# Patient Record
Sex: MALE | Race: WHITE | NOT HISPANIC OR LATINO | Employment: OTHER | URBAN - METROPOLITAN AREA
[De-identification: names, ages, dates, MRNs, and addresses within clinical notes are randomized per-mention and may not be internally consistent; named-entity substitution may affect disease eponyms.]

---

## 2017-01-09 RX ORDER — FLUTICASONE PROPIONATE 50 MCG
2 SPRAY, SUSPENSION (ML) NASAL DAILY
Qty: 16 G | Refills: 12 | Status: SHIPPED | OUTPATIENT
Start: 2017-01-09 | End: 2017-02-16 | Stop reason: SDUPTHER

## 2017-02-16 RX ORDER — FLUTICASONE PROPIONATE 50 MCG
2 SPRAY, SUSPENSION (ML) NASAL DAILY
Qty: 48 G | Refills: 0 | Status: SHIPPED | OUTPATIENT
Start: 2017-02-16 | End: 2017-06-11 | Stop reason: SDUPTHER

## 2017-04-13 ENCOUNTER — TELEPHONE (OUTPATIENT)
Dept: INTERNAL MEDICINE | Facility: CLINIC | Age: 72
End: 2017-04-13

## 2017-04-13 DIAGNOSIS — J30.9 CHRONIC ALLERGIC RHINITIS: Primary | ICD-10-CM

## 2017-04-13 NOTE — TELEPHONE ENCOUNTER
----- Message from Magdaleno Mcnamara sent at 4/13/2017  8:30 AM CDT -----  Contact: self/ [phone] home  Type: Referral to a Specialist    Where would you like a referral to? Allergist    Have you previously requested? yes    Is the physician within the [facility]? Yes    Name and phone number of specialist: Advisement of PCP    Reason for appointment: Seasonal allergies    Is an appointment scheduled with specialist? When? no    Comments:  Pt would like to request a referral for the specialist above.  He would like a call back to discuss.  Please call and advise.      Thank you

## 2017-04-13 NOTE — TELEPHONE ENCOUNTER
Spoke to patient and states that he talked to Dr. Friedman is pass regarding seeing an allergist----patient would like to get a referral for seasonal allergies and recently had an allergic reaction to something that he went to Derm for and would like to discuss that with the allergist also---pls advise

## 2017-06-10 ENCOUNTER — DOCUMENTATION ONLY (OUTPATIENT)
Dept: INTERNAL MEDICINE | Facility: CLINIC | Age: 72
End: 2017-06-10

## 2017-06-10 NOTE — PROGRESS NOTES
Pre-Visit Review & Summary:    73 y/o male with history of HTN, Pre-DM, DJD L/S Spine, Allergic Rhinitis, and BPH has a scheduled appt 6/22/17 for 6 month follow up.    He had previously been in the SPRINT Hypertension Research Trial, which has since ended. He had been randomized to the Intensive [facility] with Systolic BP Goal of <120. He has done well and BP is controlled on Atenolol 25 mg/day, Amlodipine 5 mg/day, and Losartan 100 mg/day.       PMH:    1. HTN          Atenolol 25 mg/day, Amlodipine 5 mg/day, Losartan 100 mg/day          SPRINT HTN Trial          (Intolerant of 10 mg Amlodipine)    2. Pre-DM          Hgb A1c (12/2016): 5.8    3. DJD L/S           Sx improved with low back exercises    4. Allergic Rhinitis           Zyrtec prn, Flonase Nasal [place] prn           Sx include seasonal nasal congestion    5. Hx of Kidney Stones           Cystoscopy with retrieval of Stone 2011    6. Basal Cell CA Right Ear             Excised 2011; no recurrence           Followed yearly [facility] - appt 12/8/16    7. BPH         Tamsulosin 0.4 mg/day         Proscar 5 mg/day         Followed by Urologist, Dr. Beatty    8. Hx of Colon Polyps         Last Colonoscopy 1/2013 - benign polyp; told to repeat 1/2018      MEDS:        ASA 81 mg/day        Amlodipine 5 mg/day        Atenolol 25 mg/day        Flonase Nasal [place] prn        Losartan 100 mg/day        Proscar 5 mg/day        Tamsulosin 0.4 mg/day        Zyrtec prn    ALLERGIES:        ACE Inhibitors        Chlorthalidone - hyponatremia        Intolerant of 10 mg Amlodipine    Surgical Hx:       TURP       Hemorrhoidectomy       Pilonidal Cystectomy       Right Inguinal Hernia Repair       Cystoscopy       Orchiectomy for Undescended Testicle as a child       Basal Cell CA Excision Right Ear 2011    Social Hx:      Smoking Hx: Former Smoker (10 pk yrs) - Quit 1976      ETOH: Rare      Exercise: Walks 30-45 min/day      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       Home Environment: Single      (+) Sexually Active - Female      (-) Hx of STDs    Family Hx:      (+) HTN - Father      (+) DM - Father      (+) HD - Father      (+) Dementia - Mother    Preventive Health Screening & Recent Lab Results:              Annual PE: 11/11/15              CBC (12/2016): 13.4/40.1              CMP (12/2016): normal              TSH (10/2014): 1.886              Lipids (10/2016): Chol-170, TG-68, HDL-48, LDL-108              Hgb A1c (10/2014): 6.2                             (11/2015): 6.0                             (12/2016): 5.8              PSA - done at Urologist office              Eye Exam - 3/2015 - Dr. Meyers on [address]              Colonoscopy: 1/2013 - benign polyp; repeat 2018              Immunizations                            Influenza - 9/2016                          Tdp - Nov, 2009                          Prevnar 13 - 11/11/15                          Pneumovax - 10/2014                          Zostovax - 12/2014              Other:                    EKG (3/2015): NSR                    Anemia W/U (11/2014): normal S.Fe/TIBC, and normal Ferritin    IMP:  1. HTN  2. Pre-DM  3. AR  4. BPH  5. Hx of colon Polyps  6. Hx of Kidney Stones  7. Hx of Basal Cell CA

## 2017-06-12 RX ORDER — FLUTICASONE PROPIONATE 50 MCG
SPRAY, SUSPENSION (ML) NASAL
Qty: 16 G | Refills: 0 | Status: SHIPPED | OUTPATIENT
Start: 2017-06-12 | End: 2017-07-17 | Stop reason: SDUPTHER

## 2017-06-22 ENCOUNTER — OFFICE VISIT (OUTPATIENT)
Dept: INTERNAL MEDICINE | Facility: CLINIC | Age: 72
End: 2017-06-22
Payer: MEDICARE

## 2017-06-22 VITALS
BODY MASS INDEX: 24.79 KG/M2 | WEIGHT: 163.56 LBS | DIASTOLIC BLOOD PRESSURE: 76 MMHG | HEIGHT: 68 IN | HEART RATE: 68 BPM | SYSTOLIC BLOOD PRESSURE: 126 MMHG

## 2017-06-22 DIAGNOSIS — N40.1 BPH (BENIGN PROSTATIC HYPERTROPHY) WITH URINARY OBSTRUCTION: ICD-10-CM

## 2017-06-22 DIAGNOSIS — N13.8 BPH (BENIGN PROSTATIC HYPERTROPHY) WITH URINARY OBSTRUCTION: ICD-10-CM

## 2017-06-22 DIAGNOSIS — R73.03 PRE-DIABETES: ICD-10-CM

## 2017-06-22 DIAGNOSIS — Z87.442 HISTORY OF KIDNEY STONES: ICD-10-CM

## 2017-06-22 DIAGNOSIS — Z86.010 HISTORY OF COLON POLYPS: ICD-10-CM

## 2017-06-22 DIAGNOSIS — I10 ESSENTIAL HYPERTENSION: Primary | ICD-10-CM

## 2017-06-22 PROCEDURE — 99213 OFFICE O/P EST LOW 20 MIN: CPT | Mod: PBBFAC | Performed by: INTERNAL MEDICINE

## 2017-06-22 PROCEDURE — 99999 PR PBB SHADOW E&M-EST. PATIENT-LVL III: CPT | Mod: PBBFAC,,, | Performed by: INTERNAL MEDICINE

## 2017-06-22 PROCEDURE — 99214 OFFICE O/P EST MOD 30 MIN: CPT | Mod: S$PBB,,, | Performed by: INTERNAL MEDICINE

## 2017-06-22 PROCEDURE — 1159F MED LIST DOCD IN RCRD: CPT | Mod: ,,, | Performed by: INTERNAL MEDICINE

## 2017-06-22 NOTE — PROGRESS NOTES
Subjective:       Patient ID: Jd Dill III is a 72 y.o. male.    Chief Complaint: Follow-up    71 y/o male with history of HTN, Pre-DM, DJD L/S Spine, Allergic Rhinitis, and BPH comes in for 6 month follow up.    He had previously been in the SPRINT Hypertension Research Trial, which has since ended. He had been randomized to the Intensive Care Group with Systolic BP Goal of <120. He has done well and BP is controlled on Atenolol 25 mg/day, Amlodipine 5 mg/day, and Losartan 100 mg/day.     He notes he had TURP for BPH with Dr. Quispe April, 2017. Post-op, he developed diffuse stinging rash. He saw a Dermatologist, Dr. Martines, who treated with TAC Cream and Atarax. Rash resolved. Subsequent patch testing May, 2017 was negative. No recurrence of rash since then. He is urinating well on Proscar and Flomax. He has follow up with Dr. Quispe in September, 2017.      PMH:    1. HTN          Atenolol 25 mg/day, Amlodipine 5 mg/day, Losartan 100 mg/day          SPRINT HTN Trial          (Intolerant of 10 mg Amlodipine)    2. Pre-DM          Hgb A1c (12/2016): 5.8    3. DJD L/S           Sx improved with low back exercises    4. Allergic Rhinitis           Zyrtec prn, Flonase Nasal [place] prn           Sx include seasonal nasal congestion    5. Hx of Kidney Stones           Cystoscopy with retrieval of Stone 2011    6. Basal Cell CA Right Ear             Excised 2011; no recurrence           Followed yearly [facility] - appt 12/8/16    7. BPH         Tamsulosin 0.4 mg/day         Proscar 5 mg/day         Followed by Urologist, Dr. Beatty    8. Hx of Colon Polyps         Last Colonoscopy 1/2013 - benign polyp; told to repeat 1/2018      MEDS:        ASA 81 mg/day        Amlodipine 5 mg/day        Atenolol 25 mg/day        Flonase Nasal [place] prn        Losartan 100 mg/day        Proscar 5 mg/day        Tamsulosin 0.4 mg/day        Zyrtec prn    ALLERGIES:        ACE Inhibitors        Chlorthalidone - hyponatremia         Intolerant of 10 mg Amlodipine    Surgical Hx:       TURP       Hemorrhoidectomy       Pilonidal Cystectomy       Right Inguinal Hernia Repair       Cystoscopy       Orchiectomy for Undescended Testicle as a child       Basal Cell CA Excision Right Ear 2011    Social Hx:      Smoking Hx: Former Smoker (10 pk yrs) - Quit 1976      ETOH: Rare      Exercise: Walks 30-45 min/day      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      Home Environment: Single      (+) Sexually Active - Female      (-) Hx of STDs    Family Hx:      (+) HTN - Father      (+) DM - Father      (+) HD - Father      (+) Dementia - Mother    Preventive Health Screening & Recent Lab Results:              Annual PE: 11/11/15              CBC (12/2016): 13.4/40.1              CMP (12/2016): normal              TSH (10/2014): 1.886              Lipids (10/2016): Chol-170, TG-68, HDL-48, LDL-108              Hgb A1c (10/2014): 6.2                             (11/2015): 6.0                             (12/2016): 5.8              PSA - done at Urologist office              Eye Exam - 3/2015 - Dr. Meyers on [address]              Colonoscopy: 1/2013 - benign polyp; repeat 2018              Immunizations                            Influenza - 9/2016                          Tdp - Nov, 2009                          Prevnar 13 - 11/11/15                          Pneumovax - 10/2014                          Zostovax - 12/2014              Other:                    EKG (3/2015): NSR                    Anemia W/U (11/2014): normal S.Fe/TIBC, and normal Ferritin            Review of Systems   Constitutional: Negative.  Negative for fever.   HENT: Negative for trouble swallowing.    Respiratory: Negative for shortness of breath and wheezing.    Cardiovascular: Negative for chest pain, palpitations and leg swelling.   Gastrointestinal: Negative.  Negative for abdominal pain.   Genitourinary: Negative for difficulty urinating and dysuria.   Musculoskeletal: Negative.     Neurological: Negative for dizziness, syncope, weakness, light-headedness and headaches.       Objective:      Physical Exam   Constitutional: He is oriented to person, place, and time. He appears well-developed and well-nourished.   HENT:   Head: Normocephalic.   Cardiovascular: Normal rate, regular rhythm and normal heart sounds.  Exam reveals no gallop.    No murmur heard.  Pulmonary/Chest: Effort normal. No respiratory distress. He has no wheezes. He has no rales.   Abdominal: Soft.   Musculoskeletal: He exhibits no edema.   Neurological: He is alert and oriented to person, place, and time.   Skin: Skin is warm and dry.       Assessment:   1. HTN - well controlled  2. Pre-DM  3. AR  4. BPH  5. Hx of colon Polyps  6. Hx of Kidney Stones  7. Hx of Basal Cell CA    Plan:   1. RTC in 6 months for Annual PE

## 2017-07-17 RX ORDER — FLUTICASONE PROPIONATE 50 MCG
SPRAY, SUSPENSION (ML) NASAL
Qty: 16 G | Refills: 0 | Status: SHIPPED | OUTPATIENT
Start: 2017-07-17 | End: 2017-07-18 | Stop reason: SDUPTHER

## 2017-07-18 RX ORDER — FLUTICASONE PROPIONATE 50 MCG
SPRAY, SUSPENSION (ML) NASAL
Qty: 16 G | Refills: 0 | Status: SHIPPED | OUTPATIENT
Start: 2017-07-18 | End: 2017-07-20 | Stop reason: SDUPTHER

## 2017-07-18 NOTE — TELEPHONE ENCOUNTER
----- Message from Brandee Griffith sent at 7/18/2017  4:01 PM CDT -----  Contact: [facility][phone]      RX request - refill or new RX.  Is this a refill or new RX:  Refill  RX name and strength: fluticasone (FLONASE) 50 mcg/actuation nasal spray  Directions:   Is this a 30 day or 90 day RX:  90 days  Pharmacy name and phone #: [facility] [id number] [facility] - [address].  # [phone]  Comments:  Please call and advise.       Thank you!!!

## 2017-07-20 RX ORDER — FLUTICASONE PROPIONATE 50 MCG
SPRAY, SUSPENSION (ML) NASAL
Qty: 48 G | Refills: 2 | Status: SHIPPED | OUTPATIENT
Start: 2017-07-20 | End: 2017-10-20

## 2017-08-05 DIAGNOSIS — I10 ESSENTIAL HYPERTENSION: ICD-10-CM

## 2017-08-05 RX ORDER — LOSARTAN POTASSIUM 100 MG/1
TABLET ORAL
Qty: 90 TABLET | Refills: 3 | Status: SHIPPED | OUTPATIENT
Start: 2017-08-05 | End: 2017-12-12 | Stop reason: SDUPTHER

## 2017-08-21 ENCOUNTER — TELEPHONE (OUTPATIENT)
Dept: INTERNAL MEDICINE | Facility: CLINIC | Age: 72
End: 2017-08-21

## 2017-08-21 DIAGNOSIS — I10 ESSENTIAL HYPERTENSION: Primary | ICD-10-CM

## 2017-08-21 RX ORDER — METOPROLOL SUCCINATE 25 MG/1
25 TABLET, EXTENDED RELEASE ORAL DAILY
Qty: 90 TABLET | Refills: 3 | Status: SHIPPED | OUTPATIENT
Start: 2017-08-21 | End: 2017-12-12 | Stop reason: SDUPTHER

## 2017-08-21 NOTE — TELEPHONE ENCOUNTER
Please call patient and let him know we are substituting a similar once a day medication called Metoprolol succinate 25 mg for the Atenolol 25 mg. It is also a once a day prescription.

## 2017-08-21 NOTE — TELEPHONE ENCOUNTER
----- Message from Dianna Hemphill sent at 8/21/2017 11:22 AM CDT -----  Contact: [facility] [phone]  Prescription Clarification:     The pharmacy needs clarity on the following RX:    atenolol (TENORMIN) 25 MG tablet    The 25 mg and the 50 mg is on back order. Please send a substitute RX for this medication.    Thank You

## 2017-09-04 RX ORDER — FLUTICASONE PROPIONATE 50 MCG
SPRAY, SUSPENSION (ML) NASAL
Qty: 1 BOTTLE | Refills: 2 | Status: SHIPPED | OUTPATIENT
Start: 2017-09-04 | End: 2017-10-20 | Stop reason: SDUPTHER

## 2017-09-11 ENCOUNTER — TELEPHONE (OUTPATIENT)
Dept: INTERNAL MEDICINE | Facility: CLINIC | Age: 72
End: 2017-09-11

## 2017-09-11 NOTE — TELEPHONE ENCOUNTER
----- Message from Magdaleno Mcnamara sent at 9/11/2017  2:46 PM CDT -----  Contact: self/ [phone] cell  Pt is calling because he received a letter regarding Dr. [facility].  He would like to discuss a recommendation for a new physician.  He would like a call back today, If possible.  Please call and advise.    Thank you

## 2017-10-19 RX ORDER — FLUTICASONE PROPIONATE 50 MCG
2 SPRAY, SUSPENSION (ML) NASAL DAILY
Qty: 48 G | Refills: 0 | Status: CANCELLED | OUTPATIENT
Start: 2017-10-19

## 2017-10-20 RX ORDER — FLUTICASONE PROPIONATE 50 MCG
2 SPRAY, SUSPENSION (ML) NASAL DAILY
Qty: 3 BOTTLE | Refills: 0 | Status: SHIPPED | OUTPATIENT
Start: 2017-10-20 | End: 2017-12-12 | Stop reason: SDUPTHER

## 2017-10-20 NOTE — TELEPHONE ENCOUNTER
----- Message from Annabelle Rouse sent at 10/20/2017 11:29 AM CDT -----  Contact: [facility] [phone]  RX request - refill or new RX.  Is this a refill or new RX:  refill  RX name and strength: fluticasone (FLONASE) 50 mcg/actuation nasal spray  Directions:   Is this a 30 day or 90 day RX:  90  Pharmacy name and phone # : [facility] [phone]  Comments:

## 2017-12-12 ENCOUNTER — OFFICE VISIT (OUTPATIENT)
Dept: INTERNAL MEDICINE | Facility: CLINIC | Age: 72
End: 2017-12-12
Payer: MEDICARE

## 2017-12-12 ENCOUNTER — TELEPHONE (OUTPATIENT)
Dept: INTERNAL MEDICINE | Facility: CLINIC | Age: 72
End: 2017-12-12

## 2017-12-12 ENCOUNTER — LAB VISIT (OUTPATIENT)
Dept: LAB | Facility: HOSPITAL | Age: 72
End: 2017-12-12
Attending: INTERNAL MEDICINE
Payer: MEDICARE

## 2017-12-12 VITALS
HEART RATE: 83 BPM | WEIGHT: 170.44 LBS | OXYGEN SATURATION: 98 % | HEIGHT: 68 IN | DIASTOLIC BLOOD PRESSURE: 64 MMHG | BODY MASS INDEX: 25.83 KG/M2 | SYSTOLIC BLOOD PRESSURE: 118 MMHG

## 2017-12-12 DIAGNOSIS — R09.81 NASAL CONGESTION: Primary | ICD-10-CM

## 2017-12-12 DIAGNOSIS — I10 ESSENTIAL HYPERTENSION: ICD-10-CM

## 2017-12-12 DIAGNOSIS — N13.8 BENIGN PROSTATIC HYPERPLASIA WITH URINARY OBSTRUCTION: ICD-10-CM

## 2017-12-12 DIAGNOSIS — R73.03 PRE-DIABETES: ICD-10-CM

## 2017-12-12 DIAGNOSIS — N40.1 BENIGN PROSTATIC HYPERPLASIA WITH URINARY OBSTRUCTION: ICD-10-CM

## 2017-12-12 LAB
ALBUMIN SERPL BCP-MCNC: 4.1 G/DL
ALP SERPL-CCNC: 67 U/L
ALT SERPL W/O P-5'-P-CCNC: 20 U/L
ANION GAP SERPL CALC-SCNC: 6 MMOL/L
AST SERPL-CCNC: 19 U/L
BILIRUB SERPL-MCNC: 0.5 MG/DL
BUN SERPL-MCNC: 12 MG/DL
CALCIUM SERPL-MCNC: 9.9 MG/DL
CHLORIDE SERPL-SCNC: 101 MMOL/L
CHOLEST SERPL-MCNC: 158 MG/DL
CHOLEST/HDLC SERPL: 3.4 {RATIO}
CO2 SERPL-SCNC: 29 MMOL/L
CREAT SERPL-MCNC: 0.9 MG/DL
EST. GFR  (AFRICAN AMERICAN): >60 ML/MIN/1.73 M^2
EST. GFR  (NON AFRICAN AMERICAN): >60 ML/MIN/1.73 M^2
ESTIMATED AVG GLUCOSE: 123 MG/DL
GLUCOSE SERPL-MCNC: 96 MG/DL
HBA1C MFR BLD HPLC: 5.9 %
HDLC SERPL-MCNC: 47 MG/DL
HDLC SERPL: 29.7 %
LDLC SERPL CALC-MCNC: 88 MG/DL
NONHDLC SERPL-MCNC: 111 MG/DL
POTASSIUM SERPL-SCNC: 4.7 MMOL/L
PROT SERPL-MCNC: 6.8 G/DL
SODIUM SERPL-SCNC: 136 MMOL/L
TRIGL SERPL-MCNC: 115 MG/DL

## 2017-12-12 PROCEDURE — 99213 OFFICE O/P EST LOW 20 MIN: CPT | Mod: PBBFAC | Performed by: INTERNAL MEDICINE

## 2017-12-12 PROCEDURE — 80053 COMPREHEN METABOLIC PANEL: CPT

## 2017-12-12 PROCEDURE — 80061 LIPID PANEL: CPT

## 2017-12-12 PROCEDURE — 99999 PR PBB SHADOW E&M-EST. PATIENT-LVL III: CPT | Mod: PBBFAC,,, | Performed by: INTERNAL MEDICINE

## 2017-12-12 PROCEDURE — 99214 OFFICE O/P EST MOD 30 MIN: CPT | Mod: S$PBB,,, | Performed by: INTERNAL MEDICINE

## 2017-12-12 PROCEDURE — 36415 COLL VENOUS BLD VENIPUNCTURE: CPT

## 2017-12-12 PROCEDURE — 83036 HEMOGLOBIN GLYCOSYLATED A1C: CPT

## 2017-12-12 RX ORDER — AZELASTINE 1 MG/ML
1 SPRAY, METERED NASAL 2 TIMES DAILY
Qty: 30 ML | Refills: 1 | Status: SHIPPED | OUTPATIENT
Start: 2017-12-12 | End: 2018-02-08 | Stop reason: SDUPTHER

## 2017-12-12 RX ORDER — METOPROLOL SUCCINATE 25 MG/1
25 TABLET, EXTENDED RELEASE ORAL DAILY
Qty: 90 TABLET | Refills: 3 | Status: SHIPPED | OUTPATIENT
Start: 2017-12-12 | End: 2019-01-31 | Stop reason: SDUPTHER

## 2017-12-12 RX ORDER — AMLODIPINE BESYLATE 5 MG/1
5 TABLET ORAL DAILY
Qty: 90 TABLET | Refills: 3 | Status: SHIPPED | OUTPATIENT
Start: 2017-12-12 | End: 2018-11-27 | Stop reason: SDUPTHER

## 2017-12-12 RX ORDER — LOSARTAN POTASSIUM 100 MG/1
100 TABLET ORAL DAILY
Qty: 90 TABLET | Refills: 3 | Status: SHIPPED | OUTPATIENT
Start: 2017-12-12 | End: 2019-01-20 | Stop reason: SDUPTHER

## 2017-12-12 RX ORDER — FLUTICASONE PROPIONATE 50 MCG
2 SPRAY, SUSPENSION (ML) NASAL DAILY
Qty: 3 BOTTLE | Refills: 11 | Status: SHIPPED | OUTPATIENT
Start: 2017-12-12 | End: 2019-01-14

## 2017-12-12 NOTE — TELEPHONE ENCOUNTER
----- Message from Tomasz Pina sent at 12/12/2017 11:45 AM CST -----  Contact: self [phone]  Patient need clarification on medication losartan (COZAAR) 100 MG tablet . Please advise, Thanks

## 2017-12-12 NOTE — TELEPHONE ENCOUNTER
Spoke to patient and wanted to clarify if the losartan 100 mg changed---looked over med list and it has not changed

## 2017-12-12 NOTE — PROGRESS NOTES
Subjective:       Patient ID: Jd Dill III is a 72 y.o. male.    Chief Complaint: Follow-up    Here to re-est care.    frequ nasal divina, gatito at noc. Symptoms for yrs. Takes flonase and zyrtec but still has the prob. Worse in spring and fall, but yr round. Knows he's allergic to feathers which he avoids.      Review of Systems   Constitutional: Negative for appetite change and unexpected weight change.   HENT: Positive for congestion, postnasal drip and rhinorrhea.    Respiratory: Negative for chest tightness and shortness of breath.    Cardiovascular: Negative for chest pain.   Gastrointestinal: Negative for abdominal pain.   Genitourinary: Negative for difficulty urinating, scrotal swelling and testicular pain.   Skin:        No lesions       Objective:      Physical Exam   Constitutional: He is oriented to person, place, and time. He appears well-developed and well-nourished. No distress.   HENT:   Head: Normocephalic and atraumatic.   Nasal mucosa mildly boggy, no purulence seen   Eyes: No scleral icterus.   Neck: Normal range of motion. No thyromegaly present.   Cardiovascular: Normal rate, regular rhythm and normal heart sounds.  Exam reveals no gallop and no friction rub.    No murmur heard.  Pulmonary/Chest: Effort normal and breath sounds normal. No respiratory distress. He has no wheezes. He has no rales.   Abdominal: Soft. Bowel sounds are normal. He exhibits no distension and no mass. There is no tenderness. There is no rebound and no guarding.   Musculoskeletal: Normal range of motion. He exhibits no edema.   Lymphadenopathy:     He has no cervical adenopathy.   Neurological: He is alert and oriented to person, place, and time.   Skin: No lesion noted. He is not diaphoretic.   Psychiatric: He has a normal mood and affect. Thought content normal.       Assessment:       1. Nasal congestion    2. Essential hypertension    3. Benign prostatic hyperplasia with urinary obstruction    4. Pre-diabetes         Plan:       Jd was seen today for follow-up.    Diagnoses and all orders for this visit:    Nasal congestion  -     azelastine (ASTELIN) 137 mcg (0.1 %) nasal spray; 1 spray (137 mcg total) by Nasal route 2 (two) times daily.  -     fluticasone (FLONASE) 50 mcg/actuation nasal spray; 2 sprays by Each Nare route once daily.  Ok to d/c zytrect while trying astelin.  Explained that if not better in 1-2 mos, pt should rtc/call PCP then consider allergy referral        Essential hypertension  -     amLODIPine (NORVASC) 5 MG tablet; Take 1 tablet (5 mg total) by mouth once daily.  -     losartan (COZAAR) 100 MG tablet; Take 1 tablet (100 mg total) by mouth once daily.  -     metoprolol succinate (TOPROL-XL) 25 MG 24 hr tablet; Take 1 tablet (25 mg total) by mouth once daily.  -     Lipid panel; Future  -     Comprehensive metabolic panel; Future  -     Hemoglobin A1c; Future  controlled    Benign prostatic hyperplasia with urinary obstruction  Sees urologist    Pre-diabetes  -     Hemoglobin A1c; Future        Health Maintenance       Date Due Completion Date    TETANUS VACCINE 11/23/2019 11/23/2009    Lipid Panel 11/30/2021 11/30/2016    Colonoscopy 01/02/2023 1/2/2013 (Done)    Override on 1/2/2013: Done      [place] due 1-2 mos, Dr Bess EJ    Return in about 1 year (around 12/12/2018).

## 2017-12-18 ENCOUNTER — RESEARCH ENCOUNTER (OUTPATIENT)
Dept: RESEARCH | Facility: HOSPITAL | Age: 72
End: 2017-12-18
Payer: MEDICARE

## 2018-01-29 ENCOUNTER — TELEPHONE (OUTPATIENT)
Dept: INTERNAL MEDICINE | Facility: CLINIC | Age: 73
End: 2018-01-29

## 2018-02-08 DIAGNOSIS — R09.81 NASAL CONGESTION: ICD-10-CM

## 2018-02-08 RX ORDER — AZELASTINE 1 MG/ML
SPRAY, METERED NASAL
Qty: 30 ML | Refills: 11 | Status: SHIPPED | OUTPATIENT
Start: 2018-02-08 | End: 2018-10-16

## 2018-08-27 ENCOUNTER — OFFICE VISIT (OUTPATIENT)
Dept: INTERNAL MEDICINE | Facility: CLINIC | Age: 73
End: 2018-08-27
Payer: MEDICARE

## 2018-08-27 VITALS
WEIGHT: 166.88 LBS | DIASTOLIC BLOOD PRESSURE: 68 MMHG | HEIGHT: 68 IN | OXYGEN SATURATION: 99 % | SYSTOLIC BLOOD PRESSURE: 120 MMHG | HEART RATE: 63 BPM | BODY MASS INDEX: 25.29 KG/M2

## 2018-08-27 DIAGNOSIS — J30.9 ALLERGIC RHINITIS, UNSPECIFIED SEASONALITY, UNSPECIFIED TRIGGER: Primary | ICD-10-CM

## 2018-08-27 PROCEDURE — 99213 OFFICE O/P EST LOW 20 MIN: CPT | Mod: S$PBB,,, | Performed by: INTERNAL MEDICINE

## 2018-08-27 PROCEDURE — 99214 OFFICE O/P EST MOD 30 MIN: CPT | Mod: PBBFAC | Performed by: INTERNAL MEDICINE

## 2018-08-27 PROCEDURE — 99999 PR PBB SHADOW E&M-EST. PATIENT-LVL IV: CPT | Mod: PBBFAC,,, | Performed by: INTERNAL MEDICINE

## 2018-08-27 NOTE — PROGRESS NOTES
Subjective:       Patient ID: Jd Dill III is a 73 y.o. male.    Chief Complaint: Follow-up (chest congestion for about 3 weeks, has taking mucinex, has gotten better )    Here for semiurgent appt --  (chest congestion for about 3 weeks, has taking mucinex, has gotten better )  Still chest cough. He suspects from allergies, needs to prop himself up at noc to sleep.  No f/c/ns, no purulent sputum. He knows he is allergic to feathers, no animals in the home and no clear allergen exposure recently.      Review of Systems   Constitutional: Negative for appetite change and unexpected weight change.   HENT: Positive for congestion, postnasal drip and rhinorrhea.    Respiratory: Positive for cough. Negative for chest tightness and shortness of breath.    Cardiovascular: Negative for chest pain.   Gastrointestinal: Negative for abdominal pain.   Genitourinary: Negative for difficulty urinating, scrotal swelling and testicular pain.   Skin:        No lesions       Objective:      Physical Exam   Constitutional: He is oriented to person, place, and time. He appears well-developed and well-nourished. No distress.   HENT:   Head: Normocephalic and atraumatic.   Nasal mucosa mildly boggy, no purulence seen   Eyes: No scleral icterus.   Neck: Normal range of motion. No thyromegaly present.   Cardiovascular: Normal rate, regular rhythm and normal heart sounds. Exam reveals no gallop and no friction rub.   No murmur heard.  Pulmonary/Chest: Effort normal and breath sounds normal. No respiratory distress. He has no wheezes. He has no rales.   Abdominal: Soft. Bowel sounds are normal. He exhibits no distension and no mass. There is no tenderness. There is no rebound and no guarding.   Musculoskeletal: Normal range of motion. He exhibits no edema.   Lymphadenopathy:     He has no cervical adenopathy.   Neurological: He is alert and oriented to person, place, and time.   Skin: No lesion noted. He is not diaphoretic.   Psychiatric:  He has a normal mood and affect. Thought content normal.       Assessment:       1. Allergic rhinitis, unspecified seasonality, unspecified trigger        Plan:       Jd was seen today for follow-up.    Diagnoses and all orders for this visit:    Allergic rhinitis, unspecified seasonality, unspecified trigger  -     Ambulatory Referral to Allergy  Patient Instructions   consider adding Singulair or montelukast allergy medicine          Health Maintenance       Date Due Completion Date    Influenza Vaccine 08/01/2018 12/12/2017 (Done)    Override on 12/12/2017: Done    Override on 9/28/2016: Done    TETANUS VACCINE 11/23/2019 11/23/2009    Lipid Panel 12/12/2022 12/12/2017    Colonoscopy 04/19/2023 4/19/2018 (Done)    Override on 4/19/2018: Done (dr [facility])    Override on 1/2/2013: Done          No Follow-up on file.    Future Appointments   Date Time Provider Department Center   8/28/2018 10:00 AM TUSHAR Aldana III, MD [facility]

## 2018-08-28 ENCOUNTER — OFFICE VISIT (OUTPATIENT)
Dept: ALLERGY | Facility: CLINIC | Age: 73
End: 2018-08-28
Payer: MEDICARE

## 2018-08-28 VITALS
OXYGEN SATURATION: 97 % | HEIGHT: 68 IN | HEART RATE: 82 BPM | SYSTOLIC BLOOD PRESSURE: 140 MMHG | DIASTOLIC BLOOD PRESSURE: 78 MMHG | BODY MASS INDEX: 25.33 KG/M2 | WEIGHT: 167.13 LBS

## 2018-08-28 DIAGNOSIS — I10 ESSENTIAL HYPERTENSION: ICD-10-CM

## 2018-08-28 DIAGNOSIS — N13.8 BENIGN PROSTATIC HYPERPLASIA WITH URINARY OBSTRUCTION: ICD-10-CM

## 2018-08-28 DIAGNOSIS — N40.1 BENIGN PROSTATIC HYPERPLASIA WITH URINARY OBSTRUCTION: ICD-10-CM

## 2018-08-28 DIAGNOSIS — Z87.442 HISTORY OF KIDNEY STONES: ICD-10-CM

## 2018-08-28 DIAGNOSIS — J31.0 RHINITIS, UNSPECIFIED TYPE: Primary | ICD-10-CM

## 2018-08-28 PROCEDURE — 99213 OFFICE O/P EST LOW 20 MIN: CPT | Mod: PBBFAC | Performed by: ALLERGY & IMMUNOLOGY

## 2018-08-28 PROCEDURE — 99204 OFFICE O/P NEW MOD 45 MIN: CPT | Mod: S$PBB,,, | Performed by: ALLERGY & IMMUNOLOGY

## 2018-08-28 PROCEDURE — 99999 PR PBB SHADOW E&M-EST. PATIENT-LVL III: CPT | Mod: PBBFAC,,, | Performed by: ALLERGY & IMMUNOLOGY

## 2018-08-28 NOTE — LETTER
August 28, 2018      Jabier Hinkle MD  [address] [place]  [facility] [address]           [facility] All[facility]/ [facility]ology  [address]  [facility] [address]  Phone: [phone]  Fax: [phone]          Patient: Jd Dill III   MR Number: [id number]   YOB: 1945   Date of Visit: 8/28/2018       Dear Dr. Jabier Hinkle:    Thank you for referring Jd Dill to me for evaluation. Attached you will find relevant portions of my assessment and plan of care.    If you have questions, please do not hesitate to call me. I look forward to following Jd Dill along with you.    Sincerely,    TUSHAR Aldana III, MD    Enclosure  CC:  No Recipients    If you would like to receive this communication electronically, please contact [email or web address] or [phone] to request more information on [employer] access.    For providers and/or their staff who would like to refer a patient to [facility], please contact us through our one-stop-shop provider referral line, [facility], at [phone].    If you feel you have received this communication in error or would no longer like to receive these types of communications, please e-mail [email or web address]

## 2018-08-28 NOTE — PROGRESS NOTES
Jd Dill is referred by Dr. Jabier Hinkle for a consult regarding chronic rhinitis.  He is here alone.    He grew up in [place], [place] and then went [employer].  He lived in several locations while he was in the [employer] and settled in to [place] many years ago.    Twenty years ago he developed mild increased rhinitis which has been increasing since then.  It has gotten much worse in the last year.    He has bilateral nasal congestion on particularly when he lies down.  He has to sleep propped up on pillows.  He also has mild clear rhinorrhea and postnasal drip.  He denies any hoarseness, sore throats, throat clearing, difficulty swallowing, or cough.  He does have some congestion in his chest.  He was taking Mucinex and this did help.    Yesterday he noticed day bump in the right nostril.    He has been taking Zyrtec without relief.  He has also been taking azelastine without relief.  He just started taking them together yesterday.  He also takes Flonase daily.    He does occasionally have indigestion and heartburn.  He sleeps with Tums next to his bed.  He using eats this about once a month.    Twice in the past he has developed a rash on the back of his head after sleeping on feather pillows.    For ROS, FH, SH please see Allergy and Asthma Questionnaire dated today.    Some relevant pertinent positives:    Review of Systems:  He had a kidney stone about five years ago.  He has BPH and is taking Flomax and Proscar.  He has hypertension and is taking amlodipine, losartan, and metoprolol.    Family History:  His mother had rhinitis with nasal congestion.    Home environment:  He has lived in the same house for the past 16 years.  There was no water damage.  There is no evidence of mold.  There is no carpeting in the bedroom.  There are no pets.    Social History:  He has smoked in the past minimally.  There are no pets.    Physical Examination:  General: Well-developed, well-nourished, no acute distress.  Head: No  sinus tenderness.  Eyes: Conjunctivae:  No bulbar or palpebral conjunctival injection.  Ears: EAC's clear.  TM's clear.  No pre-auricular nodes.  Nose: Nasal Mucosa:  Pink.  Septum: No apparent deviation.  Turbinates:  No significant edema.  Polyps/Mass:  None visible.  Teeth/Gums:  No bleeding noted.  Oropharynx: No exudates.  Neck: Supple without thyromegaly. No cervical lymphadenopathy.    Respiratory/Chest: Effort: Good.  Auscultation:  Clear bilaterally.  Cardiovascular:  No murmur, rubs, or gallop heard.   GI:  Non-tender.  No masses.  No organomegaly.  Extremities:  No swelling.  No cyanosis, clubbing, or edema.  Skin: Good turgor.  No urticaria or angioedema.  Neuro/Psych: Oriented x 3.    Assessment:  1.  Chronic rhinitis, consider allergic.  2.  Mild GERD.  3.  Hypertension on amlodipine, losartan, and metoprolol.  4.  History of nephrolithiasis.  5.  BPH.    Recommendations:  1.  Laboratory as ordered.  2.  Continue present medications for now.  3.  Return to clinic in one week.  4.  Consider ENT evaluation for LPR.

## 2018-09-04 ENCOUNTER — OFFICE VISIT (OUTPATIENT)
Dept: ALLERGY | Facility: CLINIC | Age: 73
End: 2018-09-04
Payer: MEDICARE

## 2018-09-04 VITALS
HEIGHT: 68 IN | BODY MASS INDEX: 25.79 KG/M2 | SYSTOLIC BLOOD PRESSURE: 138 MMHG | WEIGHT: 170.19 LBS | DIASTOLIC BLOOD PRESSURE: 72 MMHG

## 2018-09-04 DIAGNOSIS — I10 ESSENTIAL HYPERTENSION: ICD-10-CM

## 2018-09-04 DIAGNOSIS — K21.9 GASTROESOPHAGEAL REFLUX DISEASE WITHOUT ESOPHAGITIS: ICD-10-CM

## 2018-09-04 DIAGNOSIS — J30.9 ALLERGIC RHINITIS, UNSPECIFIED SEASONALITY, UNSPECIFIED TRIGGER: Primary | ICD-10-CM

## 2018-09-04 DIAGNOSIS — K21.9 LARYNGOPHARYNGEAL REFLUX: ICD-10-CM

## 2018-09-04 PROCEDURE — 99213 OFFICE O/P EST LOW 20 MIN: CPT | Mod: PBBFAC | Performed by: ALLERGY & IMMUNOLOGY

## 2018-09-04 PROCEDURE — 99999 PR PBB SHADOW E&M-EST. PATIENT-LVL III: CPT | Mod: PBBFAC,,, | Performed by: ALLERGY & IMMUNOLOGY

## 2018-09-04 PROCEDURE — 99214 OFFICE O/P EST MOD 30 MIN: CPT | Mod: S$PBB,,, | Performed by: ALLERGY & IMMUNOLOGY

## 2018-09-04 NOTE — PROGRESS NOTES
Jd Dill returns to clinic today for continued evaluation of chronic rhinitis.  He is here alone.  He was last seen August 28, 2018.    Since his last visit, he has been about the same.  Today he describes chronic rhinitis with nasal congestion bilaterally, postnasal drip, hoarseness, throat clearing, difficulty swallowing, and a cough that is usually present in the morning.  He denies any wheezing or shortness of breath.    He also this says that he does have indigestion particularly at night that will wake him up.    He has been taking Zyrtec and Flonase daily.  He has been taking azelastine one spray twice a day since December.  With these medications he still does not have control of his symptoms.    He takes Tums for as needed heartburn.  He may use this about once a month.    He takes metoprolol for his hypertension.    [facility] QUESTIONNAIRE SHORT 9/4/2018   Are you taking any new medications since your last visit? No   Constitution: No symptoms, No changes since my last visit with this provider   Head or facial pain: No changes since my last visit with this provider   Eyes: No symptoms   Ears: No symptoms   Nose: Post nasal drip, Sniffling, Sneezing, Runny nose, Blocked nose   Throat: Hoarseness, Reflux/ heartburn   Sinuses: No symptoms   Lungs: Cough   Skin: No symptoms   Cardiovascular: High blood pressue   Gastrointestinal: Heartburn/ indigestion/ reflux   Genital/ urinary Urination at night more than twice   Musculoskeletal: No symptoms   Neurologic: No symptoms   Endocrine: No symptoms   Hematologic: Bruises/ bleeds easily     Physical Examination:  General: Well-developed, well-nourished, no acute distress.  Head: No sinus tenderness.  Eyes: Conjunctivae:  No bulbar or palpebral conjunctival injection.  Ears: EAC's clear.  TM's clear.  No pre-auricular nodes.  Nose: Nasal Mucosa:  Pink.  Septum: No apparent deviation.  Turbinates:  No significant edema.  Polyps/Mass:  None  visible.  Teeth/Gums:  No bleeding noted.  Oropharynx: No exudates.  Neck: Supple without thyromegaly. No cervical lymphadenopathy.    Respiratory/Chest: Effort: Good.  Auscultation:  Clear bilaterally.  Skin: Good turgor.  No urticaria or angioedema.  Neuro/Psych: Oriented x 3.    Laboratory 08/28/2018:  IgE level:  167.  ImmunoCAP:  Class II:  DM f.  Class I:  DM pt.  A    Assessment:  1.  Allergic rhinitis.  2.  Mild GERD.  3.  Consider LPR.  4.  Hypertension on amlodipine, losartan, and metoprolol.  5.  History of nephrolithiasis.  6.  BPH.    Recommendations:  1.  House dust mite avoidance procedures.  2.  Continue Zyrtec, Flonase, and azelastine.  3.  ENT evaluation for LPR.  4.  Return to clinic in three four weeks or sooner if needed.    Allergic mechanisms and treatment options were reviewed in detail.  House dust mite avoidance was reviewed.    LPR and web site were reviewed.

## 2018-09-11 ENCOUNTER — OFFICE VISIT (OUTPATIENT)
Dept: OTOLARYNGOLOGY | Facility: CLINIC | Age: 73
End: 2018-09-11
Payer: MEDICARE

## 2018-09-11 VITALS — HEIGHT: 68 IN | WEIGHT: 167.31 LBS | BODY MASS INDEX: 25.36 KG/M2

## 2018-09-11 DIAGNOSIS — H61.23 BILATERAL IMPACTED CERUMEN: ICD-10-CM

## 2018-09-11 DIAGNOSIS — J34.89 NASAL SEPTAL SPUR: ICD-10-CM

## 2018-09-11 DIAGNOSIS — K21.9 LPRD (LARYNGOPHARYNGEAL REFLUX DISEASE): Primary | ICD-10-CM

## 2018-09-11 DIAGNOSIS — R09.81 CHRONIC NASAL CONGESTION: ICD-10-CM

## 2018-09-11 DIAGNOSIS — J30.89 ALLERGIC RHINITIS DUE TO DUST MITE: ICD-10-CM

## 2018-09-11 PROCEDURE — 69210 REMOVE IMPACTED EAR WAX UNI: CPT | Mod: 50,PBBFAC,PO | Performed by: NURSE PRACTITIONER

## 2018-09-11 PROCEDURE — 99203 OFFICE O/P NEW LOW 30 MIN: CPT | Mod: 25,S$PBB,, | Performed by: NURSE PRACTITIONER

## 2018-09-11 PROCEDURE — 31575 DIAGNOSTIC LARYNGOSCOPY: CPT | Mod: PBBFAC,PO | Performed by: NURSE PRACTITIONER

## 2018-09-11 PROCEDURE — 31575 DIAGNOSTIC LARYNGOSCOPY: CPT | Mod: S$PBB,,, | Performed by: NURSE PRACTITIONER

## 2018-09-11 PROCEDURE — 99999 PR PBB SHADOW E&M-EST. PATIENT-LVL IV: CPT | Mod: PBBFAC,,, | Performed by: NURSE PRACTITIONER

## 2018-09-11 PROCEDURE — 69210 REMOVE IMPACTED EAR WAX UNI: CPT | Mod: 51,S$PBB,, | Performed by: NURSE PRACTITIONER

## 2018-09-11 PROCEDURE — 99214 OFFICE O/P EST MOD 30 MIN: CPT | Mod: PBBFAC,PO,25 | Performed by: NURSE PRACTITIONER

## 2018-09-11 RX ORDER — OMEPRAZOLE 40 MG/1
40 CAPSULE, DELAYED RELEASE ORAL
Qty: 30 CAPSULE | Refills: 11 | Status: SHIPPED | OUTPATIENT
Start: 2018-09-11 | End: 2019-07-01

## 2018-09-11 NOTE — PATIENT INSTRUCTIONS
"  How Acid Reflux Affects Your Throat    Do you have to clear your throat or cough often? Are you hoarse? Do you have trouble swallowing? If you have these or other throat symptoms, you may have acid reflux. This occurs when stomach acid flows back up and irritates your throat.  Why you have throat symptoms  There are muscles (esophageal sphincters) at both ends of the tube that carries food to your stomach (the esophagus). These muscles relax to let food pass. Then they tighten to keep stomach acid down. When the lower esophageal sphincter (LES) doesnt tighten enough, acid can flow back (reflux) from your stomach into your esophagus. This may cause heartburn. In some cases the upper esophageal sphincter (UES) also doesnt work well. Then acid can travel higher and enter your throat (pharynx). In many cases, this causes throat symptoms.  Common throat symptoms  · Need to clear your throat often  · Feeling like youre choking  · Long-term (chronic) cough  · Hoarseness  · Trouble swallowing  · Feel like you have a lump in your throat  · Sour or acid taste  · Sore throat that keeps coming back     LARYNGOPHARYNGEAL REFLUX  (SILENT OR ATYPICAL REFLUX)    If you have any of the following symptoms you may have laryngopharyngeal reflux (LPR):  hoarseness, thick or too much mucus, chronic throat pain/irritation, chronic throat clearing, chronic cough, especially cough that wake you up from sleep, chronic "postnasal drip" without the need to blow your nose.     Many people with LPR do not have symptoms of heartburn. Compared to the esophagus, the voice box and the back of the throat are significantly more sensitive to the effects of acid on surrounding tissue. Acid passing quickly through the esophagus does not have a chance to irritate the area for too long.  However acid that pools in the throat or voice box can cause prolonged irritation resulting in the symptoms of LPR.    The symptoms of LPR can consist of a dry cough " "and the sensation of something being stuck in the throat.  Some people will complain of heartburn while others may have intermittent hoarseness or loss of voice.  Another major symptom of LPR is "postnasal drip."  Patients are often told symptoms are due to abnormal nasal drainage or sinus infection; however this is rarely the cause of chronic throat irritation. For post nasal drip to cause the complaints described, signs and symptoms of an active nasal infection should be present.     Treatments for LPR include:  postural changes, weight reduction, diet modification, medication to reduce stomach acid and promote normal motility, and surgery to prevent reflux. Most patients will begin to notice some relief in her symptoms about 2 weeks after starting the medication; however it is generally recommended the medication should be continued for 2 months. If the symptoms completely resolve, the medication can then be tapered.  Some people will remain symptom free while others may have relapses which required treatment again.    Things you can do to prevent reflux include:  Do not smoke.  Smoking will cause reflux.  Avoid tight fitting clothes or belts around the waist.  Avoid eating at least 2 hours prior to bedtime.  In fact avoid eating your largest meal at night.  Weight loss.  For patient's with recent weight gain, shedding a few pounds is all that is required to improve reflux.  Avoid caffeine, cola beverages, citrus beverages, mints, alcoholic beverages, particularly at night, cheese, fried foods, spicy foods, eggs, and chocolate.  Sleep with the head of bed elevated at least 6 inches.    Recommendations:    Take Nexium or Prilosec (PPI) every morning on an empty stomach (30-60 minutes before eating) 40 MG.   At bedtime take Zantac (H2-blocker) 150-300 mg.    After 4-8 weeks, with significant symptomatic improvement, you may begin weaning your reflux medications down:  Nexium or Prilosec 40 mg --> to 20 mg " (over-the-counter strength)  Zantac 300 mg --> to 150 mg (over-the-counter stength)  Wean as tolerated.   See a Gastro doctor (GI) for refractory symptoms and continued management.        Nasal Surgery: Septoplasty    Youre scheduled to have nasal surgery. The type of nasal surgery youre having is called septoplasty. Read on to learn more about what to expect during this surgery.During surgery, the surgeon may remove cartilage and bone to reshape the deviated septum. After surgery, there is more breathing space. Enough cartilage and bone remain to give the nose support.  What to expect during septoplasty  This surgery repairs a blockage inside the nose caused by a deviated septum. With a deviated septum, there is a problem with the wall that divides the nose into two chambers. A deviated septum may block air coming through one or both nostrils. This makes it harder for you to breathe through your nose. During septoplasty, the surgeon makes incisions inside the nose. Then the surgeon trims, reshapes, moves, or removes cartilage and sometimes bone from the septum.  Risks and possible complications  As with any surgery, nasal surgery has some risks. These include a slight risk of bleeding and infection. Your doctor will discuss any other risks and complications with you.   After septoplasty  After septoplasty, youll be taken to a recovery area or to your hospital room. Your experience may be as follows:  · You may have packing material inside your nose. This reduces bleeding and promotes healing. You may also have bandages (dressings) on the outside of your nose.  · Its normal to have some mucus and blood drain from your nose. Until packing is removed, you may have to breathe through your mouth.  · You may have some swelling or bruising around the eyelids if a rhinoplasty was also done.  · Expect some throat dryness and irritation.  · Pain medicine will be prescribed as needed. Dont take medicine that contains  aspirin or ibuprofen. These can cause increased bleeding.  Follow-up care  Youll need to follow up with your doctor within a week after your surgery. Here is what to expect:  · Any packing, splint, or dressings will probably be removed. You may feel slight discomfort and bleed a little when this is done.  · After the splint or packing is removed, youll most likely breathe better than you did before surgery.  · You may have minor numbness, pain, swelling, and a little stiffness under the tip of the nose.  · In a few days, the inside of your nose may swell and briefly block your breathing. Or a scab or crust may block breathing for a short time. Leave the scab alone. Your doctor can remove it. Using saline (irrigation or aerosol) regularly after surgery helps to reduce the amount of crusting at each visit.  · Contact your healthcare provider if you have any questions or concerns.  Date Last Reviewed: 11/1/2016  © 5180-0876 [employer]. [address] [place] [address], [place] [address]. All rights reserved. This information is not intended as a substitute for professional medical care. Always follow your healthcare professional's instructions.

## 2018-09-11 NOTE — LETTER
September 11, 2018      TUSHAR Aldana III, MD  [address] [facility]  [facility] [address]           [facility]  [address] [facility] [address] [place] [address]  Phone: [phone]  Fax: [phone]          Patient: Jd Dill III   MR Number: [id number]   YOB: 1945   Date of Visit: 9/11/2018       Dear Dr. TSUHAR Aldana III:    Thank you for referring Jd Dill to me for evaluation. Attached you will find relevant portions of my assessment and plan of care.    If you have questions, please do not hesitate to call me. I look forward to following Jd Dill along with you.    Sincerely,    Claudia Brian NP    Enclosure  CC:  No Recipients    If you would like to receive this communication electronically, please contact [email or web address] or [phone] to request more information on [facility] [employer] access.    For providers and/or their staff who would like to refer a patient to [facility], please contact us through our one-stop-shop provider referral line, [facility] [place], at [phone].    If you feel you have received this communication in error or would no longer like to receive these types of communications, please e-mail [email or web address]

## 2018-09-11 NOTE — PROGRESS NOTES
"Subjective:       Patient ID: Jd Dill III is a 73 y.o. male.    Chief Complaint: Cough and Eval LPR    HPI   Patient saw his internist 12/12/17 for nasal congestion; treated with Astelin, Flonase, Zyrtec. Patient saw his internist again for nasal congestion on 8/27/18; referred to allergist. Patient saw allergist on 8/28/18 for evaluation. Elevated IgE @ 167 and Class I & II responses to both types of dust mites; all else negative. His reports his nasal congestion, rhinorrhea, sneezing are worse at night when he is in bed, then gradually improve throughout the morning and into the day he is fine, then all symptoms worsen again at night in bed (explained that is likely where he has the most contact with dust mites).   Patient reports "chest congestion" started 3-4 weeks ago and is now completely resolved.   Patient states reflux wakes him up in the middle of the night approximately once every 6-8 weeks.     Review of Systems   Constitutional: Negative.    HENT: Positive for congestion, rhinorrhea and sneezing.    Eyes: Negative.    Respiratory: Negative.  Negative for cough.    Cardiovascular: Negative.    Gastrointestinal: Negative.    Musculoskeletal: Negative.    Skin: Negative.    Neurological: Negative.    Hematological: Negative.    Psychiatric/Behavioral: Negative.        Objective:      Physical Exam   Constitutional: He is oriented to person, place, and time. Vital signs are normal. He appears well-developed and well-nourished. He is cooperative. He does not appear ill. No distress.   HENT:   Head: Normocephalic and atraumatic.   Right Ear: Hearing, tympanic membrane, external ear and ear canal normal. Tympanic membrane is not erythematous. No middle ear effusion.   Left Ear: Hearing, tympanic membrane, external ear and ear canal normal. Tympanic membrane is not erythematous.  No middle ear effusion.   Nose: Nose normal.   Mouth/Throat: Uvula is midline, oropharynx is clear and moist and mucous " membranes are normal.     SEPARATE PROCEDURE IN OFFICE:   Procedure: Removal of impacted cerumen, bilateral   Pre Procedure Diagnosis: Cerumen Impaction   Post Procedure Diagnosis: Cerumen Impaction   Verbal informed consent in regards to risk of trauma to ear canal, ear drum or hearing, discomfort during procedure and/or inability to remove cerumen impaction in one session or unforeseen events or complications.   No anesthesia.     Procedure in detail:   Ear canal visualized bilateral with appropriate size ear speculum utilizing Operating Head Binocular Otomicroscope   Utilizing the following: Ring curet, delicate alligator forceps, and/or suction cannula. The impacted cerumen of the ear canals was removed atraumatically. The TM and EAC were then inspected and found to be clear of wax. See description of TMs/EACs in PE above.   Complications: No   Condition: Improved/Good     Eyes: EOM and lids are normal. Right eye exhibits no discharge. Left eye exhibits no discharge. No scleral icterus.   Neck: Trachea normal and normal range of motion. Neck supple. No tracheal deviation present.   Cardiovascular: Normal rate.   Pulmonary/Chest: Effort normal. No stridor. No respiratory distress. He has no wheezes.   Musculoskeletal: Normal range of motion.   Neurological: He is alert and oriented to person, place, and time. He has normal strength. Coordination and gait normal.   Skin: Skin is warm, dry and intact. He is not diaphoretic. No cyanosis. No pallor.   Psychiatric: He has a normal mood and affect. His speech is normal and behavior is normal. Judgment and thought content normal. Cognition and memory are normal.   Nursing note and vitals reviewed.      Procedure: Flexible laryngoscopy    In order to fully examine the upper aerodigestive tract, including the larynx, in a patient with a hyperactive gag reflex, and suboptimal visualization with indirect mirror exam,  flexible endoscopy is required.   After explaining the  procedure and obtaining verbal consent, a timeout was performed with the patient's participation according to the universal protocol. Both nasal cavities were anesthetized with 4% Xylocaine spray mixed with Ronny-Synephrine. The flexible laryngoscope  was inserted into the nasal cavity and advanced to visualize the nasal cavity, nasopharynx, the posterior oropharynx, hypopharynx, and the endolarynx with the  findings noted. The scope was removed and the procedure terminated. The patient tolerated this procedure well without apparent complication.     OVERALL FINDINGS  Nasopharynx - the torus is clear. There are no lesions of the posterior wall.   Oropharynx - no lesions of the tongue base. There is no obvious fullness or asymmetry.  Hypopharynx - there are no lesions of the pyriform sinuses or postcricoid region   Larynx - there are no lesions of the supraglottic or glottic larynx.  Vocal fold mobility is normal.     SPECIFIC FINDINGS  Adenoid tissue - normal   Nasopharynx & eustachian tube orifices - normal   Posterior pharyngeal wall - normal   Base of tongue - normal   Epiglottis - normal   Valleculae - normal   Pyriform sinuses - normal   False vocal cords - normal   True vocal cords - normal  Arytenoids - normal   Interarytenoid space - erythema, edema  Larynx    Larynx     Assessment:       1. LPRD (laryngopharyngeal reflux disease)     2.  Bilateral cerumen impaction removal   3. Large septal bone spur right posteriorly  Plan:     Advised/Cautioned: The results of today's ENT exam and flexible endoscopy were detailed to the patient and her questions were answered. Patient education centered around GERD, known exacerbants and contemporary treatment options. Laryngoscope photos were given to the patient. Handouts given on LPRD and GERD were given to the patient. After review of these, patient elected to be placed on PPI 40 mg QAM on an empty stomach for the next 6-8 weeks, and H2-blocker QHS. I encouraged the  patient once he has completed the evening meal to not snack or consume any other food products or caffeinated beverages for at least  minutes before retiring. Finally, I encouraged the patient to sleep about 30 degrees above horizontal, and this can be facilitated by using 2-3 pillows or a wedge foam product. If the patient is not demonstrably improved in 6-8 weeks, consultation with gastroenterology may be indicated to rule out intrinsic disease in the lower esophagus, stomach, or proximal duodenum.     Discussed septoplasty. See Dr. Diez if interested. Continue Flonase, Astelin, Zyrtec, saline daily.

## 2018-10-16 ENCOUNTER — OFFICE VISIT (OUTPATIENT)
Dept: ALLERGY | Facility: CLINIC | Age: 73
End: 2018-10-16
Payer: MEDICARE

## 2018-10-16 VITALS — BODY MASS INDEX: 25.84 KG/M2 | WEIGHT: 170 LBS | DIASTOLIC BLOOD PRESSURE: 76 MMHG | SYSTOLIC BLOOD PRESSURE: 120 MMHG

## 2018-10-16 DIAGNOSIS — I10 ESSENTIAL HYPERTENSION: ICD-10-CM

## 2018-10-16 DIAGNOSIS — J30.9 ALLERGIC RHINITIS, UNSPECIFIED SEASONALITY, UNSPECIFIED TRIGGER: Primary | ICD-10-CM

## 2018-10-16 DIAGNOSIS — K21.9 LARYNGOPHARYNGEAL REFLUX: ICD-10-CM

## 2018-10-16 PROCEDURE — 99213 OFFICE O/P EST LOW 20 MIN: CPT | Mod: PBBFAC | Performed by: ALLERGY & IMMUNOLOGY

## 2018-10-16 PROCEDURE — 99214 OFFICE O/P EST MOD 30 MIN: CPT | Mod: S$PBB,,, | Performed by: ALLERGY & IMMUNOLOGY

## 2018-10-16 PROCEDURE — 99999 PR PBB SHADOW E&M-EST. PATIENT-LVL III: CPT | Mod: PBBFAC,,, | Performed by: ALLERGY & IMMUNOLOGY

## 2018-10-16 NOTE — PROGRESS NOTES
Jd Dill returns to clinic today for continued evaluation of chronic rhinitis.  He was last seen September 4, 2018.  He is here alone.    After his last visit, he went home and did dust mite avoidance procedures.  He cleaned his house and bedroom.  He vacuumed the drapes.  He did not by dust mite covers yet.    After doing the above, his nose became dry and he discontinued Zyrtec and Flonase.    He also saw NP Claudia Brian who diagnosed LPR.  She started him on omeprazole in the morning and ranitidine at night.    Since then his symptoms have been almost completely controlled.  He is having some mild nasal congestion.  His postnasal drip, hoarseness, throat clearing, difficulty swallowing, and cough have resolved.    He is not having any wheezing or shortness of breath.    He only has occasional heartburn.    [facility] ALLERGY QUESTIONNAIRE SHORT 10/14/2018   Are you taking any new medications since your last visit? No   Constitution: No changes since my last visit with this provider   Head or facial pain: No symptoms   Eyes: No symptoms   Ears: No symptoms   Nose: No symptoms   Throat: No symptoms   Sinuses: No symptoms   Lungs: No symptoms   Skin: No symptoms   Cardiovascular: No symptoms   Gastrointestinal: No symptoms   Genital/ urinary No symptoms   Musculoskeletal: No symptoms   Neurologic: No symptoms   Endocrine: No symptoms   Hematologic: No symptoms     Physical Examination:  General: Well-developed, well-nourished, no acute distress.  Head: No sinus tenderness.  Eyes: Conjunctivae:  No bulbar or palpebral conjunctival injection.  Ears: EAC's clear.  TM's clear.  No pre-auricular nodes.  Nose: Nasal Mucosa:  Pink.  Septum: No apparent deviation.  Turbinates:  No significant edema.  Polyps/Mass:  None visible.  Teeth/Gums:  No bleeding noted.  Oropharynx: No exudates.  Neck: Supple without thyromegaly. No cervical lymphadenopathy.    Respiratory/Chest: Effort: Good.  Auscultation:  Clear bilaterally.  Skin:  Good turgor.  No urticaria or angioedema.  Neuro/Psych: Oriented x 3.    Laboratory 08/28/2018:  IgE level:  167.  ImmunoCAP:  Class II:  DM f.  Class I:  DM pt.      Assessment:  1.  Allergic rhinitis, controlled.  2.  Mild GERD.  3.  LPR.  4.  Hypertension on amlodipine, losartan, and metoprolol.  5.  History of nephrolithiasis.  6.  BPH.    Recommendations:  1.  Continue house dust mite avoidance procedures.  2.  Hold Zyrtec, Flonase, and azelastine for now.  3.  Continue omeprazole in the morning.  4.  Continue ranitidine at night.  5.  Observe symptoms on above.  6.  Return to clinic in three months or sooner if needed.

## 2018-11-27 DIAGNOSIS — I10 ESSENTIAL HYPERTENSION: ICD-10-CM

## 2018-11-27 RX ORDER — AMLODIPINE BESYLATE 5 MG/1
5 TABLET ORAL DAILY
Qty: 90 TABLET | Refills: 3 | Status: SHIPPED | OUTPATIENT
Start: 2018-11-27 | End: 2019-11-25 | Stop reason: SDUPTHER

## 2018-12-04 ENCOUNTER — TELEPHONE (OUTPATIENT)
Dept: INTERNAL MEDICINE | Facility: CLINIC | Age: 73
End: 2018-12-04

## 2018-12-04 DIAGNOSIS — I10 ESSENTIAL HYPERTENSION: Primary | ICD-10-CM

## 2018-12-04 NOTE — TELEPHONE ENCOUNTER
----- Message from Elysia Isidro sent at 12/4/2018  9:24 AM CST -----  Contact: [phone]  Patient wants to know if should request lab order before his physical appt 1214-2018.    Please advise, thank you.

## 2018-12-05 NOTE — TELEPHONE ENCOUNTER
Hi, he is due for just BMP blood test.  No need lipids/cholesterol since his levels have been ok.  Order is in.  Let me know if patient has any questions.  Thank you, Jabier Hinkle

## 2018-12-07 ENCOUNTER — LAB VISIT (OUTPATIENT)
Dept: LAB | Facility: HOSPITAL | Age: 73
End: 2018-12-07
Attending: INTERNAL MEDICINE
Payer: MEDICARE

## 2018-12-07 DIAGNOSIS — I10 ESSENTIAL HYPERTENSION: ICD-10-CM

## 2018-12-07 LAB
ANION GAP SERPL CALC-SCNC: 7 MMOL/L
BUN SERPL-MCNC: 19 MG/DL
CALCIUM SERPL-MCNC: 9.4 MG/DL
CHLORIDE SERPL-SCNC: 101 MMOL/L
CO2 SERPL-SCNC: 27 MMOL/L
CREAT SERPL-MCNC: 1 MG/DL
EST. GFR  (AFRICAN AMERICAN): >60 ML/MIN/1.73 M^2
EST. GFR  (NON AFRICAN AMERICAN): >60 ML/MIN/1.73 M^2
GLUCOSE SERPL-MCNC: 92 MG/DL
POTASSIUM SERPL-SCNC: 4.6 MMOL/L
SODIUM SERPL-SCNC: 135 MMOL/L

## 2018-12-07 PROCEDURE — 36415 COLL VENOUS BLD VENIPUNCTURE: CPT

## 2018-12-07 PROCEDURE — 80048 BASIC METABOLIC PNL TOTAL CA: CPT

## 2018-12-14 ENCOUNTER — OFFICE VISIT (OUTPATIENT)
Dept: INTERNAL MEDICINE | Facility: CLINIC | Age: 73
End: 2018-12-14
Payer: MEDICARE

## 2018-12-14 VITALS
BODY MASS INDEX: 25.36 KG/M2 | HEART RATE: 65 BPM | WEIGHT: 167.31 LBS | SYSTOLIC BLOOD PRESSURE: 128 MMHG | OXYGEN SATURATION: 98 % | HEIGHT: 68 IN | DIASTOLIC BLOOD PRESSURE: 60 MMHG

## 2018-12-14 DIAGNOSIS — J30.9 ALLERGIC RHINITIS, UNSPECIFIED SEASONALITY, UNSPECIFIED TRIGGER: ICD-10-CM

## 2018-12-14 DIAGNOSIS — I10 ESSENTIAL HYPERTENSION: Primary | ICD-10-CM

## 2018-12-14 PROCEDURE — 99213 OFFICE O/P EST LOW 20 MIN: CPT | Mod: S$PBB,,, | Performed by: INTERNAL MEDICINE

## 2018-12-14 PROCEDURE — 99999 PR PBB SHADOW E&M-EST. PATIENT-LVL III: CPT | Mod: PBBFAC,,, | Performed by: INTERNAL MEDICINE

## 2018-12-14 PROCEDURE — 99213 OFFICE O/P EST LOW 20 MIN: CPT | Mod: PBBFAC | Performed by: INTERNAL MEDICINE

## 2018-12-14 NOTE — PROGRESS NOTES
Subjective:       Patient ID: Jd Dill III is a 73 y.o. male.    Chief Complaint: Annual Exam    Patient is here for followup for chronic conditions.    Int in new shingles vaccine.    Found that he has dust mite allergy. Not on sprays any more. On 2 GERD meds, he is not sure whether these help him.      Review of Systems   Constitutional: Negative for appetite change and unexpected weight change.   HENT: Positive for congestion, postnasal drip and rhinorrhea. Negative for sore throat.         Allergy symptoms btr   Respiratory: Negative for cough, chest tightness and shortness of breath.    Cardiovascular: Negative for chest pain.   Gastrointestinal: Negative for abdominal pain.        Rare GERD symptoms   Genitourinary: Negative for difficulty urinating, scrotal swelling and testicular pain.   Skin:        No lesions   Allergic/Immunologic: Positive for environmental allergies.   Neurological: Negative for tremors, syncope and weakness.   Psychiatric/Behavioral: Negative for dysphoric mood.       Objective:      Physical Exam   Constitutional: He is oriented to person, place, and time. He appears well-developed and well-nourished. No distress.   HENT:   Head: Normocephalic and atraumatic.   Eyes: No scleral icterus.   Neck: Normal range of motion. No thyromegaly present.   Cardiovascular: Normal rate, regular rhythm and normal heart sounds. Exam reveals no gallop and no friction rub.   No murmur heard.  Pulmonary/Chest: Effort normal and breath sounds normal. No respiratory distress. He has no wheezes. He has no rales.   Abdominal: Soft. Bowel sounds are normal. He exhibits no distension and no mass. There is no tenderness. There is no rebound and no guarding.   Musculoskeletal: Normal range of motion. He exhibits no edema.   Lymphadenopathy:     He has no cervical adenopathy.   Neurological: He is alert and oriented to person, place, and time.   Skin: No lesion noted. He is not diaphoretic.   Psychiatric: He  has a normal mood and affect. Thought content normal.       Assessment:       No diagnosis found.    Plan:       Here for scheduled visit.    HTN controlled well.    He will consider whether it makes sense to take 2 GERD meds daily, he is not sure whether it has helped his allergy symptoms.    There are no diagnoses linked to this encounter.    Health Maintenance       Date Due Completion Date    TETANUS VACCINE 11/23/2019 11/23/2009    Lipid Panel 12/12/2022 12/12/2017    Colonoscopy 04/19/2023 4/19/2018    Override on 4/19/2018: Done (dr [facility])    Override on 1/2/2013: Done          Follow-up in about 1 year (around 12/14/2019).    Future Appointments   Date Time Provider Department Center   1/14/2019 10:00 AM TUSHAR Aldana III, MD [facility]

## 2019-01-14 ENCOUNTER — OFFICE VISIT (OUTPATIENT)
Dept: ALLERGY | Facility: CLINIC | Age: 74
End: 2019-01-14
Payer: MEDICARE

## 2019-01-14 VITALS
SYSTOLIC BLOOD PRESSURE: 116 MMHG | DIASTOLIC BLOOD PRESSURE: 68 MMHG | BODY MASS INDEX: 25.99 KG/M2 | WEIGHT: 171.5 LBS | HEIGHT: 68 IN

## 2019-01-14 DIAGNOSIS — J30.9 ALLERGIC RHINITIS, UNSPECIFIED SEASONALITY, UNSPECIFIED TRIGGER: Primary | ICD-10-CM

## 2019-01-14 DIAGNOSIS — K21.9 LARYNGOPHARYNGEAL REFLUX: ICD-10-CM

## 2019-01-14 DIAGNOSIS — I10 ESSENTIAL HYPERTENSION: ICD-10-CM

## 2019-01-14 PROCEDURE — 99999 PR PBB SHADOW E&M-EST. PATIENT-LVL III: CPT | Mod: PBBFAC,,, | Performed by: ALLERGY & IMMUNOLOGY

## 2019-01-14 PROCEDURE — 99213 OFFICE O/P EST LOW 20 MIN: CPT | Mod: PBBFAC | Performed by: ALLERGY & IMMUNOLOGY

## 2019-01-14 PROCEDURE — 99999 PR PBB SHADOW E&M-EST. PATIENT-LVL III: ICD-10-PCS | Mod: PBBFAC,,, | Performed by: ALLERGY & IMMUNOLOGY

## 2019-01-14 PROCEDURE — 99214 PR OFFICE/OUTPT VISIT, EST, LEVL IV, 30-39 MIN: ICD-10-PCS | Mod: S$PBB,,, | Performed by: ALLERGY & IMMUNOLOGY

## 2019-01-14 PROCEDURE — 99214 OFFICE O/P EST MOD 30 MIN: CPT | Mod: S$PBB,,, | Performed by: ALLERGY & IMMUNOLOGY

## 2019-01-14 NOTE — PROGRESS NOTES
Jd Dill returns to clinic today for continued evaluation of allergic rhinitis and LPR.  He was last seen October 16, 2018.  He is here alone.    Since his last visit, he has done much better.  His cough has resolved.  He is no longer clearing his throat or having postnasal drip.  He continues to take Ranitidine at [facility].    He did quit taking Zyrtec, Flonase, and Astelin.    He is having some mild bilateral nasal congestion that alternates.  He has a sensation that one nostril is more open than the other one throughout the day.    He does have some mild postnasal drip.    He did do house dust mite avoidance.    He is not having any heartburn.    [facility] ALLERGY QUESTIONNAIRE SHORT 1/14/2019   Are you taking any new medications since your last visit? No   Constitution: No changes since my last visit with this provider   Head or facial pain: No changes since my last visit with this provider   Eyes: No symptoms   Ears: No symptoms   Nose: Sneezing, Runny nose, Blocked nose   Throat: Reflux/ heartburn   Sinuses: No symptoms   Lungs: No symptoms   Skin: No symptoms   Cardiovascular: High blood pressue   Gastrointestinal: Heartburn/ indigestion/ reflux   Genital/ urinary Frequency of urination, Urination at night more than twice, Prostate trouble   Musculoskeletal: No symptoms   Neurologic: No symptoms   Endocrine: No symptoms   Hematologic: No symptoms     Physical Examination:  General: Well-developed, well-nourished, no acute distress.  Head: No sinus tenderness.  Eyes: Conjunctivae:  No bulbar or palpebral conjunctival injection.  Ears: EAC's clear.  TM's clear.  No pre-auricular nodes.  Nose: Nasal Mucosa:  Pink.  Septum: No apparent deviation.  Turbinates:  No significant edema.  Polyps/Mass:  None visible.  Teeth/Gums:  No bleeding noted.  Oropharynx: No exudates.  Neck: Supple without thyromegaly. No cervical lymphadenopathy.    Respiratory/Chest: Effort: Good.  Auscultation:  Clear bilaterally.  Skin:  Good turgor.  No urticaria or angioedema.  Neuro/Psych: Oriented x 3.    Laboratory 08/28/2018:  IgE level:  167.  ImmunoCAP:  Class II:  DM f.  Class I:  DM pt.      Assessment:  1.  Allergic rhinitis.  2.  Mild GERD.  3.  LPR.  4.  Hypertension on amlodipine, losartan, and metoprolol.  5.  History of nephrolithiasis.  6.  BPH.    Recommendations:  1.  Continue house dust mite avoidance procedures.  2.  Start taking azelastine 1 to 2 sprays each nostril b.i.d. p.r.n. rhinitis.  3.  Continue omeprazole in the morning.  4.  Continue ranitidine at night.  5.  Observe symptoms on above.  6.  Return to clinic in two weeks.

## 2019-01-20 DIAGNOSIS — I10 ESSENTIAL HYPERTENSION: ICD-10-CM

## 2019-01-21 RX ORDER — LOSARTAN POTASSIUM 100 MG/1
100 TABLET ORAL DAILY
Qty: 90 TABLET | Refills: 1 | Status: SHIPPED | OUTPATIENT
Start: 2019-01-21 | End: 2019-07-18 | Stop reason: SDUPTHER

## 2019-01-28 ENCOUNTER — OFFICE VISIT (OUTPATIENT)
Dept: ALLERGY | Facility: CLINIC | Age: 74
End: 2019-01-28
Payer: MEDICARE

## 2019-01-28 VITALS
BODY MASS INDEX: 26.03 KG/M2 | HEIGHT: 68 IN | DIASTOLIC BLOOD PRESSURE: 54 MMHG | WEIGHT: 171.75 LBS | SYSTOLIC BLOOD PRESSURE: 122 MMHG

## 2019-01-28 DIAGNOSIS — K21.9 LARYNGOPHARYNGEAL REFLUX: ICD-10-CM

## 2019-01-28 DIAGNOSIS — I10 ESSENTIAL HYPERTENSION: ICD-10-CM

## 2019-01-28 DIAGNOSIS — R05.9 COUGH: ICD-10-CM

## 2019-01-28 DIAGNOSIS — J30.9 ALLERGIC RHINITIS, UNSPECIFIED SEASONALITY, UNSPECIFIED TRIGGER: Primary | ICD-10-CM

## 2019-01-28 PROCEDURE — 99999 PR PBB SHADOW E&M-EST. PATIENT-LVL III: ICD-10-PCS | Mod: PBBFAC,,, | Performed by: ALLERGY & IMMUNOLOGY

## 2019-01-28 PROCEDURE — 99213 OFFICE O/P EST LOW 20 MIN: CPT | Mod: PBBFAC | Performed by: ALLERGY & IMMUNOLOGY

## 2019-01-28 PROCEDURE — 99214 OFFICE O/P EST MOD 30 MIN: CPT | Mod: S$PBB,,, | Performed by: ALLERGY & IMMUNOLOGY

## 2019-01-28 PROCEDURE — 99214 PR OFFICE/OUTPT VISIT, EST, LEVL IV, 30-39 MIN: ICD-10-PCS | Mod: S$PBB,,, | Performed by: ALLERGY & IMMUNOLOGY

## 2019-01-28 PROCEDURE — 99999 PR PBB SHADOW E&M-EST. PATIENT-LVL III: CPT | Mod: PBBFAC,,, | Performed by: ALLERGY & IMMUNOLOGY

## 2019-01-28 RX ORDER — AZELASTINE 1 MG/ML
2 SPRAY, METERED NASAL DAILY PRN
COMMUNITY
End: 2019-02-12

## 2019-01-28 NOTE — PROGRESS NOTES
Jd Dill returns to clinic today for continued evaluation of allergic rhinitis and LPR.  He is here alone.  He was last seen January 14, 2019.    Since his last visit, he has been doing well.  He continues to take his omeprazole 40 mg in the morning and ranitidine 300 mg at night.    He added azelastine two sprays each nostril as needed for nasal congestion.  He did needed twice this morning.    With these three medications he says that his symptoms are completely controlled.  He no longer has any throat clearing.  He no longer has any cough.    His rhinitis is controlled now.  He is not taking any Flonase are antihistamines.    He denies any wheezing or shortness of breath.    He denies any indigestion or heartburn.    [facility] ALLERGY QUESTIONNAIRE SHORT 1/28/2019   Are you taking any new medications since your last visit? No   Constitution: No symptoms   Head or facial pain: No symptoms   Eyes: No symptoms   Ears: No symptoms   Nose: Sniffling, Runny nose   Throat: No symptoms   Sinuses: No symptoms   Lungs: No symptoms   Skin: No symptoms   Cardiovascular: No symptoms   Gastrointestinal: No symptoms   Genital/ urinary No symptoms   Musculoskeletal: No symptoms   Neurologic: No symptoms   Endocrine: No symptoms   Hematologic: No symptoms     Physical Examination:  General: Well-developed, well-nourished, no acute distress.  Head: No sinus tenderness.  Eyes: Conjunctivae:  No bulbar or palpebral conjunctival injection.  Ears: EAC's clear.  TM's clear.  No pre-auricular nodes.  Nose: Nasal Mucosa:  Pink.  Septum: No apparent deviation.  Turbinates:  No significant edema.  Polyps/Mass:  None visible.  Teeth/Gums:  No bleeding noted.  Oropharynx: No exudates.  Neck: Supple without thyromegaly. No cervical lymphadenopathy.    Respiratory/Chest: Effort: Good.  Auscultation:  Clear bilaterally.  Skin: Good turgor.  No urticaria or angioedema.  Neuro/Psych: Oriented x 3.    Laboratory 08/28/2018:  IgE level:   167.  ImmunoCAP:  Class II:  DM f.  Class I:  DM pt.      Assessment:  1.  Allergic rhinitis.  2.  Mild GERD.  3.  LPR.  4.  Hypertension on amlodipine, losartan, and metoprolol.  5.  History of nephrolithiasis.  6.  BPH.    Recommendations:  1.  Continue house dust mite avoidance procedures.  2.  Continue azelastine 1 to 2 sprays each nostril b.i.d. p.r.n. rhinitis.  3.  Discontinue omeprazole in the morning.  4.  Continue ranitidine at night.  5.  Observe symptoms on above.  6.  Return to clinic in 6 to 8 weeks or sooner if needed.

## 2019-01-31 DIAGNOSIS — I10 ESSENTIAL HYPERTENSION: ICD-10-CM

## 2019-01-31 RX ORDER — METOPROLOL SUCCINATE 25 MG/1
25 TABLET, EXTENDED RELEASE ORAL DAILY
Qty: 90 TABLET | Refills: 11 | Status: SHIPPED | OUTPATIENT
Start: 2019-01-31 | End: 2020-04-20

## 2019-02-12 DIAGNOSIS — R09.81 NASAL CONGESTION: ICD-10-CM

## 2019-02-12 RX ORDER — AZELASTINE 1 MG/ML
SPRAY, METERED NASAL
Qty: 30 ML | Refills: 5 | Status: SHIPPED | OUTPATIENT
Start: 2019-02-12 | End: 2019-06-28 | Stop reason: SDUPTHER

## 2019-03-11 ENCOUNTER — OFFICE VISIT (OUTPATIENT)
Dept: ALLERGY | Facility: CLINIC | Age: 74
End: 2019-03-11
Payer: MEDICARE

## 2019-03-11 VITALS
SYSTOLIC BLOOD PRESSURE: 124 MMHG | WEIGHT: 169.31 LBS | BODY MASS INDEX: 25.66 KG/M2 | HEIGHT: 68 IN | DIASTOLIC BLOOD PRESSURE: 78 MMHG

## 2019-03-11 DIAGNOSIS — I10 ESSENTIAL HYPERTENSION: ICD-10-CM

## 2019-03-11 DIAGNOSIS — J30.9 ALLERGIC RHINITIS, UNSPECIFIED SEASONALITY, UNSPECIFIED TRIGGER: Primary | ICD-10-CM

## 2019-03-11 DIAGNOSIS — K21.9 GASTROESOPHAGEAL REFLUX DISEASE, ESOPHAGITIS PRESENCE NOT SPECIFIED: ICD-10-CM

## 2019-03-11 DIAGNOSIS — K21.9 LARYNGOPHARYNGEAL REFLUX: ICD-10-CM

## 2019-03-11 PROCEDURE — 99999 PR PBB SHADOW E&M-EST. PATIENT-LVL III: CPT | Mod: PBBFAC,,, | Performed by: ALLERGY & IMMUNOLOGY

## 2019-03-11 PROCEDURE — 99213 OFFICE O/P EST LOW 20 MIN: CPT | Mod: PBBFAC | Performed by: ALLERGY & IMMUNOLOGY

## 2019-03-11 PROCEDURE — 99214 OFFICE O/P EST MOD 30 MIN: CPT | Mod: S$PBB,,, | Performed by: ALLERGY & IMMUNOLOGY

## 2019-03-11 PROCEDURE — 99214 PR OFFICE/OUTPT VISIT, EST, LEVL IV, 30-39 MIN: ICD-10-PCS | Mod: S$PBB,,, | Performed by: ALLERGY & IMMUNOLOGY

## 2019-03-11 PROCEDURE — 99999 PR PBB SHADOW E&M-EST. PATIENT-LVL III: ICD-10-PCS | Mod: PBBFAC,,, | Performed by: ALLERGY & IMMUNOLOGY

## 2019-03-11 NOTE — PROGRESS NOTES
Jd Dill returns to clinic today for continued evaluation of allergic rhinitis and LPR.  He is here alone.  He was last seen January 28, 2019.    Since his last visit, he has done well.  He was able to stop his omeprazole without any increase in his symptoms.    He continues to take azelastine at least once a day usually in the morning.  He may occasionally use this in the afternoon.    He continues to take Ranitidine at night.    He has no longer taking any Flonase for antihistamines.    He says that his symptoms are almost completely controlled.  He does have some nasal congestion which is relieved with azelastine.    He denies any hoarseness, throat clearing, postnasal drip, or cough.s     [facility] ALLERGY QUESTIONNAIRE SHORT 3/11/2019   Are you taking any new medications since your last visit? No   Constitution: No changes since my last visit with this provider   Head or facial pain: No changes since my last visit with this provider   Eyes: No symptoms   Ears: No symptoms   Nose: Blocked nose   Throat: Reflux/ heartburn   Sinuses: No symptoms   Lungs: No symptoms   Skin: No symptoms   Cardiovascular: No symptoms   Gastrointestinal: No symptoms   Genital/ urinary No symptoms   Musculoskeletal: No symptoms   Neurologic: No symptoms   Endocrine: No symptoms   Hematologic: No symptoms     Physical Examination:  General: Well-developed, well-nourished, no acute distress.  Head: No sinus tenderness.  Eyes: Conjunctivae:  No bulbar or palpebral conjunctival injection.  Ears: EAC's clear.  TM's clear.  No pre-auricular nodes.  Nose: Nasal Mucosa:  Pink.  Septum: No apparent deviation.  Turbinates:  No significant edema.  Polyps/Mass:  None visible.  Teeth/Gums:  No bleeding noted.  Oropharynx: No exudates.  Neck: Supple without thyromegaly. No cervical lymphadenopathy.    Respiratory/Chest: Effort: Good.  Auscultation:  Clear bilaterally.  Skin: Good turgor.  No urticaria or angioedema.  Neuro/Psych: Oriented x  3.    Laboratory 08/28/2018:  IgE level:  167.  ImmunoCAP:  Class II:  DM f.  Class I:  DM pt.      Assessment:  1.  Allergic rhinitis, controlled.  2.  Mild GERD.  3.  LPR, controlled.  4.  Hypertension on amlodipine, losartan, and metoprolol.  5.  History of nephrolithiasis.  6.  BPH.    Recommendations:  1.  Continue house dust mite avoidance procedures.  2.  Continue azelastine 1 to 2 sprays each nostril b.i.d. p.r.n. rhinitis.  3.  Continue ranitidine at night.  4.  Observe symptoms on above.  5.  Return to clinic in 3 to 4 months or sooner if needed.

## 2019-06-28 DIAGNOSIS — R09.81 NASAL CONGESTION: ICD-10-CM

## 2019-06-28 RX ORDER — AZELASTINE 1 MG/ML
SPRAY, METERED NASAL
Qty: 90 ML | Refills: 1 | Status: SHIPPED | OUTPATIENT
Start: 2019-06-28 | End: 2020-04-13

## 2019-07-01 ENCOUNTER — OFFICE VISIT (OUTPATIENT)
Dept: ALLERGY | Facility: CLINIC | Age: 74
End: 2019-07-01
Payer: MEDICARE

## 2019-07-01 VITALS
HEIGHT: 68 IN | WEIGHT: 171.31 LBS | SYSTOLIC BLOOD PRESSURE: 122 MMHG | BODY MASS INDEX: 25.96 KG/M2 | DIASTOLIC BLOOD PRESSURE: 70 MMHG

## 2019-07-01 DIAGNOSIS — K21.9 LARYNGOPHARYNGEAL REFLUX: ICD-10-CM

## 2019-07-01 DIAGNOSIS — K21.9 LPRD (LARYNGOPHARYNGEAL REFLUX DISEASE): ICD-10-CM

## 2019-07-01 DIAGNOSIS — J30.9 ALLERGIC RHINITIS, UNSPECIFIED SEASONALITY, UNSPECIFIED TRIGGER: Primary | ICD-10-CM

## 2019-07-01 DIAGNOSIS — K21.9 GASTROESOPHAGEAL REFLUX DISEASE, ESOPHAGITIS PRESENCE NOT SPECIFIED: ICD-10-CM

## 2019-07-01 DIAGNOSIS — I10 ESSENTIAL HYPERTENSION: ICD-10-CM

## 2019-07-01 DIAGNOSIS — R73.03 PRE-DIABETES: ICD-10-CM

## 2019-07-01 PROCEDURE — 99214 PR OFFICE/OUTPT VISIT, EST, LEVL IV, 30-39 MIN: ICD-10-PCS | Mod: S$PBB,,, | Performed by: ALLERGY & IMMUNOLOGY

## 2019-07-01 PROCEDURE — 99213 OFFICE O/P EST LOW 20 MIN: CPT | Mod: PBBFAC | Performed by: ALLERGY & IMMUNOLOGY

## 2019-07-01 PROCEDURE — 99999 PR PBB SHADOW E&M-EST. PATIENT-LVL III: CPT | Mod: PBBFAC,,, | Performed by: ALLERGY & IMMUNOLOGY

## 2019-07-01 PROCEDURE — 99999 PR PBB SHADOW E&M-EST. PATIENT-LVL III: ICD-10-PCS | Mod: PBBFAC,,, | Performed by: ALLERGY & IMMUNOLOGY

## 2019-07-01 PROCEDURE — 99214 OFFICE O/P EST MOD 30 MIN: CPT | Mod: S$PBB,,, | Performed by: ALLERGY & IMMUNOLOGY

## 2019-07-01 RX ORDER — OMEPRAZOLE 40 MG/1
CAPSULE, DELAYED RELEASE ORAL
Qty: 90 CAPSULE | Refills: 3 | OUTPATIENT
Start: 2019-07-01

## 2019-07-01 NOTE — PROGRESS NOTES
Jd Dill returns to clinic today for continued evaluation of allergic rhinitis and LPR.  He is here alone.  He was last seen March 11, 2019.    Since his last visit, he has continued to take azelastine in the morning and ranitidine 300 milligrams at night.    He says that his symptoms are completely controlled.  He does have some mild nasal congestion and clear rhinorrhea in the morning.  This resolves after he uses his Astelin.    He is not having any indigestion or heartburn.  He now is taking only Zantac 300 milligrams at night.  He no longer is taking omeprazole.    He denies any conjunctivitis.  He denies any cough, wheezing, or shortness of breath.    He has no longer taking any Flonase.    [facility] ALLERGY QUESTIONNAIRE SHORT 7/1/2019   Are you taking any new medications since your last visit? No   Constitution: No changes since my last visit with this provider   Head or facial pain: No changes since my last visit with this provider   Eyes: No symptoms   Ears: No symptoms   Nose: Sniffling, Sneezing, Blocked nose   Throat: Reflux/ heartburn   Sinuses: No symptoms   Lungs: No symptoms   Skin: No symptoms   Cardiovascular: High blood pressue   Gastrointestinal: No symptoms   Genital/ urinary No symptoms   Musculoskeletal: No symptoms   Neurologic: No symptoms   Endocrine: No symptoms   Hematologic: No symptoms     Physical Examination:  General: Well-developed, well-nourished, no acute distress.  Head: No sinus tenderness.  Eyes: Conjunctivae:  No bulbar or palpebral conjunctival injection.  Ears: EAC's clear.  TM's clear.  No pre-auricular nodes.  Nose: Nasal Mucosa:  Pink.  Septum: No apparent deviation.  Turbinates:  No significant edema.  Polyps/Mass:  None visible.  Teeth/Gums:  No bleeding noted.  Oropharynx: No exudates.  Neck: Supple without thyromegaly. No cervical lymphadenopathy.    Respiratory/Chest: Effort: Good.  Auscultation:  Clear bilaterally.  Skin: Good turgor.  No urticaria or  angioedema.  Neuro/Psych: Oriented x 3.    Laboratory 08/28/2018:  IgE level:  167.  ImmunoCAP:  Class II:  DM f.  Class I:  DM pt.      Assessment:  1.  Allergic rhinitis, controlled.  2.  Mild GERD, controlled.  3.  LPR, controlled.  4.  Hypertension on amlodipine, losartan, and metoprolol.  5.  History of nephrolithiasis.  6.  BPH.    Recommendations:  1.  Continue house dust mite avoidance procedures.  2.  Continue azelastine 1 to 2 sprays each nostril b.i.d. p.r.n. rhinitis.  3.  Continue ranitidine at night.  4.  We discussed reducing the dosage of some of his medication and as he is doing so well, he would like to continue on the same regimen.  5.  Return to clinic in six months or sooner if needed.  4 months or sooner if needed.

## 2019-07-02 ENCOUNTER — PATIENT MESSAGE (OUTPATIENT)
Dept: ALLERGY | Facility: CLINIC | Age: 74
End: 2019-07-02

## 2019-07-02 ENCOUNTER — TELEPHONE (OUTPATIENT)
Dept: ALLERGY | Facility: CLINIC | Age: 74
End: 2019-07-02

## 2019-07-02 DIAGNOSIS — K21.9 LPRD (LARYNGOPHARYNGEAL REFLUX DISEASE): ICD-10-CM

## 2019-07-02 NOTE — TELEPHONE ENCOUNTER
Returned patient's call, but there wasn't an answer.      ----- Message from Tomasz Pina sent at 7/2/2019 11:54 AM CDT -----  Contact: [phone]  Patient requesting from the office to discuss  medication ranitidine (ZANTAC) 300 MG tablet . Please call and advise, Thanks

## 2019-07-18 DIAGNOSIS — I10 ESSENTIAL HYPERTENSION: ICD-10-CM

## 2019-07-18 RX ORDER — LOSARTAN POTASSIUM 100 MG/1
TABLET ORAL
Qty: 90 TABLET | Refills: 1 | Status: SHIPPED | OUTPATIENT
Start: 2019-07-18 | End: 2020-01-11

## 2019-09-15 ENCOUNTER — PATIENT MESSAGE (OUTPATIENT)
Dept: ALLERGY | Facility: CLINIC | Age: 74
End: 2019-09-15

## 2019-09-18 RX ORDER — FAMOTIDINE 40 MG/1
40 TABLET, FILM COATED ORAL DAILY
Qty: 30 TABLET | Refills: 5 | Status: SHIPPED | OUTPATIENT
Start: 2019-09-18 | End: 2020-01-23

## 2019-10-23 ENCOUNTER — TELEPHONE (OUTPATIENT)
Dept: INTERNAL MEDICINE | Facility: CLINIC | Age: 74
End: 2019-10-23

## 2019-10-23 DIAGNOSIS — I10 ESSENTIAL HYPERTENSION: Primary | ICD-10-CM

## 2019-10-23 DIAGNOSIS — R73.03 PRE-DIABETES: ICD-10-CM

## 2019-10-23 NOTE — TELEPHONE ENCOUNTER
Hi, please contact the patient to assist in scheduling    Orders Placed This Encounter    Basic metabolic panel    Hemoglobin A1c       Thank you, Jabier Hinkle    Future Appointments   Date Time Provider Department Center   12/12/2019  7:00 AM [facility] [place] [facility]   12/17/2019  9:20 AM Jabier Hinkle MD [facility] IM [facility]   1/2/2020  9:40 AM TUSHAR Aldana III, MD [facility] ALLERGY [facility] [place] [facility]

## 2019-10-23 NOTE — TELEPHONE ENCOUNTER
Please advise. Looks as if the labs may not be ordered yet although the lab appt is made. We are in need of those labs to be ordered in addition to A1c to link the labs to this appt. Thank you so much!    Ron Bush

## 2019-10-23 NOTE — TELEPHONE ENCOUNTER
----- Message from Esther Woody sent at 10/23/2019 12:38 PM CDT -----  Contact: Patient [phone]  Doctor appointment and lab have been scheduled.  Please link lab orders to the lab appointment.  Date of doctor appointment:  11/17/2019  Date of lab appointment:  12/12/2019  Physical or EP: Annual  Comments: Please add A1C

## 2019-11-25 DIAGNOSIS — I10 ESSENTIAL HYPERTENSION: ICD-10-CM

## 2019-11-25 RX ORDER — AMLODIPINE BESYLATE 5 MG/1
TABLET ORAL
Qty: 90 TABLET | Refills: 3 | Status: SHIPPED | OUTPATIENT
Start: 2019-11-25 | End: 2020-11-23

## 2019-12-12 ENCOUNTER — LAB VISIT (OUTPATIENT)
Dept: LAB | Facility: HOSPITAL | Age: 74
End: 2019-12-12
Payer: MEDICARE

## 2019-12-12 DIAGNOSIS — R73.03 PRE-DIABETES: ICD-10-CM

## 2019-12-12 DIAGNOSIS — I10 ESSENTIAL HYPERTENSION: ICD-10-CM

## 2019-12-12 LAB
ANION GAP SERPL CALC-SCNC: 8 MMOL/L (ref 8–16)
BUN SERPL-MCNC: 16 MG/DL (ref 8–23)
CALCIUM SERPL-MCNC: 9.5 MG/DL (ref 8.7–10.5)
CHLORIDE SERPL-SCNC: 102 MMOL/L (ref 95–110)
CO2 SERPL-SCNC: 26 MMOL/L (ref 23–29)
CREAT SERPL-MCNC: 1 MG/DL (ref 0.5–1.4)
EST. GFR  (AFRICAN AMERICAN): >60 ML/MIN/1.73 M^2
EST. GFR  (NON AFRICAN AMERICAN): >60 ML/MIN/1.73 M^2
ESTIMATED AVG GLUCOSE: 131 MG/DL (ref 68–131)
GLUCOSE SERPL-MCNC: 111 MG/DL (ref 70–110)
HBA1C MFR BLD HPLC: 6.2 % (ref 4–5.6)
POTASSIUM SERPL-SCNC: 4.6 MMOL/L (ref 3.5–5.1)
SODIUM SERPL-SCNC: 136 MMOL/L (ref 136–145)

## 2019-12-12 PROCEDURE — 36415 COLL VENOUS BLD VENIPUNCTURE: CPT

## 2019-12-12 PROCEDURE — 80048 BASIC METABOLIC PNL TOTAL CA: CPT

## 2019-12-12 PROCEDURE — 83036 HEMOGLOBIN GLYCOSYLATED A1C: CPT

## 2019-12-17 ENCOUNTER — OFFICE VISIT (OUTPATIENT)
Dept: INTERNAL MEDICINE | Facility: CLINIC | Age: 74
End: 2019-12-17
Payer: MEDICARE

## 2019-12-17 ENCOUNTER — IMMUNIZATION (OUTPATIENT)
Dept: PHARMACY | Facility: CLINIC | Age: 74
End: 2019-12-17
Payer: MEDICARE

## 2019-12-17 VITALS
WEIGHT: 170.63 LBS | OXYGEN SATURATION: 99 % | SYSTOLIC BLOOD PRESSURE: 122 MMHG | HEIGHT: 68 IN | HEART RATE: 67 BPM | BODY MASS INDEX: 25.86 KG/M2 | DIASTOLIC BLOOD PRESSURE: 74 MMHG

## 2019-12-17 DIAGNOSIS — J30.9 ALLERGIC RHINITIS, UNSPECIFIED SEASONALITY, UNSPECIFIED TRIGGER: ICD-10-CM

## 2019-12-17 DIAGNOSIS — R73.03 PRE-DIABETES: Primary | ICD-10-CM

## 2019-12-17 DIAGNOSIS — I10 ESSENTIAL HYPERTENSION: ICD-10-CM

## 2019-12-17 PROCEDURE — 1159F MED LIST DOCD IN RCRD: CPT | Mod: ,,, | Performed by: INTERNAL MEDICINE

## 2019-12-17 PROCEDURE — 99999 PR PBB SHADOW E&M-EST. PATIENT-LVL III: ICD-10-PCS | Mod: PBBFAC,,, | Performed by: INTERNAL MEDICINE

## 2019-12-17 PROCEDURE — 99213 OFFICE O/P EST LOW 20 MIN: CPT | Mod: PBBFAC | Performed by: INTERNAL MEDICINE

## 2019-12-17 PROCEDURE — 1126F AMNT PAIN NOTED NONE PRSNT: CPT | Mod: ,,, | Performed by: INTERNAL MEDICINE

## 2019-12-17 PROCEDURE — 99999 PR PBB SHADOW E&M-EST. PATIENT-LVL III: CPT | Mod: PBBFAC,,, | Performed by: INTERNAL MEDICINE

## 2019-12-17 PROCEDURE — 99214 OFFICE O/P EST MOD 30 MIN: CPT | Mod: S$PBB,,, | Performed by: INTERNAL MEDICINE

## 2019-12-17 PROCEDURE — 1126F PR PAIN SEVERITY QUANTIFIED, NO PAIN PRESENT: ICD-10-PCS | Mod: ,,, | Performed by: INTERNAL MEDICINE

## 2019-12-17 PROCEDURE — 99214 PR OFFICE/OUTPT VISIT, EST, LEVL IV, 30-39 MIN: ICD-10-PCS | Mod: S$PBB,,, | Performed by: INTERNAL MEDICINE

## 2019-12-17 PROCEDURE — 1159F PR MEDICATION LIST DOCUMENTED IN MEDICAL RECORD: ICD-10-PCS | Mod: ,,, | Performed by: INTERNAL MEDICINE

## 2019-12-17 NOTE — PATIENT INSTRUCTIONS
Diabetes: Meal Planning  You can help keep your blood sugar level in your target range by eating healthy foods. Your health care team can help you create a low-fat, nutritious meal plan. Take an active role in your diabetes management by following your meal plan and working with your health care team.    Make Your Meal Plan  A meal plan gives guidelines for the types and amounts of food you should eat. The goal is to balance food and insulin (or other diabetes medications). Your dietitian will help you make a flexible meal plan that includes many foods that you like.  Watch Serving Sizes  Your meal plan will group foods by servings. To learn how much a serving is, start by measuring food portions at each meal. Soon youll know what a serving looks like on your plate. Ask your health care provider about how to balance servings of different foods.  Eat from All the Food Groups  The basis of a healthy meal plan is variety (eating lots of different foods). Choose lean meats, fresh fruits and vegetables, whole grains, and low-fat or nonfat dairy products. Eating a wide variety of foods provides the nutrients your body needs. It can also keep you from getting bored with your meal plan.  Learn About Carbohydrates, Fats, and Protein  · Carbohydrates are starches and sugars. They are found in many foods, including fruit, bread, pasta, milk, and sweets. Of all the foods you eat, carbohydrates have the most effect on your blood sugar. Your dietitian may teach you about carb counting, a way to figure out the amount of carbohydrates in a meal.  · Fats have the most calories. They also have the most effect on your weight and your risk of heart disease. When you have diabetes, its important to control your weight and protect your heart. Foods that are high in fat include whole milk, cheese, snack foods, and desserts.  · Protein is important for building and repairing muscles and bones. Choose low-fat protein sources, such as  fish, egg whites, and skinless chicken.  Reduce Liquid Sugars  Extra calories from sodas, sports drinks, and fruit drinks make it hard to keep blood sugar in range. Cut as many liquid sugars from your meal plan as you can.  This includes most fruit juices, which are often high in natural or added sugar. Instead, drink plenty of water and other sugar-free beverages.  Eat Less Fat  If you need to lose weight, try to reduce the amount of fat in your diet. This can also help lower your cholesterol level to keep blood vessels healthier. Cut fat by using only small amounts of liquid oil for cooking. Read food labels carefully to avoid foods with unhealthy trans fats.     Timing Your Meals  When it comes to blood sugar control, when you eat is as important as what you eat. You may need to eat several small meals spaced evenly throughout the day to stay in your target range. So dont skip breakfast or wait until late in the day to get most of your calories. Doing so can cause your blood sugar to rise too high or fall too low.       Eat Foods Rich in Fiber  Fiber is a carbohydrate that breaks down slowly. Fiber is also healthy for your heart. Fiber-rich foods include:     · Whole-grain breads and cereals  · Bulgur wheat  · Brown rice     · Whole-wheat pasta  · Fruits and vegetables  · Dry beans, and peas   Choose Healthy Protein Foods  Eating protein that is low in fat can help you control your weight. It also helps keep your heart healthy. Low-fat protein foods include:  · Fish  · Plant proteins, such as dry beans and peas, nuts, and soy products like tofu and soymilk  · Lean meat with all visible fat removed  · Poultry with the skin removed  · Low-fat or nonfat milk, cheese, and yogurt  Limit Unhealthy Fats and Sugar  Saturated and trans fats are unhealthy for your heart. They raise LDL (bad) cholesterol. Fat is also high in calories, so it can make you gain weight. To cut down on unhealthy fats and sugar, limit these  foods:     · Butter or margarine  · Palm and palm kernel oils and coconut oil  · Cream  · Cheese  · Carroll  · Lunch meats     · Ice cream  · Sweet bakery goods such as pies, muffins, and donuts  · Jams and jellies  · Candy bars  · Regular sodas   How Much to Eat  The amount of food you eat affects your blood sugar. It also affects your weight. Your health care team will tell you how much of each type of food you should eat.  · Use measuring cups and spoons and a food scale to measure serving sizes.  · Learn what a correct serving size looks like on your plate. This will help when you are away from home and cant measure your servings.  · Eat only the number of servings given on your meal plan for each food. Dont take seconds.  When to Eat  Your meal plan will likely include breakfast, lunch, dinner, and some snacks.  · Try to eat your meals and snacks at about the same times each day.  · Eat all your meals and snacks. Skipping a meal or snack can make your blood sugar drop too low. It can also cause you to eat too much at the next meal or snack. Then your blood sugar could get too high.  © 2829-6847 [employer]. [address] [place] [address]. All rights reserved. This information is not intended as a substitute for professional medical care. Always follow your healthcare professional's instructions.          © 9448-3014 [employer]. [address] [place] [address]. All rights reserved. This information is not intended as a substitute for professional medical care. Always follow your healthcare professional's instructions.        Diabetes: Understanding Carbohydrates  Just as a car needs the right type of fuel (gas) to run, you need the right kind of fuel (food) to function. To sustain energy, your body needs food that contains carbohydrates. But carbohydrates raise blood sugar levels higher and faster than other kinds of food. Your dietitian will work with you to  determine the appropriate amount of carbohydrates in your diet.   Starches  Starches are found in grains, some vegetables, and beans. Grain products include bread, pasta, cereal, and tortillas. Starchy vegetables include potatoes, peas, corn, lima beans, yams, and squash. Kidney beans, simmons beans, black beans, garbanzo beans, and lentils also contain starches.  Sugars  Sugars are found naturally in many foods. Or sugar can be added. Foods that contain natural sugar include fruits and fruit juices, dairy products, honey, and molasses. Added sugars are found in most desserts, processed foods, candy, regular soda, and fruit drinks. These are very helpful to treat low blood sugar (hypoglycemia) by promptly providing sugar.   Fiber  Fiber comes from plant foods. Most fiber isnt digested by the body. Instead of raising blood sugar levels like other carbohydrates, it actually keeps blood sugar from rising too fast. Fiber is found in fruits, vegetables, whole grains, beans, peas, and many nuts.  Carb Counting  You can learn to figure out how many carbohydrates you are eating every day. Ask your dietitian to teach you a technique called carb counting. This system helps you keep track of the carbohydrates you eat at each meal. There are different ways to do carb counting. Basic carb counting is described below. This method is a good way to get started with carb counting:  · When you count carbohydrate servings, 1 serving of a starch, fruit, or dairy product counts as 1 carb.  · Each carb is about 15 grams of carbohydrate. For example:  · 1 slice of bread = 1 starch serving = 15 grams of carbohydrate  · 1 apple = 1 fruit serving = 15 grams of carbohydrate  · 1 cup milk = 1 dairy serving = 12 grams of carbohydrate  · Your dietitian will help you determine how many carbohydrate servings to have at each meal and snack.  © 3478-8070 [employer]. [address] [place] [address], [place] [address]. All rights reserved.  This information is not intended as a substitute for professional medical care. Always follow your healthcare professional's instructions.

## 2019-12-17 NOTE — PROGRESS NOTES
Subjective:       Patient ID: Jd Dill III is a 74 y.o. male.    Chief Complaint: Annual Exam    Patient is here for followup for chronic conditions.    No new complaints.    Review of Systems   Constitutional: Negative for appetite change and unexpected weight change.   HENT: Positive for congestion, postnasal drip and rhinorrhea. Negative for sore throat.         Allergy symptoms btr   Respiratory: Negative for cough, chest tightness and shortness of breath.    Cardiovascular: Negative for chest pain.   Gastrointestinal: Negative for abdominal pain.        Rare GERD symptoms   Genitourinary: Negative for difficulty urinating, scrotal swelling and testicular pain.   Skin:        No lesions   Allergic/Immunologic: Positive for environmental allergies.   Neurological: Negative for tremors, syncope and weakness.   Psychiatric/Behavioral: Negative for dysphoric mood.       Objective:      Physical Exam   Constitutional: He is oriented to person, place, and time. He appears well-developed and well-nourished. No distress.   HENT:   Head: Normocephalic and atraumatic.   Eyes: No scleral icterus.   Neck: Normal range of motion. No thyromegaly present.   Cardiovascular: Normal rate, regular rhythm and normal heart sounds. Exam reveals no gallop and no friction rub.   No murmur heard.  Pulmonary/Chest: Effort normal and breath sounds normal. No respiratory distress. He has no wheezes. He has no rales.   Abdominal: Soft. Bowel sounds are normal. He exhibits no distension and no mass. There is no tenderness. There is no rebound and no guarding.   Musculoskeletal: Normal range of motion. He exhibits no edema.   Lymphadenopathy:     He has no cervical adenopathy.   Neurological: He is alert and oriented to person, place, and time.   Skin: No lesion noted. He is not diaphoretic.   Psychiatric: He has a normal mood and affect. Thought content normal.       Assessment:       1. Pre-diabetes    2. Essential hypertension     3. Allergic rhinitis, unspecified seasonality, unspecified trigger        Plan:       Jd was seen today for annual exam.    Diagnoses and all orders for this visit:    Pre-diabetes  He would like a recheck in 6 mos  Lengthy discussion re importance of good diet, insurance may not cover RD.  Pt given handouts.      Essential hypertension  controlled    Allergic rhinitis, unspecified seasonality, unspecified trigger  On appropriate meds, sees allergist      Health Maintenance       Date Due Completion Date    TETANUS VACCINE 11/23/2019 11/23/2009    Shingles Vaccine (3 of 3) 02/11/2020 12/17/2019    Lipid Panel 12/12/2022 12/12/2017    Colonoscopy 04/19/2023 4/19/2018    Override on 4/19/2018: Done (dr [facility])    Override on 1/2/2013: Done          Follow up in about 6 months (around 6/17/2020) for Shingles vaccine please.    Future Appointments   Date Time Provider Department Center   1/6/2020  9:40 AM TUSHAR Aldana III, MD [facility] ALLERGY [facility] [place]   6/18/2020 10:00 AM Jabier Hinkle MD [facility] IM [facility] [place]

## 2020-01-03 ENCOUNTER — PATIENT OUTREACH (OUTPATIENT)
Dept: ADMINISTRATIVE | Facility: OTHER | Age: 75
End: 2020-01-03

## 2020-01-06 ENCOUNTER — OFFICE VISIT (OUTPATIENT)
Dept: ALLERGY | Facility: CLINIC | Age: 75
End: 2020-01-06
Payer: MEDICARE

## 2020-01-06 ENCOUNTER — PATIENT MESSAGE (OUTPATIENT)
Dept: PHARMACY | Facility: CLINIC | Age: 75
End: 2020-01-06

## 2020-01-06 VITALS — HEIGHT: 68 IN | RESPIRATION RATE: 16 BRPM | BODY MASS INDEX: 25.62 KG/M2 | WEIGHT: 169.06 LBS

## 2020-01-06 DIAGNOSIS — J30.9 ALLERGIC RHINITIS, UNSPECIFIED SEASONALITY, UNSPECIFIED TRIGGER: Primary | ICD-10-CM

## 2020-01-06 DIAGNOSIS — K21.9 GASTROESOPHAGEAL REFLUX DISEASE, ESOPHAGITIS PRESENCE NOT SPECIFIED: ICD-10-CM

## 2020-01-06 DIAGNOSIS — K21.9 LARYNGOPHARYNGEAL REFLUX: ICD-10-CM

## 2020-01-06 PROCEDURE — 99214 PR OFFICE/OUTPT VISIT, EST, LEVL IV, 30-39 MIN: ICD-10-PCS | Mod: S$PBB,,, | Performed by: ALLERGY & IMMUNOLOGY

## 2020-01-06 PROCEDURE — 1159F MED LIST DOCD IN RCRD: CPT | Mod: ,,, | Performed by: ALLERGY & IMMUNOLOGY

## 2020-01-06 PROCEDURE — 99213 OFFICE O/P EST LOW 20 MIN: CPT | Mod: PBBFAC | Performed by: ALLERGY & IMMUNOLOGY

## 2020-01-06 PROCEDURE — 1126F PR PAIN SEVERITY QUANTIFIED, NO PAIN PRESENT: ICD-10-PCS | Mod: ,,, | Performed by: ALLERGY & IMMUNOLOGY

## 2020-01-06 PROCEDURE — 1126F AMNT PAIN NOTED NONE PRSNT: CPT | Mod: ,,, | Performed by: ALLERGY & IMMUNOLOGY

## 2020-01-06 PROCEDURE — 99214 OFFICE O/P EST MOD 30 MIN: CPT | Mod: S$PBB,,, | Performed by: ALLERGY & IMMUNOLOGY

## 2020-01-06 PROCEDURE — 99999 PR PBB SHADOW E&M-EST. PATIENT-LVL III: CPT | Mod: PBBFAC,,, | Performed by: ALLERGY & IMMUNOLOGY

## 2020-01-06 PROCEDURE — 99999 PR PBB SHADOW E&M-EST. PATIENT-LVL III: ICD-10-PCS | Mod: PBBFAC,,, | Performed by: ALLERGY & IMMUNOLOGY

## 2020-01-06 PROCEDURE — 1159F PR MEDICATION LIST DOCUMENTED IN MEDICAL RECORD: ICD-10-PCS | Mod: ,,, | Performed by: ALLERGY & IMMUNOLOGY

## 2020-01-06 NOTE — PROGRESS NOTES
Jd Dill returns to clinic today for continued evaluation of allergic rhinitis and LPR.  He is here alone.  He was last seen July 1, 2019.    After his last visit he changed from Ranitidine to famotidine.  He does not feel that this is working as well.    He does wake up each morning with bilateral nasal congestion.  He uses Astelin with some improvement.  He may need this once in the day as well.  He also uses at night before going to bed.    He is not having any heartburn or indigestion.    He has not had any conjunctivitis.  He does not have any postnasal drip or frequent throat clearing.    He denies any cough, wheezing, or shortness of breath.    He is not taking any Flonase.    [facility] PE[facility] ALLERGY QUESTIONNAIRE SHORT 1/6/2020   Sinus pressure? Yes   Ears - No symptoms Yes   Congestion? Yes   Throat - No symptoms Yes   Eye - No symptoms Yes   Lungs - No symptoms Yes   Skin - No symptoms Yes     Physical Examination:  General: Well-developed, well-nourished, no acute distress.  Head: No sinus tenderness.  Eyes: Conjunctivae:  No bulbar or palpebral conjunctival injection.  Ears: EAC's clear.  TM's clear.  No pre-auricular nodes.  Nose: Nasal Mucosa:  Pink.  Septum: No apparent deviation.  Turbinates:  No significant edema.  Polyps/Mass:  None visible.  Teeth/Gums:  No bleeding noted.  Oropharynx: No exudates.  Neck: Supple without thyromegaly. No cervical lymphadenopathy.    Respiratory/Chest: Effort: Good.  Auscultation:  Clear bilaterally.  Skin: Good turgor.  No urticaria or angioedema.  Neuro/Psych: Oriented x 3.    Laboratory 08/28/2018:  IgE level:  167.  ImmunoCAP:  Class II:  DM f.  Class I:  DM pt.      Assessment:  1.  Allergic rhinitis, controlled.  2.  Mild GERD, controlled.  3.  LPR, controlled.  4.  Hypertension on amlodipine, losartan, and metoprolol.  5.  History of nephrolithiasis.  6.  BPH.    Recommendations:  1.  Continue house dust mite avoidance procedures.  2.  Continue azelastine 1 to 2  sprays each nostril b.i.d. p.r.n. rhinitis.  3.  Continue Pepcid for now.  Resume Ranitidine when available.  4.  Return to clinic in six months or sooner if needed.

## 2020-01-11 DIAGNOSIS — I10 ESSENTIAL HYPERTENSION: ICD-10-CM

## 2020-01-11 RX ORDER — LOSARTAN POTASSIUM 100 MG/1
TABLET ORAL
Qty: 90 TABLET | Refills: 1 | Status: SHIPPED | OUTPATIENT
Start: 2020-01-11 | End: 2020-07-03

## 2020-01-13 ENCOUNTER — PATIENT MESSAGE (OUTPATIENT)
Dept: ALLERGY | Facility: CLINIC | Age: 75
End: 2020-01-13

## 2020-01-17 ENCOUNTER — TELEPHONE (OUTPATIENT)
Dept: ALLERGY | Facility: CLINIC | Age: 75
End: 2020-01-17

## 2020-01-17 ENCOUNTER — PATIENT MESSAGE (OUTPATIENT)
Dept: ALLERGY | Facility: CLINIC | Age: 75
End: 2020-01-17

## 2020-01-17 NOTE — TELEPHONE ENCOUNTER
Jovan Stinson,    I spoke to Janey with [facility], she states that the insurance company will not cover the ranitidine 300 mg capsules and she recommends resending the prescription for the ranitidine tablets

## 2020-01-23 ENCOUNTER — TELEPHONE (OUTPATIENT)
Dept: ALLERGY | Facility: CLINIC | Age: 75
End: 2020-01-23

## 2020-01-23 RX ORDER — FAMOTIDINE 40 MG/1
TABLET, FILM COATED ORAL
Qty: 90 TABLET | Refills: 1 | Status: SHIPPED | OUTPATIENT
Start: 2020-01-23 | End: 2020-04-20 | Stop reason: SDUPTHER

## 2020-01-23 NOTE — TELEPHONE ENCOUNTER
Pharmacy was unable to get Ranitidine that has not been recalled.  Pharmacy will send over a refill request for Famotidine.      ----- Message from Mabel Castillo sent at 1/23/2020 10:47 AM CST -----  Contact: [facility] at [phone]//Opal Aldana pt- Ref to a med change.  Ranitidine 300mg needs to be changed to a similar med/Fanitidine  Would be covered. Ranitinidine has been recalled.

## 2020-02-18 ENCOUNTER — IMMUNIZATION (OUTPATIENT)
Dept: PHARMACY | Facility: CLINIC | Age: 75
End: 2020-02-18
Payer: MEDICARE

## 2020-03-02 ENCOUNTER — OFFICE VISIT (OUTPATIENT)
Dept: INTERNAL MEDICINE | Facility: CLINIC | Age: 75
End: 2020-03-02
Payer: MEDICARE

## 2020-03-02 VITALS
WEIGHT: 168 LBS | OXYGEN SATURATION: 97 % | HEART RATE: 73 BPM | HEIGHT: 68 IN | DIASTOLIC BLOOD PRESSURE: 60 MMHG | BODY MASS INDEX: 25.46 KG/M2 | SYSTOLIC BLOOD PRESSURE: 100 MMHG

## 2020-03-02 DIAGNOSIS — R29.898 TMJ CLICK: Primary | ICD-10-CM

## 2020-03-02 PROCEDURE — 99213 OFFICE O/P EST LOW 20 MIN: CPT | Mod: PBBFAC | Performed by: INTERNAL MEDICINE

## 2020-03-02 PROCEDURE — 99999 PR PBB SHADOW E&M-EST. PATIENT-LVL III: CPT | Mod: PBBFAC,,, | Performed by: INTERNAL MEDICINE

## 2020-03-02 PROCEDURE — 99214 OFFICE O/P EST MOD 30 MIN: CPT | Mod: S$PBB,,, | Performed by: INTERNAL MEDICINE

## 2020-03-02 PROCEDURE — 99999 PR PBB SHADOW E&M-EST. PATIENT-LVL III: ICD-10-PCS | Mod: PBBFAC,,, | Performed by: INTERNAL MEDICINE

## 2020-03-02 PROCEDURE — 99214 PR OFFICE/OUTPT VISIT, EST, LEVL IV, 30-39 MIN: ICD-10-PCS | Mod: S$PBB,,, | Performed by: INTERNAL MEDICINE

## 2020-03-02 RX ORDER — NAPROXEN 375 MG/1
375 TABLET ORAL 2 TIMES DAILY WITH MEALS
Qty: 30 TABLET | Refills: 0 | Status: SHIPPED | OUTPATIENT
Start: 2020-03-02 | End: 2020-03-18

## 2020-03-02 NOTE — PATIENT INSTRUCTIONS
Understanding Temporomandibular Disorders (TMD)    Do you have pain in your face, jaw, or teeth? Do you have trouble chewing? Does your jaw make clicking or popping noises? These symptoms can be caused by temporomandibular disorders (TMD). This term describes a group of problems related to the temporomandibular joint (TMJ) and nearby muscles. Your symptoms may be painful and frustrating. But dont worry. Your health care team can help you treat TMD and prevent future problems.  Whats wrong?  TMD causes many kinds of symptoms. Thats part of the reason it can be hard to diagnose. You may have headaches, tooth pain, or muscle aches. Your pain may be constant. Or it may come and go without any apparent reason. TMD-related problems include:  · Tight muscles  · Joint inflammation  · Joint damage  · Teeth grinding or clenching  What can you do?  If you are having TMD symptoms, dont wait. Call your dentist or health care provider right away. You dont have to live with pain or discomfort. TMD can be treated. In fact, a key part of treatment is learning to manage your condition at home.  Which treatment is right for you?  Treatment helps rest the muscles and joint. It also helps relieve symptoms and restore function. Depending on the type of problem you have, your treatment plan may include:  · Temporary diet changes.  · New habits for managing stress and maintaining the health of your jaw.  · Medicine to reduce pain and inflammation.  · Therapy to reduce pressure on the joint and restore function.  · Dental treatment to reduce pressure on the joint.  How can you avoid future problems?  Treatment can help relieve your current condition. But TMD symptoms may return over time. You can avoid future problems by maintaining the health of your jaw:  · Avoid foods and habits that make your symptoms worse.  · Lower the stress level in your life.  · Follow your treatment plan.  · Pay attention to your body and get help if  symptoms return.  Date Last Reviewed: 7/13/2015  © 2785-7595 [employer] [address] [place] [address], [place] [address]. All rights reserved. This information is not intended as a substitute for professional medical care. Always follow your healthcare professional's instructions.

## 2020-03-02 NOTE — PROGRESS NOTES
INTERNAL MEDICINE CLINIC - SAME DAY APPOINTMENT  Progress Note    PRESENTING HISTORY     PCP: Jabier Hinkle MD  Chief Complaint/Reason for Visit:   No chief complaint on file.    History of Present Illness & ROS : Mr. Jd Dill III is a 75 y.o. male.      He feels a swelling in the left neck for one week.  Left upper teeth is hurting occasionally.  Last seen Dentist 4 months ago.  No fever.  No pain in ear. No hearing loss.    PAST HISTORY:     Past Medical History:   Diagnosis Date    Allergy     AR (allergic rhinitis)     Basal cell carcinoma of ear     Right Ear    BPH (benign prostatic hyperplasia)     DDD (degenerative disc disease), lumbar     Degenerative disc disease     Gastroesophageal reflux disease 3/11/2019    Hypertension     Kidney stone     Undescended testicle     Right Orchiectomy       Past Surgical History:   Procedure Laterality Date    BASAL CELL CARCINOMA EXCISION      HEMORRHOID SURGERY      HERNIA REPAIR      PILONIDAL CYST DRAINAGE      Right Orchiectomy      TRANSURETHRAL RESECTION OF PROSTATE         Family History   Problem Relation Age of Onset    Heart disease Father     Hypertension Father     Diabetes Father     Dementia Mother     Allergies Mother        Social History     Socioeconomic History    Marital status: Single     Spouse name: Not on file    Number of children: Not on file    Years of education: Not on file    Highest education level: Not on file   Occupational History    Not on file   Social Needs    Financial resource strain: Not on file    Food insecurity:     Worry: Not on file     Inability: Not on file    Transportation needs:     Medical: Not on file     Non-medical: Not on file   Tobacco Use    Smoking status: Former Smoker     Packs/day: 0.50     Years: 10.00     Pack years: 5.00     Last attempt to quit: 1997     Years since quittin.1    Smokeless tobacco: Never Used   Substance and Sexual Activity    Alcohol  use: Yes     Comment: Rare    Drug use: Not on file    Sexual activity: Not on file   Lifestyle    Physical activity:     Days per week: Not on file     Minutes per session: Not on file    Stress: Not on file   Relationships    Social connections:     Talks on phone: Not on file     Gets together: Not on file     Attends Yarsani service: Not on file     Active member of club or organization: Not on file     Attends meetings of clubs or organizations: Not on file     Relationship status: Not on file   Other Topics Concern    Not on file   Social History Narrative    Retired, 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 No kids.       MEDICATIONS & ALLERGIES:     Current Outpatient Medications on File Prior to Visit   Medication Sig Dispense Refill    amLODIPine (NORVASC) 5 MG tablet TAKE 1 TABLET BY MOUTH EVERY DAY 90 tablet 3    azelastine (ASTELIN) 137 mcg (0.1 %) nasal spray INSTILL 1 SPRAY (137 MCG TOTAL) INTO EACH NOSTRIL 2 (TWO) TIMES DAILY. 90 mL 1    famotidine (PEPCID) 40 MG tablet TAKE 1 TABLET BY MOUTH EVERY DAY 90 tablet 1    finasteride (PROSCAR) 5 mg tablet Take 5 mg by mouth once daily.  6    losartan (COZAAR) 100 MG tablet TAKE 1 TABLET BY MOUTH EVERY DAY 90 tablet 1    metoprolol succinate (TOPROL-XL) 25 MG 24 hr tablet TAKE 1 TABLET (25 MG TOTAL) BY MOUTH ONCE DAILY. 90 tablet 11    ranitidine (ZANTAC) 300 MG tablet Take 1 tablet (300 mg total) by mouth every evening. 30 tablet 11    tamsulosin (FLOMAX) 0.4 mg Cp24 Take 0.8 mg by mouth once daily.        No current facility-administered medications on file prior to visit.         Review of patient's allergies indicates:   Allergen Reactions    Ace inhibitors Other (See Comments)     Cough    Chlorthalidone      Hyponatremia    Mite extract Other (See Comments)     Nose gets congested       Medications Reconciliation:   I have reconciled the patient's home medications with the patient/family. I have updated all  changes.  Refer to After-Visit Medication List.    OBJECTIVE:     Vital Signs:  Vitals:    03/02/20 0818   BP: 100/60   Pulse: 73     Wt Readings from Last 1 Encounters:   03/02/20 0818 76.2 kg (167 lb 15.9 oz)     Body mass index is 25.54 kg/m².     Physical Exam:  General: Well developed, well nourished. No distress.  HEENT: Head is normocephalic, atraumatic.  Ears: both external ears are normal.  TMs are normal bilaterally.  Nasal turbinates - layla..  Mouth: no gum swelling.  Neck: slight click to Left TMJ. No facial or neck swelling noted.  No adenopathy in the neck.  Eyes: Clear conjunctiva bilaterally.  Neck: Supple, symmetrical neck; trachea midline.  Lungs: Clear to auscultation bilaterally and normal respiratory effort.  Cardiovascular: Heart with regular rate and rhythm.      Laboratory  Lab Results   Component Value Date    WBC 5.63 11/30/2016    HGB 13.4 (L) 11/30/2016    HCT 40.1 11/30/2016     11/30/2016    CHOL 158 12/12/2017    TRIG 115 12/12/2017    HDL 47 12/12/2017    ALT 20 12/12/2017    AST 19 12/12/2017     12/12/2019    K 4.6 12/12/2019     12/12/2019    CREATININE 1.0 12/12/2019    BUN 16 12/12/2019    CO2 26 12/12/2019    TSH 1.886 10/31/2014    HGBA1C 6.2 (H) 12/12/2019       ASSESSMENT & PLAN:     TMJ click  -     naproxen (EC-NAPROSYN) 375 MG TbEC EC tablet; Take 1 tablet (375 mg total) by mouth 2 (two) times daily with meals. for 15 days  Dispense: 30 tablet; Refill: 0    - Follow up with Dentistry in 2 months.    Instructions for the patient:  TMJ instructions    Scheduled Follow-up :  Future Appointments   Date Time Provider Department Center   6/18/2020 10:00 AM Jabier Hinkle MD [facility] IM [facility]   7/7/2020 10:00 AM TUSHAR Aldana III, MD [facility] ALLERGY [facility]       After Visit Medication List :     Medication List           Accurate as of March 2, 2020  8:49 AM. If you have any questions, ask your nurse or doctor.               START taking these  medications    naproxen 375 MG Tbec EC tablet  Commonly known as:  EC-NAPROSYN  Take 1 tablet (375 mg total) by mouth 2 (two) times daily with meals. for 15 days  Started by:  Tyron Nieves MD        CONTINUE taking these medications    amLODIPine 5 MG tablet  Commonly known as:  NORVASC  TAKE 1 TABLET BY MOUTH EVERY DAY     azelastine 137 mcg (0.1 %) nasal spray  Commonly known as:  ASTELIN  INSTILL 1 SPRAY (137 MCG TOTAL) INTO EACH NOSTRIL 2 (TWO) TIMES DAILY.     famotidine 40 MG tablet  Commonly known as:  PEPCID  TAKE 1 TABLET BY MOUTH EVERY DAY     finasteride 5 mg tablet  Commonly known as:  PROSCAR     losartan 100 MG tablet  Commonly known as:  COZAAR  TAKE 1 TABLET BY MOUTH EVERY DAY     metoprolol succinate 25 MG 24 hr tablet  Commonly known as:  TOPROL-XL  TAKE 1 TABLET (25 MG TOTAL) BY MOUTH ONCE DAILY.     ranitidine 300 MG tablet  Commonly known as:  ZANTAC  Take 1 tablet (300 mg total) by mouth every evening.     tamsulosin 0.4 mg Cap  Commonly known as:  FLOMAX           Where to Get Your Medications      [facility] [place] [id number] [facility] [place] [address]  [address] [facility] [address]    Phone:  [phone]   · naproxen 375 MG Tbec EC tablet         Signing Physician:  Tyron Nieves MD

## 2020-03-18 ENCOUNTER — TELEPHONE (OUTPATIENT)
Dept: INTERNAL MEDICINE | Facility: CLINIC | Age: 75
End: 2020-03-18

## 2020-03-18 DIAGNOSIS — R29.898 TMJ CLICK: ICD-10-CM

## 2020-03-18 RX ORDER — NAPROXEN 375 MG/1
375 TABLET ORAL 2 TIMES DAILY WITH MEALS
Qty: 30 TABLET | Refills: 0 | Status: SHIPPED | OUTPATIENT
Start: 2020-03-18 | End: 2020-07-16

## 2020-03-18 NOTE — TELEPHONE ENCOUNTER
----- Message from Shanita Fisher sent at 3/18/2020 10:11 AM CDT -----  Contact: Ms. Aviva Herrera @ [facility]  Requesting an RX refill or new RX.  Is this a refill or new RX:  Refill  RX name and strength: naproxen (EC-NAPROSYN) 375 MG TbEC EC tablet ()  Directions (copy/paste from chart):  N/A  Is this a 30 day or 90 day RX:  30  Local pharmacy or mail order pharmacy:  [facility] [place] - [address]  Pharmacy name and phone # [phone], fax# [phone]

## 2020-03-18 NOTE — TELEPHONE ENCOUNTER
Hi, please let him know that I sent in a refill but he should not take it daily since it could cause a stomach ulcer if taken too much and too frequently  Let me know if patient has any questions.  Thank you, Jabier Hinkle

## 2020-03-19 ENCOUNTER — IMMUNIZATION (OUTPATIENT)
Dept: PHARMACY | Facility: CLINIC | Age: 75
End: 2020-03-19
Payer: MEDICARE

## 2020-04-12 DIAGNOSIS — R09.81 NASAL CONGESTION: ICD-10-CM

## 2020-04-13 RX ORDER — AZELASTINE 1 MG/ML
SPRAY, METERED NASAL
Qty: 30 ML | Refills: 5 | Status: SHIPPED | OUTPATIENT
Start: 2020-04-13 | End: 2020-12-17 | Stop reason: SDUPTHER

## 2020-04-20 DIAGNOSIS — I10 ESSENTIAL HYPERTENSION: ICD-10-CM

## 2020-04-20 RX ORDER — METOPROLOL SUCCINATE 25 MG/1
TABLET, EXTENDED RELEASE ORAL
Qty: 90 TABLET | Refills: 11 | Status: SHIPPED | OUTPATIENT
Start: 2020-04-20 | End: 2021-07-13

## 2020-04-20 RX ORDER — FAMOTIDINE 40 MG/1
40 TABLET, FILM COATED ORAL DAILY
Qty: 90 TABLET | Refills: 1 | Status: SHIPPED | OUTPATIENT
Start: 2020-04-20 | End: 2020-07-16

## 2020-04-21 ENCOUNTER — TELEPHONE (OUTPATIENT)
Dept: ALLERGY | Facility: CLINIC | Age: 75
End: 2020-04-21

## 2020-04-21 NOTE — TELEPHONE ENCOUNTER
Scheduled with patient for in clinic appointment tomorrow.      ----- Message from Emory Delvalle sent at 4/21/2020 10:32 AM CDT -----  Contact: Patient  Patient called in and wanted to speak with the office regarding an appointment. He would like a call back from the office and can be reached at    [phone]

## 2020-04-22 ENCOUNTER — OFFICE VISIT (OUTPATIENT)
Dept: ALLERGY | Facility: CLINIC | Age: 75
End: 2020-04-22
Payer: MEDICARE

## 2020-04-22 DIAGNOSIS — N13.8 BENIGN PROSTATIC HYPERPLASIA WITH URINARY OBSTRUCTION: ICD-10-CM

## 2020-04-22 DIAGNOSIS — Z87.442 HISTORY OF KIDNEY STONES: ICD-10-CM

## 2020-04-22 DIAGNOSIS — I10 ESSENTIAL HYPERTENSION: ICD-10-CM

## 2020-04-22 DIAGNOSIS — J30.89 ALLERGIC RHINITIS DUE TO DUST MITE: Primary | ICD-10-CM

## 2020-04-22 DIAGNOSIS — K21.9 LARYNGOPHARYNGEAL REFLUX: ICD-10-CM

## 2020-04-22 DIAGNOSIS — N40.1 BENIGN PROSTATIC HYPERPLASIA WITH URINARY OBSTRUCTION: ICD-10-CM

## 2020-04-22 DIAGNOSIS — K21.9 GASTROESOPHAGEAL REFLUX DISEASE, ESOPHAGITIS PRESENCE NOT SPECIFIED: ICD-10-CM

## 2020-04-22 PROCEDURE — 99213 OFFICE O/P EST LOW 20 MIN: CPT | Mod: 95,,, | Performed by: ALLERGY & IMMUNOLOGY

## 2020-04-22 PROCEDURE — 99213 PR OFFICE/OUTPT VISIT, EST, LEVL III, 20-29 MIN: ICD-10-PCS | Mod: 95,,, | Performed by: ALLERGY & IMMUNOLOGY

## 2020-04-22 NOTE — PROGRESS NOTES
Established Patient - Audio Only Telehealth Visit     The patient location is: Home  The chief complaint leading to consultation is: LPR and medication change  Visit type: Virtual visit with audio only (telephone)       The reason for the audio only service rather than synchronous audio and video virtual visit was related to technical difficulties or patient preference/necessity.     Each patient to whom I provide medical services by telemedicine is:  (1) informed of the relationship between the physician and patient and the respective role of any other health care provider with respect to management of the patient; and (2) notified that they may decline to receive medical services by telemedicine and may withdraw from such care at any time. Patient verbally consented to receive this service via voice-only telephone call.    Jd Dill is evaluated today via an audio phone call.  He does not have a smart phone and prefers not to come in to the hospital.    He has allergic rhinitis and LPR.  He was last seen January 6, 2020.    He was initially diagnosed with LPR by JOBY Brian on September 11, 2018.  She started him on omeprazole 40 milligrams a day and ranitidine 300 milligrams at night.    His symptoms did improve and omeprazole was discontinued.  His symptoms have been well controlled on ranitidine 300 milligrams a day.    There was an initial recall of Ranitidine several months ago.  He was changed to famotidine.  His symptoms returned and he did not do well.  Ranitidine was restarted and within six weeks his symptoms were well controlled again.    Ranitidine has again been recalled.  He does have several bottles of omeprazole at home.    He denies any rhinitis or conjunctivitis.  He denies any cough, wheezing, or shortness of breath.    [facility] PEQ ALLERGY QUESTIONNAIRE SHORT 4/22/2020   Head or facial pain: No symptoms   Sinus pressure? -   Ears: No symptoms   Nosebleeds? No   Postnasal drip? Yes   Sneezing?  Yes   Runny nose? Yes   Congestion? Yes   Throat: No symptoms   Eyes: No symptoms   Lungs: No symptoms   Skin: No symptoms     Physical Examination:  Telephone visit.    Laboratory 08/28/2018:  IgE level:  167.  ImmunoCAP:  Class II:  DM f.  Class I:  DM pt.      Assessment:  1.  Allergic rhinitis, controlled.  2.  Mild GERD, controlled.  3.  LPR, controlled.  4.  Hypertension on amlodipine, losartan, and metoprolol.  5.  History of nephrolithiasis.  6.  BPH.    Recommendations:  1.  Continue house dust mite avoidance procedures.  2.  Continue azelastine 1 to 2 sprays each nostril b.i.d. p.r.n. rhinitis.  3.  Start taking omeprazole 40 milligrams a day.  4.  Follow symptoms on above.  5.  Return to clinic or call in 4 to 8 weeks.    Discussed with Pharmacy in person.  Ranitidine is not available from [facility] now and no date is given for it's return.    15 minutes was spent with this patient, over half in counseling and coordinating care.       This service was not originating from a related E/M service provided within the previous 7 days nor will  to an E/M service or procedure within the next 24 hours or my soonest available appointment.  Prevailing standard of care was able to be met in this audio-only visit.

## 2020-05-15 ENCOUNTER — TELEPHONE (OUTPATIENT)
Dept: ALLERGY | Facility: CLINIC | Age: 75
End: 2020-05-15

## 2020-05-15 NOTE — TELEPHONE ENCOUNTER
----- Message from Tyler Olson sent at 5/15/2020  9:09 AM CDT -----  Contact: pt  Pt wanted to leave a message for Dr. Aldana to give him a call back at [phone]

## 2020-05-15 NOTE — TELEPHONE ENCOUNTER
"Patient states " I'm just giving Dr. Aldana a call as recommended when I last did a virtual visit. I think he just wants to see how this new medication Omeprazole is working. It's working pretty well."   "

## 2020-07-16 ENCOUNTER — OFFICE VISIT (OUTPATIENT)
Dept: ALLERGY | Facility: CLINIC | Age: 75
End: 2020-07-16
Payer: MEDICARE

## 2020-07-16 VITALS — HEIGHT: 68 IN | WEIGHT: 165.81 LBS | BODY MASS INDEX: 25.13 KG/M2

## 2020-07-16 DIAGNOSIS — I10 ESSENTIAL HYPERTENSION: ICD-10-CM

## 2020-07-16 DIAGNOSIS — J30.9 ALLERGIC RHINITIS, UNSPECIFIED SEASONALITY, UNSPECIFIED TRIGGER: Primary | ICD-10-CM

## 2020-07-16 DIAGNOSIS — K21.9 LARYNGOPHARYNGEAL REFLUX: ICD-10-CM

## 2020-07-16 DIAGNOSIS — K21.9 GASTROESOPHAGEAL REFLUX DISEASE, ESOPHAGITIS PRESENCE NOT SPECIFIED: ICD-10-CM

## 2020-07-16 PROCEDURE — 99214 PR OFFICE/OUTPT VISIT, EST, LEVL IV, 30-39 MIN: ICD-10-PCS | Mod: S$PBB,,, | Performed by: ALLERGY & IMMUNOLOGY

## 2020-07-16 PROCEDURE — 99213 OFFICE O/P EST LOW 20 MIN: CPT | Mod: PBBFAC | Performed by: ALLERGY & IMMUNOLOGY

## 2020-07-16 PROCEDURE — 99999 PR PBB SHADOW E&M-EST. PATIENT-LVL III: ICD-10-PCS | Mod: PBBFAC,,, | Performed by: ALLERGY & IMMUNOLOGY

## 2020-07-16 PROCEDURE — 99999 PR PBB SHADOW E&M-EST. PATIENT-LVL III: CPT | Mod: PBBFAC,,, | Performed by: ALLERGY & IMMUNOLOGY

## 2020-07-16 PROCEDURE — 99214 OFFICE O/P EST MOD 30 MIN: CPT | Mod: S$PBB,,, | Performed by: ALLERGY & IMMUNOLOGY

## 2020-07-16 RX ORDER — OMEPRAZOLE 40 MG/1
40 CAPSULE, DELAYED RELEASE ORAL EVERY MORNING
Qty: 90 CAPSULE | Refills: 3 | Status: SHIPPED | OUTPATIENT
Start: 2020-07-16 | End: 2021-07-07

## 2020-07-16 RX ORDER — OMEPRAZOLE 40 MG/1
CAPSULE, DELAYED RELEASE ORAL
COMMUNITY
Start: 2020-04-25 | End: 2020-07-16 | Stop reason: SDUPTHER

## 2020-07-16 NOTE — PROGRESS NOTES
Jd guillermo returns to clinic today for continued evaluation of a cough and LPR.  He is here alone.  He was last seen April 22, 2020 with a phone call.    Since his last visit, he has been taking omeprazole 40 milligrams a day.  His symptoms are controlled for the most part.  He does have some mild postnasal drip, throat clearing, and cough.  He does think that he does better on Ranitidine.    He also uses azelastine two to 3 times a day.  He does feel that this is effective and works immediately.    He did not have control of his symptoms with Pepcid.  It is improved since starting omeprazole.    He denies any conjunctivitis.  He denies any wheezing or shortness of breath.    He has not had any heartburn.    [facility] PEQ ALLERGY QUESTIONNAIRE SHORT 7/15/2020   Head or facial pain: No symptoms   Sinus pressure? -   Ears: No symptoms   Nosebleeds? No   Postnasal drip? Yes   Sneezing? Yes   Runny nose? Yes   Congestion? Yes   Throat: No symptoms   Eyes: -   Eye itching? No   Eye redness? No   Eye discharge? No   Eye pain?  No   Light sensitivity / light hurts the eyes? No   Lungs: No symptoms   Skin: No symptoms     Physical Examination:  General: Well-developed, well-nourished, no acute distress.  Head: No sinus tenderness.  Eyes: Conjunctivae:  No bulbar or palpebral conjunctival injection.  Ears: EAC's clear.  TM's clear.  No pre-auricular nodes.  Nose: Nasal Mucosa:  Pink.  Septum: No apparent deviation.  Turbinates:  No significant edema.  Polyps/Mass:  None visible.  Teeth/Gums:  No bleeding noted.  Oropharynx: No exudates.  Neck: Supple without thyromegaly. No cervical lymphadenopathy.    Respiratory/Chest: Effort: Good.  Auscultation:  Clear bilaterally.  Skin: Good turgor.  No urticaria or angioedema.  Neuro/Psych: Oriented x 3.    Laboratory 08/28/2018:  IgE level:  167.  ImmunoCAP:  Class II:  DM f.  Class I:  LORRAINE pt.      Assessment:  1.  Allergic rhinitis, controlled.  2.  Mild GERD, controlled.  3.  LPR,  controlled.  4.  Hypertension on amlodipine, losartan, and metoprolol.  5.  History of nephrolithiasis.  6.  BPH.    Recommendations:  1.  Continue house dust mite avoidance procedures.  2.  Continue azelastine 1 to 2 sprays each nostril b.i.d. p.r.n. rhinitis.  3.  Continue omeprazole 40 milligrams a day.  4.  Discussed Ranitidine when again available.  5.  Return to clinic or call in six months or sooner if needed.    Discussed with Pharmacy in person on April 22, 2020.  Ranitidine is not available from [email or web address] now and no date is given for it's return.

## 2020-09-29 ENCOUNTER — PATIENT MESSAGE (OUTPATIENT)
Dept: OTHER | Facility: OTHER | Age: 75
End: 2020-09-29

## 2020-11-23 DIAGNOSIS — I10 ESSENTIAL HYPERTENSION: ICD-10-CM

## 2020-11-23 RX ORDER — AMLODIPINE BESYLATE 5 MG/1
TABLET ORAL
Qty: 90 TABLET | Refills: 0 | Status: SHIPPED | OUTPATIENT
Start: 2020-11-23 | End: 2020-12-17 | Stop reason: SDUPTHER

## 2020-11-23 NOTE — TELEPHONE ENCOUNTER
Hi, please call patient and let the patient know that at [facility] we have developed a hypertension registry of patients and I see that this patient is due to see me back and have the high blood pressure evaluated along with overall health.  I have sent in prescription refills.  Please offer an appointment.  Thank you, Jabier Hinkle

## 2020-11-25 ENCOUNTER — PATIENT OUTREACH (OUTPATIENT)
Dept: ADMINISTRATIVE | Facility: HOSPITAL | Age: 75
End: 2020-11-25

## 2020-11-25 ENCOUNTER — PATIENT MESSAGE (OUTPATIENT)
Dept: ADMINISTRATIVE | Facility: HOSPITAL | Age: 75
End: 2020-11-25

## 2020-11-25 NOTE — PROGRESS NOTES
Health Maintenance Due   Topic Date Due    Hepatitis C Screening  1945     I tried calling pt, but there was no answer and I was unable to LM.  Portal message has been sent to pt asking him to call back to schedule his annual visit with Dr. Hinkle.  Chart review completed.

## 2020-11-27 ENCOUNTER — TELEPHONE (OUTPATIENT)
Dept: INTERNAL MEDICINE | Facility: CLINIC | Age: 75
End: 2020-11-27

## 2020-11-27 DIAGNOSIS — Z11.59 ENCOUNTER FOR HEPATITIS C SCREENING TEST FOR LOW RISK PATIENT: ICD-10-CM

## 2020-11-27 DIAGNOSIS — R73.03 PRE-DIABETES: Primary | ICD-10-CM

## 2020-11-27 DIAGNOSIS — I10 ESSENTIAL HYPERTENSION: ICD-10-CM

## 2020-11-27 NOTE — TELEPHONE ENCOUNTER
----- Message from Dana Carrera sent at 11/27/2020  9:27 AM CST -----  Regarding: Orders  Contact: Patient  Patient is scheduled to have his annual physical completed on 12/17/2020   Patient is requesting orders to have blood work completed before appt   Patient would like a call to schedule once orders are submitted   Please assist     Patient can be reached at [phone]

## 2020-11-30 NOTE — TELEPHONE ENCOUNTER
Hi, please contact the patient to assist in scheduling    Orders Placed This Encounter    CBC Auto Differential    Comprehensive Metabolic Panel    Lipid Panel    Hemoglobin A1C    Hepatitis C Antibody       Thank you, Jabier Hinkle

## 2020-12-10 ENCOUNTER — LAB VISIT (OUTPATIENT)
Dept: LAB | Facility: HOSPITAL | Age: 75
End: 2020-12-10
Attending: INTERNAL MEDICINE
Payer: MEDICARE

## 2020-12-10 DIAGNOSIS — R73.03 PRE-DIABETES: ICD-10-CM

## 2020-12-10 DIAGNOSIS — I10 ESSENTIAL HYPERTENSION: ICD-10-CM

## 2020-12-10 DIAGNOSIS — Z11.59 ENCOUNTER FOR HEPATITIS C SCREENING TEST FOR LOW RISK PATIENT: ICD-10-CM

## 2020-12-10 LAB
ALBUMIN SERPL BCP-MCNC: 4 G/DL (ref 3.5–5.2)
ALP SERPL-CCNC: 76 U/L (ref 55–135)
ALT SERPL W/O P-5'-P-CCNC: 14 U/L (ref 10–44)
ANION GAP SERPL CALC-SCNC: 7 MMOL/L (ref 8–16)
AST SERPL-CCNC: 19 U/L (ref 10–40)
BASOPHILS # BLD AUTO: 0.01 K/UL (ref 0–0.2)
BASOPHILS NFR BLD: 0.1 % (ref 0–1.9)
BILIRUB SERPL-MCNC: 0.6 MG/DL (ref 0.1–1)
BUN SERPL-MCNC: 21 MG/DL (ref 8–23)
CALCIUM SERPL-MCNC: 9.4 MG/DL (ref 8.7–10.5)
CHLORIDE SERPL-SCNC: 101 MMOL/L (ref 95–110)
CHOLEST SERPL-MCNC: 148 MG/DL (ref 120–199)
CHOLEST/HDLC SERPL: 3.4 {RATIO} (ref 2–5)
CO2 SERPL-SCNC: 27 MMOL/L (ref 23–29)
CREAT SERPL-MCNC: 1 MG/DL (ref 0.5–1.4)
DIFFERENTIAL METHOD: ABNORMAL
EOSINOPHIL # BLD AUTO: 0.1 K/UL (ref 0–0.5)
EOSINOPHIL NFR BLD: 1.3 % (ref 0–8)
ERYTHROCYTE [DISTWIDTH] IN BLOOD BY AUTOMATED COUNT: 12.8 % (ref 11.5–14.5)
EST. GFR  (AFRICAN AMERICAN): >60 ML/MIN/1.73 M^2
EST. GFR  (NON AFRICAN AMERICAN): >60 ML/MIN/1.73 M^2
ESTIMATED AVG GLUCOSE: 126 MG/DL (ref 68–131)
GLUCOSE SERPL-MCNC: 118 MG/DL (ref 70–110)
HBA1C MFR BLD HPLC: 6 % (ref 4–5.6)
HCT VFR BLD AUTO: 40 % (ref 40–54)
HCV AB SERPL QL IA: NEGATIVE
HDLC SERPL-MCNC: 44 MG/DL (ref 40–75)
HDLC SERPL: 29.7 % (ref 20–50)
HGB BLD-MCNC: 12.8 G/DL (ref 14–18)
IMM GRANULOCYTES # BLD AUTO: 0.01 K/UL (ref 0–0.04)
IMM GRANULOCYTES NFR BLD AUTO: 0.1 % (ref 0–0.5)
LDLC SERPL CALC-MCNC: 91 MG/DL (ref 63–159)
LYMPHOCYTES # BLD AUTO: 1.2 K/UL (ref 1–4.8)
LYMPHOCYTES NFR BLD: 18.4 % (ref 18–48)
MCH RBC QN AUTO: 30.6 PG (ref 27–31)
MCHC RBC AUTO-ENTMCNC: 32 G/DL (ref 32–36)
MCV RBC AUTO: 96 FL (ref 82–98)
MONOCYTES # BLD AUTO: 0.9 K/UL (ref 0.3–1)
MONOCYTES NFR BLD: 13.9 % (ref 4–15)
NEUTROPHILS # BLD AUTO: 4.5 K/UL (ref 1.8–7.7)
NEUTROPHILS NFR BLD: 66.2 % (ref 38–73)
NONHDLC SERPL-MCNC: 104 MG/DL
NRBC BLD-RTO: 0 /100 WBC
PLATELET # BLD AUTO: 164 K/UL (ref 150–350)
PMV BLD AUTO: 11.2 FL (ref 9.2–12.9)
POTASSIUM SERPL-SCNC: 4.4 MMOL/L (ref 3.5–5.1)
PROT SERPL-MCNC: 7.1 G/DL (ref 6–8.4)
RBC # BLD AUTO: 4.18 M/UL (ref 4.6–6.2)
SODIUM SERPL-SCNC: 135 MMOL/L (ref 136–145)
TRIGL SERPL-MCNC: 65 MG/DL (ref 30–150)
WBC # BLD AUTO: 6.74 K/UL (ref 3.9–12.7)

## 2020-12-10 PROCEDURE — 36415 COLL VENOUS BLD VENIPUNCTURE: CPT

## 2020-12-10 PROCEDURE — 85025 COMPLETE CBC W/AUTO DIFF WBC: CPT

## 2020-12-10 PROCEDURE — 80053 COMPREHEN METABOLIC PANEL: CPT

## 2020-12-10 PROCEDURE — 86803 HEPATITIS C AB TEST: CPT

## 2020-12-10 PROCEDURE — 80061 LIPID PANEL: CPT

## 2020-12-10 PROCEDURE — 83036 HEMOGLOBIN GLYCOSYLATED A1C: CPT

## 2020-12-11 ENCOUNTER — PATIENT MESSAGE (OUTPATIENT)
Dept: OTHER | Facility: OTHER | Age: 75
End: 2020-12-11

## 2020-12-17 ENCOUNTER — OFFICE VISIT (OUTPATIENT)
Dept: INTERNAL MEDICINE | Facility: CLINIC | Age: 75
End: 2020-12-17
Payer: MEDICARE

## 2020-12-17 VITALS
OXYGEN SATURATION: 97 % | SYSTOLIC BLOOD PRESSURE: 120 MMHG | HEART RATE: 71 BPM | BODY MASS INDEX: 25.79 KG/M2 | DIASTOLIC BLOOD PRESSURE: 68 MMHG | WEIGHT: 170.19 LBS | HEIGHT: 68 IN

## 2020-12-17 DIAGNOSIS — R09.81 NASAL CONGESTION: ICD-10-CM

## 2020-12-17 DIAGNOSIS — I10 ESSENTIAL HYPERTENSION: ICD-10-CM

## 2020-12-17 PROCEDURE — 99214 OFFICE O/P EST MOD 30 MIN: CPT | Mod: S$PBB,,, | Performed by: INTERNAL MEDICINE

## 2020-12-17 PROCEDURE — 99999 PR PBB SHADOW E&M-EST. PATIENT-LVL IV: CPT | Mod: PBBFAC,,, | Performed by: INTERNAL MEDICINE

## 2020-12-17 PROCEDURE — 99214 OFFICE O/P EST MOD 30 MIN: CPT | Mod: PBBFAC | Performed by: INTERNAL MEDICINE

## 2020-12-17 PROCEDURE — 99999 PR PBB SHADOW E&M-EST. PATIENT-LVL IV: ICD-10-PCS | Mod: PBBFAC,,, | Performed by: INTERNAL MEDICINE

## 2020-12-17 PROCEDURE — 99214 PR OFFICE/OUTPT VISIT, EST, LEVL IV, 30-39 MIN: ICD-10-PCS | Mod: S$PBB,,, | Performed by: INTERNAL MEDICINE

## 2020-12-17 RX ORDER — AZELASTINE 1 MG/ML
SPRAY, METERED NASAL
Qty: 30 ML | Refills: 5 | Status: SHIPPED | OUTPATIENT
Start: 2020-12-17 | End: 2021-09-08

## 2020-12-17 RX ORDER — AMLODIPINE BESYLATE 5 MG/1
5 TABLET ORAL DAILY
Qty: 90 TABLET | Refills: 11 | Status: SHIPPED | OUTPATIENT
Start: 2020-12-17 | End: 2021-12-23

## 2020-12-17 NOTE — PATIENT INSTRUCTIONS
Here is some information regarding the vaccine, this information is updated frequently. I recommend that you check these websites for more vaccine information.  [email or web address]://[email or web address]/articles/[email or web address]-vaccine-ygj-ygmh-ti-first  [email or web address]/[email or web address]-faqs

## 2020-12-17 NOTE — PROGRESS NOTES
Subjective:       Patient ID: Jd Dill III is a 75 y.o. male.    Chief Complaint: Annual Exam    Patient is here for followup for chronic conditions.    No new complaints.    Active with walking in neighborhood.    Review of Systems   Constitutional: Negative for appetite change and unexpected weight change.   HENT: Positive for congestion, postnasal drip and rhinorrhea. Negative for sore throat.         Allergy symptoms btr   Respiratory: Negative for cough, chest tightness and shortness of breath.    Cardiovascular: Negative for chest pain.   Gastrointestinal: Negative for abdominal pain.        Rare GERD symptoms   Genitourinary: Negative for difficulty urinating, scrotal swelling and testicular pain.   Skin:        No lesions   Allergic/Immunologic: Positive for environmental allergies.   Neurological: Negative for tremors, syncope and weakness.   Psychiatric/Behavioral: Negative for dysphoric mood.           Objective:      Physical Exam  Constitutional:       General: He is not in acute distress.     Appearance: He is well-developed. He is not diaphoretic.   HENT:      Head: Normocephalic and atraumatic.   Eyes:      General: No scleral icterus.  Neck:      Musculoskeletal: Normal range of motion.      Thyroid: No thyromegaly.   Cardiovascular:      Rate and Rhythm: Normal rate and regular rhythm.      Heart sounds: Normal heart sounds. No murmur. No friction rub. No gallop.    Pulmonary:      Effort: Pulmonary effort is normal. No respiratory distress.      Breath sounds: Normal breath sounds. No wheezing or rales.   Abdominal:      General: Bowel sounds are normal. There is no distension.      Palpations: Abdomen is soft. There is no mass.      Tenderness: There is no abdominal tenderness. There is no guarding or rebound.   Musculoskeletal: Normal range of motion.   Lymphadenopathy:      Cervical: No cervical adenopathy.   Skin:     Findings: No lesion.   Neurological:      Mental Status: He is alert and  oriented to person, place, and time.   Psychiatric:         Thought Content: Thought content normal.         Assessment:       1. Nasal congestion    2. Essential hypertension        Plan:       Jd was seen today for annual exam.    Diagnoses and all orders for this visit:    Nasal congestion  -     azelastine (ASTELIN) 137 mcg (0.1 %) nasal spray; INSTILL 1 SPRAY INTO EACH NOSTRIL TWICE A DAY  He will discuss further with Dr Aldana in January    Essential hypertension  -     amLODIPine (NORVASC) 5 MG tablet; Take 1 tablet (5 mg total) by mouth once daily.  controlled    Reviewed blood work as well    Reviewed NOK -- his cousin listed under emergency contacts    Health Maintenance       Date Due Completion Date    Colonoscopy 04/19/2023 4/19/2018    Override on 4/19/2018: Done (dr [facility])    Override on 1/2/2013: Done    Lipid Panel 12/10/2025 12/10/2020    TETANUS VACCINE 03/19/2030 3/19/2020          Follow up in about 1 year (around 12/17/2021).    Future Appointments   Date Time Provider Department Center   1/13/2021  9:20 AM TUSHAR Aldana III, MD [facility] ALLER[facility]

## 2021-01-13 ENCOUNTER — OFFICE VISIT (OUTPATIENT)
Dept: ALLERGY | Facility: CLINIC | Age: 76
End: 2021-01-13
Payer: MEDICARE

## 2021-01-13 VITALS — BODY MASS INDEX: 25.96 KG/M2 | HEIGHT: 68 IN | WEIGHT: 171.31 LBS

## 2021-01-13 DIAGNOSIS — I10 ESSENTIAL HYPERTENSION: ICD-10-CM

## 2021-01-13 DIAGNOSIS — K21.9 LARYNGOPHARYNGEAL REFLUX: ICD-10-CM

## 2021-01-13 DIAGNOSIS — J30.9 ALLERGIC RHINITIS, UNSPECIFIED SEASONALITY, UNSPECIFIED TRIGGER: Primary | ICD-10-CM

## 2021-01-13 DIAGNOSIS — K21.9 GASTROESOPHAGEAL REFLUX DISEASE, UNSPECIFIED WHETHER ESOPHAGITIS PRESENT: ICD-10-CM

## 2021-01-13 PROCEDURE — 99999 PR PBB SHADOW E&M-EST. PATIENT-LVL III: ICD-10-PCS | Mod: PBBFAC,,, | Performed by: ALLERGY & IMMUNOLOGY

## 2021-01-13 PROCEDURE — 99999 PR PBB SHADOW E&M-EST. PATIENT-LVL III: CPT | Mod: PBBFAC,,, | Performed by: ALLERGY & IMMUNOLOGY

## 2021-01-13 PROCEDURE — 99213 OFFICE O/P EST LOW 20 MIN: CPT | Mod: PBBFAC | Performed by: ALLERGY & IMMUNOLOGY

## 2021-01-13 PROCEDURE — 99214 OFFICE O/P EST MOD 30 MIN: CPT | Mod: S$PBB,,, | Performed by: ALLERGY & IMMUNOLOGY

## 2021-01-13 PROCEDURE — 99214 PR OFFICE/OUTPT VISIT, EST, LEVL IV, 30-39 MIN: ICD-10-PCS | Mod: S$PBB,,, | Performed by: ALLERGY & IMMUNOLOGY

## 2021-04-06 ENCOUNTER — PATIENT MESSAGE (OUTPATIENT)
Dept: INTERNAL MEDICINE | Facility: CLINIC | Age: 76
End: 2021-04-06

## 2021-04-06 DIAGNOSIS — K21.9 LPRD (LARYNGOPHARYNGEAL REFLUX DISEASE): Primary | ICD-10-CM

## 2021-04-13 ENCOUNTER — OFFICE VISIT (OUTPATIENT)
Dept: GASTROENTEROLOGY | Facility: CLINIC | Age: 76
End: 2021-04-13
Payer: MEDICARE

## 2021-04-13 VITALS — BODY MASS INDEX: 25.39 KG/M2 | WEIGHT: 167.56 LBS | HEIGHT: 68 IN

## 2021-04-13 DIAGNOSIS — K21.9 LPRD (LARYNGOPHARYNGEAL REFLUX DISEASE): ICD-10-CM

## 2021-04-13 DIAGNOSIS — K21.9 GASTROESOPHAGEAL REFLUX DISEASE, UNSPECIFIED WHETHER ESOPHAGITIS PRESENT: Primary | ICD-10-CM

## 2021-04-13 PROCEDURE — 99214 PR OFFICE/OUTPT VISIT, EST, LEVL IV, 30-39 MIN: ICD-10-PCS | Mod: S$PBB,,, | Performed by: NURSE PRACTITIONER

## 2021-04-13 PROCEDURE — 99999 PR PBB SHADOW E&M-EST. PATIENT-LVL III: CPT | Mod: PBBFAC,,, | Performed by: NURSE PRACTITIONER

## 2021-04-13 PROCEDURE — 99213 OFFICE O/P EST LOW 20 MIN: CPT | Mod: PBBFAC,PO | Performed by: NURSE PRACTITIONER

## 2021-04-13 PROCEDURE — 99999 PR PBB SHADOW E&M-EST. PATIENT-LVL III: ICD-10-PCS | Mod: PBBFAC,,, | Performed by: NURSE PRACTITIONER

## 2021-04-13 PROCEDURE — 99214 OFFICE O/P EST MOD 30 MIN: CPT | Mod: S$PBB,,, | Performed by: NURSE PRACTITIONER

## 2021-06-03 ENCOUNTER — PATIENT MESSAGE (OUTPATIENT)
Dept: INTERNAL MEDICINE | Facility: CLINIC | Age: 76
End: 2021-06-03

## 2021-06-03 DIAGNOSIS — H61.20 IMPACTED CERUMEN, UNSPECIFIED LATERALITY: Primary | ICD-10-CM

## 2021-06-07 ENCOUNTER — TELEPHONE (OUTPATIENT)
Dept: OTOLARYNGOLOGY | Facility: CLINIC | Age: 76
End: 2021-06-07

## 2021-06-09 ENCOUNTER — TELEPHONE (OUTPATIENT)
Dept: OTOLARYNGOLOGY | Facility: CLINIC | Age: 76
End: 2021-06-09

## 2021-06-11 ENCOUNTER — TELEPHONE (OUTPATIENT)
Dept: OTOLARYNGOLOGY | Facility: CLINIC | Age: 76
End: 2021-06-11

## 2021-07-13 ENCOUNTER — OFFICE VISIT (OUTPATIENT)
Dept: ALLERGY | Facility: CLINIC | Age: 76
End: 2021-07-13
Payer: MEDICARE

## 2021-07-13 VITALS — WEIGHT: 168.19 LBS | HEIGHT: 68 IN | BODY MASS INDEX: 25.49 KG/M2

## 2021-07-13 DIAGNOSIS — K21.9 LARYNGOPHARYNGEAL REFLUX: ICD-10-CM

## 2021-07-13 DIAGNOSIS — I10 ESSENTIAL HYPERTENSION: ICD-10-CM

## 2021-07-13 DIAGNOSIS — J30.9 ALLERGIC RHINITIS, UNSPECIFIED SEASONALITY, UNSPECIFIED TRIGGER: Primary | ICD-10-CM

## 2021-07-13 DIAGNOSIS — K21.9 GASTROESOPHAGEAL REFLUX DISEASE, UNSPECIFIED WHETHER ESOPHAGITIS PRESENT: ICD-10-CM

## 2021-07-13 PROCEDURE — 99999 PR PBB SHADOW E&M-EST. PATIENT-LVL III: ICD-10-PCS | Mod: PBBFAC,,, | Performed by: ALLERGY & IMMUNOLOGY

## 2021-07-13 PROCEDURE — 99213 OFFICE O/P EST LOW 20 MIN: CPT | Mod: PBBFAC | Performed by: ALLERGY & IMMUNOLOGY

## 2021-07-13 PROCEDURE — 99214 OFFICE O/P EST MOD 30 MIN: CPT | Mod: S$PBB,,, | Performed by: ALLERGY & IMMUNOLOGY

## 2021-07-13 PROCEDURE — 99999 PR PBB SHADOW E&M-EST. PATIENT-LVL III: CPT | Mod: PBBFAC,,, | Performed by: ALLERGY & IMMUNOLOGY

## 2021-07-13 PROCEDURE — 99214 PR OFFICE/OUTPT VISIT, EST, LEVL IV, 30-39 MIN: ICD-10-PCS | Mod: S$PBB,,, | Performed by: ALLERGY & IMMUNOLOGY

## 2021-07-29 ENCOUNTER — PATIENT MESSAGE (OUTPATIENT)
Dept: ADMINISTRATIVE | Facility: HOSPITAL | Age: 76
End: 2021-07-29

## 2021-07-29 ENCOUNTER — PATIENT OUTREACH (OUTPATIENT)
Dept: ADMINISTRATIVE | Facility: HOSPITAL | Age: 76
End: 2021-07-29

## 2021-08-26 ENCOUNTER — TELEPHONE (OUTPATIENT)
Dept: RESEARCH | Facility: HOSPITAL | Age: 76
End: 2021-08-26

## 2021-09-13 ENCOUNTER — TELEPHONE (OUTPATIENT)
Dept: INTERNAL MEDICINE | Facility: CLINIC | Age: 76
End: 2021-09-13

## 2021-09-24 ENCOUNTER — TELEPHONE (OUTPATIENT)
Dept: RESEARCH | Facility: HOSPITAL | Age: 76
End: 2021-09-24

## 2021-09-30 ENCOUNTER — PES CALL (OUTPATIENT)
Dept: ADMINISTRATIVE | Facility: CLINIC | Age: 76
End: 2021-09-30

## 2021-10-07 ENCOUNTER — RESEARCH ENCOUNTER (OUTPATIENT)
Dept: RESEARCH | Facility: HOSPITAL | Age: 76
End: 2021-10-07

## 2021-10-25 ENCOUNTER — TELEPHONE (OUTPATIENT)
Dept: INTERNAL MEDICINE | Facility: CLINIC | Age: 76
End: 2021-10-25
Payer: MEDICARE

## 2021-10-25 DIAGNOSIS — I10 ESSENTIAL HYPERTENSION: ICD-10-CM

## 2021-10-25 DIAGNOSIS — R73.03 PRE-DIABETES: Primary | ICD-10-CM

## 2021-12-20 ENCOUNTER — LAB VISIT (OUTPATIENT)
Dept: LAB | Facility: HOSPITAL | Age: 76
End: 2021-12-20
Attending: INTERNAL MEDICINE
Payer: MEDICARE

## 2021-12-20 DIAGNOSIS — I10 ESSENTIAL HYPERTENSION: ICD-10-CM

## 2021-12-20 DIAGNOSIS — R73.03 PRE-DIABETES: ICD-10-CM

## 2021-12-20 LAB
ALBUMIN SERPL BCP-MCNC: 3.9 G/DL (ref 3.5–5.2)
ALP SERPL-CCNC: 86 U/L (ref 55–135)
ALT SERPL W/O P-5'-P-CCNC: 18 U/L (ref 10–44)
ANION GAP SERPL CALC-SCNC: 4 MMOL/L (ref 8–16)
AST SERPL-CCNC: 19 U/L (ref 10–40)
BASOPHILS # BLD AUTO: 0 K/UL (ref 0–0.2)
BASOPHILS NFR BLD: 0 % (ref 0–1.9)
BILIRUB SERPL-MCNC: 0.5 MG/DL (ref 0.1–1)
BUN SERPL-MCNC: 24 MG/DL (ref 8–23)
CALCIUM SERPL-MCNC: 9.9 MG/DL (ref 8.7–10.5)
CHLORIDE SERPL-SCNC: 104 MMOL/L (ref 95–110)
CO2 SERPL-SCNC: 29 MMOL/L (ref 23–29)
CREAT SERPL-MCNC: 1.1 MG/DL (ref 0.5–1.4)
DIFFERENTIAL METHOD: ABNORMAL
EOSINOPHIL # BLD AUTO: 0.1 K/UL (ref 0–0.5)
EOSINOPHIL NFR BLD: 1.9 % (ref 0–8)
ERYTHROCYTE [DISTWIDTH] IN BLOOD BY AUTOMATED COUNT: 13.2 % (ref 11.5–14.5)
EST. GFR  (AFRICAN AMERICAN): >60 ML/MIN/1.73 M^2
EST. GFR  (NON AFRICAN AMERICAN): >60 ML/MIN/1.73 M^2
ESTIMATED AVG GLUCOSE: 137 MG/DL (ref 68–131)
GLUCOSE SERPL-MCNC: 120 MG/DL (ref 70–110)
HBA1C MFR BLD: 6.4 % (ref 4–5.6)
HCT VFR BLD AUTO: 41.4 % (ref 40–54)
HGB BLD-MCNC: 13.6 G/DL (ref 14–18)
IMM GRANULOCYTES # BLD AUTO: 0.01 K/UL (ref 0–0.04)
IMM GRANULOCYTES NFR BLD AUTO: 0.1 % (ref 0–0.5)
LYMPHOCYTES # BLD AUTO: 1.4 K/UL (ref 1–4.8)
LYMPHOCYTES NFR BLD: 19.8 % (ref 18–48)
MCH RBC QN AUTO: 31.4 PG (ref 27–31)
MCHC RBC AUTO-ENTMCNC: 32.9 G/DL (ref 32–36)
MCV RBC AUTO: 96 FL (ref 82–98)
MONOCYTES # BLD AUTO: 0.9 K/UL (ref 0.3–1)
MONOCYTES NFR BLD: 12.9 % (ref 4–15)
NEUTROPHILS # BLD AUTO: 4.6 K/UL (ref 1.8–7.7)
NEUTROPHILS NFR BLD: 65.3 % (ref 38–73)
NRBC BLD-RTO: 0 /100 WBC
PLATELET # BLD AUTO: 169 K/UL (ref 150–450)
PMV BLD AUTO: 11 FL (ref 9.2–12.9)
POTASSIUM SERPL-SCNC: 4.7 MMOL/L (ref 3.5–5.1)
PROT SERPL-MCNC: 6.8 G/DL (ref 6–8.4)
RBC # BLD AUTO: 4.33 M/UL (ref 4.6–6.2)
SODIUM SERPL-SCNC: 137 MMOL/L (ref 136–145)
WBC # BLD AUTO: 6.98 K/UL (ref 3.9–12.7)

## 2021-12-20 PROCEDURE — 36415 COLL VENOUS BLD VENIPUNCTURE: CPT | Performed by: INTERNAL MEDICINE

## 2021-12-20 PROCEDURE — 80053 COMPREHEN METABOLIC PANEL: CPT | Performed by: INTERNAL MEDICINE

## 2021-12-20 PROCEDURE — 85025 COMPLETE CBC W/AUTO DIFF WBC: CPT | Performed by: INTERNAL MEDICINE

## 2021-12-20 PROCEDURE — 83036 HEMOGLOBIN GLYCOSYLATED A1C: CPT | Performed by: INTERNAL MEDICINE

## 2021-12-23 ENCOUNTER — OFFICE VISIT (OUTPATIENT)
Dept: INTERNAL MEDICINE | Facility: CLINIC | Age: 76
End: 2021-12-23
Payer: MEDICARE

## 2021-12-23 VITALS
BODY MASS INDEX: 26.06 KG/M2 | WEIGHT: 171.94 LBS | HEIGHT: 68 IN | DIASTOLIC BLOOD PRESSURE: 68 MMHG | RESPIRATION RATE: 18 BRPM | HEART RATE: 70 BPM | OXYGEN SATURATION: 99 % | SYSTOLIC BLOOD PRESSURE: 118 MMHG

## 2021-12-23 DIAGNOSIS — R09.81 NASAL CONGESTION: ICD-10-CM

## 2021-12-23 DIAGNOSIS — R73.03 PRE-DIABETES: Primary | ICD-10-CM

## 2021-12-23 DIAGNOSIS — I10 ESSENTIAL HYPERTENSION: ICD-10-CM

## 2021-12-23 PROCEDURE — 99214 OFFICE O/P EST MOD 30 MIN: CPT | Mod: S$PBB,,, | Performed by: INTERNAL MEDICINE

## 2021-12-23 PROCEDURE — 99214 OFFICE O/P EST MOD 30 MIN: CPT | Mod: PBBFAC | Performed by: INTERNAL MEDICINE

## 2021-12-23 PROCEDURE — 99214 PR OFFICE/OUTPT VISIT, EST, LEVL IV, 30-39 MIN: ICD-10-PCS | Mod: S$PBB,,, | Performed by: INTERNAL MEDICINE

## 2021-12-23 PROCEDURE — 99999 PR PBB SHADOW E&M-EST. PATIENT-LVL IV: CPT | Mod: PBBFAC,,, | Performed by: INTERNAL MEDICINE

## 2021-12-23 PROCEDURE — 99999 PR PBB SHADOW E&M-EST. PATIENT-LVL IV: ICD-10-PCS | Mod: PBBFAC,,, | Performed by: INTERNAL MEDICINE

## 2022-01-06 ENCOUNTER — PATIENT MESSAGE (OUTPATIENT)
Dept: INTERNAL MEDICINE | Facility: CLINIC | Age: 77
End: 2022-01-06
Payer: MEDICARE

## 2022-01-11 ENCOUNTER — OFFICE VISIT (OUTPATIENT)
Dept: ALLERGY | Facility: CLINIC | Age: 77
End: 2022-01-11
Payer: MEDICARE

## 2022-01-11 VITALS — BODY MASS INDEX: 26.3 KG/M2 | WEIGHT: 173.5 LBS | HEIGHT: 68 IN

## 2022-01-11 DIAGNOSIS — I10 ESSENTIAL HYPERTENSION: ICD-10-CM

## 2022-01-11 DIAGNOSIS — K21.9 GASTROESOPHAGEAL REFLUX DISEASE, UNSPECIFIED WHETHER ESOPHAGITIS PRESENT: ICD-10-CM

## 2022-01-11 DIAGNOSIS — R73.03 PRE-DIABETES: ICD-10-CM

## 2022-01-11 DIAGNOSIS — J30.9 ALLERGIC RHINITIS, UNSPECIFIED SEASONALITY, UNSPECIFIED TRIGGER: Primary | ICD-10-CM

## 2022-01-11 DIAGNOSIS — K21.9 LARYNGOPHARYNGEAL REFLUX: ICD-10-CM

## 2022-01-11 PROCEDURE — 99212 OFFICE O/P EST SF 10 MIN: CPT | Mod: PBBFAC | Performed by: ALLERGY & IMMUNOLOGY

## 2022-01-11 PROCEDURE — 99214 PR OFFICE/OUTPT VISIT, EST, LEVL IV, 30-39 MIN: ICD-10-PCS | Mod: S$PBB,,, | Performed by: ALLERGY & IMMUNOLOGY

## 2022-01-11 PROCEDURE — 99999 PR PBB SHADOW E&M-EST. PATIENT-LVL II: ICD-10-PCS | Mod: PBBFAC,,, | Performed by: ALLERGY & IMMUNOLOGY

## 2022-01-11 PROCEDURE — 99999 PR PBB SHADOW E&M-EST. PATIENT-LVL II: CPT | Mod: PBBFAC,,, | Performed by: ALLERGY & IMMUNOLOGY

## 2022-01-11 PROCEDURE — 99214 OFFICE O/P EST MOD 30 MIN: CPT | Mod: S$PBB,,, | Performed by: ALLERGY & IMMUNOLOGY

## 2022-01-11 RX ORDER — OMEPRAZOLE 40 MG/1
40 CAPSULE, DELAYED RELEASE ORAL
Qty: 180 CAPSULE | Refills: 3 | Status: SHIPPED | OUTPATIENT
Start: 2022-01-11 | End: 2022-08-31

## 2022-01-11 NOTE — PROGRESS NOTES
Jd Dill returns to clinic today for continued evaluation of allergic rhinitis, cough, and LPR. He was last seen July 13, 2021.    Since his last visit, he has been taking omeprazole. He used to take ranitidine until it was taken off the market.  He does think that this has been working as well as the ranitidine.     He does however continue to wake up with bilateral nasal congestion and postnasal drip.     He does take azelastine 1 to 2 sprays each nostril twice a day which does help.     After he has been up right for several hours and has used his Astelin his symptoms resolve.    He has tried risers for GERD that did not help.  He tried elevate the head of his bed but his mattress began to slip.     He has been considering getting a hospital bed to keep his head elevated.    He denies any cough, wheezing, or shortness of breath.    [facility] PE[facility] ALLERGY QUESTIONNAIRE SHORT 1/7/2022   Head or facial pain: No symptoms   Sinus pressure? -   Ears: No symptoms   Ear discharge? -   Ear pain? -   Hearing loss? -   Nose: No symptoms   Nosebleeds? -   Postnasal drip? -   Sneezing? -   Runny nose? -   Congestion? -   Throat: No symptoms   Eyes: No symptoms   Eye itching? -   Eye redness? -   Eye discharge? -   Eye pain?  -   Light sensitivity / light hurts the eyes? -   Lungs: No symptoms   Skin: -   Rash? No   Color change of skin? No     Physical Examination:  General: Well-developed, well-nourished, no acute distress.  Neuro/Psych: Oriented x 3.    Laboratory 08/28/2018:  IgE level:  167.  ImmunoCAP:  Class II:  DM f.  Class I:  DM pt.      Assessment:  1.  Allergic rhinitis, controlled.  2.  Mild GERD, controlled.  3.  LPR, not controlled.  4.  Hypertension on amlodipine, losartan, and metoprolol.  5.  History of nephrolithiasis.  6.  BPH.    Recommendations:  1.  Continue house dust mite avoidance procedures.  2.  Continue azelastine 1 to 2 sprays each nostril b.i.d. p.r.n. rhinitis.  3.  Increase omeprazole 40 milligrams  to twice a day.  A  4.  Follow symptoms on increased dose of omeprazole.  5.  Return to clinic in three months.    Patient education January 13, 2021:  Discussed long-term safety profile of PPIs.    Discussed with Pharmacy in person on April 22, 2020.  Ranitidine is not available from [place] now and no date is given for it's return.

## 2022-04-11 ENCOUNTER — OFFICE VISIT (OUTPATIENT)
Dept: ALLERGY | Facility: CLINIC | Age: 77
End: 2022-04-11
Payer: MEDICARE

## 2022-04-11 VITALS — HEIGHT: 68 IN | BODY MASS INDEX: 25.66 KG/M2 | WEIGHT: 169.31 LBS

## 2022-04-11 DIAGNOSIS — J30.9 ALLERGIC RHINITIS, UNSPECIFIED SEASONALITY, UNSPECIFIED TRIGGER: Primary | ICD-10-CM

## 2022-04-11 DIAGNOSIS — I10 ESSENTIAL HYPERTENSION: ICD-10-CM

## 2022-04-11 DIAGNOSIS — K21.9 GASTROESOPHAGEAL REFLUX DISEASE, UNSPECIFIED WHETHER ESOPHAGITIS PRESENT: ICD-10-CM

## 2022-04-11 DIAGNOSIS — K21.9 LARYNGOPHARYNGEAL REFLUX: ICD-10-CM

## 2022-04-11 PROCEDURE — 99214 OFFICE O/P EST MOD 30 MIN: CPT | Mod: S$PBB,,, | Performed by: ALLERGY & IMMUNOLOGY

## 2022-04-11 PROCEDURE — 99214 PR OFFICE/OUTPT VISIT, EST, LEVL IV, 30-39 MIN: ICD-10-PCS | Mod: S$PBB,,, | Performed by: ALLERGY & IMMUNOLOGY

## 2022-04-11 PROCEDURE — 99999 PR PBB SHADOW E&M-EST. PATIENT-LVL III: CPT | Mod: PBBFAC,,, | Performed by: ALLERGY & IMMUNOLOGY

## 2022-04-11 PROCEDURE — 99999 PR PBB SHADOW E&M-EST. PATIENT-LVL III: ICD-10-PCS | Mod: PBBFAC,,, | Performed by: ALLERGY & IMMUNOLOGY

## 2022-04-11 PROCEDURE — 99213 OFFICE O/P EST LOW 20 MIN: CPT | Mod: PBBFAC | Performed by: ALLERGY & IMMUNOLOGY

## 2022-04-11 NOTE — PROGRESS NOTES
Jd Dill returns to clinic today for continued evaluation of allergic rhinitis, cough, and LPR. He was last seen July 13, 2021.    Since his last visit, he has been taking omeprazole. He used to take ranitidine until it was taken off the market.  He does think that this has been working as well as the ranitidine.     He does however continue to wake up with bilateral nasal congestion and postnasal drip.     He does take azelastine 1 to 2 sprays each nostril twice a day which does help.     After he has been up right for several hours and has used his Astelin his symptoms resolve.    He has tried risers for GERD that did not help.  He tried elevate the head of his bed but his mattress began to slip.     He has been considering getting a hospital bed to keep his head elevated.    He denies any cough, wheezing, or shortness of breath.    [facility] PEQ ALLERGY QUESTIONNAIRE SHORT 1/7/2022   Head or facial pain: No symptoms   Sinus pressure? -   Ears: No symptoms   Ear discharge? -   Ear pain? -   Hearing loss? -   Nose: No symptoms   Nosebleeds? -   Postnasal drip? -   Sneezing? -   Runny nose? -   Congestion? -   Throat: No symptoms   Eyes: No symptoms   Eye itching? -   Eye redness? -   Eye discharge? -   Eye pain?  -   Light sensitivity / light hurts the eyes? -   Lungs: No symptoms   Skin: -   Rash? No   Color change of skin? No     Physical Examination:  General: Well-developed, well-nourished, no acute distress.  Neuro/Psych: Oriented x 3.    Laboratory 08/28/2018:  IgE level:  167.  ImmunoCAP:  Class II:  DM f.  Class I:  DM pt.      Assessment:  1.  Allergic rhinitis, controlled.  2.  Mild GERD, controlled.  3.  LPR, not controlled.  4.  Hypertension on amlodipine, losartan, and metoprolol.  5.  History of nephrolithiasis.  6.  BPH.    Recommendations:  1.  Continue house dust mite avoidance procedures.  2.  Continue azelastine 1 to 2 sprays each nostril b.i.d. p.r.n. rhinitis.  3.  Increase omeprazole 40 milligrams  to twice a day.    4.  Follow symptoms on increased dose of omeprazole.  5.  Return to clinic in three months.    Patient education January 13, 2021:  Discussed long-term safety profile of PPIs.    Discussed with Pharmacy in person on April 22, 2020.  Ranitidine is not available from [place] now and no date is given for it's return.

## 2022-04-11 NOTE — PROGRESS NOTES
Jd Dill returns to clinic today for continued evaluation of allergic rhinitis, cough, and LPR. He was last seen January 11, 2022. He is here alone.    At his last visit, he was continuing to have rhinitis that was not controlled.    His omeprazole was increased to 40 milligrams twice a day. In the past he was well controlled on ranitidine.    He did not see any improvement until about three weeks ago. He now continues to wake up with bilateral nasal congestion. This is usually relieved with azelastine in the morning.    He does not feel that he is clearing his throat. Is not coughing.    He has not had any heartburn.    He has been washing his clothes in warm to hot water.    [facility] PEQ ALLERGY QUESTIONNAIRE SHORT 4/11/2022   Head or facial pain: -   Facial swelling? No   Sinus pain? No   Sinus pressure? No   Ears: -   Ear discharge? No   Ear pain? No   Hearing loss? No   Nose: -   Nosebleeds? No   Postnasal drip? Yes   Sneezing? Yes   Runny nose? Yes   Congestion? Yes   Throat: -   Sore throat? No   Trouble swallowing? No   Voice change? No   Eyes: -   Eye itching? Yes   Eye redness? No   Eye discharge? No   Eye pain?  No   Light sensitivity / light hurts the eyes? No   Lungs: -   Cough? No   Wheezing? No   Shortness of breath? No   Apnea? No   Choking? No   Chest tightness? No   Skin: -   Rash? No   Color change of skin? No     Physical Examination:  General: Well-developed, well-nourished, no acute distress. Clearing throat during interview.  Head: No sinus tenderness.  Eyes: Conjunctivae:  No bulbar or palpebral conjunctival injection.  Ears: EAC's clear.  TM's clear.  No pre-auricular nodes.  Nose: Nasal Mucosa:  Pink.  Septum: No apparent deviation.  Turbinates:  No significant edema.  Polyps/Mass:  None visible.  Teeth/Gums:  No bleeding noted.  Oropharynx: No exudates.  Neck: Supple without thyromegaly. No cervical lymphadenopathy.    Respiratory/Chest: Effort: Good.  Auscultation:  Clear bilaterally.  Skin:  Good turgor.  No urticaria or angioedema.  Neuro/Psych: Oriented x 3.    Laboratory 08/28/2018:  IgE level:  167.  ImmunoCAP:  Class II:  DM f.  Class I:  DM pt.      Assessment:  1.  Allergic rhinitis, controlled.  2.  Mild GERD, controlled.  3.  LPR, improved.  4.  Hypertension on amlodipine, losartan, and metoprolol.  5.  History of nephrolithiasis.  6.  BPH.    Recommendations:  1.  Continue house dust mite avoidance procedures.  2.  Continue azelastine 1 to 2 sprays each nostril b.i.d. p.r.n. rhinitis.  3.  Continue omeprazole 40 milligrams to twice a day.    4.  ENT evaluation if symptoms persist.  5.  Return to clinic in three to four months or sooner if needed.    Patient education January 13, 2021:  Discussed long-term safety profile of PPIs.    Discussed with Pharmacy in person on April 22, 2020.  Ranitidine is not available from [place] now and no date is given for it's return.

## 2022-04-26 ENCOUNTER — NURSE TRIAGE (OUTPATIENT)
Dept: ADMINISTRATIVE | Facility: CLINIC | Age: 77
End: 2022-04-26
Payer: MEDICARE

## 2022-04-26 ENCOUNTER — TELEPHONE (OUTPATIENT)
Dept: INTERNAL MEDICINE | Facility: CLINIC | Age: 77
End: 2022-04-26
Payer: MEDICARE

## 2022-04-26 NOTE — TELEPHONE ENCOUNTER
Hi, please call him back -- I saw he called deanna having diarrhea. Please ask if any blood in stools. Has he been on any recent antibiotics. How is his urine?  If he would like an appt please offer him one with Leonor Pretus or tomorrow or Thursday  Let me know if patient has any questions.  Thank you, Jabier Hinkle

## 2022-04-26 NOTE — TELEPHONE ENCOUNTER
Called and spoke to patient. Episodes of diarrhea have slacked up quite a bit since this morning.Patient denies blood or changes in urine color. Patient spoke to triage nurse this morning. Patient states that the person he spoke to this morning is suppose to follow up with him by Thursday.Patient went and bought pedialyte to keep hydrated. Educated patient to call the clinic if condition changes and he notices blood in his stool or changes in urine color or abdominal cramps again. Pt verbalized understanding with read back.

## 2022-04-26 NOTE — TELEPHONE ENCOUNTER
Last Wednesday had abdominal pain. Thursday woke up vomiting and stomach felt better but started with diarrhea. Has been having diarrhea since that time.    Reason for Disposition   [1] SEVERE diarrhea (e.g., 7 or more times / day more than normal) AND [2] age > 60 years    Additional Information   Negative: Shock suspected (e.g., cold/pale/clammy skin, too weak to stand, low BP, rapid pulse)   Negative: Difficult to awaken or acting confused (e.g., disoriented, slurred speech)   Negative: Sounds like a life-threatening emergency to the triager   Negative: Vomiting also present and worse than the diarrhea   Negative: [1] Blood in stool AND [2] without diarrhea   Negative: Diarrhea in a cancer patient who is currently (or recently) receiving chemotherapy or radiation therapy, or cancer patient who has metastatic or end-stage cancer and is receiving palliative care   Negative: [1] SEVERE abdominal pain (e.g., excruciating) AND [2] present > 1 hour   Negative: [1] SEVERE abdominal pain AND [2] age > 60 years   Negative: [1] Blood in the stool AND [2] moderate or large amount of blood   Negative: Black or tarry bowel movements (Exception: chronic-unchanged black-grey bowel movements AND is taking iron pills or Pepto-bismol)   Negative: [1] Drinking very little AND [2] dehydration suspected (e.g., no urine > 12 hours, very dry mouth, very lightheaded)   Negative: Patient sounds very sick or weak to the triager    Protocols used: DIARRHEA-A-

## 2022-05-13 DIAGNOSIS — I10 ESSENTIAL HYPERTENSION: ICD-10-CM

## 2022-05-13 RX ORDER — LOSARTAN POTASSIUM 100 MG/1
TABLET ORAL
Qty: 90 TABLET | Refills: 11 | Status: SHIPPED | OUTPATIENT
Start: 2022-05-13 | End: 2023-06-27 | Stop reason: SDUPTHER

## 2022-05-13 NOTE — TELEPHONE ENCOUNTER
Refill Routing Note   Medication(s) are not appropriate for processing by [facility] for the following reason(s):      - Medication not previously prescribed by PCP    [facility] action(s):  Defer          Medication reconciliation completed: No     Appointments  past 12m or future 3m with PCP    Date Provider   Last Visit   12/23/2021 Jabier Hinkle MD   Next Visit   Visit date not found Jabier Hinkle MD   ED visits in past 90 days: 0        Note composed:10:09 AM 05/13/2022

## 2022-05-13 NOTE — TELEPHONE ENCOUNTER
No new care gaps identified.  [facility] Embedded Care Gaps. Reference number: [id number]. 5/13/2022   12:30:33 AM CDT

## 2022-05-26 ENCOUNTER — PATIENT MESSAGE (OUTPATIENT)
Dept: ALLERGY | Facility: CLINIC | Age: 77
End: 2022-05-26
Payer: MEDICARE

## 2022-05-27 ENCOUNTER — TELEPHONE (OUTPATIENT)
Dept: ALLERGY | Facility: CLINIC | Age: 77
End: 2022-05-27
Payer: MEDICARE

## 2022-05-27 NOTE — TELEPHONE ENCOUNTER
Pt. Wanted you to know he is still coughing , you talked about sending to a doctor to get scoped . please advise

## 2022-05-27 NOTE — TELEPHONE ENCOUNTER
----- Message from Elvira Delvalle RN sent at 5/26/2022 12:34 PM CDT -----  Regarding: FW: pt advice  Contact: Pt @ [phone].  Sent to rheum  ----- Message -----  From: Tremontana Chevalier  Sent: 5/26/2022   9:34 AM CDT  To: [facility] Staff  Subject: pt advice                                        Pt calling to spk with someone in Dr. Aldana office about a cough he says is related to allergies and may need to have a scope procedure. Pt @ [phone].

## 2022-05-30 DIAGNOSIS — R05.9 COUGH: Primary | ICD-10-CM

## 2022-06-09 ENCOUNTER — PES CALL (OUTPATIENT)
Dept: ADMINISTRATIVE | Facility: CLINIC | Age: 77
End: 2022-06-09
Payer: MEDICARE

## 2022-06-16 ENCOUNTER — LAB VISIT (OUTPATIENT)
Dept: LAB | Facility: HOSPITAL | Age: 77
End: 2022-06-16
Attending: INTERNAL MEDICINE
Payer: MEDICARE

## 2022-06-16 DIAGNOSIS — R73.03 PRE-DIABETES: ICD-10-CM

## 2022-06-16 LAB
ESTIMATED AVG GLUCOSE: 143 MG/DL (ref 68–131)
HBA1C MFR BLD: 6.6 % (ref 4–5.6)

## 2022-06-16 PROCEDURE — 83036 HEMOGLOBIN GLYCOSYLATED A1C: CPT | Performed by: INTERNAL MEDICINE

## 2022-06-16 PROCEDURE — 36415 COLL VENOUS BLD VENIPUNCTURE: CPT | Performed by: INTERNAL MEDICINE

## 2022-06-30 ENCOUNTER — TELEPHONE (OUTPATIENT)
Dept: INTERNAL MEDICINE | Facility: CLINIC | Age: 77
End: 2022-06-30
Payer: MEDICARE

## 2022-06-30 ENCOUNTER — PATIENT MESSAGE (OUTPATIENT)
Dept: INTERNAL MEDICINE | Facility: CLINIC | Age: 77
End: 2022-06-30
Payer: MEDICARE

## 2022-06-30 ENCOUNTER — OFFICE VISIT (OUTPATIENT)
Dept: OTOLARYNGOLOGY | Facility: CLINIC | Age: 77
End: 2022-06-30
Payer: MEDICARE

## 2022-06-30 VITALS — TEMPERATURE: 98 F | DIASTOLIC BLOOD PRESSURE: 62 MMHG | SYSTOLIC BLOOD PRESSURE: 122 MMHG | HEART RATE: 76 BPM

## 2022-06-30 DIAGNOSIS — R05.9 COUGH: Primary | ICD-10-CM

## 2022-06-30 DIAGNOSIS — J30.9 ALLERGIC RHINITIS, UNSPECIFIED SEASONALITY, UNSPECIFIED TRIGGER: ICD-10-CM

## 2022-06-30 DIAGNOSIS — J34.3 HYPERTROPHY OF BOTH INFERIOR NASAL TURBINATES: ICD-10-CM

## 2022-06-30 DIAGNOSIS — K21.9 GASTROESOPHAGEAL REFLUX DISEASE, UNSPECIFIED WHETHER ESOPHAGITIS PRESENT: ICD-10-CM

## 2022-06-30 DIAGNOSIS — J34.2 NASAL SEPTAL DEVIATION: ICD-10-CM

## 2022-06-30 PROCEDURE — 99999 PR PBB SHADOW E&M-EST. PATIENT-LVL V: CPT | Mod: PBBFAC,,, | Performed by: OTOLARYNGOLOGY

## 2022-06-30 PROCEDURE — 99204 OFFICE O/P NEW MOD 45 MIN: CPT | Mod: 25,S$PBB,, | Performed by: OTOLARYNGOLOGY

## 2022-06-30 PROCEDURE — 99215 OFFICE O/P EST HI 40 MIN: CPT | Mod: PBBFAC | Performed by: OTOLARYNGOLOGY

## 2022-06-30 PROCEDURE — 31575 DIAGNOSTIC LARYNGOSCOPY: CPT | Mod: S$PBB,,, | Performed by: OTOLARYNGOLOGY

## 2022-06-30 PROCEDURE — 99204 PR OFFICE/OUTPT VISIT, NEW, LEVL IV, 45-59 MIN: ICD-10-PCS | Mod: 25,S$PBB,, | Performed by: OTOLARYNGOLOGY

## 2022-06-30 PROCEDURE — 31575 PR LARYNGOSCOPY, FLEXIBLE; DIAGNOSTIC: ICD-10-PCS | Mod: S$PBB,,, | Performed by: OTOLARYNGOLOGY

## 2022-06-30 PROCEDURE — 99999 PR PBB SHADOW E&M-EST. PATIENT-LVL V: ICD-10-PCS | Mod: PBBFAC,,, | Performed by: OTOLARYNGOLOGY

## 2022-06-30 PROCEDURE — 31575 DIAGNOSTIC LARYNGOSCOPY: CPT | Mod: PBBFAC | Performed by: OTOLARYNGOLOGY

## 2022-06-30 NOTE — TELEPHONE ENCOUNTER
Hi, please call him -- his hemoglobin a1c is slightly higher than the last check, which shows he now has mild diabetes. This is not an urgent matter since his sugars levels are not very elevated.  I recommend he see a diabetes nutritionist and see me back in 3-4 weeks as well.  Let me know if patient has any questions and if he would like to see the diabetes nurtionist.  Thank you, Jabier Hinkle

## 2022-06-30 NOTE — PROGRESS NOTES
[facility]  Department of Otorhinolaryngology and Communication Sciences    Jd Dill III is a 77 y.o. male who presents to the [facility] for consultation at the kind request of Dr. TUSHAR Aldana III for further management of aerodigestive symptoms.    He complains of nasal congestion in the morning, intermittent nasal obstruction, clear rhinorrhea. Onset was gradual. Duration is several years. Symptoms are mild. Time course is constant. Symptoms are stable. Alleviating factors include Astelin spray.     She has nasal obstruction, worse first thing in the am. She does report frequent sneezing.  Wakes up in the am with nose stopped up. If his head remains elevated, his nose is less congested. Using Astelin presently. He recalls in the past some benefit from Flonase but he has not been using this in quite a while. He is not using an antihistamine.     He had been experiencing some coughing at the time of his last visit with Dr. Aldana but this has resolved.    Prior workup includes the following:  - pulmonary:  no  - GI:  yes; saw GI in 2021; no EGD as of yet  - allergy:  yes; + dust mite allergy;  omeprazole 40 BID Rx'd by Dr. Aldana  - ENT:  yes; saw RONNIE Brian 2018    Past Medical History  He has a past medical history of Allergy, AR (allergic rhinitis), Basal cell carcinoma of ear, BPH (benign prostatic hyperplasia), DDD (degenerative disc disease), lumbar, Degenerative disc disease, Gastroesophageal reflux disease, Hypertension, Kidney stone, and Undescended testicle.    Past Surgical History  He has a past surgical history that includes Hemorrhoid surgery; Hernia repair; Right Orchiectomy; Pilonidal cyst drainage; Transurethral resection of prostate (2010); and Excision basal cell carcinoma.    Family History  His family history includes Allergies in his mother; Dementia in his mother; Diabetes in his father; Heart disease in his father; Hypertension in his father.    Social History  He reports that  he quit smoking about 25 years ago. He has a 5.00 pack-year smoking history. He has never used smokeless tobacco. He reports current alcohol use.    Allergies  He is allergic to ace inhibitors, chlorthalidone, and mite extract.    Medications  He has a current medication list which includes the following prescription(s): amlodipine, azelastine, finasteride, losartan, metoprolol succinate, omeprazole, and tamsulosin.    Review of Systems   Constitutional: Negative for fever.   HENT: Negative for sore throat.    Eyes: Negative for visual disturbance.   Respiratory: Negative for wheezing.    Cardiovascular: Negative for chest pain.   Gastrointestinal: Negative for nausea.   Musculoskeletal: Negative for arthralgias.   Skin: Negative for rash.   Neurological: Negative for tremors.   Hematological: Does not bruise/bleed easily.   Psychiatric/Behavioral: The patient is not nervous/anxious.           Objective:     /62   Pulse 76   Temp 97.8 °F (36.6 °C)   Physical Exam  Constitutional: comfortable, well dressed  Psychiatric: appropriate affect  Respiratory: comfortably breathing, symmetric chest rise, no stridor  Voice: faint high pitched strain  Cardiovascular: upper extremities non-edematous  Lymphatic: no cervical lymphadenopathy  Neurologic: alert and oriented to time, place, person, and situation; cranial nerves 3-12 grossly intact  Head: normocephalic  Eyes: conjunctivae and sclerae clear  Ears: normal pinnae, normal external auditory canals, tympanic membranes intact  Nose: mucosa pink; + turbinate congestion bilaterally; + broad septal deflection to the right anteriorly with low deflection to left along maxillary crest; no mucopurulence, no polyps  Oral cavity / oropharynx: no mucosal lesions  Neck: soft, full range of motion, laryngotracheal complex palpable with appropriate landmarks, larynx elevates on swallowing  Indirect laryngoscopy: limited due to gag    Procedure  Flexible Laryngoscopy (66801):  Laryngoscopy is indicated for assessment of upper aerodigestive structure and function. This was carried out transnasally with a distal chip videoendoscope. After verbal consent was obtained, the patient was positioned and the nose was topically decongested with 1% phenylephrine and topically anesthetized with 4% lidocaine. The endoscope was passed through the most patent nasal cavity and positioned to image the nasopharynx, larynx, and hypopharynx in detail. The following features were examined: nasopharyngeal, laryngeal, hypopharyngeal masses; velopharyngeal strength, closure, and symmetry of motion; vocal fold range and symmetry of motion; laryngeal mucosal edema, erythema, inflammation, and hydration; salivary pooling; and gross laryngeal sensation. The equipment was removed. The patient tolerated the procedure well without complication. All findings were normal except:  - no masses, no mucosal abnormalities, no paralysis/paresis  - nasal airway obstruction as outlined above    Assessment:     Jd Dill III is a 77 y.o. male with chronic allergic rhinitis, nasal septal deviation, inferior nasal turbinate hypertrophy.    He carries a diagnosis of LPR for which he has been on BID PPI for several months. He has not had an EGD.       Plan:        I had a discussion with the patient regarding his condition and the further workup and management options.      I recommended he consider consistent use of a nonsedating PO antihistamine, a topical nasal steroid, and nasal saline.    I recommended he meet with the GI team to discuss ongoing reflux management and for screening EGD.    He will follow up with me in the future on an as-needed basis.    All questions were answered, and the patient is in agreement with the above.     Gualberto Meza M.D.  [facility]  Department of Otorhinolaryngology and Communication Sciences

## 2022-06-30 NOTE — PATIENT INSTRUCTIONS
Consider using Fexofenadine once a day  Consider using Fluticasone spray daily (in addition to the Astelin)  Nasal saline    Meet with the GI team to discuss reflux management

## 2022-07-06 ENCOUNTER — TELEPHONE (OUTPATIENT)
Dept: INTERNAL MEDICINE | Facility: CLINIC | Age: 77
End: 2022-07-06
Payer: MEDICARE

## 2022-07-06 DIAGNOSIS — E11.9 TYPE 2 DIABETES MELLITUS WITHOUT COMPLICATION, WITHOUT LONG-TERM CURRENT USE OF INSULIN: Primary | ICD-10-CM

## 2022-07-11 ENCOUNTER — OFFICE VISIT (OUTPATIENT)
Dept: ALLERGY | Facility: CLINIC | Age: 77
End: 2022-07-11
Payer: MEDICARE

## 2022-07-11 ENCOUNTER — DOCUMENTATION ONLY (OUTPATIENT)
Dept: ALLERGY | Facility: CLINIC | Age: 77
End: 2022-07-11
Payer: MEDICARE

## 2022-07-11 VITALS — BODY MASS INDEX: 26.23 KG/M2 | WEIGHT: 167.13 LBS | HEIGHT: 67 IN | HEART RATE: 76 BPM | OXYGEN SATURATION: 98 %

## 2022-07-11 DIAGNOSIS — K21.9 GASTROESOPHAGEAL REFLUX DISEASE, UNSPECIFIED WHETHER ESOPHAGITIS PRESENT: ICD-10-CM

## 2022-07-11 DIAGNOSIS — I10 ESSENTIAL HYPERTENSION: ICD-10-CM

## 2022-07-11 DIAGNOSIS — J30.9 ALLERGIC RHINITIS, UNSPECIFIED SEASONALITY, UNSPECIFIED TRIGGER: Primary | ICD-10-CM

## 2022-07-11 DIAGNOSIS — K21.9 LARYNGOPHARYNGEAL REFLUX: ICD-10-CM

## 2022-07-11 PROCEDURE — 99213 OFFICE O/P EST LOW 20 MIN: CPT | Mod: PBBFAC | Performed by: ALLERGY & IMMUNOLOGY

## 2022-07-11 PROCEDURE — 99999 PR PBB SHADOW E&M-EST. PATIENT-LVL III: CPT | Mod: PBBFAC,,, | Performed by: ALLERGY & IMMUNOLOGY

## 2022-07-11 PROCEDURE — 99214 OFFICE O/P EST MOD 30 MIN: CPT | Mod: S$PBB,,, | Performed by: ALLERGY & IMMUNOLOGY

## 2022-07-11 PROCEDURE — 99999 PR PBB SHADOW E&M-EST. PATIENT-LVL III: ICD-10-PCS | Mod: PBBFAC,,, | Performed by: ALLERGY & IMMUNOLOGY

## 2022-07-11 PROCEDURE — 99214 PR OFFICE/OUTPT VISIT, EST, LEVL IV, 30-39 MIN: ICD-10-PCS | Mod: S$PBB,,, | Performed by: ALLERGY & IMMUNOLOGY

## 2022-07-11 RX ORDER — AZELASTINE 1 MG/ML
1-2 SPRAY, METERED NASAL 2 TIMES DAILY PRN
Qty: 30 ML | Refills: 5 | Status: SHIPPED | OUTPATIENT
Start: 2022-07-11 | End: 2023-01-19

## 2022-08-01 ENCOUNTER — CLINICAL SUPPORT (OUTPATIENT)
Dept: DIABETES | Facility: CLINIC | Age: 77
End: 2022-08-01
Payer: MEDICARE

## 2022-08-01 DIAGNOSIS — E11.9 TYPE 2 DIABETES MELLITUS WITHOUT COMPLICATION, WITHOUT LONG-TERM CURRENT USE OF INSULIN: ICD-10-CM

## 2022-08-01 PROCEDURE — 99999 PR PBB SHADOW E&M-EST. PATIENT-LVL I: ICD-10-PCS | Mod: PBBFAC,,,

## 2022-08-01 PROCEDURE — 99211 OFF/OP EST MAY X REQ PHY/QHP: CPT | Mod: PBBFAC | Performed by: REGISTERED NURSE

## 2022-08-01 PROCEDURE — 99999 PR PBB SHADOW E&M-EST. PATIENT-LVL I: CPT | Mod: PBBFAC,,,

## 2022-08-01 PROCEDURE — G0108 DIAB MANAGE TRN  PER INDIV: HCPCS | Mod: PBBFAC | Performed by: REGISTERED NURSE

## 2022-08-02 VITALS — BODY MASS INDEX: 26.16 KG/M2 | WEIGHT: 167 LBS

## 2022-08-02 NOTE — PROGRESS NOTES
Diabetes Care Specialist Progress Note  Author: Mitch Fink RN  Date: 8/2/2022    Program Intake  Reason for Diabetes Program Visit:: Initial Diabetes Assessment  Current diabetes risk level:: moderate  In the last 12 months, have you:: none    Lab Results   Component Value Date    HGBA1C 6.6 (H) 06/16/2022       Clinical    Patient Health Rating  Compared to other people your age, how would you rate your health?: Good    Problem Review  Reviewed Problem List with Patient: yes  Active comorbidities affecting diabetes self-care.: yes  Comorbidities: Hypertension (kidney stones)  Reviewed health maintenance: yes    Clinical Assessment  Current Diabetes Treatment: Diet, Exercise  Have you ever experienced hypoglycemia (low blood sugar)?: no  Have you ever experienced hyperglycemia (high blood sugar)?: no    Medication Information  How do you obtain your medications?: Patient drives  How many days a week do you miss your medications?:  (na)  Do you use a pill box or medication chart to help you manage your medications?:  (na)  Do you sometimes have difficulty refilling your medications?: No  Medication adherence impacting ability to self-manage diabetes?:  (na)    Labs  Do you have regular lab work to monitor your medications?: Yes  Type of Regular Lab Work: A1c, CBC, BMP  Where do you get your labs drawn?: [facility]  Lab Compliance Barriers: No    Nutritional Status  Diet: Regular  Meal Plan 24 Hour Recall: Breakfast, Lunch, Dinner, Snack  Meal Plan 24 Hour Recall - Breakfast: coffee, yogurt, cereal  Meal Plan 24 Hour Recall - Lunch: salad, fish, turnip greens, peas  Meal Plan 24 Hour Recall - Dinner: sardines, watermelon, butter beans, corn  Meal Plan 24 Hour Recall - Snack: none   drinks water  Change in appetite?: No  Recent Changes in Weight: No Recent Weight Change  Current nutritional status an area of need that is impacting patient's ability to self-manage diabetes?: Yes    Additional Social  History    Support  Does anyone support you with your diabetes care?: yes  Who supports you?: self  Who takes you to your medical appointments?: self  Does the current support meet the patient's needs?: Yes  Is Support an area impacting ability to self-manage diabetes?: No    Access to Mass Media & Technology  Media or technology needs impacting ability to self-manage diabetes?: No    Cognitive/Behavioral Health  Alert and Oriented: Yes  Difficulty Thinking: No  Requires Prompting: No  Requires assistance for routine expression?: No  Cognitive or behavioral barriers impacting ability to self-manage diabetes?: No    Culture/Sikh  Culture or Taoism beliefs that may impact ability to access healthcare: No    Communication  Language preference: English  Hearing Problems: No  Vision Problems: Yes  Vision problem type:: Decreased Vision  Vision Assistance: Glasses  Communication needs impacting ability to self-manage diabetes?: No    Health Literacy  Preferred Learning Method: Face to Face, Demonstration, Reading Materials  How often do you need to have someone help you read instructions, pamphlets, or written material from your doctor or pharmacy?: Never  Health literacy needs impacting ability to self-manage diabetes?: No      Diabetes Self-Management Skills Assessment    Diabetes Disease Process/Treatment Options  Patient/caregiver able to state what happens when someone has diabetes.: no  Patient/caregiver knows what type of diabetes they have.: no  Patient/caregiver able to identify at least three signs and symptoms of diabetes.: no  Patient able to identify at least three risk factors for diabetes.: no  Diabetes Disease Process/Treatment Options: Skills Assessment Completed: Yes  Assessment indicates:: Knowledge deficit, Instruction Needed  Area of need?: Yes    Nutrition/Healthy Eating  Challenges to healthy eating:: portion control  Method of carbohydrate measurement:: no method  Patient can identify foods  that impact blood sugar.: no (see comments)  Nutrition/Healthy Eating Skills Assessment Completed:: Yes  Assessment indicates:: Knowledge deficit, Instruction Needed  Area of need?: Yes    Physical Activity/Exercise  Patient formally exercises outside of work.: yes  Exercise Type: walking (fitness center)  Intensity: Low  Frequency: four or more times a week  Duration: 15 min  Patient can identify forms of physical activity.: yes  Stated forms of physical activity:: yardwork, manual activity at work  Patient can identify reasons why exercise/physical activity is important in diabetes management.: yes  Identified reasons:: keeps body and joints flexible  Physical Activity/Exercise Skills Assessment Completed: : Yes  Assessment indicates:: Adequate understanding  Area of need?: No    Medications  Patient is able to describe current diabetes management routine.:  (na)  Patient is able to identify current diabetes medications, dosages, and appropriate timing of medications.:  (na)  Patient understands the purpose of the medications taken for diabetes.:  (na)  Patient reports problems or concerns with current medication regimen.:  (na)  Medication Skills Assessment Completed:: Yes  Assessment indicates::  (na)  Area of need?: No    Home Blood Glucose Monitoring  Patient states that blood sugar is checked at home daily.: no  Reasons for not monitoring:: new diabetes diagnosis (given an accu chek guide me meter and instructed in use. instructed to test once a day fasting in am. Instructed on the normal bs ranges and to keep logs of bs's and to bring the logs to md visits)  Home Blood Glucose Monitoring Skills Assessment Completed: : Yes  Assessment indicates:: Knowledge deficit, Instruction Needed  Area of need?: Yes    Acute Complications  Patient is able to identify types of acute complications: No  Acute Complications Skills Assessment Completed: : Yes  Assessment indicates:: Knowledge deficit, Instruction Needed  Area  of need?: Yes    Chronic Complications  Patient can identify major chronic complications of diabetes.: no  Patient can identify ways to prevent or delay diabetes complications.: no  Patient is aware that having diabetes increases risk of heart disease?: No  Patient is aware that heart disease is the leading cause of death and disability in people with diabetes?: No  Patient able to state risk factors for heart disease?: No  Patient is taking statin?: No  Has your doctor talked to you about Statin use?: No  Do you want more information on Statins?: No  Chronic Complications Skills Assessment Completed: : Yes  Assessment indicates:: Knowledge deficit, Instruction Needed  Area of need?: Yes    Psychosocial/Coping  Patient can identify ways of coping with chronic disease.: yes  Patient-stated ways of coping with chronic disease:: support group  Psychosocial/Coping Skills Assessment Completed: : Yes  Assessment indicates:: Adequate understanding  Area of need?: No      Diabetes Self Support Plan         Assessment Summary and Plan    Based on today's diabetes care assessment, the following areas of need were identified:      Social 8/1/2022   Support No   Access to Mass Media/Tech No   Cognitive/Behavioral Health No   Culture/Worship No   Communication No   Health Literacy No        Clinical 8/1/2022   Medication Adherence (No Data)   Lab Compliance No   Nutritional Status Yes        Diabetes Self-Management Skills 8/1/2022   Diabetes Disease Process/Treatment Options YesProvided Diabetes management guide and provided instruction regarding SS and consume increased amount of carbohydrate foods and risk factors of diabetes.Instructed patient on what is Diabetes and the progression of uncontrolled diabetes. Discussed qualifying parameters of diabetes diagnosis. Reviewed/Instructed on disease process. Discussed current treatment options, especially dietary and lifestyle changes as well as possible medication additions  and/or changes. Patient verbalizes understanding of received information/education.   Nutrition/Healthy Eating YesEmphasized importance of eliminating all SSB. Discussed SF drink options. Discussed carb vs non-carb foods and reviewed appropriate amounts of carbs to have at meals vs snacks. Recommended 45-60 gm carb at meals and 15 gm carb at snacks. Instructed on appropriate label reading and serving sizes of specific carb containing foods. Reviewed need to limit total/saturated fats. Discussed meal plans and snack ideas amenable to pt. Reviewed the plate method. Patient verbalizes understanding of received information/education.       Physical Activity/Exercise No   Medication No   Home Blood Glucose Monitoring YesProvided patient with blood glucose logs, reviewed appropriate timing and frequency to SMBG, education on parameters on when to notify provider and advised patient to bring logs to all appts with PCP/Endocrinologist/Diabetes Care Specialist.Reviewed the importance of self-monitoring blood glucose and keeping logs.Instructed on how to self-monitor blood glucose using a home glucometer, how to properly dispose of used strips and lancets after use, and how to appropriately store meter and supplies.   Acute Complications YesDiscussed hypoglycemia vs hyperglycemia symptoms and discussed appropriate treatments for each. Reviewed that pt is at high risk of hypo with current medication regimen. Discussed general vs severe hyperglycemia and risk of DKA.   Chronic Complications YesProvided Diabetes management guide and provided instruction regarding the disease progression if diabetes is uncontrolled. Reviewed ways to decrease risk of chronic complications by healthy eating and having regular activity. Maintaining optimal blood glucose control. Following up with Diabetic team which includes PCP, JONAH MD (if applicable), yearly eye exam, yearly foot exam and Diabetes care specialist .Patient verbalizes understanding  of received information/education.    Psychosocial/Coping No          Today's interventions were provided through individual discussion, instruction, and written materials were provided.      Patient verbalized understanding of instruction and written materials.  Pt was able to return back demonstration of instructions today. Patient understood key points, needs reinforcement and further instruction.     Diabetes Self-Management Care Plan:    Today's Diabetes Self-Management Care Plan was developed with Jd's input. Jd has agreed to work toward the following goal(s) to improve his/her overall diabetes control.      Care Plan: Diabetes Management   Updates made since 7/3/2022 12:00 AM      Problem: Healthy Eating       Goal: Eat 3 meals daily with 45-60g/3-4 servings of Carbohydrate per meal.    Start Date: 8/1/2022   Expected End Date: 9/12/2022   Priority: Medium   Barriers: Knowledge deficit   Note:    Instructed pt on the food groups, how to read labels and count carbs. Pt was given sample menus and meal plans as examples. Discussed with pt the importance of eating 3 balanced and portioned meals per day. Pt lives alone and does very little cooking- most things are out of the can. He eats lunch at the [employer] usually every day. Discussed with pt food options in the grocery that he could use to make his meals more balanced. Discussed with pt about cooking one dish per week and eating off of that for the week. Discussed some other options to choose to eat out. Pt will work on making his meals balanced.      Task: Reviewed the sources and role of Carbohydrate, Protein, and Fat and how each nutrient impacts blood sugar. Completed 8/2/2022      Task: Provided visual examples using dry measuring cups, food models, and other familiar objects such as computer mouse, deck or cards, tennis ball etc. to help with visualization of portions.       Task: Explained how to count carbohydrates using the food label and the  use of dry measuring cups for accurate carb counting. Completed 8/2/2022      Task: Discussed strategies for choosing healthier menu options when dining out. Completed 8/2/2022      Task: Recommended replacing beverages containing high sugar content with noncaloric/sugar free options and/or water.       Task: Review the importance of balancing carbohydrates with each meal using portion control techniques to count servings of carbohydrate and label reading to identify serving size and amount of total carbs per serving. Completed 8/2/2022      Task: Provided Sample plate method and reviewed the use of the plate to estimate amounts of carbohydrate per meal. Completed 8/2/2022          Follow Up Plan     Follow up in about 6 weeks (around 9/12/2022).    Today's care plan and follow up schedule was discussed with patient.  Jd verbalized understanding of the care plan, goals, and agrees to follow up plan.        The patient was encouraged to communicate with his/her health care provider/physician and care team regarding his/her condition(s) and treatment.  I provided the patient with my contact information today and encouraged to contact me via phone or [facility] Patient Portal as needed.     Length of Visit   Total Time: 60 Minutes

## 2022-08-03 DIAGNOSIS — I10 ESSENTIAL HYPERTENSION: ICD-10-CM

## 2022-08-03 RX ORDER — METOPROLOL SUCCINATE 25 MG/1
TABLET, EXTENDED RELEASE ORAL
Qty: 90 TABLET | Refills: 1 | Status: SHIPPED | OUTPATIENT
Start: 2022-08-03 | End: 2023-05-12 | Stop reason: SDUPTHER

## 2022-08-03 NOTE — TELEPHONE ENCOUNTER
Refill Decision Note   Jd Fuentes  is requesting a refill authorization.  Brief Assessment and Rationale for Refill:  Approve     Medication Therapy Plan:       Medication Reconciliation Completed: No   Comments:     No Care Gaps recommended.     Note composed:12:32 PM 08/03/2022

## 2022-08-03 NOTE — TELEPHONE ENCOUNTER
No new care gaps identified.  [facility] Embedded Care Gaps. Reference number: [id number]. 8/03/2022   12:07:59 PM CDT

## 2022-08-04 ENCOUNTER — OFFICE VISIT (OUTPATIENT)
Dept: INTERNAL MEDICINE | Facility: CLINIC | Age: 77
End: 2022-08-04
Payer: MEDICARE

## 2022-08-04 VITALS
HEART RATE: 85 BPM | DIASTOLIC BLOOD PRESSURE: 60 MMHG | BODY MASS INDEX: 25.96 KG/M2 | RESPIRATION RATE: 18 BRPM | HEIGHT: 67 IN | WEIGHT: 165.38 LBS | OXYGEN SATURATION: 98 % | SYSTOLIC BLOOD PRESSURE: 118 MMHG

## 2022-08-04 DIAGNOSIS — N62 GYNECOMASTIA: Primary | ICD-10-CM

## 2022-08-04 DIAGNOSIS — I10 ESSENTIAL HYPERTENSION: ICD-10-CM

## 2022-08-04 DIAGNOSIS — E11.9 TYPE 2 DIABETES MELLITUS WITHOUT COMPLICATION, WITHOUT LONG-TERM CURRENT USE OF INSULIN: ICD-10-CM

## 2022-08-04 PROCEDURE — 99214 OFFICE O/P EST MOD 30 MIN: CPT | Mod: S$PBB,,, | Performed by: INTERNAL MEDICINE

## 2022-08-04 PROCEDURE — 99214 PR OFFICE/OUTPT VISIT, EST, LEVL IV, 30-39 MIN: ICD-10-PCS | Mod: S$PBB,,, | Performed by: INTERNAL MEDICINE

## 2022-08-04 PROCEDURE — 99999 PR PBB SHADOW E&M-EST. PATIENT-LVL IV: CPT | Mod: PBBFAC,,, | Performed by: INTERNAL MEDICINE

## 2022-08-04 PROCEDURE — 99999 PR PBB SHADOW E&M-EST. PATIENT-LVL IV: ICD-10-PCS | Mod: PBBFAC,,, | Performed by: INTERNAL MEDICINE

## 2022-08-04 PROCEDURE — 99214 OFFICE O/P EST MOD 30 MIN: CPT | Mod: PBBFAC | Performed by: INTERNAL MEDICINE

## 2022-08-04 NOTE — PROGRESS NOTES
Subjective:       Patient ID: Jd Dill III is a 77 y.o. male.    Chief Complaint: Follow-up    Patient is here for followup for chronic conditions.    DM2:  n/a med adherence  has changed Diet  118 this am, 140 AM sugars  n/a Post-prandial sugars  no Numbness in feet  no sores in feet  no Visual changes  no Polyuria/polydipsia.    Has a hard knot L nipple would like examined. Denies fam hx of br ca. No nipple d/c. The area is painful. He is on finasteride from urologist.    Review of Systems   Constitutional: Negative for appetite change and unexpected weight change.   HENT: Positive for congestion, postnasal drip and rhinorrhea. Negative for sore throat.         Allergy symptoms btr   Respiratory: Negative for cough, chest tightness and shortness of breath.    Cardiovascular: Negative for chest pain.   Gastrointestinal: Negative for abdominal pain.        Rare GERD symptoms   Genitourinary: Negative for difficulty urinating, scrotal swelling and testicular pain.   Skin:        L breast tenderness   Allergic/Immunologic: Positive for environmental allergies.   Neurological: Negative for tremors, syncope and weakness.   Psychiatric/Behavioral: Negative for dysphoric mood.           Objective:      Physical Exam  Vitals reviewed.   Constitutional:       General: He is not in acute distress.     Appearance: Normal appearance. He is well-developed. He is not ill-appearing, toxic-appearing or diaphoretic.   HENT:      Head: Normocephalic and atraumatic.   Eyes:      General: No scleral icterus.  Neck:      Thyroid: No thyromegaly.   Cardiovascular:      Rate and Rhythm: Normal rate and regular rhythm.      Heart sounds: Normal heart sounds. No murmur heard.    No friction rub. No gallop.   Pulmonary:      Effort: Pulmonary effort is normal. No respiratory distress.      Breath sounds: Normal breath sounds. No wheezing or rales.   Abdominal:      General: Bowel sounds are normal. There is no distension.       Palpations: Abdomen is soft. There is no mass.      Tenderness: There is no abdominal tenderness. There is no guarding or rebound.   Musculoskeletal:         General: Normal range of motion.      Cervical back: Normal range of motion.      Comments: Protective Sensation (w/ 10 gram monofilament):  Right: Intact  Left: Intact    Visual Inspection:  Normal -  Bilateral, no tinea pedis seen    Pedal Pulses:   Right: Present  Left: Present    Posterior tibialis:   Right:Present  Left: Present        Lymphadenopathy:      Cervical: No cervical adenopathy.   Skin:     Findings: No lesion.      Comments: bilat breast with gyneco, mild tenderness L breast tissue, no hard mass palpated   Neurological:      Mental Status: He is alert and oriented to person, place, and time.   Psychiatric:         Mood and Affect: Mood normal.         Behavior: Behavior normal.         Thought Content: Thought content normal.         Assessment:       1. Gynecomastia    2. Type 2 diabetes mellitus without complication, without long-term current use of insulin    3. Essential hypertension        Plan:         Jd was seen today for follow-up.    Diagnoses and all orders for this visit:    Gynecomastia  Pt given handouts.      Type 2 diabetes mellitus without complication, without long-term current use of insulin  -     CBC Auto Differential; Future  -     Comprehensive Metabolic Panel; Future  -     Lipid Panel; Future  -     Hemoglobin A1C; Future  Has an eye doctor    Essential hypertension  controlled      Health Maintenance       Date Due Completion Date    Influenza Vaccine (1) 09/01/2022 10/4/2021    Colonoscopy 04/19/2023 4/19/2018    Override on 4/19/2018: Done (dr [facility])    Override on 1/2/2013: Done    Lipid Panel 12/10/2025 12/10/2020    TETANUS VACCINE 03/19/2030 3/19/2020      Sees eye doctor Dr Meyers    Follow up in about 6 months (around 2/4/2023) for Please schedule blood work prior to next appt.    Future  Appointments   Date Time Provider Department Center   8/18/2022  3:00 PM Chari Moreno, JOBY [facility] GASTRO [facility]   9/12/2022  1:00 PM Mitch Fink RN [facility] ERIKA [place]   1/11/2023  9:40 AM TUSHAR Aldana III, MD [facility] [place]   1/31/2023  7:00 AM [facility]   2/7/2023  9:00 AM Jabier Hinkle MD [facility] IM [place] QUAN

## 2022-08-08 ENCOUNTER — TELEPHONE (OUTPATIENT)
Dept: RESEARCH | Facility: HOSPITAL | Age: 77
End: 2022-08-08
Payer: MEDICARE

## 2022-08-08 NOTE — TELEPHONE ENCOUNTER
Preventable study- [id number]    Query in Preventable EDC regarding delivery of study medication. Confirmed with the patient study drug for the Preventable study is mailed directly to his house, he doesn't pick it up from the post office.

## 2022-08-18 ENCOUNTER — OFFICE VISIT (OUTPATIENT)
Dept: GASTROENTEROLOGY | Facility: CLINIC | Age: 77
End: 2022-08-18
Payer: MEDICARE

## 2022-08-18 VITALS — WEIGHT: 161.81 LBS | BODY MASS INDEX: 25.4 KG/M2 | HEIGHT: 67 IN

## 2022-08-18 DIAGNOSIS — K21.9 GASTROESOPHAGEAL REFLUX DISEASE, UNSPECIFIED WHETHER ESOPHAGITIS PRESENT: ICD-10-CM

## 2022-08-18 PROCEDURE — 99999 PR PBB SHADOW E&M-EST. PATIENT-LVL III: ICD-10-PCS | Mod: PBBFAC,,, | Performed by: NURSE PRACTITIONER

## 2022-08-18 PROCEDURE — 99214 PR OFFICE/OUTPT VISIT, EST, LEVL IV, 30-39 MIN: ICD-10-PCS | Mod: S$PBB,,, | Performed by: NURSE PRACTITIONER

## 2022-08-18 PROCEDURE — 99213 OFFICE O/P EST LOW 20 MIN: CPT | Mod: PBBFAC,PO | Performed by: NURSE PRACTITIONER

## 2022-08-18 PROCEDURE — 99214 OFFICE O/P EST MOD 30 MIN: CPT | Mod: S$PBB,,, | Performed by: NURSE PRACTITIONER

## 2022-08-18 PROCEDURE — 99999 PR PBB SHADOW E&M-EST. PATIENT-LVL III: CPT | Mod: PBBFAC,,, | Performed by: NURSE PRACTITIONER

## 2022-08-18 NOTE — PATIENT INSTRUCTIONS
EGD Instructions    [facility] [address]  Clinic Office [phone]  Endoscopy Lab [phone]    You are scheduled for an EGD with Dr. smith at [facility] in [place].    Check in at the Hospital -1st floor, Information desk.   Call [phone] to reschedule.    You cannot have anything to eat or drink after Midnight. You can brush your teeth with a sip of water.     An adult friend/family member must come with you to drive you home.  You cannot drive, take a taxi, Uber/Lyft or bus to leave the Endoscopy Center alone.  If you do not have someone to drive you home, your test will be cancelled.     Please follow the directions of your doctor if you take any pills that thin your blood. If you take these meds: Aggrenox, Brilinta, Effient, Eliquis, Lovenox, Plavix, Pletal, Pradaxa, Ticilid, Xarelto or Coumadin, let the doctor's office know.    DON'T: On the morning of the test do not take insulin or pills for diabetes.     DO: On the morning of the test, do take any pills for blood pressure, heart, anti-rejection and or seizures with a small sip of water. Bring any inhalers with you.    A Covid test is required 72 hours before the procedure. The test is not needed with proof of vaccination > 2 weeks or previous Covid infection.    Leave all valuables and jewelry at home. You will be at the hospital for 2-4 hours.    Call the Endoscopy department at [phone] with any questions about your procedure.    Thank you for choosing [facility].

## 2022-08-18 NOTE — PROGRESS NOTES
GASTROENTEROLOGY CLINIC NOTE    Chief Complaint: The encounter diagnosis was Gastroesophageal reflux disease, unspecified whether esophagitis present.  Referring provider/PCP: Jabier Hinkle MD    HPI:  Jd Dill III is a 77 y.o. male who is a new patient to me with a PMH that is significant for Allergy, AR (allergic rhinitis), Basal cell carcinoma of ear, BPH (benign prostatic hyperplasia), DDD (degenerative disc disease), lumbar, Degenerative disc disease, Gastroesophageal reflux disease, Hypertension, Kidney stone, and Undescended testicle.  He is here today to establish care for reflux and heartburn.  These are not new problems as patient reports h/o reflux that has been well controlled with omeprazole.  He reports originally he sought care with Dr. Aldana with allergy department due to sinus congestion.  He was then referred to ENT who performed laryngoscopy and diagnosed patient with LPR.  Omeprazole was initiated and he reports his heartburn has improved.  Prior to omeprazole, he was experiencing heartburn every few months and he is no longer experiencing any episodes.  Denies water brash, regurgitation, dysphagia, cough, or frequent throat clearing.  He sleeps with head of bed elevated and avoids foods high in acidity.    He is currently taking omeprazole which he has been taking over the last 9 months with good symptoms control.     Interval Note 8/18/2022  Mr. Jd Dill his known to me presents to clinic for follow-up regarding reflux.  He is also being followed by Dr. Meza with otolaryngology for LPR.  Following patient's last appointment he was continued on omeprazole 40 mg daily.  He did not undergo EGD as symptoms were well controlled.  It was recommended he follow-up for EGD if symptoms worsened.  About 7 months ago his omeprazole was increased to twice a day due to worsening nasal congestion and postnasal drip at night.  Patient reports he continued omeprazole twice a day for a few months and  then decrease back to once a day after symptoms improved.  The he denies pyrosis, reflux, water brash, regurgitation of food, frequent throat clearing, chest pain, or dysphagia.  He does not have episodes of coughing or choking with eating.  Denies chest pain.  No nausea or vomiting reported.  She states his primary complaint is congestion which is worse in the morning that improves by the afternoon for which she is being followed by Dr. Aldana.  He also reports to me since his last visit it was discovered he has a dust mite allergy.  He has since purchased a bed with which he can sleep with his head elevated and states this does help.    Prior Upper Endoscopy: No  Prior Colonoscopy: 2018 No abnormal findings per patient.   Family h/o Colon Cancer: No  Family h/o Crohn's Disease or Ulcerative Colitis: No  Abdominal Surgeries: No    GI ROS:  Reflux: No  Dysphagia: No   Constipation: No  Diarrhea: No  Rectal bleeding/Melena/hematemesis: No  NSAIDs: No  Anticoagulation or Antiplatelet: No    Review of Systems   Constitutional: Negative for malaise/fatigue and weight loss.   HENT: Positive for congestion. Negative for sore throat.    Eyes: Negative for blurred vision.   Respiratory: Negative for cough.    Cardiovascular: Negative for chest pain.   Gastrointestinal: Negative for abdominal pain, blood in stool, constipation, diarrhea, heartburn, melena, nausea and vomiting.   Genitourinary: Negative for dysuria.   Musculoskeletal: Negative for myalgias.   Skin: Negative for rash.   Neurological: Negative for headaches.   Endo/Heme/Allergies: Negative for environmental allergies.   Psychiatric/Behavioral: Negative for suicidal ideas. The patient is not nervous/anxious.        Past Medical History: has a past medical history of Allergy, AR (allergic rhinitis), Basal cell carcinoma of ear, BPH (benign prostatic hyperplasia), DDD (degenerative disc disease), lumbar, Degenerative disc disease, Gastroesophageal reflux disease,  Hypertension, Kidney stone, and Undescended testicle.    Past Surgical History: has a past surgical history that includes Hemorrhoid surgery; Hernia repair; Right Orchiectomy; Pilonidal cyst drainage; Transurethral resection of prostate (2010); and Excision basal cell carcinoma.    Family History:family history includes Allergies in his mother; Dementia in his mother; Diabetes in his father; Heart disease in his father; Hypertension in his father.    Allergies:   Review of patient's allergies indicates:   Allergen Reactions    Ace inhibitors Other (See Comments)     Cough    Chlorthalidone      Hyponatremia    Mite extract Other (See Comments)     Nose gets congested       Social History: reports that he quit smoking about 25 years ago. He has a 5.00 pack-year smoking history. He has never used smokeless tobacco. He reports current alcohol use.    Home medications:   Current Outpatient Medications on File Prior to Visit   Medication Sig Dispense Refill    amLODIPine (NORVASC) 5 MG tablet TAKE 1 TABLET BY MOUTH EVERY DAY 90 tablet 11    azelastine (ASTELIN) 137 mcg (0.1 %) nasal spray INSTILL 1 SPRAY INTO EACH NOSTRIL TWICE A DAY (Patient not taking: Reported on 8/4/2022) 30 mL 11    azelastine (ASTELIN) 137 mcg (0.1 %) nasal spray 1-2 sprays (137-274 mcg total) by Nasal route 2 (two) times daily as needed for Rhinitis. 30 mL 5    blood sugar diagnostic Strp To check BG 1 times daily, to use with insurance preferred meter 100 each 11    blood-glucose meter kit To check BG 1 times daily, to use with insurance preferred meter 1 each 0    finasteride (PROSCAR) 5 mg tablet Take 5 mg by mouth once daily.  6    lancets Misc To check BG 1 times daily, to use with insurance preferred meter 100 each 11    losartan (COZAAR) 100 MG tablet TAKE 1 TABLET BY MOUTH EVERY DAY 90 tablet 11    metoprolol succinate (TOPROL-XL) 25 MG 24 hr tablet TAKE 1 TABLET BY MOUTH EVERY DAY 90 tablet 1    omeprazole (PRILOSEC) 40 MG  "capsule Take 1 capsule (40 mg total) by mouth 2 (two) times daily before meals. 180 capsule 3    tamsulosin (FLOMAX) 0.4 mg Cp24 Take 0.8 mg by mouth once daily.        No current facility-administered medications on file prior to visit.       Vital signs:  Ht 5' 7" (1.702 m)   Wt 73.4 kg (161 lb 13.1 oz)   BMI 25.34 kg/m²     Physical Exam  Vitals reviewed.   Constitutional:       General: He is not in acute distress.     Appearance: Normal appearance. He is not ill-appearing.   HENT:      Head: Normocephalic.   Cardiovascular:      Rate and Rhythm: Normal rate and regular rhythm.      Heart sounds: Normal heart sounds. No murmur heard.  Pulmonary:      Effort: Pulmonary effort is normal. No respiratory distress.      Breath sounds: Normal breath sounds.   Chest:      Chest wall: No tenderness.   Abdominal:      General: Bowel sounds are normal. There is no distension.      Palpations: Abdomen is soft.      Tenderness: There is no abdominal tenderness. Negative signs include Gonzalez's sign.      Hernia: No hernia is present.   Skin:     General: Skin is warm.   Neurological:      Mental Status: He is alert and oriented to person, place, and time.   Psychiatric:         Mood and Affect: Mood normal.         Behavior: Behavior normal.         Routine labs:  Lab Results   Component Value Date    WBC 6.98 12/20/2021    HGB 13.6 (L) 12/20/2021    HCT 41.4 12/20/2021    MCV 96 12/20/2021     12/20/2021     No results found for: INR  Lab Results   Component Value Date    IRON 123 11/06/2014    FERRITIN 211 11/06/2014    TIBC 407 11/06/2014    FESATURATED 30 11/06/2014     Lab Results   Component Value Date     12/20/2021    K 4.7 12/20/2021     12/20/2021    CO2 29 12/20/2021    BUN 24 (H) 12/20/2021    CREATININE 1.1 12/20/2021     Lab Results   Component Value Date    ALBUMIN 3.9 12/20/2021    ALT 18 12/20/2021    AST 19 12/20/2021    ALKPHOS 86 12/20/2021    BILITOT 0.5 12/20/2021     No results " found for: GLUCOSE  Lab Results   Component Value Date    TSH 1.886 10/31/2014     Lab Results   Component Value Date    CALCIUM 9.9 12/20/2021       Imaging:  No image results found.      I have reviewed prior labs, imaging, and notes.      Assessment:  1. Gastroesophageal reflux disease, unspecified whether esophagitis present        Plan:  Orders Placed This Encounter    Case Request Endoscopy: EGD (ESOPHAGOGASTRODUODENOSCOPY)     Continue omeprazole as previously prescribed.  EGD due to history of reflux.    Plan of care discussed with patient who is in agreement and verbalized understanding.       Follow Up: As Needed          RULA Mariee,FNP-BC  [facility]

## 2022-08-19 ENCOUNTER — TELEPHONE (OUTPATIENT)
Dept: GASTROENTEROLOGY | Facility: CLINIC | Age: 77
End: 2022-08-19
Payer: MEDICARE

## 2022-08-19 NOTE — TELEPHONE ENCOUNTER
Spoke to patient and schedule him to have his EGD on 10/05/2022 with Dr. Campoverde, went over instruction again, verbal understanding.

## 2022-09-12 ENCOUNTER — CLINICAL SUPPORT (OUTPATIENT)
Dept: DIABETES | Facility: CLINIC | Age: 77
End: 2022-09-12
Payer: MEDICARE

## 2022-09-12 VITALS — WEIGHT: 161 LBS | BODY MASS INDEX: 25.22 KG/M2

## 2022-09-12 DIAGNOSIS — E11.9 TYPE 2 DIABETES MELLITUS WITHOUT COMPLICATION, WITHOUT LONG-TERM CURRENT USE OF INSULIN: Primary | ICD-10-CM

## 2022-09-12 PROCEDURE — G0108 DIAB MANAGE TRN  PER INDIV: HCPCS | Mod: PBBFAC | Performed by: REGISTERED NURSE

## 2022-09-12 NOTE — PROGRESS NOTES
Diabetes Care Specialist Follow-up Note  Author: Mitch Fink RN  Date: 9/12/2022    Program Intake  Reason for Diabetes Program Visit:: Intervention  Type of Intervention:: Individual  Individual: Education  Education: Nutrition and Meal Planning, Self-Management Skill Review    Lab Results   Component Value Date    HGBA1C 6.6 (H) 06/16/2022       During today's follow-up visit,  the following areas required further assessment and content was provided/reviewed.    Based on today's diabetes care assessment, the following areas of need were identified:      Social 8/1/2022   Support No   Access to Mass Media/Tech No   Cognitive/Behavioral Health No   Culture/Evangelical No   Communication No   Health Literacy No        Clinical 8/1/2022   Medication Adherence (No Data)   Lab Compliance No   Nutritional Status Yes        Diabetes Self-Management Skills 8/1/2022   Diabetes Disease Process/Treatment Options Yes   Nutrition/Healthy Eating Yes   Physical Activity/Exercise No   Medication No   Home Blood Glucose Monitoring Yes   Acute Complications Yes   Chronic Complications Yes   Psychosocial/Coping No        Today's interventions were provided through individual discussion, instruction, and written materials were provided.    Patient verbalized understanding of instruction and written materials.  Pt was able to return back demonstration of instructions today. Patient understood key points, needs reinforcement and further instruction.     Diabetes Self-Management Care Plan Review:  Diabetes Self-Management Care Plan Review and Evaluation of Progress:    During today's follow-up Jd's Diabetes Self-Management Care Plan progress was reviewed and progress was evaluated including his/her input. Jd has agreed to continue his/her journey to improve/maintain overall diabetes control by continuing to set health goals. See care plan progress below.      Care Plan: Diabetes Management   Updates made since 8/13/2022 12:00 AM         Problem: Healthy Eating         Goal: Eat 3 meals daily with 45-60g/3-4 servings of Carbohydrate per meal.    Start Date: 8/1/2022   Expected End Date: 9/12/2022   This Visit's Progress: On track   Priority: Medium   Barriers: Knowledge deficit   Note:    Instructed pt on the food groups, how to read labels and count carbs. Pt was given sample menus and meal plans as examples. Discussed with pt the importance of eating 3 balanced and portioned meals per day. Pt lives alone and does very little cooking- most things are out of the can. He eats lunch at the [employer] usually every day. Discussed with pt food options in the grocery that he could use to make his meals more balanced. Discussed with pt about cooking one dish per week and eating off of that for the week. Discussed some other options to choose to eat out. Pt will work on making his meals balanced.     Pt came to clinic for fu visit. Pt use to eat all of his meals out but now he is preparing his own meals. Pt is checking his bs's once a day fasting in the am and they range 117-138. Pt is trying to follow the meals in our guide and has been doing so however we discussed about putting some variety in his meals, Discussed with pt the vast amount of selections in the grocery store that are available for those needing a single meal. Pt will look into some of these items and try to put some variations in his meals 24 hour meal recall showed that pt had cereal, milk, 1 egg and margarine for breakfast, tuna sandwich, chips and orange for lunch and turkey sandwich with lettuce and tomatoes, chips, 1/2 banana and salad for dinner. He snacks on yogurt. Pt will continue to work on his meal adjustments.       Task: Provided visual examples using dry measuring cups, food models, and other familiar objects such as computer mouse, deck or cards, tennis ball etc. to help with visualization of portions. Completed 9/12/2022          Follow Up Plan     Follow up in about  3 months (around 12/12/2022).    Today's care plan and follow up schedule was discussed with patient.  Jd verbalized understanding of the care plan, goals, and agrees to follow up plan.        The patient was encouraged to communicate with his/her health care provider/physician and care team regarding his/her condition(s) and treatment.  I provided the patient with my contact information today and encouraged to contact me via phone or [facility] as needed.

## 2022-09-30 ENCOUNTER — PES CALL (OUTPATIENT)
Dept: ADMINISTRATIVE | Facility: OTHER | Age: 77
End: 2022-09-30
Payer: MEDICARE

## 2022-10-03 ENCOUNTER — TELEPHONE (OUTPATIENT)
Dept: ENDOSCOPY | Facility: HOSPITAL | Age: 77
End: 2022-10-03
Payer: MEDICARE

## 2022-10-03 NOTE — TELEPHONE ENCOUNTER
Spoke with patient about arrival time @ 0845.   Covid test = boosted    NPO status reviewed: Patient must have nothing to eat after midnight.     Medications: Do not take Insulin or oral diabetic medications the day of the procedure.  Take as prescribed: heart, seizure and blood pressure medication in the morning with a sip of water (less than an ounce).  Take any breathing medications and bring inhalers to hospital with you Leave all valuables and jewelry at home.     Wear comfortable clothes to procedure to change into hospital gown You cannot drive for 24 hours after your procedure because you will receive sedation for your procedure to make you comfortable.  A ride must be provided at discharge.

## 2022-10-05 ENCOUNTER — ANESTHESIA (OUTPATIENT)
Dept: ENDOSCOPY | Facility: HOSPITAL | Age: 77
End: 2022-10-05
Payer: MEDICARE

## 2022-10-05 ENCOUNTER — ANESTHESIA EVENT (OUTPATIENT)
Dept: ENDOSCOPY | Facility: HOSPITAL | Age: 77
End: 2022-10-05
Payer: MEDICARE

## 2022-10-05 ENCOUNTER — HOSPITAL ENCOUNTER (OUTPATIENT)
Facility: HOSPITAL | Age: 77
Discharge: HOME | End: 2022-10-05
Attending: INTERNAL MEDICINE | Admitting: INTERNAL MEDICINE
Payer: MEDICARE

## 2022-10-05 VITALS
HEART RATE: 72 BPM | OXYGEN SATURATION: 100 % | HEIGHT: 67 IN | BODY MASS INDEX: 25.11 KG/M2 | RESPIRATION RATE: 20 BRPM | TEMPERATURE: 98 F | WEIGHT: 160 LBS | DIASTOLIC BLOOD PRESSURE: 55 MMHG | SYSTOLIC BLOOD PRESSURE: 108 MMHG

## 2022-10-05 DIAGNOSIS — K21.9 GERD (GASTROESOPHAGEAL REFLUX DISEASE): ICD-10-CM

## 2022-10-05 LAB — GLUCOSE SERPL-MCNC: 124 MG/DL (ref 70–110)

## 2022-10-05 PROCEDURE — 63600175 PHARM REV CODE 636 W HCPCS: Performed by: NURSE ANESTHETIST, CERTIFIED REGISTERED

## 2022-10-05 PROCEDURE — 43239 EGD BIOPSY SINGLE/MULTIPLE: CPT | Performed by: INTERNAL MEDICINE

## 2022-10-05 PROCEDURE — 43239 EGD BIOPSY SINGLE/MULTIPLE: CPT | Mod: ,,, | Performed by: INTERNAL MEDICINE

## 2022-10-05 PROCEDURE — 88305 TISSUE EXAM BY PATHOLOGIST: CPT | Mod: 26,,, | Performed by: PATHOLOGY

## 2022-10-05 PROCEDURE — 37000009 HC ANESTHESIA EA ADD 15 MINS: Performed by: INTERNAL MEDICINE

## 2022-10-05 PROCEDURE — 88305 TISSUE EXAM BY PATHOLOGIST: CPT | Performed by: PATHOLOGY

## 2022-10-05 PROCEDURE — 27201012 HC FORCEPS, HOT/COLD, DISP: Performed by: INTERNAL MEDICINE

## 2022-10-05 PROCEDURE — 25000003 PHARM REV CODE 250: Performed by: NURSE ANESTHETIST, CERTIFIED REGISTERED

## 2022-10-05 PROCEDURE — 82962 GLUCOSE BLOOD TEST: CPT | Performed by: INTERNAL MEDICINE

## 2022-10-05 PROCEDURE — 37000008 HC ANESTHESIA 1ST 15 MINUTES: Performed by: INTERNAL MEDICINE

## 2022-10-05 PROCEDURE — 25000003 PHARM REV CODE 250: Performed by: INTERNAL MEDICINE

## 2022-10-05 PROCEDURE — 88305 TISSUE EXAM BY PATHOLOGIST: ICD-10-PCS | Mod: 26,,, | Performed by: PATHOLOGY

## 2022-10-05 PROCEDURE — 43239 PR EGD, FLEX, W/BIOPSY, SGL/MULTI: ICD-10-PCS | Mod: ,,, | Performed by: INTERNAL MEDICINE

## 2022-10-05 RX ORDER — PROPOFOL 10 MG/ML
VIAL (ML) INTRAVENOUS
Status: DISCONTINUED | OUTPATIENT
Start: 2022-10-05 | End: 2022-10-05

## 2022-10-05 RX ORDER — PROPOFOL 10 MG/ML
VIAL (ML) INTRAVENOUS CONTINUOUS PRN
Status: DISCONTINUED | OUTPATIENT
Start: 2022-10-05 | End: 2022-10-05

## 2022-10-05 RX ORDER — SODIUM CHLORIDE 0.9 % (FLUSH) 0.9 %
10 SYRINGE (ML) INJECTION
Status: DISCONTINUED | OUTPATIENT
Start: 2022-10-05 | End: 2022-10-05 | Stop reason: HOSPADM

## 2022-10-05 RX ORDER — LIDOCAINE HCL/PF 100 MG/5ML
SYRINGE (ML) INTRAVENOUS
Status: DISCONTINUED | OUTPATIENT
Start: 2022-10-05 | End: 2022-10-05

## 2022-10-05 RX ORDER — SODIUM CHLORIDE 9 MG/ML
INJECTION, SOLUTION INTRAVENOUS CONTINUOUS
Status: DISCONTINUED | OUTPATIENT
Start: 2022-10-05 | End: 2022-10-05 | Stop reason: HOSPADM

## 2022-10-05 RX ADMIN — GLYCOPYRROLATE 0.1 MG: 0.2 INJECTION, SOLUTION INTRAMUSCULAR; INTRAVITREAL at 10:10

## 2022-10-05 RX ADMIN — PROPOFOL 150 MCG/KG/MIN: 10 INJECTION, EMULSION INTRAVENOUS at 10:10

## 2022-10-05 RX ADMIN — SODIUM CHLORIDE: 0.9 INJECTION, SOLUTION INTRAVENOUS at 09:10

## 2022-10-05 RX ADMIN — LIDOCAINE HYDROCHLORIDE 60 MG: 20 INJECTION, SOLUTION INTRAVENOUS at 10:10

## 2022-10-05 RX ADMIN — TOPICAL ANESTHETIC 1 EACH: 200 SPRAY DENTAL; PERIODONTAL at 10:10

## 2022-10-05 RX ADMIN — PROPOFOL 50 MG: 10 INJECTION, EMULSION INTRAVENOUS at 10:10

## 2022-10-05 NOTE — ANESTHESIA POSTPROCEDURE EVALUATION
Anesthesia Post Evaluation    Patient: Jd Dill III    Procedure(s) Performed: Procedure(s) (LRB):  EGD (ESOPHAGOGASTRODUODENOSCOPY) (N/A)    Final Anesthesia Type: MAC      Patient location during evaluation: GI PACU  Patient participation: Yes- Able to Participate  Level of consciousness: awake and alert  Post-procedure vital signs: reviewed and stable  Pain management: adequate  Airway patency: patent    PONV status at discharge: No PONV  Anesthetic complications: no      Cardiovascular status: hemodynamically stable  Respiratory status: unassisted, spontaneous ventilation and room air  Hydration status: euvolemic  Follow-up not needed.          Vitals Value Taken Time   /64 10/05/22 0915   Temp 36.1 °C (97 °F) 10/05/22 0915   Pulse 80 10/05/22 0915   Resp 18 10/05/22 0915   SpO2 98 % 10/05/22 0915         No case tracking events are documented in the log.      Pain/Dia Score: No data recorded

## 2022-10-05 NOTE — H&P
Short Stay Endoscopy History and Physical    PCP - Jabier Hinkle MD     Procedure - EGD  ASA - per anesthesia  Mallampati - per anesthesia  History of Anesthesia problems - no  Family history Anesthesia problems -  no   Plan of anesthesia - General    HPI:  This is a 77 y.o. male here for evaluation of :     gerd      ROS:  Constitutional: No fevers, chills, No weight loss  CV: No chest pain  Pulm: No cough, No shortness of breath  Ophtho: No vision changes  GI: see HPI  Derm: No rash    Medical History:  has a past medical history of Allergy, AR (allergic rhinitis), Basal cell carcinoma of ear (2006), BPH (benign prostatic hyperplasia), DDD (degenerative disc disease), lumbar, Degenerative disc disease, Gastroesophageal reflux disease (3/11/2019), Hypertension, Kidney stone, and Undescended testicle.    Surgical History:  has a past surgical history that includes Hemorrhoid surgery; Hernia repair; Right Orchiectomy; Pilonidal cyst drainage; Transurethral resection of prostate (2010); and Excision basal cell carcinoma.    Family History: family history includes Allergies in his mother; Dementia in his mother; Diabetes in his father; Heart disease in his father; Hypertension in his father.. Otherwise no colon cancer, inflammatory bowel disease, or GI malignancies.    Social History:  reports that he quit smoking about 25 years ago. His smoking use included cigarettes. He has a 5.00 pack-year smoking history. He has never used smokeless tobacco. He reports current alcohol use. He reports that he does not use drugs.    Review of patient's allergies indicates:   Allergen Reactions    Ace inhibitors Other (See Comments)     Cough    Chlorthalidone      Hyponatremia    Mite extract Other (See Comments)     Nose gets congested       Medications:   Medications Prior to Admission   Medication Sig Dispense Refill Last Dose    amLODIPine (NORVASC) 5 MG tablet TAKE 1 TABLET BY MOUTH EVERY DAY 90 tablet 11 10/5/2022 at 0700     azelastine (ASTELIN) 137 mcg (0.1 %) nasal spray INSTILL 1 SPRAY INTO EACH NOSTRIL TWICE A DAY 30 mL 11 10/5/2022 at 0700    azelastine (ASTELIN) 137 mcg (0.1 %) nasal spray 1-2 sprays (137-274 mcg total) by Nasal route 2 (two) times daily as needed for Rhinitis. 30 mL 5 10/5/2022 at 0700    losartan (COZAAR) 100 MG tablet TAKE 1 TABLET BY MOUTH EVERY DAY 90 tablet 11 10/5/2022 at 0700    metoprolol succinate (TOPROL-XL) 25 MG 24 hr tablet TAKE 1 TABLET BY MOUTH EVERY DAY 90 tablet 1 10/5/2022 at 0700    omeprazole (PRILOSEC) 40 MG capsule TAKE 1 CAPSULE BY MOUTH EVERY DAY IN THE MORNING 90 capsule 3 10/4/2022    tamsulosin (FLOMAX) 0.4 mg Cp24 Take 0.8 mg by mouth once daily.    10/4/2022    blood sugar diagnostic Strp To check BG 1 times daily, to use with insurance preferred meter 100 each 11     finasteride (PROSCAR) 5 mg tablet Take 5 mg by mouth once daily.  6     lancets Misc To check BG 1 times daily, to use with insurance preferred meter 100 each 11     ONETOUCH VERIO FLEX METER Misc TO CHECK BLOOD GLUCOSE 1 TIME DAILY, TO USE WITH INSURANCE PREFERRED METER 1 each 0        Physical Exam:    Vital Signs:   Vitals:    10/05/22 0915   BP: 135/64   Pulse: 80   Resp: 18   Temp: 97 °F (36.1 °C)       General Appearance: Well appearing in no acute distress  Eyes:    No scleral icterus  ENT: Neck supple, Lips, mucosa, and tongue normal; teeth and gums normal  Abdomen: Soft, non tender, non distended with normal bowel sounds. No hepatosplenomegaly, ascites, or mass.  Extremities: No edema  Skin: No rash    Labs:  Lab Results   Component Value Date    WBC 6.98 12/20/2021    HGB 13.6 (L) 12/20/2021    HCT 41.4 12/20/2021     12/20/2021    CHOL 148 12/10/2020    TRIG 65 12/10/2020    HDL 44 12/10/2020    ALT 18 12/20/2021    AST 19 12/20/2021     12/20/2021    K 4.7 12/20/2021     12/20/2021    CREATININE 1.1 12/20/2021    BUN 24 (H) 12/20/2021    CO2 29 12/20/2021    TSH 1.886 10/31/2014     HGBA1C 6.6 (H) 06/16/2022       I have explained the risks and benefits of endoscopy procedures to the patient including but not limited to bleeding, perforation, infection, and death.  The patient was asked if they understand and allowed to ask any further questions to their satisfaction.      Franco Campoverde MD

## 2022-10-05 NOTE — TRANSFER OF CARE
"Anesthesia Transfer of Care Note    Patient: Jd Dill III    Procedure(s) Performed: Procedure(s) (LRB):  EGD (ESOPHAGOGASTRODUODENOSCOPY) (N/A)    Patient location: GI    Anesthesia Type: MAC    Transport from OR: Transported from OR on room air with adequate spontaneous ventilation    Post pain: adequate analgesia    Post assessment: no apparent anesthetic complications and tolerated procedure well    Post vital signs: stable    Level of consciousness: responds to stimulation and sedated    Nausea/Vomiting: no nausea/vomiting    Complications: none    Transfer of care protocol was followed      Last vitals:   Visit Vitals  /64 (BP Location: Left arm, Patient Position: Lying)   Pulse 80   Temp 36.1 °C (97 °F) (Temporal)   Resp 18   Ht 5' 7" (1.702 m)   Wt 72.6 kg (160 lb)   SpO2 98%   BMI 25.06 kg/m²     "

## 2022-10-05 NOTE — ANESTHESIA PREPROCEDURE EVALUATION
10/05/2022  Jd Dill III is a 77 y.o., male for EGD under MAC    Past Medical History:   Diagnosis Date    Allergy     AR (allergic rhinitis)     Basal cell carcinoma of ear 2006    Right Ear    BPH (benign prostatic hyperplasia)     DDD (degenerative disc disease), lumbar     Degenerative disc disease     Gastroesophageal reflux disease 3/11/2019    Hypertension     Kidney stone     Undescended testicle     Right Orchiectomy     Past Surgical History:   Procedure Laterality Date    BASAL CELL CARCINOMA EXCISION      HEMORRHOID SURGERY      HERNIA REPAIR      PILONIDAL CYST DRAINAGE      Right Orchiectomy      TRANSURETHRAL RESECTION OF PROSTATE  2010           Pre-op Assessment    I have reviewed the Patient Summary Reports.    I have reviewed the NPO Status.   I have reviewed the Medications.     Review of Systems  Social:  Former Smoker        Physical Exam  General: Well nourished    Airway:  Mallampati: II           Anesthesia Plan  Type of Anesthesia, risks & benefits discussed:    Anesthesia Type: MAC  Intra-op Monitoring Plan: Standard ASA Monitors  Informed Consent: Informed consent signed with the Patient and all parties understand the risks and agree with anesthesia plan.  All questions answered.   ASA Score: 3    Ready For Surgery From Anesthesia Perspective.     .

## 2022-10-05 NOTE — PROVATION PATIENT INSTRUCTIONS
Discharge Summary/Instructions after an Endoscopic Procedure  Patient Name: Jd Dill  Patient MRN: [id number]  Patient YOB: 1945 Wednesday, October 5, 2022  Franco Campoverde MD  Dear patient,  As a result of recent federal legislation (The Federal Cures Act), you may   receive lab or pathology results from your procedure in your [facility]   account before your physician is able to contact you. Your physician or   their representative will relay the results to you with their   recommendations at their soonest availability.  Thank you,  Your health is very important to us during the Covid Crisis. Following your   procedure today, you will receive a daily text for 2 weeks asking about   signs or symptoms of Covid 19.  Please respond to this text when you   receive it so we can follow up and keep you as safe as possible.   RESTRICTIONS:  During your procedure today, you received medications for sedation.  These   medications may affect your judgment, balance and coordination.  Therefore,   for 24 hours, you have the following restrictions:   - DO NOT drive a car, operate machinery, make legal/financial decisions,   sign important papers or drink alcohol.    ACTIVITY:  Today: no heavy lifting, straining or running due to procedural   sedation/anesthesia.  The following day: return to full activity including work.  DIET:  Eat and drink normally unless instructed otherwise.     TREATMENT FOR COMMON SIDE EFFECTS:  - Mild abdominal pain, nausea, belching, bloating or excessive gas:  rest,   eat lightly and use a heating pad.  - Sore Throat: treat with throat lozenges and/or gargle with warm salt   water.  - Because air was used during the procedure, expelling large amounts of air   from your rectum or belching is normal.  - If a bowel prep was taken, you may not have a bowel movement for 1-3 days.    This is normal.  SYMPTOMS TO WATCH FOR AND REPORT TO YOUR PHYSICIAN:  1. Abdominal pain or bloating, other than  gas cramps.  2. Chest pain.  3. Back pain.  4. Signs of infection such as: chills or fever occurring within 24 hours   after the procedure.  5. Rectal bleeding, which would show as bright red, maroon, or black stools.   (A tablespoon of blood from the rectum is not serious, especially if   hemorrhoids are present.)  6. Vomiting.  7. Weakness or dizziness.  GO DIRECTLY TO THE NEAREST EMERGENCY ROOM IF YOU HAVE ANY OF THE FOLLOWING:      Difficulty breathing              Chills and/or fever over 101 F   Persistent vomiting and/or vomiting blood   Severe abdominal pain   Severe chest pain   Black, tarry stools   Bleeding- more than one tablespoon   Any other symptom or condition that you feel may need urgent attention  Your doctor recommends these additional instructions:  If any biopsies were taken, your doctors clinic will contact you in 1 to 2   weeks with any results.  - Discharge patient to home.   - Patient has a contact number available for emergencies.  The signs and   symptoms of potential delayed complications were discussed with the   patient.  Return to normal activities tomorrow.  Written discharge   instructions were provided to the patient.   - Resume previous diet.   - Continue present medications.   - Await pathology results.  For questions, problems or results please call your physician - Franco Campoverde MD.  EMERGENCY PHONE NUMBER: [phone],  LAB RESULTS: [phone]  IF A COMPLICATION OR EMERGENCY SITUATION ARISES AND YOU ARE UNABLE TO REACH   YOUR PHYSICIAN - GO DIRECTLY TO THE EMERGENCY ROOM.  Franco Campoverde MD  10/5/2022 10:18:14 AM  This report has been verified and signed electronically.  Dear patient,  As a result of recent federal legislation (The Federal Cures Act), you may   receive lab or pathology results from your procedure in your [facility]   account before your physician is able to contact you. Your physician or   their representative will relay the results to you with  their   recommendations at their soonest availability.  Thank you,  PROVATION

## 2022-10-06 ENCOUNTER — TELEPHONE (OUTPATIENT)
Dept: ENDOSCOPY | Facility: HOSPITAL | Age: 77
End: 2022-10-06
Payer: MEDICARE

## 2022-10-06 LAB — POCT GLUCOSE: 124 MG/DL (ref 70–110)

## 2022-10-07 LAB
FINAL PATHOLOGIC DIAGNOSIS: NORMAL
GROSS: NORMAL
Lab: NORMAL

## 2022-11-01 ENCOUNTER — TELEPHONE (OUTPATIENT)
Dept: ADMINISTRATIVE | Facility: CLINIC | Age: 77
End: 2022-11-01
Payer: MEDICARE

## 2022-11-01 ENCOUNTER — TELEPHONE (OUTPATIENT)
Dept: INTERNAL MEDICINE | Facility: CLINIC | Age: 77
End: 2022-11-01
Payer: MEDICARE

## 2022-11-01 NOTE — TELEPHONE ENCOUNTER
Called patient regarding message for appointment. Patient is ok with the appointment he has as well as his labs 1 week prior are scheduled- Cadence RIVERA MA

## 2022-11-01 NOTE — TELEPHONE ENCOUNTER
----- Message from Luis Alberto Sherman sent at 10/31/2022  8:30 AM CDT -----  Contact: [phone]  Caller is requesting an earlier appointment then we can schedule.  Caller is requesting a message be sent to the provider.  If this is for urgent care symptoms, did you offer other providers at this location, providers at other locations, or [facility]? (yes, no, n/a):  yes  If this is for the patients physical, did you offer to schedule next available and put on wait list, or to see NP or PA for their physical?  (yes, no, n/a):  yes  When is the next available appointment with their provider:  01/09/23  Reason for the appointment:  Annual   Patient preference of timeframe to be scheduled:    Would the patient like a call back, or a response through their [facility] portal?:   call back   Comments:

## 2022-11-02 ENCOUNTER — IMMUNIZATION (OUTPATIENT)
Dept: INTERNAL MEDICINE | Facility: CLINIC | Age: 77
End: 2022-11-02
Payer: MEDICARE

## 2022-11-02 ENCOUNTER — OFFICE VISIT (OUTPATIENT)
Dept: INTERNAL MEDICINE | Facility: CLINIC | Age: 77
End: 2022-11-02
Payer: MEDICARE

## 2022-11-02 VITALS
SYSTOLIC BLOOD PRESSURE: 108 MMHG | BODY MASS INDEX: 24.65 KG/M2 | DIASTOLIC BLOOD PRESSURE: 60 MMHG | OXYGEN SATURATION: 99 % | HEART RATE: 65 BPM | WEIGHT: 157.44 LBS

## 2022-11-02 DIAGNOSIS — J30.9 ALLERGIC RHINITIS, UNSPECIFIED SEASONALITY, UNSPECIFIED TRIGGER: ICD-10-CM

## 2022-11-02 DIAGNOSIS — Z00.00 ENCOUNTER FOR PREVENTIVE HEALTH EXAMINATION: Primary | ICD-10-CM

## 2022-11-02 DIAGNOSIS — I10 ESSENTIAL HYPERTENSION: ICD-10-CM

## 2022-11-02 DIAGNOSIS — K21.9 GASTROESOPHAGEAL REFLUX DISEASE, UNSPECIFIED WHETHER ESOPHAGITIS PRESENT: ICD-10-CM

## 2022-11-02 DIAGNOSIS — N13.8 BENIGN PROSTATIC HYPERPLASIA WITH URINARY OBSTRUCTION: ICD-10-CM

## 2022-11-02 DIAGNOSIS — Z23 NEED FOR VACCINATION: Primary | ICD-10-CM

## 2022-11-02 DIAGNOSIS — N40.1 BENIGN PROSTATIC HYPERPLASIA WITH URINARY OBSTRUCTION: ICD-10-CM

## 2022-11-02 DIAGNOSIS — E11.9 TYPE 2 DIABETES MELLITUS WITHOUT COMPLICATION, WITHOUT LONG-TERM CURRENT USE OF INSULIN: ICD-10-CM

## 2022-11-02 PROCEDURE — 91312 COVID-19, MRNA, LNP-S, BIVALENT BOOSTER, PF, 30 MCG/0.3 ML DOSE: CPT | Mod: PBBFAC

## 2022-11-02 PROCEDURE — 99999 PR PBB SHADOW E&M-EST. PATIENT-LVL III: CPT | Mod: PBBFAC,,, | Performed by: NURSE PRACTITIONER

## 2022-11-02 PROCEDURE — 99999 PR PBB SHADOW E&M-EST. PATIENT-LVL III: ICD-10-PCS | Mod: PBBFAC,,, | Performed by: NURSE PRACTITIONER

## 2022-11-02 PROCEDURE — G0439 PR MEDICARE ANNUAL WELLNESS SUBSEQUENT VISIT: ICD-10-PCS | Mod: ,,, | Performed by: NURSE PRACTITIONER

## 2022-11-02 PROCEDURE — 99213 OFFICE O/P EST LOW 20 MIN: CPT | Mod: PBBFAC | Performed by: NURSE PRACTITIONER

## 2022-11-02 PROCEDURE — 0124A COVID-19, MRNA, LNP-S, BIVALENT BOOSTER, PF, 30 MCG/0.3 ML DOSE: CPT | Mod: PBBFAC,CV19

## 2022-11-02 PROCEDURE — G0439 PPPS, SUBSEQ VISIT: HCPCS | Mod: ,,, | Performed by: NURSE PRACTITIONER

## 2022-11-02 NOTE — PROGRESS NOTES
Jd Dill presented for a  Medicare AWV and comprehensive Health Risk Assessment today. The following components were reviewed and updated:    Medical history  Family History  Social history  Allergies and Current Medications  Health Risk Assessment  Health Maintenance  Care Team         ** See Completed Assessments for Annual Wellness Visit within the encounter summary.**         The following assessments were completed:  Living Situation  CAGE  Depression Screening  Timed Get Up and Go  Whisper Test  Cognitive Function Screening  Nutrition Screening  ADL Screening  PAQ Screening            Vitals:    11/02/22 1309   BP: 108/60   Pulse: 65   SpO2: 99%   Weight: 71.4 kg (157 lb 6.5 oz)     Body mass index is 24.65 kg/m².  Physical Exam  Vitals and nursing note reviewed.   Constitutional:       Appearance: He is well-developed.   HENT:      Head: Normocephalic and atraumatic.      Right Ear: External ear normal.      Left Ear: External ear normal.   Eyes:      Conjunctiva/sclera: Conjunctivae normal.      Pupils: Pupils are equal, round, and reactive to light.   Cardiovascular:      Rate and Rhythm: Normal rate and regular rhythm.   Pulmonary:      Effort: Pulmonary effort is normal.      Breath sounds: Normal breath sounds.   Abdominal:      General: Bowel sounds are normal.      Palpations: Abdomen is soft.   Musculoskeletal:         General: Normal range of motion.      Cervical back: Normal range of motion and neck supple.   Skin:     General: Skin is warm and dry.   Neurological:      Mental Status: He is alert and oriented to person, place, and time.             Diagnoses and health risks identified today and associated recommendations/orders:    1. Encounter for preventive health examination  Health Maintenance updated   Records reviewed   Exam done     2. Type 2 diabetes mellitus without complication, without long-term current use of insulin  Stable and chronic. Continue current medications. Followed by  PCP.     3. Essential hypertension  Stable, followed by PCP   Take medications as prescribed.   Monitor BP at home, goal BP < or = 140/80, call office if consistently above this range.   Follow low salt DASH diet and exercise.   BMI reviewed.     4. Gastroesophageal reflux disease, unspecified whether esophagitis present  Stable and chronic. Continue current medications. Followed by PCP.     5. Benign prostatic hyperplasia with urinary obstruction  Stable and chronic. Continue current medications. Followed by PCP.     6. Allergic rhinitis, unspecified seasonality, unspecified trigger  Stable and chronic. Continue current medications. Followed by PCP.       Counseling and Referral of Other Preventative  (Italic type indicates deductible and co-insurance are waived)    Patient Name: Jd Dill  Today's Date: 11/3/2022    Health Maintenance         Date Due Completion Date    Diabetes Urine Screening 06/03/2016 6/3/2015    Eye Exam 09/23/2021 9/23/2020    Lipid Panel 12/10/2021 12/10/2020    Hemoglobin A1c 12/16/2022 6/16/2022    Colonoscopy 04/19/2023 4/19/2018    Override on 4/19/2018: Done ([facility])    Override on 1/2/2013: Done    TETANUS VACCINE 03/19/2030 3/19/2020          No orders of the defined types were placed in this encounter.        Provided Jd with a 5-10 year written screening schedule and personal prevention plan. Recommendations were developed using the USPSTF age appropriate recommendations. Education, counseling, and referrals were provided as needed. After Visit Summary printed and given to patient which includes a list of additional screenings\tests needed.    Follow up in about 2 months (around 1/9/2023).    Deidra Call NP  I offered to discuss advanced care planning, including how to pick a person who would make decisions for you if you were unable to make them for yourself, called a health care power of , and what kind of decisions you might make such as use of  life sustaining treatments such as ventilators and tube feeding when faced with a life limiting illness recorded on a living will that they will need to know. (How you want to be cared for as you near the end of your natural life)     X  Patient has advanced directives on file, which we reviewed, and they do not wish to make changes.  Review for Opioid Screening: Pt does not have Rx for Opioids     Review for Substance Use Disorders: Patient does not use substance

## 2022-11-02 NOTE — PATIENT INSTRUCTIONS
Counseling and Referral of Other Preventative  (Italic type indicates deductible and co-insurance are waived)    Patient Name: Jd Dill  Today's Date: 11/2/2022    Health Maintenance       Date Due Completion Date    Diabetes Urine Screening 06/03/2016 6/3/2015    Eye Exam 09/23/2021 9/23/2020    Lipid Panel 12/10/2021 12/10/2020    COVID-19 Vaccine (5 - Booster for Pfizer series) 07/01/2022 5/6/2022    Hemoglobin A1c 12/16/2022 6/16/2022    Colonoscopy 04/19/2023 4/19/2018    Override on 4/19/2018: Done ([facility])    Override on 1/2/2013: Done    TETANUS VACCINE 03/19/2030 3/19/2020        No orders of the defined types were placed in this encounter.      The following information is provided to all patients.  This information is to help you find resources for any of the problems found today that may be affecting your health:                Living healthy guide: www.[facility].[place].[facility]      Understanding Diabetes: www.diabetes.org      Eating healthy: www.cdc.gov/healthyweight      CDC home safety checklist: www.cdc.gov/steadi/patient.html      Agency on Aging: www.goea.[place].gov      Alcoholics anonymous (AA): www.aa.org      Physical Activity: www.carlos.nih.gov/lv7fftf      Tobacco use: www.quitwithusla.org

## 2022-11-03 ENCOUNTER — DOCUMENTATION ONLY (OUTPATIENT)
Dept: RESEARCH | Facility: HOSPITAL | Age: 77
End: 2022-11-03
Payer: MEDICARE

## 2022-11-03 NOTE — PROGRESS NOTES
Study: PREVENTABLE ([id number])  Sponsor: [employer]  PI: Dr. Renee Perez  Date: 2022        Jd Dill is a participant in the PREVENTABLE Trial, which randomizes patients to atorvastatin 40mg or placebo (double blind) to study if statin use in older adults can prevent dementia, disability, and heart disease. This patient is being evaluated prior to re-ordering their trial medication.     If yes to any, study drug cannot be renewed at this time.   Has the participant ? NO  Is the participant currently known to be taking an open-label statin? NO   If any of the following are prewsent, verify that patient is still eligible for study drug renewal at this time:  Is there currently unexplained persistent transaminase elevations? NO  Is there current active liver disease? NO  Has the participant experienced markedly elevated creatine phosphokinase (CPK) levels or myopathy while taking study drug? NO  Has the participant experienced any other adverse effects while taking study drug that might prevent the participant from being able to take Atorvastatin 40mg once daily in a reasonably safe manner? NO  Is there an active order in the medical record for an open-label statin medication? NO  Is the participant now chronically using any of the following medications: clarithromycin, colchicine, cyclosporine, darunavir, elbasvir, fenofibrate, fosamprenavir, gemfibrozil, glecaprevir, grazoprevir, itraconazole, lopinavir, nelfinavir, niacin (dose > 1g/day), pibrentasvir, ritonavir, saquinavir, simeprevir, tipranavir? NO      I, Renee Perez, confirm that the patient Jd Dill meets criteria for drug renewal. The electronic data capture system for the trial will be updated accordingly. Please contact [phone] (PREVENTABLE [facility] Phone Number) with any questions or concerns.

## 2022-11-08 DIAGNOSIS — E11.9 TYPE 2 DIABETES MELLITUS WITHOUT COMPLICATION, WITHOUT LONG-TERM CURRENT USE OF INSULIN: Primary | ICD-10-CM

## 2022-12-12 ENCOUNTER — CLINICAL SUPPORT (OUTPATIENT)
Dept: DIABETES | Facility: CLINIC | Age: 77
End: 2022-12-12
Payer: MEDICARE

## 2022-12-12 VITALS — WEIGHT: 157 LBS | BODY MASS INDEX: 24.59 KG/M2

## 2022-12-12 DIAGNOSIS — E11.9 TYPE 2 DIABETES MELLITUS WITHOUT COMPLICATION, WITHOUT LONG-TERM CURRENT USE OF INSULIN: ICD-10-CM

## 2022-12-12 PROCEDURE — 99211 OFF/OP EST MAY X REQ PHY/QHP: CPT | Mod: PBBFAC | Performed by: REGISTERED NURSE

## 2022-12-12 PROCEDURE — 99999 PR PBB SHADOW E&M-EST. PATIENT-LVL I: CPT | Mod: PBBFAC,,,

## 2022-12-12 PROCEDURE — G0108 DIAB MANAGE TRN  PER INDIV: HCPCS | Mod: PBBFAC | Performed by: REGISTERED NURSE

## 2022-12-12 PROCEDURE — 99999 PR PBB SHADOW E&M-EST. PATIENT-LVL I: ICD-10-PCS | Mod: PBBFAC,,,

## 2022-12-12 NOTE — PROGRESS NOTES
Diabetes Care Specialist Follow-up Note  Author: Mitch Fink RN  Date: 12/12/2022    Program Intake  Reason for Diabetes Program Visit:: Intervention  Type of Intervention:: Individual  Individual: Education  Education: Self-Management Skill Review, Nutrition and Meal Planning    Lab Results   Component Value Date    HGBA1C 6.6 (H) 06/16/2022     During today's follow-up visit,  the following areas required further assessment and content was provided/reviewed.    Based on today's diabetes care assessment, the following areas of need were identified:      Social 8/1/2022   Support No   Access to Mass Media/Tech No   Cognitive/Behavioral Health No   Culture/Baptism No   Communication No   Health Literacy No        Clinical 8/1/2022   Medication Adherence (No Data)   Lab Compliance No   Nutritional Status Yes        Diabetes Self-Management Skills 8/1/2022   Diabetes Disease Process/Treatment Options Yes   Nutrition/Healthy Eating Yes   Physical Activity/Exercise No   Medication No   Home Blood Glucose Monitoring Yes   Acute Complications Yes   Chronic Complications Yes   Psychosocial/Coping No        Today's interventions were provided through individual discussion, instruction, and written materials were provided.    Patient verbalized understanding of instruction and written materials.  Pt was able to return back demonstration of instructions today. Patient understood key points, needs reinforcement and further instruction.     Diabetes Self-Management Care Plan Review:  Diabetes Self-Management Care Plan Review and Evaluation of Progress:    During today's follow-up Jd's Diabetes Self-Management Care Plan progress was reviewed and progress was evaluated including his/her input. Jd has agreed to continue his/her journey to improve/maintain overall diabetes control by continuing to set health goals. See care plan progress below.      Care Plan: Diabetes Management   Updates made since 11/12/2022 12:00 AM         Problem: Healthy Eating         Goal: Eat 3 meals daily with 45-60g/3-4 servings of Carbohydrate per meal.    Start Date: 8/1/2022   Expected End Date: 9/12/2022   Recent Progress: On track   Priority: Medium   Barriers: Knowledge deficit   Note:    Instructed pt on the food groups, how to read labels and count carbs. Pt was given sample menus and meal plans as examples. Discussed with pt the importance of eating 3 balanced and portioned meals per day. Pt lives alone and does very little cooking- most things are out of the can. He eats lunch at the [employer] usually every day. Discussed with pt food options in the grocery that he could use to make his meals more balanced. Discussed with pt about cooking one dish per week and eating off of that for the week. Discussed some other options to choose to eat out. Pt will work on making his meals balanced.     Pt came to clinic for fu visit. Pt use to eat all of his meals out but now he is preparing his own meals. Pt is checking his bs's once a day fasting in the am and they range 117-138. Pt is trying to follow the meals in our guide and has been doing so however we discussed about putting some variety in his meals, Discussed with pt the vast amount of selections in the grocery store that are available for those needing a single meal. Pt will look into some of these items and try to put some variations in his meals 24 hour meal recall showed that pt had cereal, milk, 1 egg and margarine for breakfast, tuna sandwich, chips and orange for lunch and turkey sandwich with lettuce and tomatoes, chips, 1/2 banana and salad for dinner. He snacks on yogurt. Pt will continue to work on his meal adjustments.    Pt came to clinic for fu visit. Bs's checked once a day in am and  range from 114-142. Pt continues to exercise at the fitness center 5 days a week. Pt is working on his meal planning. He has tried some of the foods suggested at his last visit. Pt is still not reading  his labels and counting his carbs and most of the time he is low in carbs. 24 hour meal recall showed that pt had 1/2 banana, cereal and milk, egg and margarine for breakfast, atkins tv dinner- pizza with a salad for lunch and sauce, meat balls and pasta for dinner. He drinks only water and snacks on yogurt. Discussed with pt what needs to be added to his meals to make them more balanced. Pt will continue to work on meals and reading labels.         Follow Up Plan     Follow up in about 13 weeks (around 3/13/2023).    Today's care plan and follow up schedule was discussed with patient.  Jd verbalized understanding of the care plan, goals, and agrees to follow up plan.        The patient was encouraged to communicate with his/her health care provider/physician and care team regarding his/her condition(s) and treatment.  I provided the patient with my contact information today and encouraged to contact me via phone or [facility] Portal as needed.

## 2023-01-03 ENCOUNTER — LAB VISIT (OUTPATIENT)
Dept: LAB | Facility: HOSPITAL | Age: 78
End: 2023-01-03
Attending: INTERNAL MEDICINE
Payer: MEDICARE

## 2023-01-03 DIAGNOSIS — E11.9 TYPE 2 DIABETES MELLITUS WITHOUT COMPLICATION, WITHOUT LONG-TERM CURRENT USE OF INSULIN: ICD-10-CM

## 2023-01-03 LAB
ALBUMIN SERPL BCP-MCNC: 3.8 G/DL (ref 3.5–5.2)
ALP SERPL-CCNC: 65 U/L (ref 55–135)
ALT SERPL W/O P-5'-P-CCNC: 23 U/L (ref 10–44)
ANION GAP SERPL CALC-SCNC: 6 MMOL/L (ref 8–16)
AST SERPL-CCNC: 22 U/L (ref 10–40)
BASOPHILS # BLD AUTO: 0.02 K/UL (ref 0–0.2)
BASOPHILS NFR BLD: 0.3 % (ref 0–1.9)
BILIRUB SERPL-MCNC: 0.9 MG/DL (ref 0.1–1)
BUN SERPL-MCNC: 16 MG/DL (ref 8–23)
CALCIUM SERPL-MCNC: 9.7 MG/DL (ref 8.7–10.5)
CHLORIDE SERPL-SCNC: 101 MMOL/L (ref 95–110)
CHOLEST SERPL-MCNC: 109 MG/DL (ref 120–199)
CHOLEST/HDLC SERPL: 2.7 {RATIO} (ref 2–5)
CO2 SERPL-SCNC: 27 MMOL/L (ref 23–29)
CREAT SERPL-MCNC: 1 MG/DL (ref 0.5–1.4)
DIFFERENTIAL METHOD: ABNORMAL
EOSINOPHIL # BLD AUTO: 0.2 K/UL (ref 0–0.5)
EOSINOPHIL NFR BLD: 3 % (ref 0–8)
ERYTHROCYTE [DISTWIDTH] IN BLOOD BY AUTOMATED COUNT: 13.8 % (ref 11.5–14.5)
EST. GFR  (NO RACE VARIABLE): >60 ML/MIN/1.73 M^2
ESTIMATED AVG GLUCOSE: 137 MG/DL (ref 68–131)
GLUCOSE SERPL-MCNC: 119 MG/DL (ref 70–110)
HBA1C MFR BLD: 6.4 % (ref 4–5.6)
HCT VFR BLD AUTO: 37.2 % (ref 40–54)
HDLC SERPL-MCNC: 41 MG/DL (ref 40–75)
HDLC SERPL: 37.6 % (ref 20–50)
HGB BLD-MCNC: 12.4 G/DL (ref 14–18)
IMM GRANULOCYTES # BLD AUTO: 0.01 K/UL (ref 0–0.04)
IMM GRANULOCYTES NFR BLD AUTO: 0.2 % (ref 0–0.5)
LDLC SERPL CALC-MCNC: 52 MG/DL (ref 63–159)
LYMPHOCYTES # BLD AUTO: 1.3 K/UL (ref 1–4.8)
LYMPHOCYTES NFR BLD: 21.1 % (ref 18–48)
MCH RBC QN AUTO: 31.1 PG (ref 27–31)
MCHC RBC AUTO-ENTMCNC: 33.3 G/DL (ref 32–36)
MCV RBC AUTO: 93 FL (ref 82–98)
MONOCYTES # BLD AUTO: 0.8 K/UL (ref 0.3–1)
MONOCYTES NFR BLD: 13.8 % (ref 4–15)
NEUTROPHILS # BLD AUTO: 3.8 K/UL (ref 1.8–7.7)
NEUTROPHILS NFR BLD: 61.6 % (ref 38–73)
NONHDLC SERPL-MCNC: 68 MG/DL
NRBC BLD-RTO: 0 /100 WBC
PLATELET # BLD AUTO: 216 K/UL (ref 150–450)
PMV BLD AUTO: 10.4 FL (ref 9.2–12.9)
POTASSIUM SERPL-SCNC: 4.5 MMOL/L (ref 3.5–5.1)
PROT SERPL-MCNC: 6.7 G/DL (ref 6–8.4)
RBC # BLD AUTO: 3.99 M/UL (ref 4.6–6.2)
SODIUM SERPL-SCNC: 134 MMOL/L (ref 136–145)
TRIGL SERPL-MCNC: 80 MG/DL (ref 30–150)
WBC # BLD AUTO: 6.08 K/UL (ref 3.9–12.7)

## 2023-01-03 PROCEDURE — 36415 COLL VENOUS BLD VENIPUNCTURE: CPT | Mod: PO | Performed by: INTERNAL MEDICINE

## 2023-01-03 PROCEDURE — 80061 LIPID PANEL: CPT | Performed by: INTERNAL MEDICINE

## 2023-01-03 PROCEDURE — 80053 COMPREHEN METABOLIC PANEL: CPT | Performed by: INTERNAL MEDICINE

## 2023-01-03 PROCEDURE — 85025 COMPLETE CBC W/AUTO DIFF WBC: CPT | Performed by: INTERNAL MEDICINE

## 2023-01-03 PROCEDURE — 83036 HEMOGLOBIN GLYCOSYLATED A1C: CPT | Performed by: INTERNAL MEDICINE

## 2023-01-09 ENCOUNTER — OFFICE VISIT (OUTPATIENT)
Dept: INTERNAL MEDICINE | Facility: CLINIC | Age: 78
End: 2023-01-09
Payer: MEDICARE

## 2023-01-09 VITALS
HEART RATE: 72 BPM | WEIGHT: 158.94 LBS | DIASTOLIC BLOOD PRESSURE: 60 MMHG | OXYGEN SATURATION: 99 % | HEIGHT: 67 IN | SYSTOLIC BLOOD PRESSURE: 110 MMHG | BODY MASS INDEX: 24.94 KG/M2 | RESPIRATION RATE: 18 BRPM

## 2023-01-09 DIAGNOSIS — E11.9 TYPE 2 DIABETES MELLITUS WITHOUT COMPLICATION, WITHOUT LONG-TERM CURRENT USE OF INSULIN: ICD-10-CM

## 2023-01-09 PROCEDURE — 99214 OFFICE O/P EST MOD 30 MIN: CPT | Mod: PBBFAC | Performed by: INTERNAL MEDICINE

## 2023-01-09 PROCEDURE — 99999 PR PBB SHADOW E&M-EST. PATIENT-LVL IV: CPT | Mod: PBBFAC,,, | Performed by: INTERNAL MEDICINE

## 2023-01-09 PROCEDURE — 99999 PR PBB SHADOW E&M-EST. PATIENT-LVL IV: ICD-10-PCS | Mod: PBBFAC,,, | Performed by: INTERNAL MEDICINE

## 2023-01-09 PROCEDURE — 99214 PR OFFICE/OUTPT VISIT, EST, LEVL IV, 30-39 MIN: ICD-10-PCS | Mod: S$PBB,,, | Performed by: INTERNAL MEDICINE

## 2023-01-09 PROCEDURE — 99214 OFFICE O/P EST MOD 30 MIN: CPT | Mod: S$PBB,,, | Performed by: INTERNAL MEDICINE

## 2023-01-09 NOTE — PROGRESS NOTES
Subjective:       Patient ID: Jd Dill III is a 77 y.o. male.    Chief Complaint: Annual Exam    Patient is here for followup for chronic conditions.      Chronic nasal congestion and acid reflux symptoms.    L middle finger triggering.    DM2:  good med adherence  no changed Diet  < 140 AM sugars  N/a Post-prandial sugars  no Numbness in feet  no sores in feet  no Visual changes  no Polyuria/polydipsia.          Review of Systems   Constitutional:  Negative for appetite change and unexpected weight change.   HENT:  Positive for congestion, postnasal drip and rhinorrhea. Negative for sore throat.         Allergy symptoms btr   Respiratory:  Negative for cough, chest tightness and shortness of breath.    Cardiovascular:  Negative for chest pain.   Gastrointestinal:  Negative for abdominal pain.        Rare GERD symptoms   Genitourinary:  Negative for difficulty urinating, scrotal swelling and testicular pain.   Skin:         L breast tenderness, mild still   Allergic/Immunologic: Positive for environmental allergies.   Neurological:  Negative for tremors, syncope and weakness.   Psychiatric/Behavioral:  Negative for dysphoric mood.          Objective:      Physical Exam  Vitals reviewed.   Constitutional:       General: He is not in acute distress.     Appearance: Normal appearance. He is well-developed. He is not ill-appearing, toxic-appearing or diaphoretic.   HENT:      Head: Normocephalic and atraumatic.   Eyes:      General: No scleral icterus.  Neck:      Thyroid: No thyromegaly.   Cardiovascular:      Rate and Rhythm: Normal rate and regular rhythm.      Heart sounds: Normal heart sounds. No murmur heard.    No friction rub. No gallop.   Pulmonary:      Effort: Pulmonary effort is normal. No respiratory distress.      Breath sounds: Normal breath sounds. No wheezing or rales.   Abdominal:      General: Bowel sounds are normal. There is no distension.      Palpations: Abdomen is soft. There is no mass.       Tenderness: There is no abdominal tenderness. There is no guarding or rebound.   Musculoskeletal:         General: Normal range of motion.      Cervical back: Normal range of motion.      Comments: LL middle finger no triggering during exam, nml rom   Lymphadenopathy:      Cervical: No cervical adenopathy.   Skin:     Findings: No lesion.      Comments: bilat breast with gyneco, mild tenderness L breast tissue, no hard mass palpated   Neurological:      Mental Status: He is alert and oriented to person, place, and time.   Psychiatric:         Mood and Affect: Mood normal.         Behavior: Behavior normal.         Thought Content: Thought content normal.       Assessment:       1. Type 2 diabetes mellitus without complication, without long-term current use of insulin          Plan:       Jd was seen today for annual exam.    Diagnoses and all orders for this visit:    Type 2 diabetes mellitus without complication, without long-term current use of insulin  Controlled  I reviewed recent blood work results with the patient.      Trigger finger symptoms L hand, he will call if symptoms worsen then refer to [facility]      Health Maintenance         Date Due Completion Date    Diabetes Urine Screening 06/03/2016 6/3/2015    Eye Exam 09/23/2021 9/23/2020    Colonoscopy 04/19/2023 4/19/2018    Override on 4/19/2018: Done ([facility])    Override on 1/2/2013: Done    Hemoglobin A1c 07/03/2023 1/3/2023    Lipid Panel 01/03/2024 1/3/2023    TETANUS VACCINE 03/19/2030 3/19/2020            Follow up in about 1 year (around 1/9/2024).    Future Appointments   Date Time Provider Department Center   1/11/2023  9:40 AM TUSHAR Aldana III, MD [facility] [place]   3/13/2023  1:30 PM Mitch Fink RN [facility] ERIKA HERNAN[facility]   7/11/2023  2:00 PM ABDOUL Gonzalez [facility] G[facility]ME[facility]   1/9/2024 11:40 AM Jabier Hinkle MD [facility] QUANW

## 2023-01-11 ENCOUNTER — OFFICE VISIT (OUTPATIENT)
Dept: ALLERGY | Facility: CLINIC | Age: 78
End: 2023-01-11
Payer: MEDICARE

## 2023-01-11 VITALS
WEIGHT: 157.88 LBS | SYSTOLIC BLOOD PRESSURE: 110 MMHG | DIASTOLIC BLOOD PRESSURE: 53 MMHG | HEART RATE: 62 BPM | BODY MASS INDEX: 24.72 KG/M2 | OXYGEN SATURATION: 99 %

## 2023-01-11 DIAGNOSIS — K21.9 LARYNGOPHARYNGEAL REFLUX: ICD-10-CM

## 2023-01-11 DIAGNOSIS — I10 ESSENTIAL HYPERTENSION: ICD-10-CM

## 2023-01-11 DIAGNOSIS — K21.9 GASTROESOPHAGEAL REFLUX DISEASE, UNSPECIFIED WHETHER ESOPHAGITIS PRESENT: ICD-10-CM

## 2023-01-11 DIAGNOSIS — J30.9 ALLERGIC RHINITIS, UNSPECIFIED SEASONALITY, UNSPECIFIED TRIGGER: Primary | ICD-10-CM

## 2023-01-11 PROCEDURE — 99214 PR OFFICE/OUTPT VISIT, EST, LEVL IV, 30-39 MIN: ICD-10-PCS | Mod: S$PBB,,, | Performed by: ALLERGY & IMMUNOLOGY

## 2023-01-11 PROCEDURE — 99999 PR PBB SHADOW E&M-EST. PATIENT-LVL III: CPT | Mod: PBBFAC,,, | Performed by: ALLERGY & IMMUNOLOGY

## 2023-01-11 PROCEDURE — 99213 OFFICE O/P EST LOW 20 MIN: CPT | Mod: PBBFAC | Performed by: ALLERGY & IMMUNOLOGY

## 2023-01-11 PROCEDURE — 99999 PR PBB SHADOW E&M-EST. PATIENT-LVL III: ICD-10-PCS | Mod: PBBFAC,,, | Performed by: ALLERGY & IMMUNOLOGY

## 2023-01-11 PROCEDURE — 99214 OFFICE O/P EST MOD 30 MIN: CPT | Mod: S$PBB,,, | Performed by: ALLERGY & IMMUNOLOGY

## 2023-01-11 NOTE — PROGRESS NOTES
"Jd Dill (Fred) returns to clinic today for continued evaluation of allergic rhinitis, cough, and LPR. He was last seen July 11, 2022.    Since his last visit, he continues to do well.  He does continue to take omeprazole 40 milligrams a day and azelastine two sprays each nostril b.i.d.    Since getting a hospital bed that is elevated he is sleeping better.  He does find that he may slide down in the bed. When he does he may wake up with bilateral nasal congestion.     This does improve throughout the night.     He is not having any heartburn.     He did have an EGD done that was normal except for a benign polyp.    Physical Examination:  General: Well-developed, well-nourished, no acute distress. No throat clearing.  Head: No sinus tenderness.  Eyes: Conjunctivae:  No bulbar or palpebral conjunctival injection.  Ears: EAC's clear.  TM's clear.  No pre-auricular nodes.  Nose: Nasal Mucosa:  Pink.  Septum: No apparent deviation.  Turbinates:  No significant edema.  Polyps/Mass:  None visible.  Teeth/Gums:  No bleeding noted.  Oropharynx: No exudates.  Neck: Supple without thyromegaly. No cervical lymphadenopathy.    Respiratory/Chest: Effort: Good.  Auscultation:  Clear bilaterally.  Skin: Good turgor.  No urticaria or angioedema.  Neuro/Psych: Oriented x 3.    Laboratory 08/28/2018:  IgE level:  167.  ImmunoCAP:  Class II:  DM f.  Class I:  DM pt.      Assessment:  1. Allergic rhinitis, controlled.  2. Mild GERD, controlled.  3. LPR, controlled.  4. Nasal septal deviation.   5. Hypertension on amlodipine, losartan, and metoprolol.  6. History of nephrolithiasis.  7. BPH.    Recommendations:  1.  Continue house dust mite avoidance procedures.  2.  Continue azelastine 1 to 2 sprays each nostril b.i.d. p.r.n. rhinitis.  3.  Continue omeprazole 40 milligrams once a day.  4.  Return to clinic in six months or sooner if needed.    Patient education January 13, 2021:  Discussed long-term safety profile of " PPIs.    Patient education September 4, 2018:  Allergic mechanisms and treatment options were reviewed in detail.  House dust mite avoidance was reviewed.   LPR and web site were reviewed.

## 2023-03-13 ENCOUNTER — CLINICAL SUPPORT (OUTPATIENT)
Dept: DIABETES | Facility: CLINIC | Age: 78
End: 2023-03-13
Payer: MEDICARE

## 2023-03-13 VITALS — BODY MASS INDEX: 24.59 KG/M2 | WEIGHT: 157 LBS

## 2023-03-13 DIAGNOSIS — E11.9 TYPE 2 DIABETES MELLITUS WITHOUT COMPLICATION, WITHOUT LONG-TERM CURRENT USE OF INSULIN: Primary | ICD-10-CM

## 2023-03-13 PROCEDURE — G0108 DIAB MANAGE TRN  PER INDIV: HCPCS | Mod: PBBFAC | Performed by: REGISTERED NURSE

## 2023-03-13 PROCEDURE — 99211 OFF/OP EST MAY X REQ PHY/QHP: CPT | Mod: PBBFAC | Performed by: REGISTERED NURSE

## 2023-03-13 PROCEDURE — 99999 PR PBB SHADOW E&M-EST. PATIENT-LVL I: CPT | Mod: PBBFAC,,,

## 2023-03-13 PROCEDURE — 99999 PR PBB SHADOW E&M-EST. PATIENT-LVL I: ICD-10-PCS | Mod: PBBFAC,,,

## 2023-03-13 NOTE — PROGRESS NOTES
Diabetes Care Specialist Follow-up Note  Author: Mitch Fink RN  Date: 3/13/2023    Program Intake  Reason for Diabetes Program Visit:: Intervention  Type of Intervention:: Individual  Individual: Education  Education: Nutrition and Meal Planning, Self-Management Skill Review    Lab Results   Component Value Date    HGBA1C 6.4 (H) 01/03/2023     A  Nutritional Status  Diet: Regular  Meal Plan 24 Hour Recall: Breakfast, Lunch, Dinner  Meal Plan 24 Hour Recall - Breakfast: coffee, 1/2 banana. cereal, milk, 1 egg, margarine  Meal Plan 24 Hour Recall - Lunch: tuna, crackers, ibrahim, tangerines  Meal Plan 24 Hour Recall - Dinner: tv dinner  Meal Plan 24 Hour Recall - Snack: water, coffee rare snacking                       During today's follow-up visit,  the following areas required further assessment and content was provided/reviewed.    Based on today's diabetes care assessment, the following areas of need were identified:      Social 8/1/2022   Support No   Access to Mass Media/Tech No   Cognitive/Behavioral Health No   Culture/Adventism No   Communication No   Health Literacy No        Clinical 8/1/2022   Medication Adherence (No Data)   Lab Compliance No   Nutritional Status Yes        Diabetes Self-Management Skills 8/1/2022   Diabetes Disease Process/Treatment Options Yes   Nutrition/Healthy Eating Yes   Physical Activity/Exercise No   Medication No   Home Blood Glucose Monitoring Yes   Acute Complications Yes   Chronic Complications Yes   Psychosocial/Coping No        Today's interventions were provided through individual discussion, instruction, and written materials were provided.    Patient verbalized understanding of instruction and written materials.  Pt was able to return back demonstration of instructions today. Patient understood key points, needs reinforcement and further instruction.     Diabetes Self-Management Care Plan Review:  Diabetes Self-Management Care Plan Review and Evaluation of  Progress:    During today's follow-up Jd's Diabetes Self-Management Care Plan progress was reviewed and progress was evaluated including his/her input. Jd has agreed to continue his/her journey to improve/maintain overall diabetes control by continuing to set health goals. See care plan progress below.      Care Plan: Diabetes Management   Updates made since 2/11/2023 12:00 AM        Problem: Healthy Eating         Goal: Eat 3 meals daily with 45-60g/3-4 servings of Carbohydrate per meal.    Start Date: 8/1/2022   Expected End Date: 9/12/2022   Recent Progress: On track   Priority: Medium   Barriers: Knowledge deficit   Note:    Instructed pt on the food groups, how to read labels and count carbs. Pt was given sample menus and meal plans as examples. Discussed with pt the importance of eating 3 balanced and portioned meals per day. Pt lives alone and does very little cooking- most things are out of the can. He eats lunch at the [employer] usually every day. Discussed with pt food options in the grocery that he could use to make his meals more balanced. Discussed with pt about cooking one dish per week and eating off of that for the week. Discussed some other options to choose to eat out. Pt will work on making his meals balanced.     Pt came to clinic for fu visit. Pt use to eat all of his meals out but now he is preparing his own meals. Pt is checking his bs's once a day fasting in the am and they range 117-138. Pt is trying to follow the meals in our guide and has been doing so however we discussed about putting some variety in his meals, Discussed with pt the vast amount of selections in the grocery store that are available for those needing a single meal. Pt will look into some of these items and try to put some variations in his meals 24 hour meal recall showed that pt had cereal, milk, 1 egg and margarine for breakfast, tuna sandwich, chips and orange for lunch and turkey sandwich with lettuce and  tomatoes, chips, 1/2 banana and salad for dinner. He snacks on yogurt. Pt will continue to work on his meal adjustments.    Pt came to clinic for fu visit. Bs's checked once a day in am and  range from 114-142. Pt continues to exercise at the fitness center 5 days a week. Pt is working on his meal planning. He has tried some of the foods suggested at his last visit. Pt is still not reading his labels and counting his carbs and most of the time he is low in carbs. 24 hour meal recall showed that pt had 1/2 banana, cereal and milk, egg and margarine for breakfast, atkins tv dinner- pizza with a salad for lunch and sauce, meat balls and pasta for dinner. He drinks only water and snacks on yogurt. Discussed with pt what needs to be added to his meals to make them more balanced. Pt will continue to work on meals and reading labels.    Pt came to clinic for fu visit. Pt has been eating better and more balanced. Pts bs's have been in normal range- . Pt is exercising 5 times a week at the fitness center. Pt is eating better. Pt will continue to work on eating and keeping his bs's in normal range.         Follow Up Plan     Follow up if symptoms worsen or fail to improve.    Today's care plan and follow up schedule was discussed with patient.  Jd verbalized understanding of the care plan, goals, and agrees to follow up plan.        The patient was encouraged to communicate with his/her health care provider/physician and care team regarding his/her condition(s) and treatment.  I provided the patient with my contact information today and encouraged to contact me via phone or [facility] Patient Portal as needed.

## 2023-03-29 LAB — CRC RECOMMENDATION EXT: NORMAL

## 2023-04-04 ENCOUNTER — TELEPHONE (OUTPATIENT)
Dept: INTERNAL MEDICINE | Facility: CLINIC | Age: 78
End: 2023-04-04
Payer: MEDICARE

## 2023-04-04 NOTE — TELEPHONE ENCOUNTER
----- Message from Elisabeth Hill sent at 4/4/2023  8:30 AM CDT -----  Contact: Self/[phone]  Patient would like to get a referral.  Referral to what specialty:  ?  Does the patient want the referral with a specific physician:  No   Is the [facility]   Reason (be specific):  numbness at the bottom of both feet   Does the patient already have the specialty clinic appointment scheduled:  No   If yes, what date is the appointment scheduled:     Is the insurance listed in Epic correct? (this is important for a referral):  Yes  Advised patient that once provider approves this either a nurse or  will return their call?: ys  Would the patient like a call back, or a response through their [facility] portal?:   call back   Comments:   pt stated that he thinks it maybe related to having Diabetes

## 2023-04-14 ENCOUNTER — OFFICE VISIT (OUTPATIENT)
Dept: INTERNAL MEDICINE | Facility: CLINIC | Age: 78
End: 2023-04-14
Payer: MEDICARE

## 2023-04-14 VITALS
OXYGEN SATURATION: 97 % | HEIGHT: 67 IN | BODY MASS INDEX: 24.36 KG/M2 | DIASTOLIC BLOOD PRESSURE: 40 MMHG | WEIGHT: 155.19 LBS | HEART RATE: 74 BPM | SYSTOLIC BLOOD PRESSURE: 110 MMHG | RESPIRATION RATE: 16 BRPM

## 2023-04-14 DIAGNOSIS — M21.70 LEG LENGTH DISCREPANCY: ICD-10-CM

## 2023-04-14 DIAGNOSIS — E11.42 DIABETIC POLYNEUROPATHY ASSOCIATED WITH TYPE 2 DIABETES MELLITUS: Primary | ICD-10-CM

## 2023-04-14 PROCEDURE — 99214 PR OFFICE/OUTPT VISIT, EST, LEVL IV, 30-39 MIN: ICD-10-PCS | Mod: S$PBB,,, | Performed by: INTERNAL MEDICINE

## 2023-04-14 PROCEDURE — 99999 PR PBB SHADOW E&M-EST. PATIENT-LVL V: ICD-10-PCS | Mod: PBBFAC,,, | Performed by: INTERNAL MEDICINE

## 2023-04-14 PROCEDURE — 99214 OFFICE O/P EST MOD 30 MIN: CPT | Mod: S$PBB,,, | Performed by: INTERNAL MEDICINE

## 2023-04-14 PROCEDURE — 99215 OFFICE O/P EST HI 40 MIN: CPT | Mod: PBBFAC | Performed by: INTERNAL MEDICINE

## 2023-04-14 PROCEDURE — 99999 PR PBB SHADOW E&M-EST. PATIENT-LVL V: CPT | Mod: PBBFAC,,, | Performed by: INTERNAL MEDICINE

## 2023-04-14 NOTE — PROGRESS NOTES
Subjective:       Patient ID: Jd Dill III is a 78 y.o. male.    Chief Complaint: Follow-up and Numbness    Patient is here for followup for chronic conditions.    He has had numbness sensations on the feet, by the toes. mostly notices at noc in bed. Mild daytime tingling but not very symptomatic. Does not interfere with ability to sleep.    Sugars: in good range, <130s.    Had colo 3/28 and it was normal, will not need another.        Review of Systems   Constitutional:  Negative for activity change and fatigue.   Skin:  Negative for rash and wound.   Neurological:  Positive for numbness (mild at toes only). Negative for dizziness, tremors and weakness.   Psychiatric/Behavioral:  Negative for confusion.          Objective:      Physical Exam  Vitals reviewed.   Constitutional:       General: He is not in acute distress.     Appearance: Normal appearance. He is well-developed. He is not ill-appearing, toxic-appearing or diaphoretic.   HENT:      Head: Normocephalic and atraumatic.   Eyes:      General: No scleral icterus.  Neck:      Thyroid: No thyromegaly.   Cardiovascular:      Heart sounds: No murmur heard.    No friction rub. No gallop.   Pulmonary:      Effort: Pulmonary effort is normal. No respiratory distress.      Breath sounds: No wheezing or rales.   Abdominal:      General: Bowel sounds are normal. There is no distension.      Palpations: Abdomen is soft. There is no mass.      Tenderness: There is no abdominal tenderness. There is no guarding or rebound.   Musculoskeletal:         General: Normal range of motion.      Cervical back: Normal range of motion.      Comments: Has leg leg discrepancy    Protective Sensation (w/ 10 gram monofilament):  Grossly nml pinprick prox vs distal but felt less so distal  Right: Intact  Left: Intact    Visual Inspection:  Normal -  Bilateral    Pedal Pulses:   Right: Present  Left: Present    Posterior Tibialis Pulses:   Right:Present  Left: Present        Lymphadenopathy:      Cervical: No cervical adenopathy.   Skin:     Findings: No lesion.   Neurological:      Mental Status: He is alert and oriented to person, place, and time.      Comments: Mildly + Romberg testing   Psychiatric:         Mood and Affect: Mood normal.         Behavior: Behavior normal.         Thought Content: Thought content normal.       Assessment:       1. Diabetic polyneuropathy associated with type 2 diabetes mellitus    2. Leg length discrepancy        Plan:       Jd was seen today for follow-up and numbness.    Diagnoses and all orders for this visit:    Diabetic polyneuropathy associated with type 2 diabetes mellitus  -     Ambulatory referral/consult to Podiatry; Future  Pt given handouts.  Does not feel need for gabapentin  Discussed watching for balance issues    Leg length discrepancy  -     Ambulatory referral/consult to Podiatry; Future        Health Maintenance         Date Due Completion Date    Diabetes Urine Screening 06/03/2016 6/3/2015    Colonoscopy 04/19/2023 4/19/2018    Override on 4/19/2018: Done (dr [facility])    Override on 1/2/2013: Done    Hemoglobin A1c 07/03/2023 1/3/2023    Eye Exam 09/21/2023 9/21/2022    Lipid Panel 01/03/2024 1/3/2023    TETANUS VACCINE 03/19/2030 3/19/2020            Keep next scheduled visit      Future Appointments   Date Time Provider Department Center   5/22/2023  2:00 PM Neil Rolon DPM [facility]   7/11/2023  2:00 PM ABDOUL Gonzalez [facility]   1/9/2024 11:40 AM Jabier Hinkle MD [facility]

## 2023-05-01 DIAGNOSIS — I10 ESSENTIAL HYPERTENSION: ICD-10-CM

## 2023-05-01 NOTE — TELEPHONE ENCOUNTER
No care due was identified.  [facility] Embedded Care Due Messages. Reference number: [id number].   5/01/2023 4:05:27 PM CDT

## 2023-05-01 NOTE — TELEPHONE ENCOUNTER
----- Message from Melissa Márquez sent at 5/1/2023  3:48 PM CDT -----  Contact: [phone]  Requesting an RX refill or new RX. refill  Is this a refill or new RX:  refill    RX name and strength (copy/paste from chart):  amLODIPine (NORVASC) 5 MG tablet 90 tablet     Is this a 30 day or 90 day RX: 90    Pharmacy name and phone # (copy/paste from chart):        [facility] [address] [place] [employer] - [place] [address] [address]  Phone: [phone] Fax: [phone]

## 2023-05-02 RX ORDER — AMLODIPINE BESYLATE 5 MG/1
5 TABLET ORAL DAILY
Qty: 90 TABLET | Refills: 11 | Status: SHIPPED | OUTPATIENT
Start: 2023-05-02

## 2023-05-02 RX ORDER — AMLODIPINE BESYLATE 5 MG/1
5 TABLET ORAL DAILY
Qty: 90 TABLET | Refills: 11 | Status: SHIPPED | OUTPATIENT
Start: 2023-05-02 | End: 2023-05-02

## 2023-05-12 DIAGNOSIS — I10 ESSENTIAL HYPERTENSION: ICD-10-CM

## 2023-05-12 RX ORDER — METOPROLOL SUCCINATE 25 MG/1
25 TABLET, EXTENDED RELEASE ORAL DAILY
Qty: 90 TABLET | Refills: 11 | Status: SHIPPED | OUTPATIENT
Start: 2023-05-12

## 2023-05-12 NOTE — TELEPHONE ENCOUNTER
----- Message from Chrisnate Gucci sent at 5/12/2023  9:24 AM CDT -----  Contact: self [phone]  Requesting an RX refill or new RX.  Is this a refill or new RX: refill  RX name and strength (copy/paste from chart):  metoprolol succinate (TOPROL-XL) 25 MG 24 hr  Is this a 30 day or 90 day RX:   Pharmacy name and phone # (copy/paste from chart):    [facility] [address] [place] [facility] - [place] [address] [address]  Phone: [phone] Fax: [phone]      The doctors have asked that we provide their patients with the following 2 reminders -- prescription refills can take up to 72 hours, and a friendly reminder that in the future you can use your [facility] account to request refills: yes    Please call and advise

## 2023-05-12 NOTE — TELEPHONE ENCOUNTER
No care due was identified.  [facility] Embedded Care Due Messages. Reference number: [id number].   5/12/2023 9:59:33 AM CDT

## 2023-05-22 ENCOUNTER — TELEPHONE (OUTPATIENT)
Dept: PODIATRY | Facility: CLINIC | Age: 78
End: 2023-05-22
Payer: MEDICARE

## 2023-05-22 ENCOUNTER — OFFICE VISIT (OUTPATIENT)
Dept: PODIATRY | Facility: CLINIC | Age: 78
End: 2023-05-22
Payer: MEDICARE

## 2023-05-22 VITALS
OXYGEN SATURATION: 98 % | SYSTOLIC BLOOD PRESSURE: 115 MMHG | WEIGHT: 155.44 LBS | HEIGHT: 67 IN | RESPIRATION RATE: 18 BRPM | HEART RATE: 72 BPM | DIASTOLIC BLOOD PRESSURE: 66 MMHG | BODY MASS INDEX: 24.4 KG/M2 | TEMPERATURE: 98 F

## 2023-05-22 DIAGNOSIS — E11.42 DIABETIC POLYNEUROPATHY ASSOCIATED WITH TYPE 2 DIABETES MELLITUS: ICD-10-CM

## 2023-05-22 DIAGNOSIS — L84 CORN OR CALLUS: ICD-10-CM

## 2023-05-22 DIAGNOSIS — B35.3 TINEA PEDIS OF BOTH FEET: Primary | ICD-10-CM

## 2023-05-22 DIAGNOSIS — E11.9 COMPREHENSIVE DIABETIC FOOT EXAMINATION, TYPE 2 DM, ENCOUNTER FOR: ICD-10-CM

## 2023-05-22 DIAGNOSIS — M21.70 LEG LENGTH DISCREPANCY: ICD-10-CM

## 2023-05-22 PROCEDURE — 99203 PR OFFICE/OUTPT VISIT, NEW, LEVL III, 30-44 MIN: ICD-10-PCS | Mod: S$PBB,,, | Performed by: PODIATRIST

## 2023-05-22 PROCEDURE — 99999 PR PBB SHADOW E&M-EST. PATIENT-LVL IV: ICD-10-PCS | Mod: PBBFAC,,, | Performed by: PODIATRIST

## 2023-05-22 PROCEDURE — 99203 OFFICE O/P NEW LOW 30 MIN: CPT | Mod: S$PBB,,, | Performed by: PODIATRIST

## 2023-05-22 PROCEDURE — 99999 PR PBB SHADOW E&M-EST. PATIENT-LVL IV: CPT | Mod: PBBFAC,,, | Performed by: PODIATRIST

## 2023-05-22 PROCEDURE — 99214 OFFICE O/P EST MOD 30 MIN: CPT | Mod: PBBFAC | Performed by: PODIATRIST

## 2023-05-22 RX ORDER — KETOCONAZOLE 20 MG/G
CREAM TOPICAL DAILY
Qty: 60 G | Refills: 3 | Status: SHIPPED | OUTPATIENT
Start: 2023-05-22 | End: 2023-05-22

## 2023-05-22 RX ORDER — KETOCONAZOLE 20 MG/G
CREAM TOPICAL
Qty: 60 G | Refills: 3 | Status: SHIPPED | OUTPATIENT
Start: 2023-05-22 | End: 2023-05-22

## 2023-05-22 RX ORDER — KETOCONAZOLE 20 MG/G
CREAM TOPICAL
Qty: 60 G | Refills: 3 | Status: SHIPPED | OUTPATIENT
Start: 2023-05-22 | End: 2023-10-06

## 2023-05-22 NOTE — TELEPHONE ENCOUNTER
Spoke with patient in reference to a medication order being corrected with pharmacy. Patient states Marysol has taken care of it. Call ended.

## 2023-05-22 NOTE — PROGRESS NOTES
Subjective:     Patient ID: Jd Dill III is a 78 y.o. male.    Chief Complaint: Diabetes Mellitus (Jabier Hinkle MD-04/14/2023) and Diabetic Foot Exam    Jd is a 78 y.o. male who presents to the clinic for evaluation and treatment of high risk feet. Jd has a past medical history of Allergy, AR (allergic rhinitis), Basal cell carcinoma of ear (2006), BPH (benign prostatic hyperplasia), DDD (degenerative disc disease), lumbar, Degenerative disc disease, Gastroesophageal reflux disease (3/11/2019), Hypertension, Kidney stone, and Undescended testicle. The patient's chief complaint is need for comprehensive DM foot exam. Has some numbness in toes and feet that comes and goes. Also has some dry itchy, red skin on instep both feet an inquiring about treatment options. Has not tried to self treat. Here for comprehensive DM foot exam and also needs Rx for Dm shoes with custom orthotics. States he has L leg that is 3/4 inch shorter than R. This patient has documented high risk feet requiring routine maintenance secondary to peripheral neuropathy.    PCP: Jabier Hinkle MD    Date Last Seen by PCP: 4/14/2023     Current shoe gear:    Casual shoes          Hemoglobin A1C   Date Value Ref Range Status   01/03/2023 6.4 (H) 4.0 - 5.6 % Final     Comment:     ADA Screening Guidelines:  5.7-6.4%  Consistent with prediabetes  >or=6.5%  Consistent with diabetes    High levels of fetal hemoglobin interfere with the HbA1C  assay. Heterozygous hemoglobin variants (HbS, HgC, etc)do  not significantly interfere with this assay.   However, presence of multiple variants may affect accuracy.     06/16/2022 6.6 (H) 4.0 - 5.6 % Final     Comment:     ADA Screening Guidelines:  5.7-6.4%  Consistent with prediabetes  >or=6.5%  Consistent with diabetes    High levels of fetal hemoglobin interfere with the HbA1C  assay. Heterozygous hemoglobin variants (HbS, HgC, etc)do  not significantly interfere with this assay.   However, presence of  multiple variants may affect accuracy.     12/20/2021 6.4 (H) 4.0 - 5.6 % Final     Comment:     ADA Screening Guidelines:  5.7-6.4%  Consistent with prediabetes  >or=6.5%  Consistent with diabetes    High levels of fetal hemoglobin interfere with the HbA1C  assay. Heterozygous hemoglobin variants (HbS, HgC, etc)do  not significantly interfere with this assay.   However, presence of multiple variants may affect accuracy.         Review of Systems   Constitutional: Negative for chills and fever.   Cardiovascular:  Negative for chest pain, claudication and leg swelling.   Respiratory:  Negative for cough and shortness of breath.    Skin:  Positive for dry skin and rash. Negative for nail changes.   Musculoskeletal:         L leg shorter than R   Gastrointestinal:  Negative for nausea and vomiting.   Neurological:  Positive for numbness and sensory change. Negative for paresthesias.   Psychiatric/Behavioral:  Negative for altered mental status.       Objective:     Physical Exam  Vitals reviewed.   Constitutional:       Appearance: He is well-developed.   HENT:      Head: Normocephalic.   Cardiovascular:      Pulses:           Dorsalis pedis pulses are 2+ on the right side and 2+ on the left side.        Posterior tibial pulses are 2+ on the right side and 2+ on the left side.      Comments: CRT < 3 sec to tips of toes. No edema noted to b/l LE. No vericosities noted to b/l LEs.     Pulmonary:      Effort: No respiratory distress.   Musculoskeletal:      Comments: L leg 3/4 inch shorter than R. Otherwise rectus foot and toe position b/l foot with no major deformities noted. Mild equinus noted b/l ankle with < 10 deg DF noted. MMT 5/5 in DF/PF/Inv/Ev resistance with no reproduction of pain in any direction b/l foot. Passive range of motion of ankle and pedal joints is painless b/l foot/ankle/toes.     Skin:     General: Skin is warm and dry.      Findings: No erythema.      Comments: No open lesions, lacerations or  wounds noted. Nails are normotrophic to R 1-5 and L 1-5. Interdigital spaces clean, dry and intact b/l. No erythema noted to b/l foot. Skin texture normal. Pedal hair normal. Erythematous skin b/l arch and medial heel with overlying xerotic skin without open wound, fissure or other SOI.    Neurological:      Mental Status: He is alert and oriented to person, place, and time.      Sensory: Sensory deficit present.      Comments: Light touch, proprioception, and sharp/dull sensation are all intact bilaterally. Protective threshold with the [place]-Wienstein monofilament is intact bilaterally. Subjective paresthesias with no clearly identifiable source or trigger.    Psychiatric:         Behavior: Behavior normal.         Thought Content: Thought content normal.         Judgment: Judgment normal.         Assessment:      Encounter Diagnoses   Name Primary?    Diabetic polyneuropathy associated with type 2 diabetes mellitus     Leg length discrepancy     Tinea pedis of both feet Yes    Comprehensive diabetic foot examination, type 2 DM, encounter for     Corn or callus      Plan:     Jd was seen today for diabetes mellitus and diabetic foot exam.    Diagnoses and all orders for this visit:    Tinea pedis of both feet    Diabetic polyneuropathy associated with type 2 diabetes mellitus  -     Ambulatory referral/consult to Podiatry  -     DIABETIC SHOES FOR HOME USE    Leg length discrepancy  -     Ambulatory referral/consult to Podiatry  -     DIABETIC SHOES FOR HOME USE    Comprehensive diabetic foot examination, type 2 DM, encounter for    Corn or callus  -     DIABETIC SHOES FOR HOME USE    Other orders  -     ketoconazole (NIZORAL) 2 % cream; Apply topically once daily. Apply to area of red, dry skin on both feet/heels twice a day for 2-3 weeks.      I counseled the patient on his conditions, their implications and medical management.    - Shoe inspection. Diabetic Foot Education. Patient reminded of the importance  of good nutrition and blood sugar control to help prevent podiatric complications of diabetes. Patient instructed on proper foot hygeine. We discussed wearing proper shoe gear, daily foot inspections, never walking without protective shoe gear, caution putting sharp instruments to feet     - Discussed DM foot care:  Wear comfortable, proper fitting shoes. Wash feet daily. Dry well. After drying, apply moisturizer to feet (no lotion to webspaces). Inspect feet daily for skin breaks, blisters, swelling, or redness. Wear cotton socks (preferably white)  Change socks every day. Do NOT walk barefoot. Do NOT use heating pads or warm/hot water soaks     Rx diabetic shoes with custom molded inserts to be worn at all times while ambulating. Prescription provided with list of local retailers.  (Lift for shorter L side)    Rx Ketoconazole cream to be applied to affected area of foot rash twice a day for minimum 2 weeks or until resolution.      RTC 1 year, sooner PRN.

## 2023-06-27 DIAGNOSIS — I10 ESSENTIAL HYPERTENSION: ICD-10-CM

## 2023-06-27 RX ORDER — LOSARTAN POTASSIUM 100 MG/1
100 TABLET ORAL DAILY
Qty: 90 TABLET | Refills: 11 | Status: SHIPPED | OUTPATIENT
Start: 2023-06-27

## 2023-06-27 NOTE — TELEPHONE ENCOUNTER
No care due was identified.  [facility] Embedded Care Due Messages. Reference number: [id number].   6/27/2023 11:05:03 AM CDT

## 2023-06-27 NOTE — TELEPHONE ENCOUNTER
----- Message from Ilda Duckworth sent at 6/27/2023 10:52 AM CDT -----  Contact: [facility]) [phone]  Requesting an RX refill or new RX.  Is this a refill or new RX: refill  RX name and strength (copy/paste from chart):  losartan (COZAAR) 100 MG tablet  Is this a 30 day or 90 day RX:   Pharmacy name and phone # (copy/paste from chart):    [facility] [address] [place] [employer] - [place] [id number] [facility]  [address] [facility] [address]  Phone: [phone] Fax: [phone]      The doctors have asked that we provide their patients with the following 2 reminders -- prescription refills can take up to 72 hours, and a friendly reminder that in the future you can use your [facility] account to request refills: yes    Please call and advise

## 2023-07-05 ENCOUNTER — TELEPHONE (OUTPATIENT)
Dept: PODIATRY | Facility: CLINIC | Age: 78
End: 2023-07-05
Payer: MEDICARE

## 2023-07-05 NOTE — TELEPHONE ENCOUNTER
----- Message from Alicia Delvalle sent at 7/5/2023 10:55 AM CDT -----  Regarding: Prescription for Shoes  Contact: Chari @ [phone]  Chari from [employer] is calling to get a copy of prescription for diabetic shoes. Could be fax to [phone]

## 2023-07-06 ENCOUNTER — TELEPHONE (OUTPATIENT)
Dept: INTERNAL MEDICINE | Facility: CLINIC | Age: 78
End: 2023-07-06
Payer: MEDICARE

## 2023-07-06 NOTE — TELEPHONE ENCOUNTER
----- Message from Dion Ortega sent at 7/5/2023 12:59 PM CDT -----  Name Of Caller: Jd        Provider Name: Jabier Hinkle        Does patient feel the need to be seen today? no        Relationship to the Pt?: patient        Contact Preference?: [phone]        What is the nature of the call?: Patient states that he needs a physician release form completed so that he can participate in fitness orientation and/or exercise program.

## 2023-07-09 NOTE — PROGRESS NOTES
INTERNAL MEDICINE CLINIC - SAME DAY APPOINTMENT  Progress Note    PRESENTING HISTORY     PCP: Jabier Hinkle MD    Chief Complaint/Reason for Visit:   No chief complaint on file.    History of Present Illness & ROS : Mr. Jd Dill III is a 78 y.o. male.    Same day appt.   6 month follow up  New to me.   Est'd with Dr. Hinkle.  Very pleasant gentleman.    No complaints or concerns today.   Doing well.   In research study at [facility] (Atorvastatin 40)     Review of Systems:  Eyes: denies visual changes at this time denies floaters   ENT: no nasal congestion or sore throat  Respiratory: no cough or shorness of breath  Cardiovascular: no chest pain or palpitations  Gastrointestinal: no nausea or vomiting, no abdominal pain or change in bowel habits  Genitourinary: no hematuria or dysuria; denies frequency  Hematologic/Lymphatic: no easy bruising or lymphadenopathy  Musculoskeletal: no arthralgias or myalgias  Neurological: no seizures or tremors  Endocrine: no heat or cold intolerance      PAST HISTORY:     Past Medical History:   Diagnosis Date    Allergy     AR (allergic rhinitis)     Basal cell carcinoma of ear 2006    Right Ear    BPH (benign prostatic hyperplasia)     DDD (degenerative disc disease), lumbar     Degenerative disc disease     Gastroesophageal reflux disease 3/11/2019    Hypertension     Kidney stone     Undescended testicle     Right Orchiectomy       Past Surgical History:   Procedure Laterality Date    BASAL CELL CARCINOMA EXCISION      ESOPHAGOGASTRODUODENOSCOPY N/A 10/5/2022    Procedure: EGD (ESOPHAGOGASTRODUODENOSCOPY);  Surgeon: Franco Campoverde MD;  Location: [facility];  Service: Endoscopy;  Laterality: N/A;    HEMORRHOID SURGERY      HERNIA REPAIR      PILONIDAL CYST DRAINAGE      Right Orchiectomy      TRANSURETHRAL RESECTION OF PROSTATE  2010       Family History   Problem Relation Age of Onset    Heart disease Father     Hypertension Father     Diabetes Father     Dementia Mother      Allergies Mother        Social History     Socioeconomic History    Marital status: Single   Tobacco Use    Smoking status: Former     Packs/day: 0.50     Years: 10.00     Pack years: 5.00     Types: Cigarettes     Quit date: 1997     Years since quittin.5     Passive exposure: Never    Smokeless tobacco: Never   Substance and Sexual Activity    Alcohol use: Yes     Comment: Rare    Drug use: Never   Social History Narrative    Retired, 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 frequ exercise at wellness [facility] (). No kids. 1 cousin in contact.     Social Determinants of Health     Financial Resource Strain: Low Risk     Difficulty of Paying Living Expenses: Not hard at all   Food Insecurity: No Food Insecurity    Worried About Running Out of Food in the Last Year: Never true    Ran Out of Food in the Last Year: Never true   Transportation Needs: No Transportation Needs    Lack of Transportation (Medical): No    Lack of Transportation (Non-Medical): No   Physical Activity: Sufficiently Active    Days of Exercise per Week: 7 days    Minutes of Exercise per Session: 30 min   Stress: No Stress Concern Present    Feeling of Stress : Not at all   Housing Stability: Low Risk     Unable to Pay for Housing in the Last Year: No    Number of Places Lived in the Last Year: 1    Unstable Housing in the Last Year: No       MEDICATIONS & ALLERGIES:     Current Outpatient Medications on File Prior to Visit   Medication Sig Dispense Refill    amLODIPine (NORVASC) 5 MG tablet Take 1 tablet (5 mg total) by mouth once daily. 90 tablet 11    azelastine (ASTELIN) 137 mcg (0.1 %) nasal spray INSTILL 1 SPRAY INTO EACH NOSTRIL TWICE A DAY 30 mL 11    azelastine (ASTELIN) 137 mcg (0.1 %) nasal spray INSTILL 1-2 SPRAYS BY NASAL ROUTE 2 (TWO) TIMES DAILY AS NEEDED FOR RHINITIS. 90 mL 3    blood sugar diagnostic Strp To check BG 1 times daily, to use with insurance preferred meter 100 each 11    ketoconazole (NIZORAL) 2 % cream  PLEASE SEE ATTACHED FOR DETAILED DIRECTIONS 60 g 3    lancets Misc To check BG 1 times daily, to use with insurance preferred meter 100 each 11    losartan (COZAAR) 100 MG tablet Take 1 tablet (100 mg total) by mouth once daily. 90 tablet 11    metoprolol succinate (TOPROL-XL) 25 MG 24 hr tablet Take 1 tablet (25 mg total) by mouth once daily. 90 tablet 11    omeprazole (PRILOSEC) 40 MG capsule TAKE 1 CAPSULE BY MOUTH EVERY DAY IN THE MORNING 90 capsule 3    ONETOUCH VERIO FLEX METER Misc TO CHECK BLOOD GLUCOSE 1 TIME DAILY, TO USE WITH INSURANCE PREFERRED METER 1 each 0    tamsulosin (FLOMAX) 0.4 mg Cp24 Take 0.8 mg by mouth once daily.        No current facility-administered medications on file prior to visit.        Review of patient's allergies indicates:   Allergen Reactions    Ace inhibitors Other (See Comments)     Cough    Chlorthalidone      Hyponatremia    Mite extract Other (See Comments)     Nose gets congested       Medications Reconciliation:   I have reconciled the patient's home medications with the patient/family. I have updated all changes.  Refer to After-Visit Medication List.    OBJECTIVE:     Vital Signs:  There were no vitals filed for this visit.  Wt Readings from Last 3 Encounters:   05/22/23 1401 70.5 kg (155 lb 6.8 oz)   04/14/23 1416 70.4 kg (155 lb 3.3 oz)   03/13/23 1330 71.2 kg (157 lb)     There is no height or weight on file to calculate BMI.   Wt Readings from Last 3 Encounters:   07/11/23 68.9 kg (151 lb 14.4 oz)   05/22/23 70.5 kg (155 lb 6.8 oz)   04/14/23 70.4 kg (155 lb 3.3 oz)     Temp Readings from Last 3 Encounters:   05/22/23 97.8 °F (36.6 °C)   10/05/22 98.2 °F (36.8 °C) (Temporal)   06/30/22 97.8 °F (36.6 °C)     BP Readings from Last 3 Encounters:   07/11/23 118/64   05/22/23 115/66   04/14/23 (!) 110/40     Pulse Readings from Last 3 Encounters:   07/11/23 61   05/22/23 72   04/14/23 74       Physical Exam:  General: Well developed, well nourished. No distress.  HEENT:  Head is normocephalic, atraumatic  Eyes: Clear conjunctiva.  Neck: Supple, symmetrical neck; trachea midline.  Lungs: Clear to auscultation bilaterally and normal respiratory effort.  Cardiovascular: Heart with regular rate and rhythm. No murmurs, gallops or rubs  Extremities: No LE edema. Pulses 2+ and symmetric.   Skin: Skin color, texture, turgor normal. No rashes.  Musculoskeletal: Normal gait.   Neurologic: Normal strength and tone. No focal numbness or weakness.       Laboratory  Lab Results   Component Value Date    WBC 6.08 01/03/2023    HGB 12.4 (L) 01/03/2023    HCT 37.2 (L) 01/03/2023     01/03/2023    CHOL 109 (L) 01/03/2023    TRIG 80 01/03/2023    HDL 41 01/03/2023    ALT 23 01/03/2023    AST 22 01/03/2023     (L) 01/03/2023    K 4.5 01/03/2023     01/03/2023    CREATININE 1.0 01/03/2023    BUN 16 01/03/2023    CO2 27 01/03/2023    TSH 1.886 10/31/2014    HGBA1C 6.4 (H) 01/03/2023         ASSESSMENT & PLAN:     Est'd with Dr. Hinkle. Seen in  4/2023.     Follow-up exam, 3-6 months since previous exam    Essential hypertension  Today: 118/64 (Optimal)  ` Toprol XL  ` Losartan   ` Norvasc    Benign prostatic hyperplasia with urinary obstruction  Stable and controlled   ` Flomax    Pre Diabetes with polyneuropathy believed to be 2/2 Diabetes status:   Type 2 diabetes mellitus without complication, without long-term current use of insulin  -     HEMOGLOBIN A1C; Future; Expected date: 07/11/2023  -     Microalbumin/creatinine urine ratio; Future; Expected date: 07/11/2023  Lab Results   Component Value Date    HGBA1C 6.4 (H) 01/03/2023    *Not on pharm therapy; diet  controlled   ` referred to Podiatry per Dr. Hinkle in 4/2023; seen by Dr. Rolon in 5/2023 and awaiting diabetic shoes    *Follow up with PCP as scheduled. Sooner if indicated.       Future Appointments   Date Time Provider Department Center   1/9/2024 11:40 AM Jabier Hinkle MD [facility]        Medication List             Accurate as of July 11, 2023  2:54 PM. If you have any questions, ask your nurse or doctor.                CHANGE how you take these medications      azelastine 137 mcg (0.1 %) nasal spray  Commonly known as: ASTELIN  INSTILL 1-2 SPRAYS BY NASAL ROUTE 2 (TWO) TIMES DAILY AS NEEDED FOR RHINITIS.  What changed: Another medication with the same name was removed. Continue taking this medication, and follow the directions you see here.  Changed by: ABDOUL Rojo            CONTINUE taking these medications      amLODIPine 5 MG tablet  Commonly known as: NORVASC  Take 1 tablet (5 mg total) by mouth once daily.     blood sugar diagnostic Strp  To check BG 1 times daily, to use with insurance preferred meter     ketoconazole 2 % cream  Commonly known as: NIZORAL  PLEASE SEE ATTACHED FOR DETAILED DIRECTIONS     lancets [facility]  To check BG 1 times daily, to use with insurance preferred meter     losartan 100 MG tablet  Commonly known as: COZAAR  Take 1 tablet (100 mg total) by mouth once daily.     metoprolol succinate 25 MG 24 hr tablet  Commonly known as: TOPROL-XL  Take 1 tablet (25 mg total) by mouth once daily.     omeprazole 40 MG capsule  Commonly known as: PRILOSEC  TAKE 1 CAPSULE BY MOUTH EVERY DAY IN THE MORNING     ONETOUCH VERIO FLEX METER Misc  Generic drug: blood-glucose meter  TO CHECK BLOOD GLUCOSE 1 TIME DAILY, TO USE WITH INSURANCE PREFERRED METER     tamsulosin 0.4 mg Cap  Commonly known as: FLOMAX                Signing Physician:  ABDOUL Rojo

## 2023-07-11 ENCOUNTER — LAB VISIT (OUTPATIENT)
Dept: LAB | Facility: HOSPITAL | Age: 78
End: 2023-07-11
Payer: MEDICARE

## 2023-07-11 ENCOUNTER — OFFICE VISIT (OUTPATIENT)
Dept: INTERNAL MEDICINE | Facility: CLINIC | Age: 78
End: 2023-07-11
Payer: MEDICARE

## 2023-07-11 VITALS
HEIGHT: 67 IN | DIASTOLIC BLOOD PRESSURE: 64 MMHG | HEART RATE: 61 BPM | BODY MASS INDEX: 23.84 KG/M2 | SYSTOLIC BLOOD PRESSURE: 118 MMHG | OXYGEN SATURATION: 99 % | WEIGHT: 151.88 LBS

## 2023-07-11 DIAGNOSIS — N13.8 BENIGN PROSTATIC HYPERPLASIA WITH URINARY OBSTRUCTION: ICD-10-CM

## 2023-07-11 DIAGNOSIS — I10 ESSENTIAL HYPERTENSION: ICD-10-CM

## 2023-07-11 DIAGNOSIS — E11.9 TYPE 2 DIABETES MELLITUS WITHOUT COMPLICATION, WITHOUT LONG-TERM CURRENT USE OF INSULIN: ICD-10-CM

## 2023-07-11 DIAGNOSIS — N40.1 BENIGN PROSTATIC HYPERPLASIA WITH URINARY OBSTRUCTION: ICD-10-CM

## 2023-07-11 DIAGNOSIS — Z09 FOLLOW-UP EXAM, 3-6 MONTHS SINCE PREVIOUS EXAM: Primary | ICD-10-CM

## 2023-07-11 DIAGNOSIS — R73.03 PRE-DIABETES: ICD-10-CM

## 2023-07-11 LAB
ALBUMIN/CREAT UR: NORMAL UG/MG (ref 0–30)
CREAT UR-MCNC: 38 MG/DL (ref 23–375)
MICROALBUMIN UR DL<=1MG/L-MCNC: <5 UG/ML

## 2023-07-11 PROCEDURE — 99214 OFFICE O/P EST MOD 30 MIN: CPT | Mod: S$PBB,,, | Performed by: NURSE PRACTITIONER

## 2023-07-11 PROCEDURE — 99214 PR OFFICE/OUTPT VISIT, EST, LEVL IV, 30-39 MIN: ICD-10-PCS | Mod: S$PBB,,, | Performed by: NURSE PRACTITIONER

## 2023-07-11 PROCEDURE — 99213 OFFICE O/P EST LOW 20 MIN: CPT | Mod: PBBFAC | Performed by: NURSE PRACTITIONER

## 2023-07-11 PROCEDURE — 99999 PR PBB SHADOW E&M-EST. PATIENT-LVL III: CPT | Mod: PBBFAC,,, | Performed by: NURSE PRACTITIONER

## 2023-07-11 PROCEDURE — 99999 PR PBB SHADOW E&M-EST. PATIENT-LVL III: ICD-10-PCS | Mod: PBBFAC,,, | Performed by: NURSE PRACTITIONER

## 2023-07-11 PROCEDURE — 82570 ASSAY OF URINE CREATININE: CPT | Performed by: NURSE PRACTITIONER

## 2023-08-11 ENCOUNTER — TELEPHONE (OUTPATIENT)
Dept: INTERNAL MEDICINE | Facility: CLINIC | Age: 78
End: 2023-08-11
Payer: MEDICARE

## 2023-08-11 NOTE — TELEPHONE ENCOUNTER
Hi, please call him -- we received his [facility] wellness form and we will fax it in today  Thank you, Jabier Hinkle

## 2023-09-13 RX ORDER — OMEPRAZOLE 40 MG/1
CAPSULE, DELAYED RELEASE ORAL
Qty: 90 CAPSULE | Refills: 0 | Status: SHIPPED | OUTPATIENT
Start: 2023-09-13 | End: 2024-04-01 | Stop reason: SDUPTHER

## 2023-09-20 LAB
LEFT EYE DM RETINOPATHY: NEGATIVE
RIGHT EYE DM RETINOPATHY: NEGATIVE

## 2023-10-06 RX ORDER — KETOCONAZOLE 20 MG/G
CREAM TOPICAL
Qty: 60 G | Refills: 3 | Status: SHIPPED | OUTPATIENT
Start: 2023-10-06

## 2023-10-13 ENCOUNTER — RESEARCH ENCOUNTER (OUTPATIENT)
Dept: CARDIOLOGY | Facility: CLINIC | Age: 78
End: 2023-10-13
Payer: MEDICARE

## 2023-10-13 NOTE — PROGRESS NOTES
Study: PREVENTABLE ([id number])  Sponsor: [employer]  PI: Frank Mooney MD  Date: 13 Oct 2023      Jd Dill is a participant in the PREVENTABLE Trial, which randomizes patients to atorvastatin 40mg or placebo (double blind) to study if statin use in older adults can prevent dementia, disability, and heart disease. This patient is being evaluated prior to re-ordering their trial medication.     If yes to any, study drug cannot be renewed at this time.   Has the participant ? no  Is the participant currently known to be taking an open-label statin? no  If any of the following are present, verify that patient is still eligible for study drug renewal at this time:  Is there currently unexplained persistent transaminase elevations? no  Is there current active liver disease? no  Has the participant experienced markedly elevated creatine phosphokinase (CPK) levels or myopathy while taking study drug? no*  Has the participant experienced any other adverse effects while taking study drug that might prevent the participant from being able to take Atorvastatin 40mg once daily in a reasonably safe manner? no  Is there an active order in the medical record for an open-label statin medication? no  Is the participant now chronically using any of the following medications: clarithromycin, colchicine, cyclosporine, darunavir, elbasvir, fenofibrate, fosamprenavir, gemfibrozil, glecaprevir, grazoprevir, itraconazole, lopinavir, nelfinavir, niacin (dose > 1g/day), pibrentasvir, ritonavir, saquinavir, simeprevir, tipranavir? no      I, Frank Mooney MD, confirm that the patient Jd Dill meets criteria for drug renewal. The electronic data capture system for the trial will be updated accordingly. Please contact [phone] (PREVENTABLE [facility] Phone Number) with any questions or concerns.

## 2023-11-21 ENCOUNTER — TELEPHONE (OUTPATIENT)
Dept: INTERNAL MEDICINE | Facility: CLINIC | Age: 78
End: 2023-11-21
Payer: MEDICARE

## 2023-11-21 DIAGNOSIS — E11.9 TYPE 2 DIABETES MELLITUS WITHOUT COMPLICATION, WITHOUT LONG-TERM CURRENT USE OF INSULIN: Primary | ICD-10-CM

## 2023-11-21 NOTE — TELEPHONE ENCOUNTER
----- Message from Narciso Casanova MA sent at 11/20/2023 11:19 AM CST -----  Contact: JOSH [phone]    ----- Message -----  From: Estelle Bynum  Sent: 11/20/2023   9:59 AM CST  To: Arin Eugene Staff    type: Lab--Annual--    Caller is requesting to schedule their Lab appointment prior to annual appointment.  Order is not listed in EPIC.  Please enter order and contact patient to schedule.    Name of Caller--Josh Mantilla--    Preferred Date and Time of Labs:--before the visit--    Date of Annual Physical Appointment:--01/09/24--    Where would they like the lab performed?-- [facility]--      Would the patient rather a call back or a response via [facility]?--Call back--    Best Call Back Number:[phone]      Additional Information: Please add orders to the system and call to schedule.

## 2023-11-21 NOTE — TELEPHONE ENCOUNTER
Hi, please contact the patient to assist in scheduling    Orders Placed This Encounter    CBC Auto Differential    Comprehensive Metabolic Panel    Hemoglobin A1C   For prior to his next appt with me:  Future Appointments   Date Time Provider Department Center   1/9/2024 11:40 AM Jabier Hinkle MD [facility]         Thank you, Jabier Hnikle

## 2023-11-30 ENCOUNTER — TELEPHONE (OUTPATIENT)
Dept: ADMINISTRATIVE | Facility: CLINIC | Age: 78
End: 2023-11-30
Payer: MEDICARE

## 2023-12-01 ENCOUNTER — OFFICE VISIT (OUTPATIENT)
Dept: INTERNAL MEDICINE | Facility: CLINIC | Age: 78
End: 2023-12-01
Payer: MEDICARE

## 2023-12-01 VITALS
BODY MASS INDEX: 22.49 KG/M2 | HEART RATE: 67 BPM | DIASTOLIC BLOOD PRESSURE: 58 MMHG | WEIGHT: 143.31 LBS | HEIGHT: 67 IN | OXYGEN SATURATION: 99 % | SYSTOLIC BLOOD PRESSURE: 116 MMHG

## 2023-12-01 DIAGNOSIS — K21.9 GASTROESOPHAGEAL REFLUX DISEASE, UNSPECIFIED WHETHER ESOPHAGITIS PRESENT: ICD-10-CM

## 2023-12-01 DIAGNOSIS — Z86.010 HISTORY OF COLON POLYPS: ICD-10-CM

## 2023-12-01 DIAGNOSIS — N13.8 BENIGN PROSTATIC HYPERPLASIA WITH URINARY OBSTRUCTION: ICD-10-CM

## 2023-12-01 DIAGNOSIS — E11.42 DIABETIC POLYNEUROPATHY ASSOCIATED WITH TYPE 2 DIABETES MELLITUS: ICD-10-CM

## 2023-12-01 DIAGNOSIS — E11.9 TYPE 2 DIABETES MELLITUS WITHOUT COMPLICATION, WITHOUT LONG-TERM CURRENT USE OF INSULIN: ICD-10-CM

## 2023-12-01 DIAGNOSIS — I10 ESSENTIAL HYPERTENSION: ICD-10-CM

## 2023-12-01 DIAGNOSIS — Z00.00 ENCOUNTER FOR PREVENTIVE HEALTH EXAMINATION: Primary | ICD-10-CM

## 2023-12-01 DIAGNOSIS — N40.1 BENIGN PROSTATIC HYPERPLASIA WITH URINARY OBSTRUCTION: ICD-10-CM

## 2023-12-01 PROCEDURE — G0439 PPPS, SUBSEQ VISIT: HCPCS | Mod: ,,, | Performed by: NURSE PRACTITIONER

## 2023-12-01 PROCEDURE — G0439 PR MEDICARE ANNUAL WELLNESS SUBSEQUENT VISIT: ICD-10-PCS | Mod: ,,, | Performed by: NURSE PRACTITIONER

## 2023-12-01 PROCEDURE — 99999 PR PBB SHADOW E&M-EST. PATIENT-LVL IV: CPT | Mod: PBBFAC,,, | Performed by: NURSE PRACTITIONER

## 2023-12-01 PROCEDURE — 99999 PR PBB SHADOW E&M-EST. PATIENT-LVL IV: ICD-10-PCS | Mod: PBBFAC,,, | Performed by: NURSE PRACTITIONER

## 2023-12-01 PROCEDURE — 99214 OFFICE O/P EST MOD 30 MIN: CPT | Mod: PBBFAC | Performed by: NURSE PRACTITIONER

## 2023-12-01 NOTE — PROGRESS NOTES
"Jd Dill presented for a  Medicare AWV and comprehensive Health Risk Assessment today. The following components were reviewed and updated:    Medical history  Family History  Social history  Allergies and Current Medications  Health Risk Assessment  Health Maintenance  Care Team         ** See Completed Assessments for Annual Wellness Visit within the encounter summary.**         The following assessments were completed:  Living Situation  CAGE  Depression Screening  Timed Get Up and Go  Whisper Test  Cognitive Function Screening    Nutrition Screening  ADL Screening  PAQ Screening  Review for Opioid Screening: Pt does not have Rx for Opioids.  Review for Substance Use Disorders: Patient does not use substances.        Vitals:    12/01/23 0950   BP: (!) 116/58   BP Location: Left arm   Pulse: 67   SpO2: 99%   Weight: 65 kg (143 lb 4.8 oz)   Height: 5' 7" (1.702 m)     Body mass index is 22.44 kg/m².    Physical Exam  Vitals reviewed.   Constitutional:       Appearance: Normal appearance.   HENT:      Head: Normocephalic.   Cardiovascular:      Rate and Rhythm: Normal rate.   Pulmonary:      Effort: Pulmonary effort is normal.   Abdominal:      General: Bowel sounds are normal.   Musculoskeletal:         General: Normal range of motion.      Right lower leg: No edema.      Left lower leg: No edema.   Skin:     General: Skin is warm and dry.      Capillary Refill: Capillary refill takes less than 2 seconds.   Neurological:      Mental Status: He is alert and oriented to person, place, and time.   Psychiatric:         Behavior: Behavior normal.         Thought Content: Thought content normal.         Judgment: Judgment normal.               Diagnoses and health risks identified today and associated recommendations/orders:    1. Encounter for preventive health examination  Assessments completed.  HM recommendations reviewed. Rsv vaccine today. Has eye exam scheduled with outside provider.  F/u with PCP as " instructed.    2. Type 2 diabetes mellitus without complication, without long-term current use of insulin  Chronic, stable on current regimen. Followed by PCP.    3. Diabetic polyneuropathy associated with type 2 diabetes mellitus  Chronic, stable on current regimen. Followed by PCP / podiatry.    4. Essential hypertension  Chronic, stable on current regimen. Followed by PCP.    5. Benign prostatic hyperplasia with urinary obstruction  Chronic, stable on current regimen. Followed by outside urology.    6. Gastroesophageal reflux disease, unspecified whether esophagitis present  Chronic, stable on current regimen. Followed by PCP.    7. History of colon polyps  Chronic, stable on current regimen. Followed by outside colon surgery.      Provided Jd with a 5-10 year written screening schedule and personal prevention plan. Recommendations were developed using the USPSTF age appropriate recommendations. Education, counseling, and referrals were provided as needed. After Visit Summary printed and given to patient which includes a list of additional screenings\tests needed.    Follow up in about 1 year (around 12/1/2024) for Medicare AWV and with PCP as scheduled.       Hillary Chisholm NP    I offered to discuss advanced care planning, including how to pick a person who would make decisions for you if you were unable to make them for yourself, called a health care power of , and what kind of decisions you might make such as use of life sustaining treatments such as ventilators and tube feeding when faced with a life limiting illness recorded on a living will that they will need to know. (How you want to be cared for as you near the end of your natural life)     X Patient is interested in learning more about how to make advanced directives.  I provided them paperwork and offered to discuss this with them.

## 2023-12-01 NOTE — PATIENT INSTRUCTIONS
1. Follow up with Dr. Hinkle, Jabier MORRISSEY MD as scheduled.    2. RSV vaccine today.    3. Eye exam as scheduled.    Counseling and Referral of Other Preventative  (Italic type indicates deductible and co-insurance are waived)    Patient Name: Jd Dill  Today's Date: 12/1/2023    Health Maintenance         Date Due Completion Date    RSV Vaccine (Age 60+ and Pregnant patients) (1 - 1-dose 60+ series) Never done ---    Eye Exam 09/21/2023 9/21/2022    Lipid Panel 01/03/2024 1/3/2023    Hemoglobin A1c 01/11/2024 7/11/2023    Diabetes Urine Screening 07/11/2024 7/11/2023    Colonoscopy 03/29/2028 3/29/2023    Override on 4/19/2018: Done ([facility])    Override on 1/2/2013: Done    TETANUS VACCINE 03/19/2030 3/19/2020          No orders of the defined types were placed in this encounter.      The following information is provided to all patients.  This information is to help you find resources for any of the problems found today that may be affecting your health:                Living healthy guide: www.[facility].[place].gov      Understanding Diabetes: www.diabetes.org      Eating healthy: www.cdc.gov/healthyweight      CDC home safety checklist: www.cdc.gov/steadi/patient.html      Agency on Aging: www.goea.[place].[facility]      Alcoholics anonymous (AA): www.aa.org      Physical Activity: www.carlos.nih.gov/mb1wmru      Tobacco use: www.quitwithusla.org

## 2023-12-08 DIAGNOSIS — E11.9 TYPE 2 DIABETES MELLITUS WITHOUT COMPLICATION, WITHOUT LONG-TERM CURRENT USE OF INSULIN: ICD-10-CM

## 2023-12-08 NOTE — TELEPHONE ENCOUNTER
----- Message from Chrisnate Gucci sent at 12/8/2023  9:14 AM CST -----  Contact: self  [phone]  Requesting an RX refill or new RX.  Is this a refill or new RX: refill  RX name and strength (copy/paste from chart):  blood sugar diagnostic Strp  Is this a 30 day or 90 day RX:   Pharmacy name and phone # (copy/paste from chart):  [facility] [id number] [address] [employer] [address] [place], [place] [address] [facility] [[id number]]  The doctors have asked that we provide their patients with the following 2 reminders -- prescription refills can take up to 72 hours, and a friendly reminder that in the future you can use your [facility] account to request refills: yes    Please call and advise

## 2024-01-03 ENCOUNTER — LAB VISIT (OUTPATIENT)
Dept: LAB | Facility: HOSPITAL | Age: 79
End: 2024-01-03
Attending: INTERNAL MEDICINE
Payer: MEDICARE

## 2024-01-03 DIAGNOSIS — E11.9 TYPE 2 DIABETES MELLITUS WITHOUT COMPLICATION, WITHOUT LONG-TERM CURRENT USE OF INSULIN: ICD-10-CM

## 2024-01-03 LAB
ALBUMIN SERPL BCP-MCNC: 3.7 G/DL (ref 3.5–5.2)
ALP SERPL-CCNC: 65 U/L (ref 55–135)
ALT SERPL W/O P-5'-P-CCNC: 18 U/L (ref 10–44)
ANION GAP SERPL CALC-SCNC: 6 MMOL/L (ref 8–16)
AST SERPL-CCNC: 21 U/L (ref 10–40)
BASOPHILS # BLD AUTO: 0.02 K/UL (ref 0–0.2)
BASOPHILS NFR BLD: 0.4 % (ref 0–1.9)
BILIRUB SERPL-MCNC: 0.7 MG/DL (ref 0.1–1)
BUN SERPL-MCNC: 12 MG/DL (ref 8–23)
CALCIUM SERPL-MCNC: 9.2 MG/DL (ref 8.7–10.5)
CHLORIDE SERPL-SCNC: 101 MMOL/L (ref 95–110)
CO2 SERPL-SCNC: 25 MMOL/L (ref 23–29)
CREAT SERPL-MCNC: 0.9 MG/DL (ref 0.5–1.4)
DIFFERENTIAL METHOD BLD: ABNORMAL
EOSINOPHIL # BLD AUTO: 0.2 K/UL (ref 0–0.5)
EOSINOPHIL NFR BLD: 2.9 % (ref 0–8)
ERYTHROCYTE [DISTWIDTH] IN BLOOD BY AUTOMATED COUNT: 14.1 % (ref 11.5–14.5)
EST. GFR  (NO RACE VARIABLE): >60 ML/MIN/1.73 M^2
ESTIMATED AVG GLUCOSE: 123 MG/DL (ref 68–131)
GLUCOSE SERPL-MCNC: 204 MG/DL (ref 70–110)
HBA1C MFR BLD: 5.9 % (ref 4–5.6)
HCT VFR BLD AUTO: 35.1 % (ref 40–54)
HGB BLD-MCNC: 11.8 G/DL (ref 14–18)
IMM GRANULOCYTES # BLD AUTO: 0.01 K/UL (ref 0–0.04)
IMM GRANULOCYTES NFR BLD AUTO: 0.2 % (ref 0–0.5)
LYMPHOCYTES # BLD AUTO: 0.9 K/UL (ref 1–4.8)
LYMPHOCYTES NFR BLD: 17.2 % (ref 18–48)
MCH RBC QN AUTO: 32.2 PG (ref 27–31)
MCHC RBC AUTO-ENTMCNC: 33.6 G/DL (ref 32–36)
MCV RBC AUTO: 96 FL (ref 82–98)
MONOCYTES # BLD AUTO: 0.8 K/UL (ref 0.3–1)
MONOCYTES NFR BLD: 14.1 % (ref 4–15)
NEUTROPHILS # BLD AUTO: 3.6 K/UL (ref 1.8–7.7)
NEUTROPHILS NFR BLD: 65.2 % (ref 38–73)
NRBC BLD-RTO: 0 /100 WBC
PLATELET # BLD AUTO: 142 K/UL (ref 150–450)
PMV BLD AUTO: 11.2 FL (ref 9.2–12.9)
POTASSIUM SERPL-SCNC: 4.3 MMOL/L (ref 3.5–5.1)
PROT SERPL-MCNC: 6.3 G/DL (ref 6–8.4)
RBC # BLD AUTO: 3.67 M/UL (ref 4.6–6.2)
SODIUM SERPL-SCNC: 132 MMOL/L (ref 136–145)
WBC # BLD AUTO: 5.48 K/UL (ref 3.9–12.7)

## 2024-01-03 PROCEDURE — 83036 HEMOGLOBIN GLYCOSYLATED A1C: CPT | Performed by: INTERNAL MEDICINE

## 2024-01-03 PROCEDURE — 85025 COMPLETE CBC W/AUTO DIFF WBC: CPT | Performed by: INTERNAL MEDICINE

## 2024-01-03 PROCEDURE — 36415 COLL VENOUS BLD VENIPUNCTURE: CPT | Performed by: INTERNAL MEDICINE

## 2024-01-03 PROCEDURE — 80053 COMPREHEN METABOLIC PANEL: CPT | Performed by: INTERNAL MEDICINE

## 2024-01-09 ENCOUNTER — OFFICE VISIT (OUTPATIENT)
Dept: INTERNAL MEDICINE | Facility: CLINIC | Age: 79
End: 2024-01-09
Payer: MEDICARE

## 2024-01-09 VITALS
HEART RATE: 60 BPM | WEIGHT: 151.69 LBS | OXYGEN SATURATION: 98 % | BODY MASS INDEX: 23.81 KG/M2 | SYSTOLIC BLOOD PRESSURE: 120 MMHG | DIASTOLIC BLOOD PRESSURE: 60 MMHG | HEIGHT: 67 IN

## 2024-01-09 DIAGNOSIS — E11.9 TYPE 2 DIABETES MELLITUS WITHOUT COMPLICATION, WITHOUT LONG-TERM CURRENT USE OF INSULIN: Primary | ICD-10-CM

## 2024-01-09 DIAGNOSIS — D64.9 ANEMIA, UNSPECIFIED TYPE: ICD-10-CM

## 2024-01-09 PROCEDURE — 99999 PR PBB SHADOW E&M-EST. PATIENT-LVL IV: CPT | Mod: PBBFAC,,, | Performed by: INTERNAL MEDICINE

## 2024-01-09 PROCEDURE — 99214 OFFICE O/P EST MOD 30 MIN: CPT | Mod: PBBFAC | Performed by: INTERNAL MEDICINE

## 2024-01-09 PROCEDURE — 99214 OFFICE O/P EST MOD 30 MIN: CPT | Mod: S$PBB,,, | Performed by: INTERNAL MEDICINE

## 2024-01-09 NOTE — PROGRESS NOTES
Subjective:       Patient ID: Jd Dill III is a 78 y.o. male.    Chief Complaint: Annual Exam    Patient is here for followup for chronic conditions.      Recent [place] was OK and mmg both at .    No new other health issues.    DM2:  good med adherence  no changed Diet  < 140 AM sugars  <200 Post-prandial sugars  mild Numbness in feet  no sores in feet  no Visual changes  no Polyuria/polydipsia.                Review of Systems   Constitutional:  Negative for appetite change and unexpected weight change.   HENT:  Positive for congestion, postnasal drip and rhinorrhea. Negative for sore throat.         Allergy symptoms btr   Respiratory:  Negative for cough, chest tightness and shortness of breath.    Cardiovascular:  Negative for chest pain.   Gastrointestinal:  Negative for abdominal pain.        Rare GERD symptoms   Genitourinary:  Negative for difficulty urinating, scrotal swelling and testicular pain.   Skin:         L breast tenderness   Allergic/Immunologic: Positive for environmental allergies.   Neurological:  Negative for tremors, syncope and weakness.   Psychiatric/Behavioral:  Negative for dysphoric mood.            Objective:      Physical Exam  Vitals reviewed.   Constitutional:       General: He is not in acute distress.     Appearance: Normal appearance. He is well-developed. He is not ill-appearing, toxic-appearing or diaphoretic.   HENT:      Head: Normocephalic and atraumatic.   Eyes:      General: No scleral icterus.  Neck:      Thyroid: No thyromegaly.   Cardiovascular:      Heart sounds: No murmur heard.     No friction rub. No gallop.   Pulmonary:      Effort: Pulmonary effort is normal. No respiratory distress.      Breath sounds: No wheezing or rales.   Abdominal:      General: Bowel sounds are normal. There is no distension.      Palpations: Abdomen is soft. There is no mass.      Tenderness: There is no abdominal tenderness. There is no guarding or rebound.   Musculoskeletal:          General: Normal range of motion.      Cervical back: Normal range of motion.      Comments: Has leg leg discrepancy     Lymphadenopathy:      Cervical: No cervical adenopathy.   Skin:     Findings: No lesion.   Neurological:      Mental Status: He is alert and oriented to person, place, and time.      Comments: Mildly + Romberg testing   Psychiatric:         Mood and Affect: Mood normal.         Behavior: Behavior normal.         Thought Content: Thought content normal.         Assessment:       1. Type 2 diabetes mellitus without complication, without long-term current use of insulin    2. Anemia, unspecified type        Plan:       Jd was seen today for annual exam.    Diagnoses and all orders for this visit:    Type 2 diabetes mellitus without complication, without long-term current use of insulin  -     Hemoglobin A1C; Future    Anemia, unspecified type  -     CBC Auto Differential; Future  -     RETICULOCYTES; Future  -     Iron and TIBC; Future  Offered blood work today for mild anemia, he prefers just to add on to upcoming blood work that is due    I reviewed recent blood work results with the patient.      Health Maintenance         Date Due Completion Date    Eye Exam 09/21/2023 9/21/2022    Lipid Panel 01/03/2024 1/3/2023    Hemoglobin A1c 07/03/2024 1/3/2024    Diabetes Urine Screening 07/11/2024 7/11/2023    Colonoscopy 03/29/2028 3/29/2023    Override on 4/19/2018: Done ([facility])    Override on 1/2/2013: Done    TETANUS VACCINE 03/19/2030 3/19/2020   No lipid panel due to research study         Follow up in about 1 year (around 1/9/2025) for See Ms Hillary Perry in 6 months, Blood work prior to 6 month followup please..    Future Appointments   Date Time Provider Department Center   7/8/2024  7:00 AM LAB, APPOINTMENT [facility]   7/9/2024  9:00 AM Hillary Perry FNP [facility]   1/9/2025 10:40 AM Jabier Hinkle MD [facility]  [address]

## 2024-01-18 ENCOUNTER — PATIENT OUTREACH (OUTPATIENT)
Dept: ADMINISTRATIVE | Facility: HOSPITAL | Age: 79
End: 2024-01-18
Payer: MEDICARE

## 2024-01-18 NOTE — LETTER
AUTHORIZATION FOR RELEASE OF   CONFIDENTIAL INFORMATION    Dear Dr. Dawit Meyers,    We are seeing Jd Dill III, date of birth 1945, in the clinic at [facility] INTERNAL MEDICINE. Jabier Hinkle MD is the patient's PCP. Jd Dill III has an outstanding lab/procedure at the time we reviewed his chart. In order to help keep his health information updated, he has authorized us to request the following medical record(s):        (  )  MAMMOGRAM                                      (  )  COLONOSCOPY      (  )  PAP SMEAR                                          (  )  OUTSIDE LAB RESULTS     (  )  DEXA SCAN                                          ( X )  EYE EXAM            (  )  FOOT EXAM                                          (  )  ENTIRE RECORD     (  )  OUTSIDE IMMUNIZATIONS                 (  )  _______________         Please fax records to Jabier Hinkle MD, [phone]     If you have any questions, please contact JOHN Melendez at [phone].           Patient Name: Jd Dill III  : 1945  Patient Phone #: [phone]

## 2024-01-18 NOTE — PROGRESS NOTES
Health Maintenance Due   Topic Date Due    Eye Exam  2023    Lipid Panel  2024     Population Health Chart Review & Patient Outreach Details    Updates Requested / Reviewed:     [x]  Care Everywhere    [x]     []  External Sources ([facility], [facility], [facility], etc.)    [] [facility]   [] [facility]   [] Other:    [x]  Care Team Updated   []  Removed  or Duplicate Orders   [x]  Immunization Reconciliation Completed / Queried    [x] [place]   [] [place]   [] [place]   [] [place]      Health Maintenance Topics Addressed and Outreach Outcomes / Actions Taken:             Breast Cancer Screening []  Mammogram Order Placed    []  Mammogram Screening Scheduled    []  External Records Requested & Care Team Updated if Applicable    []  External Records Uploaded & Care Team Updated if Applicable    []  Pt Declined Scheduling Mammogram    []  Pt Will Schedule with External Provider / Order Routed & Care Team Updated if Applicable              Cervical Cancer Screening []  Pap Smear Scheduled in Primary Care or OBGYN    []  External Records Requested & Care Team Updated if Applicable       []  External Records Uploaded, Care Team Updated, & History Updated if Applicable    []  Patient Declined Scheduling Pap Smear    []  Patient Will Schedule with External Provider & Care Team Updated if Applicable                  Colorectal Cancer Screening []  Colonoscopy Case Request / Referral / Home Test Order Placed    []  External Records Requested & Care Team Updated if Applicable    [x]  External Records Uploaded, Care Team Updated, & History Updated if Applicable    []  Patient Declined Completing Colon Cancer Screening    []  Patient Will Schedule with External Provider & Care Team Updated if Applicable    []  Fit Kit Mailed (add the SmartPhrase under additional notes)    []  Reminded Patient to Complete Home Test                Diabetic Eye Exam []  Eye Exam Screening Order Placed    []  Eye Camera Scheduled or  Optometry/Ophthalmology Referral Placed    [x]  External Records Requested & Care Team Updated if Applicable    []  External Records Uploaded, Care Team Updated, & History Updated if Applicable    []  Patient Declined Scheduling Eye Exam    []  Patient Will Schedule with External Provider & Care Team Updated if Applicable             Blood Pressure Control []  Primary Care Follow Up Visit Scheduled     []  Remote Blood Pressure Reading Captured    []  Patient Declined Remote Reading or Scheduling Appt - Escalated to PCP    []  Patient Will Call Back or Send Portal Message with Reading                 HbA1c & Other Labs []  Overdue Lab(s) Ordered    []  Overdue Lab(s) Scheduled    []  External Records Uploaded & Care Team Updated if Applicable    []  Primary Care Follow Up Visit Scheduled     []  Reminded Patient to Complete A1c Home Test    []  Patient Declined Scheduling Labs or Will Call Back to Schedule    []  Patient Will Schedule with External Provider / Order Routed, & Care Team Updated if Applicable           Primary Care Appointment []  Primary Care Appt Scheduled    []  Patient Declined Scheduling or Will Call Back to Schedule    []  Pt Established with External Provider, Updated Care Team, & Informed Pt to Notify Payor if Applicable           Medication Adherence /    Statin Use []  Primary Care Appointment Scheduled    []  Patient Reminded to  Prescription    []  Patient Declined, Provider Notified if Needed    []  Sent Provider Message to Review to Evaluate Pt for Statin, Add Exclusion Dx Codes, Document   Exclusion in Problem List, Change Statin Intensity Level to Moderate or High Intensity if Applicable                Osteoporosis Screening []  Dexa Order Placed    []  Dexa Appointment Scheduled    []  External Records Requested & Care Team Updated    []  External Records Uploaded, Care Team Updated, & History Updated if Applicable    []  Patient Declined Scheduling Dexa or Will Call Back to  Schedule    []  Patient Will Schedule with External Provider / Order Routed & Care Team Updated if Applicable       Additional Notes:    Record request sent to Dr. Dawit Meyers. Chart review completed.

## 2024-01-19 ENCOUNTER — PATIENT OUTREACH (OUTPATIENT)
Dept: ADMINISTRATIVE | Facility: HOSPITAL | Age: 79
End: 2024-01-19
Payer: MEDICARE

## 2024-01-19 NOTE — PROGRESS NOTES
Health Maintenance Due   Topic Date Due    Lipid Panel  2024     Population Health Chart Review & Patient Outreach Details    Updates Requested / Reviewed:     [x]  Care Everywhere    [x]     []  External Sources ([facility], [facility], [facility], etc.)    [] [facility]   [] [facility]   [] Other:    [x]  Care Team Updated   []  Removed  or Duplicate Orders   [x]  Immunization Reconciliation Completed / Queried    [x] [place]   [] [place]   [] [place]   [] [place]      Health Maintenance Topics Addressed and Outreach Outcomes / Actions Taken:             Breast Cancer Screening []  Mammogram Order Placed    []  Mammogram Screening Scheduled    []  External Records Requested & Care Team Updated if Applicable    []  External Records Uploaded & Care Team Updated if Applicable    []  Pt Declined Scheduling Mammogram    []  Pt Will Schedule with External Provider / Order Routed & Care Team Updated if Applicable              Cervical Cancer Screening []  Pap Smear Scheduled in Primary Care or OBGYN    []  External Records Requested & Care Team Updated if Applicable       []  External Records Uploaded, Care Team Updated, & History Updated if Applicable    []  Patient Declined Scheduling Pap Smear    []  Patient Will Schedule with External Provider & Care Team Updated if Applicable                  Colorectal Cancer Screening []  Colonoscopy Case Request / Referral / Home Test Order Placed    []  External Records Requested & Care Team Updated if Applicable    []  External Records Uploaded, Care Team Updated, & History Updated if Applicable    []  Patient Declined Completing Colon Cancer Screening    []  Patient Will Schedule with External Provider & Care Team Updated if Applicable    []  Fit Kit Mailed (add the SmartPhrase under additional notes)    []  Reminded Patient to Complete Home Test                Diabetic Eye Exam []  Eye Exam Screening Order Placed    []  Eye Camera Scheduled or Optometry/Ophthalmology  Referral Placed    []  External Records Requested & Care Team Updated if Applicable    [x]  External Records Uploaded, Care Team Updated, & History Updated if Applicable    []  Patient Declined Scheduling Eye Exam    []  Patient Will Schedule with External Provider & Care Team Updated if Applicable             Blood Pressure Control []  Primary Care Follow Up Visit Scheduled     []  Remote Blood Pressure Reading Captured    []  Patient Declined Remote Reading or Scheduling Appt - Escalated to PCP    []  Patient Will Call Back or Send Portal Message with Reading                 HbA1c & Other Labs []  Overdue Lab(s) Ordered    []  Overdue Lab(s) Scheduled    []  External Records Uploaded & Care Team Updated if Applicable    []  Primary Care Follow Up Visit Scheduled     []  Reminded Patient to Complete A1c Home Test    []  Patient Declined Scheduling Labs or Will Call Back to Schedule    []  Patient Will Schedule with External Provider / Order Routed, & Care Team Updated if Applicable           Primary Care Appointment []  Primary Care Appt Scheduled    []  Patient Declined Scheduling or Will Call Back to Schedule    []  Pt Established with External Provider, Updated Care Team, & Informed Pt to Notify Payor if Applicable           Medication Adherence /    Statin Use []  Primary Care Appointment Scheduled    []  Patient Reminded to  Prescription    []  Patient Declined, Provider Notified if Needed    []  Sent Provider Message to Review to Evaluate Pt for Statin, Add Exclusion Dx Codes, Document   Exclusion in Problem List, Change Statin Intensity Level to Moderate or High Intensity if Applicable                Osteoporosis Screening []  Dexa Order Placed    []  Dexa Appointment Scheduled    []  External Records Requested & Care Team Updated    []  External Records Uploaded, Care Team Updated, & History Updated if Applicable    []  Patient Declined Scheduling Dexa or Will Call Back to Schedule    []  Patient  Will Schedule with External Provider / Order Routed & Care Team Updated if Applicable       Additional Notes:    Chart review completed.

## 2024-02-06 ENCOUNTER — OFFICE VISIT (OUTPATIENT)
Dept: ALLERGY | Facility: CLINIC | Age: 79
End: 2024-02-06
Payer: MEDICARE

## 2024-02-06 VITALS — BODY MASS INDEX: 23.39 KG/M2 | WEIGHT: 149.06 LBS | HEIGHT: 67 IN

## 2024-02-06 DIAGNOSIS — I10 ESSENTIAL HYPERTENSION: ICD-10-CM

## 2024-02-06 DIAGNOSIS — K21.9 LARYNGOPHARYNGEAL REFLUX: ICD-10-CM

## 2024-02-06 DIAGNOSIS — J30.9 ALLERGIC RHINITIS, UNSPECIFIED SEASONALITY, UNSPECIFIED TRIGGER: Primary | ICD-10-CM

## 2024-02-06 DIAGNOSIS — R73.03 PRE-DIABETES: ICD-10-CM

## 2024-02-06 DIAGNOSIS — N40.1 BENIGN PROSTATIC HYPERPLASIA WITH URINARY OBSTRUCTION: ICD-10-CM

## 2024-02-06 DIAGNOSIS — K21.9 GASTROESOPHAGEAL REFLUX DISEASE, UNSPECIFIED WHETHER ESOPHAGITIS PRESENT: ICD-10-CM

## 2024-02-06 DIAGNOSIS — N13.8 BENIGN PROSTATIC HYPERPLASIA WITH URINARY OBSTRUCTION: ICD-10-CM

## 2024-02-06 PROCEDURE — 99214 OFFICE O/P EST MOD 30 MIN: CPT | Mod: S$PBB,,, | Performed by: ALLERGY & IMMUNOLOGY

## 2024-02-06 PROCEDURE — 99213 OFFICE O/P EST LOW 20 MIN: CPT | Mod: PBBFAC | Performed by: ALLERGY & IMMUNOLOGY

## 2024-02-06 PROCEDURE — 99999 PR PBB SHADOW E&M-EST. PATIENT-LVL III: CPT | Mod: PBBFAC,,, | Performed by: ALLERGY & IMMUNOLOGY

## 2024-02-06 RX ORDER — FLUTICASONE PROPIONATE 50 MCG
1 SPRAY, SUSPENSION (ML) NASAL DAILY
Qty: 16 G | Refills: 5 | Status: SHIPPED | OUTPATIENT
Start: 2024-02-06 | End: 2024-04-26

## 2024-02-06 NOTE — PROGRESS NOTES
"Jd"Salma Dill returns to clinic today for continued evaluation of allergic rhinitis, cough, and LPR.  He was last seen January 11, 2023.    Over the last year, he has done a lot of house dust mite avoidance procedures.  He is removed carpeting from the house and put laminate floors down.  He bought a bed that he is able to elevate the head of the bed.  He put a air purifier in his room.  He got new sheets that were dust mite covers and a new comforter. He has been washing them in hot water every two weeks.     Every morning he gets up and goes into his dining room to drink coffee and watch the news.  There is event directly under where he sits.     He has recently developed increased rhinitis with eye itching and redness, sneezing, clear rhinorrhea, nasal congestion that alternates, and postnasal drip that alternates.  This will go on for several hours before it begins to resolve.  It is usually not present at night.     He has continued to use azelastine two sprays each nostril twice a day.  He is not using Flonase or oral antihistamines.    He has not been having any heartburn.     He is concerned that there may be dust coming from the air conditioning vent.    He is also concerned that he may need to take more omeprazole. He has taken it b.i.d. in the past.        7/11/2022     9:38 AM   [facility] ALLERGY QUESTIONNAIRE SHORT   facial swelling No   Sinus pain? No   sinus pressure  Yes   Ears: No symptoms   nosebleeds No   postnasal drip No   sneezing Yes   runny nose Yes   congestion Yes   Throat: No symptoms   eye itching Yes   eye redness No   eye discharge No   eye pain No   Light sensitivity / light hurts the eyes? No   Lungs: No symptoms   Skin: No symptoms      Physical Examination:  General: Well-developed, well-nourished, no acute distress. No throat clearing.  Head: No sinus tenderness.  Eyes: Conjunctivae:  No bulbar or palpebral conjunctival injection.  Ears: EAC's clear.  TM's clear.  No " pre-auricular nodes.  Nose: Nasal Mucosa:  Pink.  Septum: No apparent deviation.  Turbinates:  No significant edema.  Polyps/Mass:  None visible.  Teeth/Gums:  No bleeding noted.  Oropharynx: No exudates.  Neck: Supple without thyromegaly. No cervical lymphadenopathy.    Respiratory/Chest: Effort: Good.  Auscultation:  Clear bilaterally.  Skin: Good turgor.  No urticaria or angioedema.  Neuro/Psych: Oriented x 3.    Laboratory 08/28/2018:  IgE level:  167.  ImmunoCAP:  Class II:  DM f.  Class I:  DM pt.      Assessment:  1. Allergic rhinitis, not controlled.  2. Allergic conjunctivitis, not controlled.  3. Mild GERD, controlled.  4. LPR, controlled.  5. Nasal septal deviation.   6. Hypertension on amlodipine, losartan, and metoprolol.  7. History of nephrolithiasis.  8. BPH.    Recommendations:  1. Continue house dust mite avoidance procedures.  2. Continue azelastine 1 to 2 sprays each nostril b.i.d. p.r.n. rhinitis.  3. Continue omeprazole 40 milligrams once a day.  4. And fluticasone two sprays each nostril daily.   5. Add Zyrtec daily.   6. Move locations to drink his coffee in the morning.   7. Return to clinic in one month.    Patient education January 13, 2021:  Discussed long-term safety profile of PPIs.    Patient education September 4, 2018:  Allergic mechanisms and treatment options were reviewed in detail.  House dust mite avoidance was reviewed.   LPR and web site were reviewed.

## 2024-03-04 RX ORDER — AZELASTINE 1 MG/ML
SPRAY, METERED NASAL
Qty: 30 ML | Refills: 3 | Status: SHIPPED | OUTPATIENT
Start: 2024-03-04

## 2024-03-07 ENCOUNTER — OFFICE VISIT (OUTPATIENT)
Dept: ALLERGY | Facility: CLINIC | Age: 79
End: 2024-03-07
Payer: MEDICARE

## 2024-03-07 VITALS — WEIGHT: 147.94 LBS | BODY MASS INDEX: 23.22 KG/M2 | HEIGHT: 67 IN

## 2024-03-07 DIAGNOSIS — I10 ESSENTIAL HYPERTENSION: ICD-10-CM

## 2024-03-07 DIAGNOSIS — J30.9 ALLERGIC RHINITIS, UNSPECIFIED SEASONALITY, UNSPECIFIED TRIGGER: Primary | ICD-10-CM

## 2024-03-07 DIAGNOSIS — K21.9 GASTROESOPHAGEAL REFLUX DISEASE, UNSPECIFIED WHETHER ESOPHAGITIS PRESENT: ICD-10-CM

## 2024-03-07 DIAGNOSIS — H10.13 ALLERGIC CONJUNCTIVITIS, BILATERAL: ICD-10-CM

## 2024-03-07 DIAGNOSIS — K21.9 LARYNGOPHARYNGEAL REFLUX: ICD-10-CM

## 2024-03-07 PROCEDURE — 99214 OFFICE O/P EST MOD 30 MIN: CPT | Mod: S$PBB,,, | Performed by: ALLERGY & IMMUNOLOGY

## 2024-03-07 PROCEDURE — 99999 PR PBB SHADOW E&M-EST. PATIENT-LVL III: CPT | Mod: PBBFAC,,, | Performed by: ALLERGY & IMMUNOLOGY

## 2024-03-07 PROCEDURE — 99213 OFFICE O/P EST LOW 20 MIN: CPT | Mod: PBBFAC | Performed by: ALLERGY & IMMUNOLOGY

## 2024-03-07 NOTE — PROGRESS NOTES
"Jd "Salma Dill returns to clinic today for continued evaluation of allergic rhinitis, cough, and LPR.  He was last seen February 6, 2024.     He has continued to have increased rhinitis when he goes into his dining room to drink coffee and watch the news.  There is an air conditioning vent immediately above him.    It lasts for several for several hours and then usually resolves during the afternoon.     [facility].  He went to visit a friend in [place] and did not have any symptoms while there.     He has done house dust mite avoidance with removing the carpeting from his house.  He has house dust mite covers on his bedding.  He has an air purifier.    He continues to think that it is something in his house.  He did not change locations for his breakfast.    He is planning on getting the air conditioning ducts cleaned.     He continues to take omeprazole 40 milligrams a day.  He has taken this twice a day in the past.     Physical Examination:  General: Well-developed, well-nourished, no acute distress. No throat clearing.  Head: No sinus tenderness.  Eyes: Conjunctivae:  No bulbar or palpebral conjunctival injection.  Ears: EAC's clear.  TM's clear.  No pre-auricular nodes.  Nose: Nasal Mucosa:  Pink.  Septum: No apparent deviation.  Turbinates:  No significant edema.  Polyps/Mass:  None visible.  Teeth/Gums:  No bleeding noted.  Oropharynx: No exudates.  Neck: Supple without thyromegaly. No cervical lymphadenopathy.    Respiratory/Chest: Effort: Good.  Auscultation:  Clear bilaterally.  Skin: Good turgor.  No urticaria or angioedema.  Neuro/Psych: Oriented x 3.    Laboratory 08/28/2018:  IgE level:  167.  ImmunoCAP:  Class II:  DM f.  Class I:  DM pt.      Assessment:  1. Allergic rhinitis, not controlled.  2. Allergic conjunctivitis, not controlled.  3. Mild GERD, controlled.  4. LPR, controlled.  5. Nasal septal deviation.   6. Hypertension on " amlodipine, losartan, and metoprolol.  7. History of nephrolithiasis.  8. BPH.    Recommendations:  1. Continue house dust mite avoidance procedures.  2. Continue azelastine 1 to 2 sprays each nostril b.i.d. p.r.n. rhinitis.  3. Continue omeprazole 40 milligrams once a day.  4. Fluticasone two sprays each nostril daily.   5. Zyrtec daily.   6. Move locations to drink his coffee in the morning.  He will begin to set outside to see if this improves his symptoms.  7. He will let me know the results of this on [facility].  8. Return to clinic in several months or sooner if needed.    Patient education January 13, 2021:  Discussed long-term safety profile of PPIs.    Patient education September 4, 2018:  Allergic mechanisms and treatment options were reviewed in detail.  House dust mite avoidance was reviewed.   LPR and web site were reviewed.

## 2024-03-21 ENCOUNTER — PATIENT MESSAGE (OUTPATIENT)
Dept: ALLERGY | Facility: CLINIC | Age: 79
End: 2024-03-21
Payer: MEDICARE

## 2024-04-01 RX ORDER — OMEPRAZOLE 40 MG/1
40 CAPSULE, DELAYED RELEASE ORAL EVERY MORNING
Qty: 90 CAPSULE | Refills: 0 | Status: SHIPPED | OUTPATIENT
Start: 2024-04-01

## 2024-04-01 NOTE — TELEPHONE ENCOUNTER
Received refill request for OMEPRAZOLE =, Dr. Aldana is out of office will request refill from on call provider.

## 2024-04-26 RX ORDER — FLUTICASONE PROPIONATE 50 MCG
SPRAY, SUSPENSION (ML) NASAL
Qty: 48 ML | Refills: 3 | Status: SHIPPED | OUTPATIENT
Start: 2024-04-26

## 2024-05-16 DIAGNOSIS — I10 ESSENTIAL HYPERTENSION: ICD-10-CM

## 2024-05-16 RX ORDER — AMLODIPINE BESYLATE 5 MG/1
5 TABLET ORAL
Qty: 90 TABLET | Refills: 2 | Status: SHIPPED | OUTPATIENT
Start: 2024-05-16

## 2024-05-16 NOTE — TELEPHONE ENCOUNTER
No care due was identified.  Health [facility] Embedded Care Due Messages. Reference number: [id number].   5/16/2024 1:14:59 PM CDT

## 2024-05-16 NOTE — TELEPHONE ENCOUNTER
Refill Decision Note   Jd Dill  is requesting a refill authorization.  Brief Assessment and Rationale for Refill:  Approve     Medication Therapy Plan:        Comments:     Note composed:6:44 PM 05/16/2024

## 2024-06-10 ENCOUNTER — PATIENT MESSAGE (OUTPATIENT)
Dept: INTERNAL MEDICINE | Facility: CLINIC | Age: 79
End: 2024-06-10
Payer: MEDICARE

## 2024-07-08 ENCOUNTER — LAB VISIT (OUTPATIENT)
Dept: LAB | Facility: HOSPITAL | Age: 79
End: 2024-07-08
Attending: INTERNAL MEDICINE
Payer: MEDICARE

## 2024-07-08 DIAGNOSIS — E11.9 TYPE 2 DIABETES MELLITUS WITHOUT COMPLICATION, WITHOUT LONG-TERM CURRENT USE OF INSULIN: ICD-10-CM

## 2024-07-08 DIAGNOSIS — D64.9 ANEMIA, UNSPECIFIED TYPE: ICD-10-CM

## 2024-07-08 LAB
BASOPHILS # BLD AUTO: 0.01 K/UL (ref 0–0.2)
BASOPHILS NFR BLD: 0.2 % (ref 0–1.9)
DIFFERENTIAL METHOD BLD: ABNORMAL
EOSINOPHIL # BLD AUTO: 0.1 K/UL (ref 0–0.5)
EOSINOPHIL NFR BLD: 2.2 % (ref 0–8)
ERYTHROCYTE [DISTWIDTH] IN BLOOD BY AUTOMATED COUNT: 13.3 % (ref 11.5–14.5)
ESTIMATED AVG GLUCOSE: 120 MG/DL (ref 68–131)
HBA1C MFR BLD: 5.8 % (ref 4–5.6)
HCT VFR BLD AUTO: 37.8 % (ref 40–54)
HGB BLD-MCNC: 12.6 G/DL (ref 14–18)
IMM GRANULOCYTES # BLD AUTO: 0.02 K/UL (ref 0–0.04)
IMM GRANULOCYTES NFR BLD AUTO: 0.4 % (ref 0–0.5)
IRON SERPL-MCNC: 108 UG/DL (ref 45–160)
LYMPHOCYTES # BLD AUTO: 0.9 K/UL (ref 1–4.8)
LYMPHOCYTES NFR BLD: 17.7 % (ref 18–48)
MCH RBC QN AUTO: 31.9 PG (ref 27–31)
MCHC RBC AUTO-ENTMCNC: 33.3 G/DL (ref 32–36)
MCV RBC AUTO: 96 FL (ref 82–98)
MONOCYTES # BLD AUTO: 0.8 K/UL (ref 0.3–1)
MONOCYTES NFR BLD: 15 % (ref 4–15)
NEUTROPHILS # BLD AUTO: 3.3 K/UL (ref 1.8–7.7)
NEUTROPHILS NFR BLD: 64.5 % (ref 38–73)
NRBC BLD-RTO: 0 /100 WBC
PLATELET # BLD AUTO: 129 K/UL (ref 150–450)
PMV BLD AUTO: 11.2 FL (ref 9.2–12.9)
RBC # BLD AUTO: 3.95 M/UL (ref 4.6–6.2)
RETICS/RBC NFR AUTO: 1.5 % (ref 0.4–2)
SATURATED IRON: 34 % (ref 20–50)
TOTAL IRON BINDING CAPACITY: 320 UG/DL (ref 250–450)
TRANSFERRIN SERPL-MCNC: 216 MG/DL (ref 200–375)
WBC # BLD AUTO: 5.08 K/UL (ref 3.9–12.7)

## 2024-07-08 PROCEDURE — 85045 AUTOMATED RETICULOCYTE COUNT: CPT | Performed by: INTERNAL MEDICINE

## 2024-07-08 PROCEDURE — 83036 HEMOGLOBIN GLYCOSYLATED A1C: CPT | Performed by: INTERNAL MEDICINE

## 2024-07-08 PROCEDURE — 85025 COMPLETE CBC W/AUTO DIFF WBC: CPT | Performed by: INTERNAL MEDICINE

## 2024-07-08 PROCEDURE — 83540 ASSAY OF IRON: CPT | Performed by: INTERNAL MEDICINE

## 2024-07-08 PROCEDURE — 36415 COLL VENOUS BLD VENIPUNCTURE: CPT | Performed by: INTERNAL MEDICINE

## 2024-07-08 NOTE — PROGRESS NOTES
INTERNAL MEDICINE CLINIC - SAME DAY APPOINTMENT  Progress Note    PRESENTING HISTORY     PCP: Jabier Hinkle MD    Chief Complaint/Reason for Visit:   No chief complaint on file.    History of Present Illness & ROS : Mr. Jd Dill III is a 79 y.o. male.    Follow up  Est'd with Dr. Hinkle   Very pleasant gentleman.     He is concerned about this 'blood pressures running low' has not been consistently checking at home. But does not endorse fatigue, weakness or dizziness.  (Will recheck). No new medications or changes in current regimen.   Denies any visible blood to stools and  / or urine. Has been noticing some increase in bruising to arms.    He remains physically active.     Review of Systems:  Eyes: denies visual changes at this time denies floaters   ENT: no nasal congestion or sore throat  Respiratory: no cough or shorness of breath  Cardiovascular: no chest pain or palpitations  Gastrointestinal: no nausea or vomiting, no abdominal pain or change in bowel habits  Genitourinary: no hematuria or dysuria; denies frequency  Hematologic/Lymphatic: no easy bruising or lymphadenopathy  Musculoskeletal: no arthralgias or myalgias  Neurological: no seizures or tremors  Endocrine: no heat or cold intolerance      PAST HISTORY:     Past Medical History:   Diagnosis Date    Allergy     AR (allergic rhinitis)     Basal cell carcinoma of ear 2006    Right Ear    BPH (benign prostatic hyperplasia)     DDD (degenerative disc disease), lumbar     Degenerative disc disease     Gastroesophageal reflux disease 3/11/2019    Hypertension     Kidney stone     Undescended testicle     Right Orchiectomy       Past Surgical History:   Procedure Laterality Date    BASAL CELL CARCINOMA EXCISION      ESOPHAGOGASTRODUODENOSCOPY N/A 10/5/2022    Procedure: EGD (ESOPHAGOGASTRODUODENOSCOPY);  Surgeon: Franco Campoverde MD;  Location: [facility];  Service: Endoscopy;  Laterality: N/A;    HEMORRHOID SURGERY      HERNIA REPAIR      PILONIDAL  CYST DRAINAGE      Right Orchiectomy      TRANSURETHRAL RESECTION OF PROSTATE         Family History   Problem Relation Name Age of Onset    Heart disease Father  68 y/o     Hypertension Father  68 y/o     Diabetes Father  68 y/o     Dementia Mother      Allergies Mother         Social History     Socioeconomic History    Marital status: Single   Tobacco Use    Smoking status: Former     Current packs/day: 0.00     Average packs/day: 0.5 packs/day for 10.0 years (5.0 ttl pk-yrs)     Types: Cigarettes     Start date: 1987     Quit date: 1997     Years since quittin.5     Passive exposure: Never    Smokeless tobacco: Never   Substance and Sexual Activity    Alcohol use: Yes     Comment: Rare    Drug use: Never   Social History Narrative    Retired, 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 1 cousin in contact.     Social Determinants of Health     Financial Resource Strain: Low Risk  (2023)    Overall Financial Resource Strain (CARDIA)     Difficulty of Paying Living Expenses: Not hard at all   Food Insecurity: No Food Insecurity (2023)    Hunger Vital Sign     Worried About Running Out of Food in the Last Year: Never true     Ran Out of Food in the Last Year: Never true   Transportation Needs: No Transportation Needs (2023)    PRAPARE - Transportation     Lack of Transportation (Medical): No     Lack of Transportation (Non-Medical): No   Physical Activity: Sufficiently Active (2023)    Exercise Vital Sign     Days of Exercise per Week: 7 days     Minutes of Exercise per Session: 30 min   Stress: No Stress Concern Present (2023)    [employer] - Occupational Stress Questionnaire     Feeling of Stress : Not at all   Housing Stability: Low Risk  (2023)    Housing Stability Vital Sign     Unable to Pay for Housing in the Last Year: No     Number of Places Lived in the Last Year: 1      Unstable Housing in the Last Year: No       MEDICATIONS & ALLERGIES:     Current Outpatient Medications on File Prior to Visit   Medication Sig Dispense Refill    amLODIPine (NORVASC) 5 MG tablet TAKE 1 TABLET BY MOUTH EVERY DAY 90 tablet 2    azelastine (ASTELIN) 137 mcg (0.1 %) nasal spray INSTILL 1-2 SPRAYS BY NASAL ROUTE 2 (TWO) TIMES DAILY AS NEEDED FOR RHINITIS. 30 mL 3    blood sugar diagnostic Strp To check BG 1 times daily, to use with insurance preferred meter 100 each 11    fluticasone propionate (FLONASE) 50 mcg/actuation nasal spray USE 1 SPRAY (50 MCG TOTAL) IN EACH NOSTRIL ONCE DAILY 48 mL 3    INV atorvastatin tablet 40 mg/placebo Take one tablet by mouth each morning. For Investigational Use Only. (Patient not taking: Reported on 3/7/2024) 90 tablet 3    ketoconazole (NIZORAL) 2 % cream APPLY TO AREA OF RED, DRY SKIN ON BOTH FEET/HEELS ONE TO TWO TIMES DAILY FOR 2 TO 3 WEEKS. 60 g 3    lancets Misc To check BG 1 times daily, to use with insurance preferred meter 100 each 11    losartan (COZAAR) 100 MG tablet Take 1 tablet (100 mg total) by mouth once daily. 90 tablet 11    metoprolol succinate (TOPROL-XL) 25 MG 24 hr tablet Take 1 tablet (25 mg total) by mouth once daily. 90 tablet 11    omeprazole (PRILOSEC) 40 MG capsule Take 1 capsule (40 mg total) by mouth every morning. 90 capsule 0    ONETOUCH VERIO FLEX METER Misc TO CHECK BLOOD GLUCOSE 1 TIME DAILY, TO USE WITH INSURANCE PREFERRED METER 1 each 0    tamsulosin (FLOMAX) 0.4 mg Cp24 Take 0.8 mg by mouth once daily.        No current facility-administered medications on file prior to visit.        Review of patient's allergies indicates:   Allergen Reactions    Ace inhibitors Other (See Comments)     Cough    Chlorthalidone      Hyponatremia    Feathers Other (See Comments)    Mite extract Other (See Comments)     Nose gets congested       Medications Reconciliation:   I have reconciled the patient's home medications with the patient/family. I  have updated all changes.  Refer to After-Visit Medication List.    OBJECTIVE:     Vital Signs:  There were no vitals filed for this visit.  Wt Readings from Last 3 Encounters:   03/07/24 1004 67.1 kg (147 lb 14.9 oz)   02/06/24 1018 67.6 kg (149 lb 0.5 oz)   01/09/24 1129 68.8 kg (151 lb 10.8 oz)     There is no height or weight on file to calculate BMI.   Wt Readings from Last 3 Encounters:   07/09/24 68.1 kg (150 lb 2.1 oz)   03/07/24 67.1 kg (147 lb 14.9 oz)   02/06/24 67.6 kg (149 lb 0.5 oz)     Temp Readings from Last 3 Encounters:   05/22/23 97.8 °F (36.6 °C)   10/05/22 98.2 °F (36.8 °C) (Temporal)   06/30/22 97.8 °F (36.6 °C)     BP Readings from Last 3 Encounters:   07/09/24 (!) 100/48   01/09/24 120/60   12/01/23 (!) 116/58     Pulse Readings from Last 3 Encounters:   07/09/24 68   01/09/24 60   12/01/23 67       Physical Exam:  General: Well developed, well nourished. No distress.  HEENT: Head is normocephalic, atraumatic; ears are normal.   Eyes: Clear conjunctiva.  Neck: Supple, symmetrical neck; trachea midline.  Lungs: Clear to auscultation bilaterally and normal respiratory effort.  Cardiovascular: Heart with regular rate and rhythm. No murmurs, gallops or rubs  Extremities: No LE edema. Pulses 2+ and symmetric.   Abdomen: Abdomen is soft, non-tender non-distended with normal bowel sounds.  Skin: Skin color, texture, turgor normal. No rashes.  Musculoskeletal: Normal gait.   Lymph Nodes: No cervical or supraclavicular adenopathy.  Neurologic: Normal strength and tone. No focal numbness or weakness.   Psychiatric: Not depressed.    Laboratory  Lab Results   Component Value Date    WBC 5.08 07/08/2024    HGB 12.6 (L) 07/08/2024    HCT 37.8 (L) 07/08/2024     (L) 07/08/2024    CHOL 109 (L) 01/03/2023    TRIG 80 01/03/2023    HDL 41 01/03/2023    ALT 18 01/03/2024    AST 21 01/03/2024     (L) 01/03/2024    K 4.3 01/03/2024     01/03/2024    CREATININE 0.9 01/03/2024    BUN 12  01/03/2024    CO2 25 01/03/2024    TSH 1.886 10/31/2014    HGBA1C 5.8 (H) 07/08/2024         ASSESSMENT & PLAN:     Follow up  Last seen in 1/2024 by PCP      Follow-up exam      Essential hypertension  Asymptomatic   BP Readings from Last 3 Encounters:   07/09/24 (!) 100/48   01/09/24 120/60   12/01/23 (!) 116/58     BP Readings from Last 3 Encounters:   07/09/24 118/60   01/09/24 120/60   12/01/23 (!) 116/58    ` continue Norvasc 5  ` continue Losartan 100  ` continue Toprol XL 25  *Will update keep Dr. Hinkle or I updated     Type 2 diabetes mellitus without complication, without long-term current use of insulin  Pre-DM II:   A1c 7/8: 5.8%<< 5.9%  Home Range: (Fasting) reported: < 130s  *On ARB therapy   *Exercising (150 mins) per week; daily Cardio x 30 mins  -     Microalbumin/creatinine urine ratio; Future; Expected date: 07/09/2024    Anemia, unspecified type (marginally improved)  Thrombocytopenia new, acute since 01/2024.. (129-142K?)  -     Ambulatory referral/consult to Hematology / Oncology; Future; Expected date: 07/16/2024    *Will follow up with Dr. Hinkle in 1/2025 for Annual       Future Appointments   Date Time Provider Department Center   1/13/2025 10:40 AM [facility]        Medication List            Accurate as of July 9, 2024  9:41 AM. If you have any questions, ask your nurse or doctor.                CONTINUE taking these medications      amLODIPine 5 MG tablet  Commonly known as: NORVASC  TAKE 1 TABLET BY MOUTH EVERY DAY     azelastine 137 mcg (0.1 %) nasal spray  Commonly known as: ASTELIN  INSTILL 1-2 SPRAYS BY NASAL ROUTE 2 (TWO) TIMES DAILY AS NEEDED FOR RHINITIS.     blood sugar diagnostic Strp  To check BG 1 times daily, to use with insurance preferred meter     cetirizine 10 MG tablet  Commonly known as: ZYRTEC     fluticasone propionate 50 mcg/actuation nasal spray  Commonly known as: FLONASE  USE 1 SPRAY (50 MCG TOTAL) IN EACH NOSTRIL ONCE DAILY     INV  atorvastatin/placebo 40 MG tablet  Take one tablet by mouth each morning. For Investigational Use Only.     ketoconazole 2 % cream  Commonly known as: NIZORAL  APPLY TO AREA OF RED, DRY SKIN ON BOTH FEET/HEELS ONE TO TWO TIMES DAILY FOR 2 TO 3 WEEKS.     lancets Mis  To check BG 1 times daily, to use with insurance preferred meter     losartan 100 MG tablet  Commonly known as: COZAAR  Take 1 tablet (100 mg total) by mouth once daily.     metoprolol succinate 25 MG 24 hr tablet  Commonly known as: TOPROL-XL  Take 1 tablet (25 mg total) by mouth once daily.     omeprazole 40 MG capsule  Commonly known as: PRILOSEC  Take 1 capsule (40 mg total) by mouth every morning.     ONETOUCH VERIO FLEX METER Misc  Generic drug: blood-glucose meter  TO CHECK BLOOD GLUCOSE 1 TIME DAILY, TO USE WITH INSURANCE PREFERRED METER     tamsulosin 0.4 mg Cap  Commonly known as: FLOMAX              Signing Physician:  ABDOUL Rojo

## 2024-07-09 ENCOUNTER — OFFICE VISIT (OUTPATIENT)
Dept: INTERNAL MEDICINE | Facility: CLINIC | Age: 79
End: 2024-07-09
Payer: MEDICARE

## 2024-07-09 ENCOUNTER — LAB VISIT (OUTPATIENT)
Dept: LAB | Facility: HOSPITAL | Age: 79
End: 2024-07-09
Payer: MEDICARE

## 2024-07-09 VITALS
HEIGHT: 67 IN | DIASTOLIC BLOOD PRESSURE: 60 MMHG | OXYGEN SATURATION: 97 % | BODY MASS INDEX: 23.56 KG/M2 | SYSTOLIC BLOOD PRESSURE: 118 MMHG | WEIGHT: 150.13 LBS | HEART RATE: 68 BPM

## 2024-07-09 DIAGNOSIS — I10 ESSENTIAL HYPERTENSION: ICD-10-CM

## 2024-07-09 DIAGNOSIS — D64.9 ANEMIA, UNSPECIFIED TYPE: ICD-10-CM

## 2024-07-09 DIAGNOSIS — Z09 FOLLOW-UP EXAM: Primary | ICD-10-CM

## 2024-07-09 DIAGNOSIS — E11.9 TYPE 2 DIABETES MELLITUS WITHOUT COMPLICATION, WITHOUT LONG-TERM CURRENT USE OF INSULIN: ICD-10-CM

## 2024-07-09 DIAGNOSIS — Z09 FOLLOW-UP EXAM: ICD-10-CM

## 2024-07-09 DIAGNOSIS — D69.6 THROMBOCYTOPENIA: ICD-10-CM

## 2024-07-09 LAB
ALBUMIN/CREAT UR: NORMAL UG/MG (ref 0–30)
CREAT UR-MCNC: 52 MG/DL (ref 23–375)
MICROALBUMIN UR DL<=1MG/L-MCNC: <5 UG/ML

## 2024-07-09 PROCEDURE — 82570 ASSAY OF URINE CREATININE: CPT | Performed by: NURSE PRACTITIONER

## 2024-07-09 PROCEDURE — 99214 OFFICE O/P EST MOD 30 MIN: CPT | Mod: S$PBB,,, | Performed by: NURSE PRACTITIONER

## 2024-07-09 PROCEDURE — 82043 UR ALBUMIN QUANTITATIVE: CPT | Performed by: NURSE PRACTITIONER

## 2024-07-09 PROCEDURE — 99999 PR PBB SHADOW E&M-EST. PATIENT-LVL V: CPT | Mod: PBBFAC,,, | Performed by: NURSE PRACTITIONER

## 2024-07-09 PROCEDURE — 99215 OFFICE O/P EST HI 40 MIN: CPT | Mod: PBBFAC | Performed by: NURSE PRACTITIONER

## 2024-07-09 RX ORDER — CETIRIZINE HYDROCHLORIDE 10 MG/1
10 TABLET ORAL DAILY
COMMUNITY

## 2024-08-05 DIAGNOSIS — I10 ESSENTIAL HYPERTENSION: ICD-10-CM

## 2024-08-05 RX ORDER — METOPROLOL SUCCINATE 25 MG/1
25 TABLET, EXTENDED RELEASE ORAL DAILY
Qty: 90 TABLET | Refills: 1 | Status: SHIPPED | OUTPATIENT
Start: 2024-08-05

## 2024-08-19 ENCOUNTER — LAB VISIT (OUTPATIENT)
Dept: LAB | Facility: HOSPITAL | Age: 79
End: 2024-08-19
Attending: INTERNAL MEDICINE
Payer: MEDICARE

## 2024-08-19 ENCOUNTER — OFFICE VISIT (OUTPATIENT)
Dept: HEMATOLOGY/ONCOLOGY | Facility: CLINIC | Age: 79
End: 2024-08-19
Payer: MEDICARE

## 2024-08-19 VITALS
RESPIRATION RATE: 18 BRPM | OXYGEN SATURATION: 98 % | BODY MASS INDEX: 23.53 KG/M2 | DIASTOLIC BLOOD PRESSURE: 56 MMHG | HEART RATE: 78 BPM | HEIGHT: 67 IN | SYSTOLIC BLOOD PRESSURE: 123 MMHG | WEIGHT: 149.94 LBS

## 2024-08-19 DIAGNOSIS — D64.9 ANEMIA, UNSPECIFIED TYPE: ICD-10-CM

## 2024-08-19 DIAGNOSIS — D69.6 THROMBOCYTOPENIA: ICD-10-CM

## 2024-08-19 LAB
ALBUMIN SERPL BCP-MCNC: 4.2 G/DL (ref 3.5–5.2)
ALP SERPL-CCNC: 74 U/L (ref 55–135)
ALT SERPL W/O P-5'-P-CCNC: 23 U/L (ref 10–44)
ANION GAP SERPL CALC-SCNC: 5 MMOL/L (ref 8–16)
AST SERPL-CCNC: 23 U/L (ref 10–40)
BASOPHILS # BLD AUTO: 0.01 K/UL (ref 0–0.2)
BASOPHILS NFR BLD: 0.2 % (ref 0–1.9)
BILIRUB SERPL-MCNC: 0.6 MG/DL (ref 0.1–1)
BUN SERPL-MCNC: 15 MG/DL (ref 8–23)
CALCIUM SERPL-MCNC: 10 MG/DL (ref 8.7–10.5)
CHLORIDE SERPL-SCNC: 102 MMOL/L (ref 95–110)
CO2 SERPL-SCNC: 27 MMOL/L (ref 23–29)
CREAT SERPL-MCNC: 0.9 MG/DL (ref 0.5–1.4)
DIFFERENTIAL METHOD BLD: ABNORMAL
EOSINOPHIL # BLD AUTO: 0.1 K/UL (ref 0–0.5)
EOSINOPHIL NFR BLD: 0.9 % (ref 0–8)
ERYTHROCYTE [DISTWIDTH] IN BLOOD BY AUTOMATED COUNT: 13.4 % (ref 11.5–14.5)
EST. GFR  (NO RACE VARIABLE): >60 ML/MIN/1.73 M^2
FERRITIN SERPL-MCNC: 265 NG/ML (ref 20–300)
FOLATE SERPL-MCNC: 13.9 NG/ML (ref 4–24)
GLUCOSE SERPL-MCNC: 70 MG/DL (ref 70–110)
HCT VFR BLD AUTO: 39.8 % (ref 40–54)
HGB BLD-MCNC: 13.6 G/DL (ref 14–18)
IMM GRANULOCYTES # BLD AUTO: 0.02 K/UL (ref 0–0.04)
IMM GRANULOCYTES NFR BLD AUTO: 0.3 % (ref 0–0.5)
IRON SERPL-MCNC: 120 UG/DL (ref 45–160)
LYMPHOCYTES # BLD AUTO: 1.4 K/UL (ref 1–4.8)
LYMPHOCYTES NFR BLD: 21.3 % (ref 18–48)
MCH RBC QN AUTO: 31.9 PG (ref 27–31)
MCHC RBC AUTO-ENTMCNC: 34.2 G/DL (ref 32–36)
MCV RBC AUTO: 93 FL (ref 82–98)
MONOCYTES # BLD AUTO: 1.1 K/UL (ref 0.3–1)
MONOCYTES NFR BLD: 16.8 % (ref 4–15)
NEUTROPHILS # BLD AUTO: 3.9 K/UL (ref 1.8–7.7)
NEUTROPHILS NFR BLD: 60.5 % (ref 38–73)
NRBC BLD-RTO: 0 /100 WBC
PLATELET # BLD AUTO: 151 K/UL (ref 150–450)
PMV BLD AUTO: 9.8 FL (ref 9.2–12.9)
POTASSIUM SERPL-SCNC: 4.5 MMOL/L (ref 3.5–5.1)
PROT SERPL-MCNC: 7.2 G/DL (ref 6–8.4)
RBC # BLD AUTO: 4.27 M/UL (ref 4.6–6.2)
SATURATED IRON: 32 % (ref 20–50)
SODIUM SERPL-SCNC: 134 MMOL/L (ref 136–145)
TOTAL IRON BINDING CAPACITY: 370 UG/DL (ref 250–450)
TRANSFERRIN SERPL-MCNC: 250 MG/DL (ref 200–375)
TSH SERPL DL<=0.005 MIU/L-ACNC: 1.96 UIU/ML (ref 0.4–4)
VIT B12 SERPL-MCNC: 652 PG/ML (ref 210–950)
WBC # BLD AUTO: 6.47 K/UL (ref 3.9–12.7)

## 2024-08-19 PROCEDURE — 83521 IG LIGHT CHAINS FREE EACH: CPT | Performed by: INTERNAL MEDICINE

## 2024-08-19 PROCEDURE — 85025 COMPLETE CBC W/AUTO DIFF WBC: CPT | Performed by: INTERNAL MEDICINE

## 2024-08-19 PROCEDURE — 82746 ASSAY OF FOLIC ACID SERUM: CPT | Performed by: INTERNAL MEDICINE

## 2024-08-19 PROCEDURE — 99999 PR PBB SHADOW E&M-EST. PATIENT-LVL IV: CPT | Mod: PBBFAC,,, | Performed by: INTERNAL MEDICINE

## 2024-08-19 PROCEDURE — 99204 OFFICE O/P NEW MOD 45 MIN: CPT | Mod: S$PBB,,, | Performed by: INTERNAL MEDICINE

## 2024-08-19 PROCEDURE — 84165 PROTEIN E-PHORESIS SERUM: CPT | Performed by: INTERNAL MEDICINE

## 2024-08-19 PROCEDURE — 82728 ASSAY OF FERRITIN: CPT | Performed by: INTERNAL MEDICINE

## 2024-08-19 PROCEDURE — 84165 PROTEIN E-PHORESIS SERUM: CPT | Mod: 26,,, | Performed by: PATHOLOGY

## 2024-08-19 PROCEDURE — 80053 COMPREHEN METABOLIC PANEL: CPT | Performed by: INTERNAL MEDICINE

## 2024-08-19 PROCEDURE — 82607 VITAMIN B-12: CPT | Performed by: INTERNAL MEDICINE

## 2024-08-19 PROCEDURE — 36415 COLL VENOUS BLD VENIPUNCTURE: CPT | Performed by: INTERNAL MEDICINE

## 2024-08-19 PROCEDURE — 99214 OFFICE O/P EST MOD 30 MIN: CPT | Mod: PBBFAC,PO | Performed by: INTERNAL MEDICINE

## 2024-08-19 PROCEDURE — 83540 ASSAY OF IRON: CPT | Performed by: INTERNAL MEDICINE

## 2024-08-19 PROCEDURE — 84443 ASSAY THYROID STIM HORMONE: CPT | Performed by: INTERNAL MEDICINE

## 2024-08-19 NOTE — PROGRESS NOTES
PATIENT: Jd Dill III  MRN: [id number]  DATE: 8/26/2024    Diagnosis:   1. Anemia, unspecified type    2. Thrombocytopenia        Chief Complaint: No chief complaint on file.           Subjective:      History of Present Illness: Mr. Dill is a 79 y.o. male who presents for evaluation and management of cytopenias.  80yo man referred for anemia and thrombocytopenia. He denies bleeding though he does note easy bruising which he attributes to aging. Explicitly denies hematuria and melena/hematochezia. Denies SOB/CP.   He had a colonoscopy in 2023 at .   He reports that energy levels seem to be declining with age, noting that he gets tired more easily than he used to.   Denies any history of GI surgeries. Denies diarrhea. Denies history of liver disease. Denies unintentional weight loss and loss of appetite. Though he does report some intentional weight loss related to dietary changes for diabetes.  He had a CBC last month showing hemoglobin 12.6 with MCV 96. Platelets 129.   He denies that he is on any multivitamins.      Past medical, surgical, family, and social histories have been reviewed and updated below.    Past Medical History:   Past Medical History:   Diagnosis Date    Allergy     AR (allergic rhinitis)     Basal cell carcinoma of ear 2006    Right Ear    BPH (benign prostatic hyperplasia)     DDD (degenerative disc disease), lumbar     Degenerative disc disease     Gastroesophageal reflux disease 3/11/2019    Hypertension     Kidney stone     Undescended testicle     Right Orchiectomy       Past Surgical History:   Past Surgical History:   Procedure Laterality Date    BASAL CELL CARCINOMA EXCISION      ESOPHAGOGASTRODUODENOSCOPY N/A 10/5/2022    Procedure: EGD (ESOPHAGOGASTRODUODENOSCOPY);  Surgeon: Franco Campoverde MD;  Location: [facility];  Service: Endoscopy;  Laterality: N/A;    HEMORRHOID SURGERY      HERNIA REPAIR      PILONIDAL CYST DRAINAGE      Right Orchiectomy      TRANSURETHRAL RESECTION OF  PROSTATE  2010       Family History:   Family History   Problem Relation Name Age of Onset    Heart disease Father  68 y/o     Hypertension Father  68 y/o     Diabetes Father  68 y/o     Dementia Mother      Allergies Mother         Social History:  reports that he quit smoking about 27 years ago. His smoking use included cigarettes. He started smoking about 37 years ago. He has a 5 pack-year smoking history. He has never been exposed to tobacco smoke. He has never used smokeless tobacco. He reports current alcohol use. He reports that he does not use drugs.    Allergies:  Review of patient's allergies indicates:   Allergen Reactions    Ace inhibitors Other (See Comments)     Cough    Chlorthalidone      Hyponatremia    Feathers Other (See Comments)    Mite extract Other (See Comments)     Nose gets congested       Medications:  Current Outpatient Medications   Medication Sig Dispense Refill    amLODIPine (NORVASC) 5 MG tablet TAKE 1 TABLET BY MOUTH EVERY DAY 90 tablet 2    azelastine (ASTELIN) 137 mcg (0.1 %) nasal spray INSTILL 1-2 SPRAYS BY NASAL ROUTE 2 (TWO) TIMES DAILY AS NEEDED FOR RHINITIS. 30 mL 3    blood sugar diagnostic Strp To check BG 1 times daily, to use with insurance preferred meter 100 each 11    cetirizine (ZYRTEC) 10 MG tablet Take 10 mg by mouth once daily.      INV atorvastatin tablet 40 mg/placebo Take one tablet by mouth each morning. For Investigational Use Only. 90 tablet 3    lancets Misc To check BG 1 times daily, to use with insurance preferred meter 100 each 11    losartan (COZAAR) 100 MG tablet Take 1 tablet (100 mg total) by mouth once daily. 90 tablet 11    metoprolol succinate (TOPROL-XL) 25 MG 24 hr tablet Take 1 tablet (25 mg total) by mouth once daily. 90 tablet 1    omeprazole (PRILOSEC) 40 MG capsule Take 1 capsule (40 mg total) by mouth every morning. 90 capsule 0    ONETOUCH VERIO FLEX METER Misc TO CHECK BLOOD GLUCOSE 1 TIME DAILY, TO USE WITH INSURANCE  "PREFERRED METER 1 each 0    tamsulosin (FLOMAX) 0.4 mg Cp24 Take 0.8 mg by mouth once daily.       fluticasone propionate (FLONASE) 50 mcg/actuation nasal spray USE 1 SPRAY (50 MCG TOTAL) IN EACH NOSTRIL ONCE DAILY 48 mL 3    ketoconazole (NIZORAL) 2 % cream APPLY TO AREA OF RED, DRY SKIN ON BOTH FEET/HEELS ONE TO TWO TIMES DAILY FOR 2 TO 3 WEEKS. 60 g 3     No current facility-administered medications for this visit.       Review of Systems  A comprehensive review of systems was performed; pertinent positives and negatives are noted in the HPI.        Objective:      Vitals:   Vitals:    08/19/24 1013   BP: (!) 123/56   BP Location: Left arm   Patient Position: Sitting   BP Method: Medium (Automatic)   Pulse: 78   Resp: 18   SpO2: 98%   Weight: 68 kg (149 lb 14.6 oz)   Height: 5' 7" (1.702 m)     BMI: Body mass index is 23.48 kg/m².    Physical Exam  Vitals and nursing note reviewed.   Constitutional:       General: He is not in acute distress.     Appearance: Normal appearance. He is normal weight. He is not toxic-appearing.   HENT:      Head: Normocephalic and atraumatic.   Eyes:      General: No scleral icterus.     Conjunctiva/sclera: Conjunctivae normal.   Cardiovascular:      Rate and Rhythm: Normal rate.   Pulmonary:      Effort: Pulmonary effort is normal. No respiratory distress.   Abdominal:      General: There is no distension.   Musculoskeletal:         General: No deformity.      Cervical back: Neck supple.   Skin:     Coloration: Skin is not jaundiced.      Findings: No erythema.   Neurological:      General: No focal deficit present.      Mental Status: He is alert and oriented to person, place, and time. Mental status is at baseline.   Psychiatric:         Mood and Affect: Mood normal.         Behavior: Behavior normal.         Thought Content: Thought content normal.       Laboratory Data:  Labs have been reviewed.          Assessment:       1. Anemia, unspecified type    2. Thrombocytopenia  "     Mild and asymptomatic normocytic anemia and mild asymptomatic thrombocytopenia.     Plan:     Nutritional evaluation and additional lab workup as below.     Orders Placed This Encounter    VITAMIN B12    CBC auto differential    FERRITIN    FOLATE    CMP    Iron and TIBC    STFR    FREE LT CHAIN ANAL    SPEP - Protein electrophoresis, serum    TSH    CBC auto differential          Johny Celis MD, FACP  Hematology/Oncology  [facility] - [address], [address]  [place] [address]  Phone: [phone]  Fax: [phone]

## 2024-08-20 LAB
ALBUMIN SERPL ELPH-MCNC: 4.18 G/DL (ref 3.35–5.55)
ALPHA1 GLOB SERPL ELPH-MCNC: 0.25 G/DL (ref 0.17–0.41)
ALPHA2 GLOB SERPL ELPH-MCNC: 0.81 G/DL (ref 0.43–0.99)
B-GLOBULIN SERPL ELPH-MCNC: 0.8 G/DL (ref 0.5–1.1)
GAMMA GLOB SERPL ELPH-MCNC: 0.76 G/DL (ref 0.67–1.58)
KAPPA LC SER QL IA: 1.69 MG/DL (ref 0.33–1.94)
KAPPA LC/LAMBDA SER IA: 1.3 (ref 0.26–1.65)
LAMBDA LC SER QL IA: 1.3 MG/DL (ref 0.57–2.63)
PROT SERPL-MCNC: 6.8 G/DL (ref 6–8.4)

## 2024-08-21 LAB — PATHOLOGIST INTERPRETATION SPE: NORMAL

## 2024-08-22 LAB — STFR SERPL-MCNC: 2 MG/L (ref 1.8–4.6)

## 2024-09-16 RX ORDER — OMEPRAZOLE 40 MG/1
40 CAPSULE, DELAYED RELEASE ORAL EVERY MORNING
Qty: 90 CAPSULE | Refills: 3 | Status: SHIPPED | OUTPATIENT
Start: 2024-09-16

## 2024-09-23 DIAGNOSIS — I10 ESSENTIAL HYPERTENSION: ICD-10-CM

## 2024-09-23 RX ORDER — LOSARTAN POTASSIUM 100 MG/1
100 TABLET ORAL DAILY
Qty: 90 TABLET | Refills: 1 | Status: SHIPPED | OUTPATIENT
Start: 2024-09-23

## 2024-09-23 NOTE — TELEPHONE ENCOUNTER
Refill Decision Note   Jd Dill  is requesting a refill authorization.  Brief Assessment and Rationale for Refill:  Approve     Medication Therapy Plan:         Comments:     Note composed:10:19 AM 09/23/2024

## 2024-09-23 NOTE — TELEPHONE ENCOUNTER
No care due was identified.  Health [facility] Embedded Care Due Messages. Reference number: [id number].   9/23/2024 10:14:01 AM CDT

## 2024-09-23 NOTE — TELEPHONE ENCOUNTER
----- Message from Laney Pan sent at 9/23/2024  9:30 AM CDT -----  Regarding: RX: losartan (100 mg)  Contact: [phone]  Good morning,     Pt called requesting a refill for LOSARTAN (100 mg).  Pt shared prescription should be refilled at [facility] in [employer], in [place].  Requesting a call from clinical staff if you need to discuss.    Thank you,  Kate Nuñez Navigator

## 2024-10-20 ENCOUNTER — RESEARCH ENCOUNTER (OUTPATIENT)
Dept: CARDIOLOGY | Facility: CLINIC | Age: 79
End: 2024-10-20
Payer: MEDICARE

## 2024-10-20 NOTE — PROGRESS NOTES
Study: PREVENTABLE ([id number])  Sponsor: [employer]  PI: Frank Mooney MD  Date: 20-Oct-2024      Jd Dill is a participant in the PREVENTABLE Trial, which randomizes patients to atorvastatin 40mg or placebo (double blind) to study if statin use in older adults can prevent dementia, disability, and heart disease. This patient is being evaluated prior to re-ordering their trial medication.     If yes to any, study drug cannot be renewed at this time.   Has the participant ? no  Is the participant currently known to be taking an open-label statin? no  If any of the following are present, verify that patient is still eligible for study drug renewal at this time:  Is there currently unexplained persistent transaminase elevations? no  Is there current active liver disease? no  Has the participant experienced markedly elevated creatine phosphokinase (CPK) levels or myopathy while taking study drug? no*  Has the participant experienced any other adverse effects while taking study drug that might prevent the participant from being able to take Atorvastatin 40mg once daily in a reasonably safe manner? no  Is there an active order in the medical record for an open-label statin medication? no  Is the participant now chronically using any of the following medications: clarithromycin, colchicine, cyclosporine, darunavir, elbasvir, fenofibrate, fosamprenavir, gemfibrozil, glecaprevir, grazoprevir, itraconazole, lopinavir, nelfinavir, niacin (dose > 1g/day), pibrentasvir, ritonavir, saquinavir, simeprevir, tipranavir? no      I, Frank Mooney MD, confirm that the patient Jd Dill meets criteria for drug renewal. The electronic data capture system for the trial will be updated accordingly. Please contact [phone] (PREVENTABLE [facility] Phone Number) with any questions or concerns.

## 2024-10-31 DIAGNOSIS — E11.9 TYPE 2 DIABETES MELLITUS WITHOUT COMPLICATION, WITHOUT LONG-TERM CURRENT USE OF INSULIN: ICD-10-CM

## 2024-10-31 RX ORDER — LANCETS
EACH MISCELLANEOUS
Qty: 100 EACH | Refills: 11 | Status: SHIPPED | OUTPATIENT
Start: 2024-10-31

## 2024-12-13 ENCOUNTER — OFFICE VISIT (OUTPATIENT)
Dept: INTERNAL MEDICINE | Facility: CLINIC | Age: 79
End: 2024-12-13
Payer: MEDICARE

## 2024-12-13 VITALS
HEART RATE: 88 BPM | HEIGHT: 67 IN | SYSTOLIC BLOOD PRESSURE: 122 MMHG | OXYGEN SATURATION: 98 % | RESPIRATION RATE: 18 BRPM | WEIGHT: 155.13 LBS | TEMPERATURE: 99 F | DIASTOLIC BLOOD PRESSURE: 56 MMHG | BODY MASS INDEX: 24.35 KG/M2

## 2024-12-13 DIAGNOSIS — J00 ACUTE NASOPHARYNGITIS: Primary | ICD-10-CM

## 2024-12-13 DIAGNOSIS — I10 ESSENTIAL HYPERTENSION: ICD-10-CM

## 2024-12-13 DIAGNOSIS — E11.9 TYPE 2 DIABETES MELLITUS WITHOUT COMPLICATION, WITHOUT LONG-TERM CURRENT USE OF INSULIN: ICD-10-CM

## 2024-12-13 PROCEDURE — 99215 OFFICE O/P EST HI 40 MIN: CPT | Mod: PBBFAC | Performed by: NURSE PRACTITIONER

## 2024-12-13 PROCEDURE — 99999 PR PBB SHADOW E&M-EST. PATIENT-LVL V: CPT | Mod: PBBFAC,,, | Performed by: NURSE PRACTITIONER

## 2024-12-13 RX ORDER — BENZONATATE 200 MG/1
200 CAPSULE ORAL 3 TIMES DAILY PRN
Qty: 30 CAPSULE | Refills: 0 | Status: SHIPPED | OUTPATIENT
Start: 2024-12-13 | End: 2024-12-23

## 2024-12-13 RX ORDER — FLUTICASONE PROPIONATE 50 MCG
1 SPRAY, SUSPENSION (ML) NASAL DAILY
Qty: 16 G | Refills: 0 | Status: SHIPPED | OUTPATIENT
Start: 2024-12-13

## 2024-12-13 NOTE — PROGRESS NOTES
History of Present Illness   Jd Dill III is a 79 y.o. man with medical history as listed below who presents today for evaluation of common cold symptoms x 1 week. He has tried no OTC medication or other methods for symptoms management. He is otherwise healthy on today's visit.      Past Medical History:   Diagnosis Date    Allergy     AR (allergic rhinitis)     Basal cell carcinoma of ear     Right Ear    BPH (benign prostatic hyperplasia)     DDD (degenerative disc disease), lumbar     Degenerative disc disease     Gastroesophageal reflux disease 3/11/2019    Hypertension     Kidney stone     Undescended testicle     Right Orchiectomy       Past Surgical History:   Procedure Laterality Date    BASAL CELL CARCINOMA EXCISION      ESOPHAGOGASTRODUODENOSCOPY N/A 10/5/2022    Procedure: EGD (ESOPHAGOGASTRODUODENOSCOPY);  Surgeon: Franco Campoverde MD;  Location: [facility];  Service: Endoscopy;  Laterality: N/A;    HEMORRHOID SURGERY      HERNIA REPAIR      PILONIDAL CYST DRAINAGE      Right Orchiectomy      TRANSURETHRAL RESECTION OF PROSTATE         Social History     Socioeconomic History    Marital status: Single   Tobacco Use    Smoking status: Former     Current packs/day: 0.00     Average packs/day: 0.5 packs/day for 10.0 years (5.0 ttl pk-yrs)     Types: Cigarettes     Start date: 1987     Quit date: 1997     Years since quittin.9     Passive exposure: Never    Smokeless tobacco: Never   Substance and Sexual Activity    Alcohol use: Yes     Comment: Rare    Drug use: Never   Social History Narrative    Retired, 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 No kids. 1 cousin in contact.     Social Drivers of Health     Financial Resource Strain: Low Risk  (2024)    Overall Financial Resource Strain (CARDIA)     Difficulty of Paying Living Expenses: Not hard at all   Food Insecurity: No Food Insecurity (2024)    Hunger Vital Sign     Worried  "About Running Out of Food in the Last Year: Never true     Ran Out of Food in the Last Year: Never true   Transportation Needs: No Transportation Needs (2023)    PRAPARE - Transportation     Lack of Transportation (Medical): No     Lack of Transportation (Non-Medical): No   Physical Activity: Sufficiently Active (2024)    Exercise Vital Sign     Days of Exercise per Week: 5 days     Minutes of Exercise per Session: 30 min   Stress: No Stress Concern Present (2024)    [employer] [place] [employer] - Occupational Stress Questionnaire     Feeling of Stress : Not at all   Housing Stability: Low Risk  (2023)    Housing Stability Vital Sign     Unable to Pay for Housing in the Last Year: No     Number of Places Lived in the Last Year: 1     Unstable Housing in the Last Year: No       Family History   Problem Relation Name Age of Onset    Heart disease Father  68 y/o     Hypertension Father  68 y/o     Diabetes Father  68 y/o     Dementia Mother      Allergies Mother         Review of Systems  Review of Systems   Constitutional:  Negative for chills, fever and malaise/fatigue.   HENT:  Positive for congestion and sore throat. Negative for ear discharge, ear pain and sinus pain.    Eyes:  Negative for discharge and redness.   Respiratory:  Positive for cough. Negative for sputum production, shortness of breath and wheezing.    Cardiovascular:  Negative for chest pain.   Gastrointestinal:  Negative for nausea and vomiting.   Neurological:  Negative for headaches.     A complete review of systems was otherwise negative.    Physical Exam  BP (!) 122/56   Pulse 88   Temp 98.9 °F (37.2 °C) (Oral)   Resp 18   Ht 5' 7" (1.702 m)   Wt 70.4 kg (155 lb 1.5 oz)   SpO2 98%   BMI 24.29 kg/m²   General appearance: alert, appears stated age, cooperative, and no distress  Eyes: negative findings: lids and lashes normal and conjunctivae and sclerae normal  Ears: normal TM's and external ear " canals both ears  Nose: clear discharge, mild congestion, turbinates red, no sinus tenderness  Throat: lips, mucosa, and tongue normal; teeth and gums normal and clear post nasal drainage present  Neck: no adenopathy and supple, symmetrical, trachea midline  Lungs: clear to auscultation bilaterally  Heart: regular rate and rhythm, S1, S2 normal, no murmur, click, rub or gallop  Neurologic: Grossly normal    Assessment/Plan  Acute nasopharyngitis  Antibiotics not indicated at this time.  Flonase, Astelin (patient has at home), Tessalon, and OTC Mucinex.  Rest and increase fluid intake.  Tylenol if needed.  Other home remedies such as steamy shower, warm liquids, lozenges, discussed with patient.  RTC PRN.  -     fluticasone propionate (FLONASE) 50 mcg/actuation nasal spray; 1 spray (50 mcg total) by Each Nostril route once daily.  Dispense: 16 g; Refill: 0  -     benzonatate (TESSALON) 200 MG capsule; Take 1 capsule (200 mg total) by mouth 3 (three) times daily as needed for Cough.  Dispense: 30 capsule; Refill: 0    Type 2 diabetes mellitus without complication, without long-term current use of insulin  The current medical regimen is effective;  continue present plan and medications.  Managed by PCP.    Essential hypertension  The current medical regimen is effective;  continue present plan and medications.  BP WNL today.    Patient has verbalized understanding and is in agreement with plan of care.    Follow up if symptoms worsen or fail to improve.

## 2024-12-13 NOTE — PATIENT INSTRUCTIONS
Astelin and Flonase twice a day.  Tessalon as needed.  Mucinex over the counter.  Rest and drink plenty of fluids.  Steam from the shower and warm liquids may also help.

## 2024-12-17 ENCOUNTER — OCHSNER VIRTUAL EMERGENCY DEPARTMENT (OUTPATIENT)
Facility: CLINIC | Age: 79
End: 2024-12-17
Payer: MEDICARE

## 2024-12-17 ENCOUNTER — HOSPITAL ENCOUNTER (INPATIENT)
Facility: HOSPITAL | Age: 79
LOS: 9 days | Discharge: NURSING, REHAB OR LONG-TERM CARE | DRG: 871 | End: 2024-12-26
Attending: EMERGENCY MEDICINE | Admitting: EMERGENCY MEDICINE
Payer: MEDICARE

## 2024-12-17 ENCOUNTER — OFFICE VISIT (OUTPATIENT)
Dept: INTERNAL MEDICINE | Facility: CLINIC | Age: 79
End: 2024-12-17
Payer: MEDICARE

## 2024-12-17 ENCOUNTER — NURSE TRIAGE (OUTPATIENT)
Dept: ADMINISTRATIVE | Facility: CLINIC | Age: 79
End: 2024-12-17
Payer: MEDICARE

## 2024-12-17 VITALS
SYSTOLIC BLOOD PRESSURE: 108 MMHG | OXYGEN SATURATION: 95 % | WEIGHT: 151.81 LBS | BODY MASS INDEX: 23.83 KG/M2 | HEIGHT: 67 IN | HEART RATE: 125 BPM | DIASTOLIC BLOOD PRESSURE: 56 MMHG

## 2024-12-17 DIAGNOSIS — R05.9 COUGH: ICD-10-CM

## 2024-12-17 DIAGNOSIS — R07.9 CHEST PAIN: ICD-10-CM

## 2024-12-17 DIAGNOSIS — R00.0 TACHYCARDIA: ICD-10-CM

## 2024-12-17 DIAGNOSIS — J18.9 MULTIFOCAL PNEUMONIA: Primary | ICD-10-CM

## 2024-12-17 DIAGNOSIS — R06.09 DOE (DYSPNEA ON EXERTION): Primary | ICD-10-CM

## 2024-12-17 DIAGNOSIS — E87.1 HYPONATREMIA: ICD-10-CM

## 2024-12-17 LAB
ALBUMIN SERPL BCP-MCNC: 3 G/DL (ref 3.5–5.2)
ALLENS TEST: NORMAL
ALP SERPL-CCNC: 129 U/L (ref 40–150)
ALT SERPL W/O P-5'-P-CCNC: 71 U/L (ref 10–44)
ANION GAP SERPL CALC-SCNC: 13 MMOL/L (ref 8–16)
AST SERPL-CCNC: 93 U/L (ref 10–40)
BASOPHILS # BLD AUTO: 0.09 K/UL (ref 0–0.2)
BASOPHILS NFR BLD: 0.4 % (ref 0–1.9)
BILIRUB SERPL-MCNC: 1.7 MG/DL (ref 0.1–1)
BUN SERPL-MCNC: 12 MG/DL (ref 8–23)
CALCIUM SERPL-MCNC: 9.6 MG/DL (ref 8.7–10.5)
CHLORIDE SERPL-SCNC: 91 MMOL/L (ref 95–110)
CO2 SERPL-SCNC: 22 MMOL/L (ref 23–29)
CREAT SERPL-MCNC: 0.9 MG/DL (ref 0.5–1.4)
DIFFERENTIAL METHOD BLD: ABNORMAL
EOSINOPHIL # BLD AUTO: 0 K/UL (ref 0–0.5)
EOSINOPHIL NFR BLD: 0.1 % (ref 0–8)
ERYTHROCYTE [DISTWIDTH] IN BLOOD BY AUTOMATED COUNT: 13.1 % (ref 11.5–14.5)
EST. GFR  (NO RACE VARIABLE): >60 ML/MIN/1.73 M^2
GLUCOSE SERPL-MCNC: 91 MG/DL (ref 70–110)
HCT VFR BLD AUTO: 37.7 % (ref 40–54)
HGB BLD-MCNC: 13.3 G/DL (ref 14–18)
IMM GRANULOCYTES # BLD AUTO: 0.26 K/UL (ref 0–0.04)
IMM GRANULOCYTES NFR BLD AUTO: 1.3 % (ref 0–0.5)
INFLUENZA A, MOLECULAR: NEGATIVE
INFLUENZA B, MOLECULAR: NEGATIVE
LDH SERPL L TO P-CCNC: 1.48 MMOL/L (ref 0.5–2.2)
LYMPHOCYTES # BLD AUTO: 0.5 K/UL (ref 1–4.8)
LYMPHOCYTES NFR BLD: 2.2 % (ref 18–48)
MCH RBC QN AUTO: 31.4 PG (ref 27–31)
MCHC RBC AUTO-ENTMCNC: 35.3 G/DL (ref 32–36)
MCV RBC AUTO: 89 FL (ref 82–98)
MONOCYTES # BLD AUTO: 2 K/UL (ref 0.3–1)
MONOCYTES NFR BLD: 9.7 % (ref 4–15)
NEUTROPHILS # BLD AUTO: 17.7 K/UL (ref 1.8–7.7)
NEUTROPHILS NFR BLD: 86.3 % (ref 38–73)
NRBC BLD-RTO: 0 /100 WBC
OHS QRS DURATION: 84 MS
OHS QTC CALCULATION: 408 MS
PLATELET # BLD AUTO: 234 K/UL (ref 150–450)
PMV BLD AUTO: 9.7 FL (ref 9.2–12.9)
POTASSIUM SERPL-SCNC: 3.7 MMOL/L (ref 3.5–5.1)
PROCALCITONIN SERPL IA-MCNC: 0.18 NG/ML
PROT SERPL-MCNC: 7.4 G/DL (ref 6–8.4)
RBC # BLD AUTO: 4.23 M/UL (ref 4.6–6.2)
SAMPLE: NORMAL
SARS-COV-2 RDRP RESP QL NAA+PROBE: NEGATIVE
SITE: NORMAL
SODIUM SERPL-SCNC: 126 MMOL/L (ref 136–145)
SPECIMEN SOURCE: NORMAL
WBC # BLD AUTO: 20.55 K/UL (ref 3.9–12.7)

## 2024-12-17 PROCEDURE — 87502 INFLUENZA DNA AMP PROBE: CPT | Performed by: EMERGENCY MEDICINE

## 2024-12-17 PROCEDURE — 25000003 PHARM REV CODE 250: Performed by: EMERGENCY MEDICINE

## 2024-12-17 PROCEDURE — 96361 HYDRATE IV INFUSION ADD-ON: CPT

## 2024-12-17 PROCEDURE — 99999 PR PBB SHADOW E&M-EST. PATIENT-LVL III: CPT | Mod: PBBFAC,,, | Performed by: NURSE PRACTITIONER

## 2024-12-17 PROCEDURE — 99900035 HC TECH TIME PER 15 MIN (STAT)

## 2024-12-17 PROCEDURE — 80053 COMPREHEN METABOLIC PANEL: CPT | Performed by: EMERGENCY MEDICINE

## 2024-12-17 PROCEDURE — 63600175 PHARM REV CODE 636 W HCPCS: Performed by: FAMILY MEDICINE

## 2024-12-17 PROCEDURE — 85025 COMPLETE CBC W/AUTO DIFF WBC: CPT | Performed by: EMERGENCY MEDICINE

## 2024-12-17 PROCEDURE — 99213 OFFICE O/P EST LOW 20 MIN: CPT | Mod: PBBFAC,25 | Performed by: NURSE PRACTITIONER

## 2024-12-17 PROCEDURE — 93005 ELECTROCARDIOGRAM TRACING: CPT

## 2024-12-17 PROCEDURE — 25000003 PHARM REV CODE 250: Performed by: FAMILY MEDICINE

## 2024-12-17 PROCEDURE — 84145 PROCALCITONIN (PCT): CPT | Performed by: FAMILY MEDICINE

## 2024-12-17 PROCEDURE — 12000002 HC ACUTE/MED SURGE SEMI-PRIVATE ROOM

## 2024-12-17 PROCEDURE — 87635 SARS-COV-2 COVID-19 AMP PRB: CPT | Performed by: EMERGENCY MEDICINE

## 2024-12-17 PROCEDURE — 99214 OFFICE O/P EST MOD 30 MIN: CPT | Mod: S$PBB,,, | Performed by: NURSE PRACTITIONER

## 2024-12-17 PROCEDURE — 83605 ASSAY OF LACTIC ACID: CPT

## 2024-12-17 PROCEDURE — 87040 BLOOD CULTURE FOR BACTERIA: CPT | Mod: 59 | Performed by: EMERGENCY MEDICINE

## 2024-12-17 PROCEDURE — 63600175 PHARM REV CODE 636 W HCPCS: Performed by: EMERGENCY MEDICINE

## 2024-12-17 PROCEDURE — 93010 ELECTROCARDIOGRAM REPORT: CPT | Mod: ,,, | Performed by: INTERNAL MEDICINE

## 2024-12-17 PROCEDURE — 96374 THER/PROPH/DIAG INJ IV PUSH: CPT

## 2024-12-17 PROCEDURE — 96375 TX/PRO/DX INJ NEW DRUG ADDON: CPT

## 2024-12-17 RX ORDER — BENZONATATE 100 MG/1
200 CAPSULE ORAL 3 TIMES DAILY PRN
Status: DISCONTINUED | OUTPATIENT
Start: 2024-12-17 | End: 2024-12-26 | Stop reason: HOSPADM

## 2024-12-17 RX ORDER — ENOXAPARIN SODIUM 100 MG/ML
40 INJECTION SUBCUTANEOUS EVERY 24 HOURS
Status: DISCONTINUED | OUTPATIENT
Start: 2024-12-17 | End: 2024-12-26 | Stop reason: HOSPADM

## 2024-12-17 RX ORDER — TALC
6 POWDER (GRAM) TOPICAL NIGHTLY PRN
Status: DISCONTINUED | OUTPATIENT
Start: 2024-12-17 | End: 2024-12-26 | Stop reason: HOSPADM

## 2024-12-17 RX ORDER — SODIUM CHLORIDE 0.9 % (FLUSH) 0.9 %
10 SYRINGE (ML) INJECTION EVERY 12 HOURS PRN
Status: DISCONTINUED | OUTPATIENT
Start: 2024-12-17 | End: 2024-12-26 | Stop reason: HOSPADM

## 2024-12-17 RX ORDER — GLUCAGON 1 MG
1 KIT INJECTION
Status: DISCONTINUED | OUTPATIENT
Start: 2024-12-17 | End: 2024-12-26 | Stop reason: HOSPADM

## 2024-12-17 RX ORDER — CEFTRIAXONE 2 G/1
2 INJECTION, POWDER, FOR SOLUTION INTRAMUSCULAR; INTRAVENOUS ONCE
Status: COMPLETED | OUTPATIENT
Start: 2024-12-17 | End: 2024-12-17

## 2024-12-17 RX ORDER — FLUTICASONE PROPIONATE 50 MCG
1 SPRAY, SUSPENSION (ML) NASAL DAILY
Status: DISCONTINUED | OUTPATIENT
Start: 2024-12-18 | End: 2024-12-26 | Stop reason: HOSPADM

## 2024-12-17 RX ORDER — TAMSULOSIN HYDROCHLORIDE 0.4 MG/1
0.8 CAPSULE ORAL DAILY
Status: DISCONTINUED | OUTPATIENT
Start: 2024-12-18 | End: 2024-12-26 | Stop reason: HOSPADM

## 2024-12-17 RX ORDER — ALUMINUM HYDROXIDE, MAGNESIUM HYDROXIDE, AND SIMETHICONE 1200; 120; 1200 MG/30ML; MG/30ML; MG/30ML
30 SUSPENSION ORAL 4 TIMES DAILY PRN
Status: DISCONTINUED | OUTPATIENT
Start: 2024-12-17 | End: 2024-12-26 | Stop reason: HOSPADM

## 2024-12-17 RX ORDER — OXYCODONE HYDROCHLORIDE 5 MG/1
5 TABLET ORAL EVERY 6 HOURS PRN
Status: DISCONTINUED | OUTPATIENT
Start: 2024-12-17 | End: 2024-12-26 | Stop reason: HOSPADM

## 2024-12-17 RX ORDER — ALBUTEROL SULFATE 90 UG/1
2 INHALANT RESPIRATORY (INHALATION) EVERY 6 HOURS PRN
Status: DISCONTINUED | OUTPATIENT
Start: 2024-12-17 | End: 2024-12-26 | Stop reason: HOSPADM

## 2024-12-17 RX ORDER — ACETAMINOPHEN 500 MG
1000 TABLET ORAL EVERY 8 HOURS PRN
Status: DISCONTINUED | OUTPATIENT
Start: 2024-12-17 | End: 2024-12-26 | Stop reason: HOSPADM

## 2024-12-17 RX ORDER — AZITHROMYCIN 250 MG/1
250 TABLET, FILM COATED ORAL DAILY
Status: DISCONTINUED | OUTPATIENT
Start: 2024-12-18 | End: 2024-12-18

## 2024-12-17 RX ORDER — CEFTRIAXONE 2 G/1
2 INJECTION, POWDER, FOR SOLUTION INTRAMUSCULAR; INTRAVENOUS
Status: COMPLETED | OUTPATIENT
Start: 2024-12-18 | End: 2024-12-22

## 2024-12-17 RX ORDER — NALOXONE HCL 0.4 MG/ML
0.02 VIAL (ML) INJECTION
Status: DISCONTINUED | OUTPATIENT
Start: 2024-12-17 | End: 2024-12-26 | Stop reason: HOSPADM

## 2024-12-17 RX ORDER — ONDANSETRON HYDROCHLORIDE 2 MG/ML
4 INJECTION, SOLUTION INTRAVENOUS EVERY 8 HOURS PRN
Status: DISCONTINUED | OUTPATIENT
Start: 2024-12-17 | End: 2024-12-26 | Stop reason: HOSPADM

## 2024-12-17 RX ORDER — IBUPROFEN 200 MG
24 TABLET ORAL
Status: DISCONTINUED | OUTPATIENT
Start: 2024-12-17 | End: 2024-12-26 | Stop reason: HOSPADM

## 2024-12-17 RX ORDER — ACETAMINOPHEN 500 MG
1000 TABLET ORAL
Status: COMPLETED | OUTPATIENT
Start: 2024-12-17 | End: 2024-12-17

## 2024-12-17 RX ORDER — METOPROLOL SUCCINATE 25 MG/1
25 TABLET, EXTENDED RELEASE ORAL DAILY
Status: DISCONTINUED | OUTPATIENT
Start: 2024-12-18 | End: 2024-12-26 | Stop reason: HOSPADM

## 2024-12-17 RX ORDER — IBUPROFEN 200 MG
16 TABLET ORAL
Status: DISCONTINUED | OUTPATIENT
Start: 2024-12-17 | End: 2024-12-26 | Stop reason: HOSPADM

## 2024-12-17 RX ADMIN — AZITHROMYCIN MONOHYDRATE 500 MG: 500 INJECTION, POWDER, LYOPHILIZED, FOR SOLUTION INTRAVENOUS at 04:12

## 2024-12-17 RX ADMIN — SODIUM CHLORIDE, POTASSIUM CHLORIDE, SODIUM LACTATE AND CALCIUM CHLORIDE 1000 ML: 600; 310; 30; 20 INJECTION, SOLUTION INTRAVENOUS at 06:12

## 2024-12-17 RX ADMIN — SODIUM CHLORIDE, POTASSIUM CHLORIDE, SODIUM LACTATE AND CALCIUM CHLORIDE 1000 ML: 600; 310; 30; 20 INJECTION, SOLUTION INTRAVENOUS at 03:12

## 2024-12-17 RX ADMIN — ENOXAPARIN SODIUM 40 MG: 40 INJECTION SUBCUTANEOUS at 07:12

## 2024-12-17 RX ADMIN — BENZONATATE 200 MG: 100 CAPSULE ORAL at 10:12

## 2024-12-17 RX ADMIN — CEFTRIAXONE 2 G: 2 INJECTION, POWDER, FOR SOLUTION INTRAMUSCULAR; INTRAVENOUS at 04:12

## 2024-12-17 RX ADMIN — ACETAMINOPHEN 1000 MG: 500 TABLET ORAL at 02:12

## 2024-12-17 NOTE — PLAN OF CARE-OVED
[facility] Plan of Care Note    Referral source: Nurse On-Call      Reason for consult: Cough      Additional History: I have this patient on the phone. He c/o cols symptoms x 2 weeks. He c/o a cough, hoarseness, and SOB with walking.       Disposition recommended: Primary Care      Additional Recommendations: appt available today

## 2024-12-17 NOTE — PLAN OF CARE-OVED
[facility] Plan of Care Note    Referral source: Primary Care Provider      Reason for consult: Cough      Additional History: Pt with wheezes and mild hypoxia.  Concern for pneumonia.        Disposition recommended: Emergency Department

## 2024-12-17 NOTE — ED TRIAGE NOTES
Pt arrives to ED with complaints of dry cough  for the past two weeks. He states it has gotten progressively  worse.  He states he saw a Nurse practitioner last week and was prescribed a medicine but has been too sick take his prescription to the pharmacy to get his medicine . He Denies Fever, chills, and SOB. He endorses weakness .

## 2024-12-17 NOTE — TELEPHONE ENCOUNTER
Patient c/o diarrhea, a cough, and a runny nose. He also c/o SOB with walking. Per protocol will refer to [facility]. Per Dr. Brown Advised to be seen in the office now. An appointment was scheduled for the patient.  Advised the patient to call back with any further questions or if symptoms worsen.            Reason for Disposition   [1] MILD difficulty breathing (e.g., minimal/no SOB at rest, SOB with walking, pulse <100) AND [2] still present when not coughing    Additional Information   Negative: SEVERE difficulty breathing (e.g., struggling for each breath, speaks in single words)   Negative: Bluish (or gray) lips or face now   Negative: [1] Rapid onset of cough AND [2] has hives   Negative: Coughing started suddenly after medicine, an allergic food or bee sting   Negative: [1] Difficulty breathing AND [2] exposure to flames, smoke, or fumes   Negative: [1] Stridor AND [2] difficulty breathing   Negative: Sounds like a life-threatening emergency to the triager   Negative: [1] MODERATE difficulty breathing (e.g., speaks in phrases, SOB even at rest, pulse 100-120) AND [2] still present when not coughing   Negative: Chest pain  (Exception: MILD central chest pain, present only when coughing.)   Negative: Patient sounds very sick or weak to the triager    Protocols used: Cough - Acute Non-Productive-A-AH

## 2024-12-17 NOTE — PROGRESS NOTES
History of Present Illness   Jd Dill III is a 79 y.o. man with medical history as listed below who presents today for evaluation of persistent cough. No improvement since our visit last week. Now with ROSARIO, worsening cough, wheezing, and tachycardia. KELLEY utilized, and patient will be transferred to ED.    Past Medical History:   Diagnosis Date    Allergy     AR (allergic rhinitis)     Basal cell carcinoma of ear     Right Ear    BPH (benign prostatic hyperplasia)     DDD (degenerative disc disease), lumbar     Degenerative disc disease     Gastroesophageal reflux disease 3/11/2019    Hypertension     Kidney stone     Undescended testicle     Right Orchiectomy       Past Surgical History:   Procedure Laterality Date    BASAL CELL CARCINOMA EXCISION      ESOPHAGOGASTRODUODENOSCOPY N/A 10/5/2022    Procedure: EGD (ESOPHAGOGASTRODUODENOSCOPY);  Surgeon: Franco Campoverde MD;  Location: [facility];  Service: Endoscopy;  Laterality: N/A;    HEMORRHOID SURGERY      HERNIA REPAIR      PILONIDAL CYST DRAINAGE      Right Orchiectomy      TRANSURETHRAL RESECTION OF PROSTATE         Social History     Socioeconomic History    Marital status: Single   Tobacco Use    Smoking status: Former     Current packs/day: 0.00     Average packs/day: 0.5 packs/day for 10.0 years (5.0 ttl pk-yrs)     Types: Cigarettes     Start date: 1987     Quit date: 1997     Years since quittin.9     Passive exposure: Never    Smokeless tobacco: Never   Substance and Sexual Activity    Alcohol use: Yes     Comment: Rare    Drug use: Never   Social History Narrative    Retired, 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 1 cousin in contact.     Social Drivers of Health     Financial Resource Strain: Low Risk  (2024)    Overall Financial Resource Strain (CARDIA)     Difficulty of Paying Living Expenses: Not hard at all   Food Insecurity: No Food Insecurity (2024)    Hunger  "Vital Sign     Worried About Running Out of Food in the Last Year: Never true     Ran Out of Food in the Last Year: Never true   Transportation Needs: No Transportation Needs (2023)    PRAPARE - Transportation     Lack of Transportation (Medical): No     Lack of Transportation (Non-Medical): No   Physical Activity: Sufficiently Active (2024)    Exercise Vital Sign     Days of Exercise per Week: 5 days     Minutes of Exercise per Session: 30 min   Stress: No Stress Concern Present (2024)    [employer] [place] [employer] - Occupational Stress Questionnaire     Feeling of Stress : Not at all   Housing Stability: Low Risk  (2023)    Housing Stability Vital Sign     Unable to Pay for Housing in the Last Year: No     Number of Places Lived in the Last Year: 1     Unstable Housing in the Last Year: No       Family History   Problem Relation Name Age of Onset    Heart disease Father  68 y/o     Hypertension Father  68 y/o     Diabetes Father  68 y/o     Dementia Mother      Allergies Mother         Review of Systems  Review of Systems   Constitutional:  Positive for malaise/fatigue. Negative for chills and fever.   HENT:  Positive for congestion. Negative for ear pain, sinus pain and sore throat.    Respiratory:  Positive for cough, sputum production, shortness of breath and wheezing. Negative for hemoptysis.    Cardiovascular:  Negative for chest pain, palpitations, orthopnea, claudication, leg swelling and PND.   Musculoskeletal:  Negative for myalgias.     A complete review of systems was otherwise negative.    Physical Exam  BP (!) 108/56 (BP Location: Left arm, Patient Position: Sitting)   Pulse (!) 125   Ht 5' 7" (1.702 m)   Wt 68.9 kg (151 lb 12.6 oz)   SpO2 95%   BMI 23.77 kg/m²   General appearance: alert, appears stated age, cooperative, no distress, and appears acutely ill  Lungs: clear to auscultation bilaterally and expiratory wheezes noted with increased effort and " rate  Heart: regular rate and rhythm, S1, S2 normal, no murmur, click, rub or gallop and tachycardia  Extremities: extremities normal, atraumatic, no cyanosis or edema  Pulses: 2+ and symmetric  Skin: Skin color, texture, turgor normal. No rashes or lesions  Neurologic: Grossly normal    Assessment/Plan  ROSARIO (dyspnea on exertion)  Patient with worsening respiratory status- no tachypneic with ROSARIO, O2 95% on RA, and tachycardia. KELLEY utilized, recommend ED. Patient friend Niels will transport patient from [facility] to ED via private vehicle.  -     Refer to Emergency Dept.    Tachycardia  As above.  -     Refer to Emergency Dept.    Patient has verbalized understanding and is in agreement with plan of care.    Follow up for go to ED now.

## 2024-12-17 NOTE — ED PROVIDER NOTES
Encounter Date: 12/17/2024       History     Chief Complaint   Patient presents with    Cough     Pt comes from across the street, NP examining pt states he was sating at 95% and she is concerned for pneumonia. Pt has had cough for 1 week.     Jd Dill III is an 79-year-old male with a history of BPH, GERD, hypertension transferred from clinic for tachycardia and generalized weakness.   He started feeling bad approximately 2 weeks ago.  Went to the clinic on 12/13, recommended conservative therapy with lozenges, Tylenol etc..  He went back to the clinic today because he was feeling worse.  He has had decreased p.o. intake.  He is feeling weak.  His cough is getting worse.  He was found to be tachycardic with wheezing, transferred to ED for further workup.  He denies chest pain.  No abdominal pain.  Denies dysuria or hematuria.  Denies current fever        Review of patient's allergies indicates:   Allergen Reactions    Ace inhibitors Other (See Comments)     Cough    Chlorthalidone      Hyponatremia    Feathers Other (See Comments)    Mite extract Other (See Comments)     Nose gets congested     Past Medical History:   Diagnosis Date    Allergy     AR (allergic rhinitis)     Basal cell carcinoma of ear 2006    Right Ear    BPH (benign prostatic hyperplasia)     DDD (degenerative disc disease), lumbar     Degenerative disc disease     Gastroesophageal reflux disease 3/11/2019    Hypertension     Kidney stone     Undescended testicle     Right Orchiectomy     Past Surgical History:   Procedure Laterality Date    BASAL CELL CARCINOMA EXCISION      ESOPHAGOGASTRODUODENOSCOPY N/A 10/5/2022    Procedure: EGD (ESOPHAGOGASTRODUODENOSCOPY);  Surgeon: Franco Campoverde MD;  Location: [facility];  Service: Endoscopy;  Laterality: N/A;    HEMORRHOID SURGERY      HERNIA REPAIR      PILONIDAL CYST DRAINAGE      Right Orchiectomy      TRANSURETHRAL RESECTION OF PROSTATE  2010     Family History   Problem Relation Name Age of Onset     Heart disease Father  68 y/o     Hypertension Father  68 y/o     Diabetes Father  68 y/o     Dementia Mother      Allergies Mother       Social History     Tobacco Use    Smoking status: Former     Current packs/day: 0.00     Average packs/day: 0.5 packs/day for 10.0 years (5.0 ttl pk-yrs)     Types: Cigarettes     Start date: 1987     Quit date: 1997     Years since quittin.9     Passive exposure: Never    Smokeless tobacco: Never   Substance Use Topics    Alcohol use: Yes     Comment: Rare    Drug use: Never     Review of Systems    Physical Exam     Initial Vitals [24 1231]   BP Pulse Resp Temp SpO2   125/60 (!) 120 20 98.1 °F (36.7 °C) 95 %      MAP       --         Physical Exam    Nursing note and vitals reviewed.  Constitutional: He appears well-developed and well-nourished. No distress.   HENT: Mouth/Throat: Oropharynx is clear and moist.   Eyes: Conjunctivae are normal.   Neck: Neck supple.   Cardiovascular:  Normal rate, regular rhythm and intact distal pulses.           Pulmonary/Chest: No respiratory distress. He has wheezes. He has rhonchi (Bilateral, right greater than left). He has no rales.   Abdominal: Abdomen is soft. Bowel sounds are normal. There is no abdominal tenderness.   Musculoskeletal:         General: No edema.      Cervical back: Neck supple.     Lymphadenopathy:     He has no cervical adenopathy.   Neurological: He is alert and oriented to person, place, and time.   Skin: No rash noted.   Psychiatric: He has a normal mood and affect.         ED Course   Procedures  Labs Reviewed   CBC W/ AUTO DIFFERENTIAL - Abnormal       Result Value    WBC 20.55 (*)     RBC 4.23 (*)     Hemoglobin 13.3 (*)     Hematocrit 37.7 (*)     MCV 89      MCH 31.4 (*)     MCHC 35.3      RDW 13.1      Platelets 234      MPV 9.7      Immature Granulocytes 1.3 (*)     Gran # (ANC) 17.7 (*)     Immature Grans (Abs) 0.26 (*)     Lymph # 0.5 (*)     Mono # 2.0 (*)     Eos # 0.0       Baso # 0.09      nRBC 0      Gran % 86.3 (*)     Lymph % 2.2 (*)     Mono % 9.7      Eosinophil % 0.1      Basophil % 0.4      Differential Method Automated     COMPREHENSIVE METABOLIC PANEL - Abnormal    Sodium 126 (*)     Potassium 3.7      Chloride 91 (*)     CO2 22 (*)     Glucose 91      BUN 12      Creatinine 0.9      Calcium 9.6      Total Protein 7.4      Albumin 3.0 (*)     Total Bilirubin 1.7 (*)     Alkaline Phosphatase 129      AST 93 (*)     ALT 71 (*)     eGFR >60.0      Anion Gap 13     INFLUENZA A & B BY MOLECULAR    Influenza A, Molecular Negative      Influenza B, Molecular Negative      Flu A & B Source Nasal swab     CULTURE, BLOOD   CULTURE, BLOOD   CULTURE, RESPIRATORY   SARS-COV-2 RNA AMPLIFICATION, QUAL    SARS-CoV-2 RNA, Amplification, Qual Negative     LEGIONELLA ANTIGEN, URINE RANDOM   CULTURE, LEGIONELLA   PROCALCITONIN   ISTAT LACTATE    POC Lactate 1.48      Sample VENOUS      Site Other      Allens Test N/A       EKG Readings: (Independently Interpreted)   EKG sinus tachycardia 111, normal QRS, ,  normal axis, no acute ischemic changes     ECG Results              EKG 12-lead (Final result)        Collection Time Result Time QRS Duration OHS QTC Calculation    12/17/24 14:50:49 12/17/24 15:47:44 84 408                     Final result by Interface, [facility] (12/17/24 15:47:50)                   Narrative:    Test Reason : R00.0,    Vent. Rate : 111 BPM     Atrial Rate : 111 BPM     P-R Int : 174 ms          QRS Dur :  84 ms      QT Int : 300 ms       P-R-T Axes :  59  65  40 degrees    QTcB Int : 408 ms    Sinus tachycardia  Otherwise normal ECG  No previous ECGs available  Confirmed by Aden Rosado ([id number]) on 12/17/2024 3:47:39 PM    Referred By: AAAREFERRAL SELF           Confirmed By: Aden Rosado                                  Imaging Results              X-Ray Chest PA And Lateral (Final result)  Result time 12/17/24 16:30:15      Final result by Nav Mulligan  DO RONNIE (12/17/24 16:30:15)                   Impression:      Scattered low-grade patchy airspace opacities and a suspected nodular lesion as above.  Recommend further evaluation with CT chest.      Electronically signed by: Nav Mulligan  Date:    12/17/2024  Time:    16:30               Narrative:    EXAMINATION:  XR CHEST PA AND LATERAL    CLINICAL HISTORY:  Cough, unspecified    TECHNIQUE:  PA and lateral views of the chest were performed.    COMPARISON:  None    FINDINGS:  There is a nodular lesion projecting over the cardiac silhouette, just left of midline on the frontal view, and projecting over the thoracic spine on the lateral view.  There is mild bibasilar and right mid lung patchy airspace disease.  The pleural spaces are clear.  The cardiac silhouette is unremarkable.  Osseous structures are intact.  There are vascular calcifications.                                       Medications   lactated ringers bolus 1,000 mL (has no administration in time range)   azithromycin (ZITHROMAX) 500 mg in 0.9% NaCl 250 mL IVPB (admixture device) (500 mg Intravenous New Bag 12/17/24 2582)   sodium chloride 0.9% flush 10 mL (has no administration in time range)   naloxone 0.4 mg/mL injection 0.02 mg (has no administration in time range)   glucose chewable tablet 16 g (has no administration in time range)   glucose chewable tablet 24 g (has no administration in time range)   dextrose 50% injection 12.5 g (has no administration in time range)   dextrose 50% injection 25 g (has no administration in time range)   glucagon (human recombinant) injection 1 mg (has no administration in time range)   enoxaparin injection 40 mg (has no administration in time range)   acetaminophen tablet 1,000 mg (has no administration in time range)   oxyCODONE immediate release tablet 5 mg (has no administration in time range)   ondansetron injection 4 mg (has no administration in time range)   albuterol inhaler 2 puff (has no administration in  time range)   melatonin tablet 6 mg (has no administration in time range)   aluminum-magnesium hydroxide-simethicone 200-200-20 mg/5 mL suspension 30 mL (has no administration in time range)   acetaminophen tablet 1,000 mg (1,000 mg Oral Given 12/17/24 1443)   lactated ringers bolus 1,000 mL (1,000 mLs Intravenous New Bag 12/17/24 1534)   cefTRIAXone injection 2 g (2 g Intravenous Given 12/17/24 1623)     Medical Decision Making  Emergent evaluation of a 79-year-old male presenting today for progressive weakness with productive cough, sent from clinic for emergent evaluation.    Patient is afebrile, tachycardic to 120.  /60.  He had saturation 95% on room air.      Patient has bilateral rhonchi and wheezing, right greater than left.  He appears weak.      Differential includes but not limited to URI, pneumonia, flu/COVID, LAUREN, electrolyte derangement.  I do not suspect congestive heart failure based on his current exam.    EKG reviewed and independently interpreted by me as sinus tachycardia 112, normal axis, no acute ischemic changes    Plan for EKG, labs, IV fluids    Amount and/or Complexity of Data Reviewed  Labs: ordered.    Risk  OTC drugs.    15:40   This patient does have evidence of infective focus  My overall impression is sepsis.  Source: Respiratory  Antibiotics given-   Antibiotics (72h ago, onward)      Start     Stop Route Frequency Ordered    12/18/24 1500  cefTRIAXone injection 2 g         -- IV Every 24 hours (non-standard times) 12/17/24 1720    12/18/24 0900  azithromycin tablet 500 mg         -- Oral Daily 12/18/24 0615          Latest lactate reviewed-  Recent Labs   Lab 12/17/24  1441   POCLAC 1.48     Organ dysfunction indicated by  tachycardia    Fluid challenge Ideal Body Weight- The patient's ideal body weight is Ideal body weight: 66.1 kg (145 lb 11.6 oz) which will be used to calculate fluid bolus of 30 ml/kg for treatment of septic shock.      Post- resuscitation assessment Yes  Perfusion exam was performed within 6 hours of septic shock presentation after bolus shows Adequate tissue perfusion assessed by non-invasive monitoring       Will Not start Pressors- Levophed for MAP of 65  Source control achieved by: abx            Attending Attestation:         Attending Critical Care:   Critical Care Times:   ==============================================================  Total Critical Care Time - exclusive of procedural time: 35 minutes.  ==============================================================  Critical care reasons: multifocal PNA.   Critical care was time spent personally by me on the following activities: development of treatment plan with patient or relative, ordering and performing treatments and interventions, evaluation of patient's response to treatment and re-evaluation of patient's conition.   Critical Care Condition: potentially life-threatening           ED Course as of 12/17/24 1700   Tue Dec 17, 2024   1540 WBC(!): 20.55 [GM]   1540 Patient now has 2/4 sirs criteria- blood cultures obtained. Abx initiated    Chest x-ray reviewed and independently interpreted by me as multifocal pneumonia, trace left pleural effusion    Labs reviewed, white count elevated at 20 K.  Lactate normal.  Blood cultures pending, COVID and flu negative.  CMP pending.  X-ray concerning for multifocal pneumonia.    Plan to admit to Hospital Medicine for further treatment and evaluation. [GM]      ED Course User Index  [GM] Bebe Orantes MD                           Clinical Impression:  Final diagnoses:  [R05.9] Cough  [R00.0] Tachycardia  [J18.9] Multifocal pneumonia (Primary)          ED Disposition Condition    Admit Stable                Bebe Orantes MD  12/17/24 1700       Bebe Orantes MD  12/18/24 0639       Bebe Orantes MD  12/18/24 0640

## 2024-12-18 PROBLEM — A41.9 SEPSIS WITHOUT SEPTIC SHOCK: Status: RESOLVED | Noted: 2024-12-18 | Resolved: 2024-12-18

## 2024-12-18 PROBLEM — E83.39 HYPOPHOSPHATEMIA: Status: ACTIVE | Noted: 2024-12-18

## 2024-12-18 PROBLEM — A41.9 SEPSIS WITHOUT SEPTIC SHOCK: Status: ACTIVE | Noted: 2024-12-18

## 2024-12-18 PROBLEM — K76.89 LIVER DYSFUNCTION: Status: ACTIVE | Noted: 2024-12-18

## 2024-12-18 PROBLEM — A41.9 SEPSIS: Status: ACTIVE | Noted: 2024-12-17

## 2024-12-18 PROBLEM — J18.9 MULTIFOCAL PNEUMONIA: Status: ACTIVE | Noted: 2024-12-18

## 2024-12-18 LAB
ALBUMIN SERPL BCP-MCNC: 2.2 G/DL (ref 3.5–5.2)
ALP SERPL-CCNC: 91 U/L (ref 40–150)
ALT SERPL W/O P-5'-P-CCNC: 57 U/L (ref 10–44)
ANION GAP SERPL CALC-SCNC: 5 MMOL/L (ref 8–16)
ANION GAP SERPL CALC-SCNC: 7 MMOL/L (ref 8–16)
AST SERPL-CCNC: 69 U/L (ref 10–40)
BASOPHILS # BLD AUTO: 0.04 K/UL (ref 0–0.2)
BASOPHILS # BLD AUTO: 0.06 K/UL (ref 0–0.2)
BASOPHILS NFR BLD: 0.2 % (ref 0–1.9)
BASOPHILS NFR BLD: 0.3 % (ref 0–1.9)
BILIRUB SERPL-MCNC: 0.7 MG/DL (ref 0.1–1)
BUN SERPL-MCNC: 11 MG/DL (ref 8–23)
BUN SERPL-MCNC: 12 MG/DL (ref 8–23)
CALCIUM SERPL-MCNC: 7.5 MG/DL (ref 8.7–10.5)
CALCIUM SERPL-MCNC: 8.3 MG/DL (ref 8.7–10.5)
CHLORIDE SERPL-SCNC: 100 MMOL/L (ref 95–110)
CHLORIDE SERPL-SCNC: 103 MMOL/L (ref 95–110)
CO2 SERPL-SCNC: 21 MMOL/L (ref 23–29)
CO2 SERPL-SCNC: 24 MMOL/L (ref 23–29)
CREAT SERPL-MCNC: 0.7 MG/DL (ref 0.5–1.4)
CREAT SERPL-MCNC: 0.7 MG/DL (ref 0.5–1.4)
DIFFERENTIAL METHOD BLD: ABNORMAL
DIFFERENTIAL METHOD BLD: ABNORMAL
EOSINOPHIL # BLD AUTO: 0.1 K/UL (ref 0–0.5)
EOSINOPHIL # BLD AUTO: 0.1 K/UL (ref 0–0.5)
EOSINOPHIL NFR BLD: 0.7 % (ref 0–8)
EOSINOPHIL NFR BLD: 0.7 % (ref 0–8)
ERYTHROCYTE [DISTWIDTH] IN BLOOD BY AUTOMATED COUNT: 13.2 % (ref 11.5–14.5)
ERYTHROCYTE [DISTWIDTH] IN BLOOD BY AUTOMATED COUNT: 13.5 % (ref 11.5–14.5)
EST. GFR  (NO RACE VARIABLE): >60 ML/MIN/1.73 M^2
EST. GFR  (NO RACE VARIABLE): >60 ML/MIN/1.73 M^2
GLUCOSE SERPL-MCNC: 105 MG/DL (ref 70–110)
GLUCOSE SERPL-MCNC: 138 MG/DL (ref 70–110)
HCT VFR BLD AUTO: 29.5 % (ref 40–54)
HCT VFR BLD AUTO: 31.1 % (ref 40–54)
HGB BLD-MCNC: 10.8 G/DL (ref 14–18)
HGB BLD-MCNC: 11.1 G/DL (ref 14–18)
IMM GRANULOCYTES # BLD AUTO: 0.21 K/UL (ref 0–0.04)
IMM GRANULOCYTES # BLD AUTO: 0.27 K/UL (ref 0–0.04)
IMM GRANULOCYTES NFR BLD AUTO: 1.1 % (ref 0–0.5)
IMM GRANULOCYTES NFR BLD AUTO: 1.5 % (ref 0–0.5)
LYMPHOCYTES # BLD AUTO: 0.7 K/UL (ref 1–4.8)
LYMPHOCYTES # BLD AUTO: 0.9 K/UL (ref 1–4.8)
LYMPHOCYTES NFR BLD: 4.2 % (ref 18–48)
LYMPHOCYTES NFR BLD: 5 % (ref 18–48)
MAGNESIUM SERPL-MCNC: 2 MG/DL (ref 1.6–2.6)
MCH RBC QN AUTO: 31.7 PG (ref 27–31)
MCH RBC QN AUTO: 33.2 PG (ref 27–31)
MCHC RBC AUTO-ENTMCNC: 35.7 G/DL (ref 32–36)
MCHC RBC AUTO-ENTMCNC: 36.6 G/DL (ref 32–36)
MCV RBC AUTO: 89 FL (ref 82–98)
MCV RBC AUTO: 91 FL (ref 82–98)
MONOCYTES # BLD AUTO: 1.5 K/UL (ref 0.3–1)
MONOCYTES # BLD AUTO: 1.6 K/UL (ref 0.3–1)
MONOCYTES NFR BLD: 8.7 % (ref 4–15)
MONOCYTES NFR BLD: 8.8 % (ref 4–15)
NEUTROPHILS # BLD AUTO: 14.7 K/UL (ref 1.8–7.7)
NEUTROPHILS # BLD AUTO: 15.6 K/UL (ref 1.8–7.7)
NEUTROPHILS NFR BLD: 84.2 % (ref 38–73)
NEUTROPHILS NFR BLD: 84.6 % (ref 38–73)
NRBC BLD-RTO: 0 /100 WBC
NRBC BLD-RTO: 0 /100 WBC
PHOSPHATE SERPL-MCNC: 2 MG/DL (ref 2.7–4.5)
PLATELET # BLD AUTO: 208 K/UL (ref 150–450)
PLATELET # BLD AUTO: 214 K/UL (ref 150–450)
PMV BLD AUTO: 9.6 FL (ref 9.2–12.9)
PMV BLD AUTO: 9.7 FL (ref 9.2–12.9)
POTASSIUM SERPL-SCNC: 3.6 MMOL/L (ref 3.5–5.1)
POTASSIUM SERPL-SCNC: 3.6 MMOL/L (ref 3.5–5.1)
PROT SERPL-MCNC: 5.5 G/DL (ref 6–8.4)
RBC # BLD AUTO: 3.25 M/UL (ref 4.6–6.2)
RBC # BLD AUTO: 3.5 M/UL (ref 4.6–6.2)
SODIUM SERPL-SCNC: 129 MMOL/L (ref 136–145)
SODIUM SERPL-SCNC: 131 MMOL/L (ref 136–145)
WBC # BLD AUTO: 17.42 K/UL (ref 3.9–12.7)
WBC # BLD AUTO: 18.54 K/UL (ref 3.9–12.7)

## 2024-12-18 PROCEDURE — 25000003 PHARM REV CODE 250: Performed by: INTERNAL MEDICINE

## 2024-12-18 PROCEDURE — 84100 ASSAY OF PHOSPHORUS: CPT | Performed by: FAMILY MEDICINE

## 2024-12-18 PROCEDURE — 25000003 PHARM REV CODE 250: Performed by: FAMILY MEDICINE

## 2024-12-18 PROCEDURE — 85025 COMPLETE CBC W/AUTO DIFF WBC: CPT | Mod: 91 | Performed by: FAMILY MEDICINE

## 2024-12-18 PROCEDURE — 80053 COMPREHEN METABOLIC PANEL: CPT | Performed by: FAMILY MEDICINE

## 2024-12-18 PROCEDURE — 63600175 PHARM REV CODE 636 W HCPCS: Performed by: FAMILY MEDICINE

## 2024-12-18 PROCEDURE — 80048 BASIC METABOLIC PNL TOTAL CA: CPT | Mod: XB | Performed by: FAMILY MEDICINE

## 2024-12-18 PROCEDURE — 11000001 HC ACUTE MED/SURG PRIVATE ROOM

## 2024-12-18 PROCEDURE — 63700000 PHARM REV CODE 250 ALT 637 W/O HCPCS: Performed by: FAMILY MEDICINE

## 2024-12-18 PROCEDURE — 85025 COMPLETE CBC W/AUTO DIFF WBC: CPT | Performed by: FAMILY MEDICINE

## 2024-12-18 PROCEDURE — 25000242 PHARM REV CODE 250 ALT 637 W/ HCPCS: Performed by: FAMILY MEDICINE

## 2024-12-18 PROCEDURE — 83735 ASSAY OF MAGNESIUM: CPT | Performed by: FAMILY MEDICINE

## 2024-12-18 PROCEDURE — 99285 EMERGENCY DEPT VISIT HI MDM: CPT

## 2024-12-18 RX ORDER — DOCUSATE SODIUM 100 MG
200 CAPSULE ORAL
Status: DISCONTINUED | OUTPATIENT
Start: 2024-12-18 | End: 2024-12-19

## 2024-12-18 RX ORDER — AZITHROMYCIN 250 MG/1
500 TABLET, FILM COATED ORAL DAILY
Status: COMPLETED | OUTPATIENT
Start: 2024-12-18 | End: 2024-12-22

## 2024-12-18 RX ORDER — SODIUM,POTASSIUM PHOSPHATES 280-250MG
2 POWDER IN PACKET (EA) ORAL ONCE
Status: COMPLETED | OUTPATIENT
Start: 2024-12-18 | End: 2024-12-18

## 2024-12-18 RX ORDER — GUAIFENESIN 100 MG/5ML
200 SOLUTION ORAL EVERY 4 HOURS PRN
Status: DISCONTINUED | OUTPATIENT
Start: 2024-12-18 | End: 2024-12-26 | Stop reason: HOSPADM

## 2024-12-18 RX ORDER — SODIUM CHLORIDE 9 MG/ML
INJECTION, SOLUTION INTRAVENOUS CONTINUOUS
Status: DISCONTINUED | OUTPATIENT
Start: 2024-12-18 | End: 2024-12-19

## 2024-12-18 RX ADMIN — METOPROLOL SUCCINATE 25 MG: 25 TABLET, EXTENDED RELEASE ORAL at 08:12

## 2024-12-18 RX ADMIN — Medication 200 ML: at 08:12

## 2024-12-18 RX ADMIN — Medication 200 ML: at 11:12

## 2024-12-18 RX ADMIN — Medication 200 ML: at 03:12

## 2024-12-18 RX ADMIN — CEFTRIAXONE 2 G: 2 INJECTION, POWDER, FOR SOLUTION INTRAMUSCULAR; INTRAVENOUS at 02:12

## 2024-12-18 RX ADMIN — GUAIFENESIN 200 MG: 200 SOLUTION ORAL at 04:12

## 2024-12-18 RX ADMIN — ENOXAPARIN SODIUM 40 MG: 40 INJECTION SUBCUTANEOUS at 05:12

## 2024-12-18 RX ADMIN — POTASSIUM & SODIUM PHOSPHATES POWDER PACK 280-160-250 MG 2 PACKET: 280-160-250 PACK at 08:12

## 2024-12-18 RX ADMIN — GUAIFENESIN AND DEXTROMETHORPHAN HYDROBROMIDE 2 TABLET: 600; 30 TABLET, EXTENDED RELEASE ORAL at 02:12

## 2024-12-18 RX ADMIN — GUAIFENESIN AND DEXTROMETHORPHAN HYDROBROMIDE 2 TABLET: 600; 30 TABLET, EXTENDED RELEASE ORAL at 09:12

## 2024-12-18 RX ADMIN — FLUTICASONE PROPIONATE 50 MCG: 50 SPRAY, METERED NASAL at 01:12

## 2024-12-18 RX ADMIN — Medication 200 ML: at 09:12

## 2024-12-18 RX ADMIN — AZITHROMYCIN DIHYDRATE 500 MG: 250 TABLET ORAL at 08:12

## 2024-12-18 RX ADMIN — SODIUM CHLORIDE: 9 INJECTION, SOLUTION INTRAVENOUS at 08:12

## 2024-12-18 RX ADMIN — TAMSULOSIN HYDROCHLORIDE 0.8 MG: 0.4 CAPSULE ORAL at 08:12

## 2024-12-18 NOTE — ASSESSMENT & PLAN NOTE
- Na 126 on presentation  - setting of PNA and decreased po intake  - IVFs  - follow up legionella studies  - continue to monitor

## 2024-12-18 NOTE — SUBJECTIVE & OBJECTIVE
Past Medical History:   Diagnosis Date    Allergy     AR (allergic rhinitis)     Basal cell carcinoma of ear 2006    Right Ear    BPH (benign prostatic hyperplasia)     DDD (degenerative disc disease), lumbar     Degenerative disc disease     Gastroesophageal reflux disease 3/11/2019    Hypertension     Kidney stone     Undescended testicle     Right Orchiectomy       Past Surgical History:   Procedure Laterality Date    BASAL CELL CARCINOMA EXCISION      ESOPHAGOGASTRODUODENOSCOPY N/A 10/5/2022    Procedure: EGD (ESOPHAGOGASTRODUODENOSCOPY);  Surgeon: Franco Campoverde MD;  Location: [facility];  Service: Endoscopy;  Laterality: N/A;    HEMORRHOID SURGERY      HERNIA REPAIR      PILONIDAL CYST DRAINAGE      Right Orchiectomy      TRANSURETHRAL RESECTION OF PROSTATE  2010       Review of patient's allergies indicates:   Allergen Reactions    Ace inhibitors Other (See Comments)     Cough    Chlorthalidone      Hyponatremia    Feathers Other (See Comments)    Mite extract Other (See Comments)     Nose gets congested       No current facility-administered medications on file prior to encounter.     Current Outpatient Medications on File Prior to Encounter   Medication Sig    amLODIPine (NORVASC) 5 MG tablet TAKE 1 TABLET BY MOUTH EVERY DAY    azelastine (ASTELIN) 137 mcg (0.1 %) nasal spray INSTILL 1-2 SPRAYS BY NASAL ROUTE 2 (TWO) TIMES DAILY AS NEEDED FOR RHINITIS.    benzonatate (TESSALON) 200 MG capsule Take 1 capsule (200 mg total) by mouth 3 (three) times daily as needed for Cough. (Patient not taking: Reported on 12/17/2024)    blood sugar diagnostic Strp To check BG 1 times daily, to use with insurance preferred meter    cetirizine (ZYRTEC) 10 MG tablet Take 10 mg by mouth once daily.    fluticasone propionate (FLONASE) 50 mcg/actuation nasal spray USE 1 SPRAY (50 MCG TOTAL) IN EACH NOSTRIL ONCE DAILY    fluticasone propionate (FLONASE) 50 mcg/actuation nasal spray 1 spray (50 mcg total) by Each Nostril route  once daily.    INV atorvastatin tablet 40 mg/placebo Take one tablet by mouth each morning. For Investigational Use Only.    ketoconazole (NIZORAL) 2 % cream APPLY TO AREA OF RED, DRY SKIN ON BOTH FEET/HEELS ONE TO TWO TIMES DAILY FOR 2 TO 3 WEEKS.    lancets Misc To check BG 1 times daily, to use with insurance preferred meter    losartan (COZAAR) 100 MG tablet Take 1 tablet (100 mg total) by mouth once daily.    metoprolol succinate (TOPROL-XL) 25 MG 24 hr tablet Take 1 tablet (25 mg total) by mouth once daily.    omeprazole (PRILOSEC) 40 MG capsule TAKE 1 CAPSULE (40 MG TOTAL) BY MOUTH EVERY MORNING.    ONETOUCH VERIO FLEX METER Misc TO CHECK BLOOD GLUCOSE 1 TIME DAILY, TO USE WITH INSURANCE PREFERRED METER    tamsulosin (FLOMAX) 0.4 mg Cp24 Take 0.8 mg by mouth once daily.      Family History       Problem Relation (Age of Onset)    Allergies Mother    Dementia Mother    Diabetes Father    Heart disease Father    Hypertension Father          Tobacco Use    Smoking status: Former     Current packs/day: 0.00     Average packs/day: 0.5 packs/day for 10.0 years (5.0 ttl pk-yrs)     Types: Cigarettes     Start date: 1987     Quit date: 1997     Years since quittin.9     Passive exposure: Never    Smokeless tobacco: Never   Substance and Sexual Activity    Alcohol use: Yes     Comment: Rare    Drug use: Never    Sexual activity: Not on file     Review of Systems   Constitutional:  Positive for activity change, appetite change, chills and fatigue. Negative for fever.   HENT:  Positive for voice change. Negative for congestion, drooling, rhinorrhea, sinus pain, sore throat and trouble swallowing.    Eyes:  Negative for photophobia and visual disturbance.   Respiratory:  Positive for cough and shortness of breath. Negative for wheezing.    Cardiovascular:  Negative for chest pain, palpitations and leg swelling.   Gastrointestinal:  Negative for abdominal distention, abdominal pain, diarrhea, nausea and  vomiting.   Genitourinary:  Negative for dysuria and hematuria.   Musculoskeletal:  Negative for neck pain and neck stiffness.   Skin:  Negative for rash and wound.   Neurological:  Positive for weakness. Negative for seizures, syncope, light-headedness, numbness and headaches.   Psychiatric/Behavioral:  Negative for confusion and decreased concentration.      Objective:     Vital Signs (Most Recent):  Temp: 98.9 °F (37.2 °C) (12/17/24 1558)  Pulse: 94 (12/17/24 1558)  Resp: 16 (12/17/24 1558)  BP: (!) 145/66 (12/17/24 1558)  SpO2: 97 % (12/17/24 1558) Vital Signs (24h Range):  Temp:  [98.1 °F (36.7 °C)-98.9 °F (37.2 °C)] 98.9 °F (37.2 °C)  Pulse:  [] 94  Resp:  [16-20] 16  SpO2:  [95 %-97 %] 97 %  BP: (108-145)/(56-66) 145/66     Weight: 68 kg (150 lb)  Body mass index is 23.49 kg/m².     Physical Exam  Constitutional:       General: He is not in acute distress.     Appearance: He is not toxic-appearing or diaphoretic.   HENT:      Head: Normocephalic and atraumatic.      Nose: Nose normal.   Eyes:      General: No scleral icterus.     Extraocular Movements: Extraocular movements intact.      Pupils: Pupils are equal, round, and reactive to light.   Cardiovascular:      Rate and Rhythm: Regular rhythm. Tachycardia present.   Pulmonary:      Effort: Pulmonary effort is normal. No respiratory distress.      Breath sounds: Rales present. No wheezing.   Abdominal:      General: Abdomen is flat. There is no distension.      Palpations: Abdomen is soft.      Tenderness: There is no abdominal tenderness. There is no guarding.   Musculoskeletal:         General: Normal range of motion.      Cervical back: Normal range of motion and neck supple. No rigidity.      Right lower leg: No edema.      Left lower leg: No edema.   Skin:     General: Skin is warm and dry.      Coloration: Skin is not jaundiced.   Neurological:      General: No focal deficit present.      Mental Status: He is alert and oriented to person, place,  and time.      Cranial Nerves: No cranial nerve deficit.   Psychiatric:         Mood and Affect: Mood normal.         Behavior: Behavior normal.              CRANIAL NERVES     CN III, IV, VI   Pupils are equal, round, and reactive to light.       Significant Labs: All pertinent labs within the past 24 hours have been reviewed.  CBC:   Recent Labs   Lab 12/17/24  1433   WBC 20.55*   HGB 13.3*   HCT 37.7*        CMP:   Recent Labs   Lab 12/17/24  1433   *   K 3.7   CL 91*   CO2 22*   GLU 91   BUN 12   CREATININE 0.9   CALCIUM 9.6   PROT 7.4   ALBUMIN 3.0*   BILITOT 1.7*   ALKPHOS 129   AST 93*   ALT 71*   ANIONGAP 13       Significant Imaging: I have reviewed all pertinent imaging results/findings within the past 24 hours.

## 2024-12-18 NOTE — ASSESSMENT & PLAN NOTE
Likely d/t sepsis and hypovolemia  Improved with IV fluids and antibiotics  Expect continued improvement.  If not, additional workup indicated.

## 2024-12-18 NOTE — HOSPITAL COURSE
Admitted to hospital medicine for sepsis d/t multifocal pneumonia c/b hyponatremia and liver dysfunction.  Started on ceftriaxone and azithromycin.  Na improved with IV fluids and PO intake.  Liver dysfunction improved with antibiotics and fluids.  COVID and flu negative.  Pt completed course of antibiotics during admission.   Vital signs stabilized and blood cultures negative.  Hyponatremia continued to improve with fluid restriction and Na tabs.  CM/SW assisted in placement to SNF.  Recommend continued fluid restriction and Na tabs for further Na improvement.  The patient's chronic medical conditions were managed appropriately.  Plan for discharge to [facility] discussed at length and pt agreeable.    Physical Exam  Constitutional:  well-developed.   Cardiovascular: Normal rate and regular rhythm.   Pulmonary: Pulmonary effort is normal.  Clear to auscultation bilateral  Abdominal: No distension, soft and nontender  Skin: warm and dry.   Neurological:  No focal neuro deficit  Psychiatric:    Behavior normal.

## 2024-12-18 NOTE — ASSESSMENT & PLAN NOTE
- continue home metoprolol  - holding home losartan and amlodipine for now while monitoring hemodynamics

## 2024-12-18 NOTE — PLAN OF CARE
[facility]  Initial Discharge Assessment       Primary Care Provider: Jabier Hinkle MD    Admission Diagnosis: Multifocal pneumonia [J18.9]    Admission Date: 12/17/2024  Expected Discharge Date:     Pt is independent with their ADL's and ambulation.     Post acute was discussed with pt. Pt d/c home with no needs at the moment.     Transition of Care Barriers: (P) None    Payor: MEDICARE / Plan: MEDICARE PART A & B / Product Type: Government /     Extended Emergency Contact Information  Primary Emergency Contact: Emory Quiñonez   [place]  Mobile Phone: [phone]  Relation: Relative    Discharge Plan A: (P) Home  Discharge Plan B: (P) Home      [facility]  [address] [address]  Phone: [phone] Fax: [phone]    [facility] [id number] [facility] [employer] - [place], [place] - [address]  [address] [address]  Phone: [phone] Fax: [phone]    [facility]  [address] [address]        Initial Assessment (most recent)       Adult Discharge Assessment - 12/18/24 0910          Discharge Assessment    Assessment Type Discharge Planning Assessment (P)      Confirmed/corrected address, phone number and insurance Yes (P)      Confirmed Demographics Correct on Facesheet (P)      Source of Information patient (P)      Does patient/caregiver understand observation status Yes (P)      Reason For Admission Multifocal pneumonia (P)      People in Home alone (P)      Do you expect to return to your current living situation? Yes (P)      Do you have help at home or someone to help you manage your care at home? No (P)      Prior to hospitilization cognitive status: Alert/Oriented;No Deficits (P)      Current cognitive status: No Deficits;Alert/Oriented (P)      Walking or Climbing Stairs Difficulty no (P)      Dressing/Bathing Difficulty no (P)      Home Layout Able to  live on 1st floor (P)      Equipment Currently Used at Home none (P)      Readmission within 30 days? No (P)      Patient currently being followed by outpatient case management? No (P)      Do you currently have service(s) that help you manage your care at home? No (P)      Do you take prescription medications? Yes (P)      Do you have prescription coverage? Yes (P)      Coverage MEDICARE PART A & B (P)      Do you have any problems affording any of your prescribed medications? No (P)      Is the patient taking medications as prescribed? yes (P)      Who is going to help you get home at discharge? family/friend (P)      How do you get to doctors appointments? car, drives self (P)      Are you on dialysis? No (P)      Do you take coumadin? No (P)      Discharge Plan A Home (P)      Discharge Plan B Home (P)      DME Needed Upon Discharge  none (P)      Discharge Plan discussed with: Patient (P)      Transition of Care Barriers None (P)         Physical Activity    On average, how many days per week do you engage in moderate to strenuous exercise (like a brisk walk)? 0 days (P)      On average, how many minutes do you engage in exercise at this level? 0 min (P)         Financial Resource Strain    How hard is it for you to pay for the very basics like food, housing, medical care, and heating? Not very hard (P)         Housing Stability    In the last 12 months, was there a time when you were not able to pay the mortgage or rent on time? No (P)      At any time in the past 12 months, were you homeless or living in a shelter (including now)? No (P)         Transportation Needs    Has the lack of transportation kept you from medical appointments, meetings, work or from getting things needed for daily living? No (P)         Food Insecurity    Within the past 12 months, you worried that your food would run out before you got the money to buy more. Never true (P)      Within the past 12 months, the food you bought just  didn't last and you didn't have money to get more. Never true (P)         Stress    Do you feel stress - tense, restless, nervous, or anxious, or unable to sleep at night because your mind is troubled all the time - these days? Not at all (P)         Social Isolation    How often do you feel lonely or isolated from those around you?  Never (P)         Alcohol Use    Q1: How often do you have a drink containing alcohol? Never (P)      Q2: How many drinks containing alcohol do you have on a typical day when you are drinking? Patient does not drink (P)      Q3: How often do you have six or more drinks on one occasion? Never (P)         Utilities    In the past 12 months has the electric, gas, oil, or water company threatened to shut off services in your home? No (P)         Health Literacy    How often do you need to have someone help you when you read instructions, pamphlets, or other written material from your doctor or pharmacy? Rarely (P)                    MOISES Denise, CSW.   Case Management  [facility]  Email: [email or web address]

## 2024-12-18 NOTE — PROGRESS NOTES
[facility]  Progress Note    Patient Name: Jd Dill III  MRN: [id number]  Patient Class: IP- Inpatient   Admission Date: 12/17/2024  Length of Stay: 1 days  Attending Physician: Rodrigue Farley MD  Primary Care Provider: Jabier Hinkle MD        Subjective     Principal Problem:Sepsis        HPI:  Jd Dill III is an 79-year-old male with a history of BPH, GERD, hypertension transferred from clinic for tachycardia and generalized weakness.   He started feeling bad approximately 2 weeks ago.  Went to the clinic on 12/13, recommended conservative therapy with lozenges, Tylenol etc..  He went back to the clinic today because he was feeling worse.  He has had decreased p.o. intake.  He is feeling weak.  His cough is getting worse.  He was found to be tachycardic with wheezing, transferred to ED for further workup.  He denies chest pain.  No abdominal pain.  Denies dysuria or hematuria.  Denies current fever.    In the ED patient afebrile, tachycardic, hemodynamically stable saturating in mid 90's on room air at rest. WBC 20.55. LA 1.48. Patient Na 126. CXR performed and showed Scattered low-grade patchy airspace opacities and a suspected nodular lesion. CT Chest ordered to better characterize. Started on azithro and ceftriaxone for multifocal pna. Admitted to the care of medicine for further evaluation and management.      Overview/Hospital Course:  Admitted to hospital medicine for sepsis d/t multifocal pneumonia c/b hyponatremia and liver dysfunction.  Started on ceftriaxone and azithromycin.  Na improved with IV fluids and PO intake.  Liver dysfunction improved with antibiotics and fluids.  COVID and flu negative.  Blood cultures NGTD.    Interval History:  Pt reports continued productive cough, SOB, ROSARIO that is unchanged.  Denies any fevers, chills, chest pain, abdominal pain, palpitations, nausea, vomiting, diarrhea.    Labs personally reviewed:  WBC 17.42 from 20.55, Na 131 from  126, phosphorus 2.0, bilirubin 0.7 from 1.7, AST 69 from 93, ALT 57 from 71    CXR personally reviewed: Bilateral pulmonary opacities concerning for multifocal pneumonia possible left-sided pleural effusion      Objective:     Vital Signs (Most Recent):  Temp: 99 °F (37.2 °C) (12/18/24 1550)  Pulse: 99 (12/18/24 1550)  Resp: 18 (12/18/24 1550)  BP: 114/69 (12/18/24 1550)  SpO2: (!) 92 % (12/18/24 1550) Vital Signs (24h Range):  Temp:  [97.8 °F (36.6 °C)-99.4 °F (37.4 °C)] 99 °F (37.2 °C)  Pulse:  [] 99  Resp:  [15-18] 18  SpO2:  [92 %-95 %] 92 %  BP: (107-137)/(57-79) 114/69     Weight: 67.8 kg (149 lb 7.6 oz)  Body mass index is 23.41 kg/m².    Intake/Output Summary (Last 24 hours) at 12/18/2024 1621  Last data filed at 12/18/2024 1508  Gross per 24 hour   Intake 2040 ml   Output 700 ml   Net 1340 ml         Physical Exam  Constitutional:       General: He is not in acute distress.  Cardiovascular:      Rate and Rhythm: Normal rate and regular rhythm.   Pulmonary:      Effort: Pulmonary effort is normal. No respiratory distress.      Breath sounds: No rales (Bilateral).      Comments: Diminished bilaterally.  Frequent cough  Abdominal:      General: Abdomen is flat. There is no distension.      Palpations: Abdomen is soft.      Tenderness: There is no abdominal tenderness. There is no guarding.   Musculoskeletal:      Right lower leg: No edema.      Left lower leg: No edema.   Skin:     General: Skin is warm and dry.   Neurological:      General: No focal deficit present.      Mental Status: He is alert.   Psychiatric:         Mood and Affect: Mood normal.             Significant Labs: All pertinent labs within the past 24 hours have been reviewed.    Significant Imaging: I have reviewed all pertinent imaging results/findings within the past 24 hours.    Assessment and Plan     * Sepsis  Multifocal pneumonia  Chest imaging consistent with multifocal pneumonia.  Cont ceftriaxone and azithromycin.  May need  longer course of azithromycin pending Legionella studies.  follow up blood and respiratory cultures  follow up legionella Ag and culture  Lactic acid WNL.  S/p 2 L bolus in ED and 1 L maintenance IVF.    Hyponatremia  Likely d/t decreased PO intake  Na 126 on presentation, improved with IV fluid and PO intake  setting of PNA and decreased po intake  S/p IV fluids, cont PO intake.  follow up legionella studies  continue to monitor     Liver dysfunction  Likely d/t sepsis and hypovolemia  Improved with IV fluids and antibiotics  Expect continued improvement.  If not, additional workup indicated.    Hypophosphatemia  Monitor and replace as needed    Benign prostatic hyperplasia with urinary obstruction  continue home tamsulosin  strict I/Os    Essential hypertension  continue home metoprolol  holding home losartan and amlodipine for now, resume when BP tolerates.      VTE Risk Mitigation (From admission, onward)           Ordered     enoxaparin injection 40 mg  Daily         12/17/24 1657     IP VTE HIGH RISK PATIENT  Once         12/17/24 1657     Place sequential compression device  Until discontinued         12/17/24 1657                    Discharge Planning   SIRIA: 12/20/2024     Code Status: Full Code   Medical Readiness for Discharge Date:   Discharge Plan A: Home                        Franky Gonzalez III, MD  Department of Hospital Medicine   [facility]

## 2024-12-18 NOTE — ASSESSMENT & PLAN NOTE
Likely d/t decreased PO intake  Na 126 on presentation, improved with IV fluid and PO intake  setting of PNA and decreased po intake  S/p IV fluids, cont PO intake.  follow up legionella studies  continue to monitor

## 2024-12-18 NOTE — HPI
Jd Dill III is an 79-year-old male with a history of BPH, GERD, hypertension transferred from clinic for tachycardia and generalized weakness.   He started feeling bad approximately 2 weeks ago.  Went to the clinic on 12/13, recommended conservative therapy with lozenges, Tylenol etc..  He went back to the clinic today because he was feeling worse.  He has had decreased p.o. intake.  He is feeling weak.  His cough is getting worse.  He was found to be tachycardic with wheezing, transferred to ED for further workup.  He denies chest pain.  No abdominal pain.  Denies dysuria or hematuria.  Denies current fever.    In the ED patient afebrile, tachycardic, hemodynamically stable saturating in mid 90's on room air at rest. WBC 20.55. LA 1.48. Patient Na 126. CXR performed and showed Scattered low-grade patchy airspace opacities and a suspected nodular lesion. CT Chest ordered to better characterize. Started on azithro and ceftriaxone for multifocal pna. Admitted to the care of medicine for further evaluation and management.

## 2024-12-18 NOTE — ASSESSMENT & PLAN NOTE
Multifocal pneumonia  Chest imaging consistent with multifocal pneumonia.  Cont ceftriaxone and azithromycin.  May need longer course of azithromycin pending Legionella studies.  follow up blood and respiratory cultures  follow up legionella Ag and culture  Lactic acid WNL.  S/p 2 L bolus in ED and 1 L maintenance IVF.

## 2024-12-18 NOTE — H&P
[facility]  [facility]  History & Physical    Patient Name: Jd Dill III  MRN: [id number]  Patient Class: IP- Inpatient  Admission Date: 12/17/2024  Attending Physician: Franky Gonzalez III*   Primary Care Provider: Jabier Hinkle MD         Patient information was obtained from patient, past medical records, and ER records.     Subjective:     Principal Problem:Multifocal pneumonia    Chief Complaint:   Chief Complaint   Patient presents with    Cough     Pt comes from across the street, NP examining pt states he was sating at 95% and she is concerned for pneumonia. Pt has had cough for 1 week.        HPI: Jd Dill III is an 79-year-old male with a history of BPH, GERD, hypertension transferred from clinic for tachycardia and generalized weakness.   He started feeling bad approximately 2 weeks ago.  Went to the clinic on 12/13, recommended conservative therapy with lozenges, Tylenol etc..  He went back to the clinic today because he was feeling worse.  He has had decreased p.o. intake.  He is feeling weak.  His cough is getting worse.  He was found to be tachycardic with wheezing, transferred to ED for further workup.  He denies chest pain.  No abdominal pain.  Denies dysuria or hematuria.  Denies current fever.    In the ED patient afebrile, tachycardic, hemodynamically stable saturating in mid 90's on room air at rest. WBC 20.55. LA 1.48. Patient Na 126. CXR performed and showed Scattered low-grade patchy airspace opacities and a suspected nodular lesion. CT Chest ordered to better characterize. Started on azithro and ceftriaxone for multifocal pna. Admitted to the care of medicine for further evaluation and management.      Past Medical History:   Diagnosis Date    Allergy     AR (allergic rhinitis)     Basal cell carcinoma of ear 2006    Right Ear    BPH (benign prostatic hyperplasia)     DDD (degenerative disc disease), lumbar     Degenerative disc disease     Gastroesophageal  reflux disease 3/11/2019    Hypertension     Kidney stone     Undescended testicle     Right Orchiectomy       Past Surgical History:   Procedure Laterality Date    BASAL CELL CARCINOMA EXCISION      ESOPHAGOGASTRODUODENOSCOPY N/A 10/5/2022    Procedure: EGD (ESOPHAGOGASTRODUODENOSCOPY);  Surgeon: Franco Campoverde MD;  Location: [facility];  Service: Endoscopy;  Laterality: N/A;    HEMORRHOID SURGERY      HERNIA REPAIR      PILONIDAL CYST DRAINAGE      Right Orchiectomy      TRANSURETHRAL RESECTION OF PROSTATE  2010       Review of patient's allergies indicates:   Allergen Reactions    Ace inhibitors Other (See Comments)     Cough    Chlorthalidone      Hyponatremia    Feathers Other (See Comments)    Mite extract Other (See Comments)     Nose gets congested       No current facility-administered medications on file prior to encounter.     Current Outpatient Medications on File Prior to Encounter   Medication Sig    amLODIPine (NORVASC) 5 MG tablet TAKE 1 TABLET BY MOUTH EVERY DAY    azelastine (ASTELIN) 137 mcg (0.1 %) nasal spray INSTILL 1-2 SPRAYS BY NASAL ROUTE 2 (TWO) TIMES DAILY AS NEEDED FOR RHINITIS.    benzonatate (TESSALON) 200 MG capsule Take 1 capsule (200 mg total) by mouth 3 (three) times daily as needed for Cough. (Patient not taking: Reported on 12/17/2024)    blood sugar diagnostic Strp To check BG 1 times daily, to use with insurance preferred meter    cetirizine (ZYRTEC) 10 MG tablet Take 10 mg by mouth once daily.    fluticasone propionate (FLONASE) 50 mcg/actuation nasal spray USE 1 SPRAY (50 MCG TOTAL) IN EACH NOSTRIL ONCE DAILY    fluticasone propionate (FLONASE) 50 mcg/actuation nasal spray 1 spray (50 mcg total) by Each Nostril route once daily.    INV atorvastatin tablet 40 mg/placebo Take one tablet by mouth each morning. For Investigational Use Only.    ketoconazole (NIZORAL) 2 % cream APPLY TO AREA OF RED, DRY SKIN ON BOTH FEET/HEELS ONE TO TWO TIMES DAILY FOR 2 TO 3 WEEKS.    lancets  Misc To check BG 1 times daily, to use with insurance preferred meter    losartan (COZAAR) 100 MG tablet Take 1 tablet (100 mg total) by mouth once daily.    metoprolol succinate (TOPROL-XL) 25 MG 24 hr tablet Take 1 tablet (25 mg total) by mouth once daily.    omeprazole (PRILOSEC) 40 MG capsule TAKE 1 CAPSULE (40 MG TOTAL) BY MOUTH EVERY MORNING.    ONETOUCH VERIO FLEX METER Misc TO CHECK BLOOD GLUCOSE 1 TIME DAILY, TO USE WITH INSURANCE PREFERRED METER    tamsulosin (FLOMAX) 0.4 mg Cp24 Take 0.8 mg by mouth once daily.      Family History       Problem Relation (Age of Onset)    Allergies Mother    Dementia Mother    Diabetes Father    Heart disease Father    Hypertension Father          Tobacco Use    Smoking status: Former     Current packs/day: 0.00     Average packs/day: 0.5 packs/day for 10.0 years (5.0 ttl pk-yrs)     Types: Cigarettes     Start date: 1987     Quit date: 1997     Years since quittin.9     Passive exposure: Never    Smokeless tobacco: Never   Substance and Sexual Activity    Alcohol use: Yes     Comment: Rare    Drug use: Never    Sexual activity: Not on file     Review of Systems   Constitutional:  Positive for activity change, appetite change, chills and fatigue. Negative for fever.   HENT:  Positive for voice change. Negative for congestion, drooling, rhinorrhea, sinus pain, sore throat and trouble swallowing.    Eyes:  Negative for photophobia and visual disturbance.   Respiratory:  Positive for cough and shortness of breath. Negative for wheezing.    Cardiovascular:  Negative for chest pain, palpitations and leg swelling.   Gastrointestinal:  Negative for abdominal distention, abdominal pain, diarrhea, nausea and vomiting.   Genitourinary:  Negative for dysuria and hematuria.   Musculoskeletal:  Negative for neck pain and neck stiffness.   Skin:  Negative for rash and wound.   Neurological:  Positive for weakness. Negative for seizures, syncope, light-headedness, numbness  and headaches.   Psychiatric/Behavioral:  Negative for confusion and decreased concentration.      Objective:     Vital Signs (Most Recent):  Temp: 98.9 °F (37.2 °C) (12/17/24 1558)  Pulse: 94 (12/17/24 1558)  Resp: 16 (12/17/24 1558)  BP: (!) 145/66 (12/17/24 1558)  SpO2: 97 % (12/17/24 1558) Vital Signs (24h Range):  Temp:  [98.1 °F (36.7 °C)-98.9 °F (37.2 °C)] 98.9 °F (37.2 °C)  Pulse:  [] 94  Resp:  [16-20] 16  SpO2:  [95 %-97 %] 97 %  BP: (108-145)/(56-66) 145/66     Weight: 68 kg (150 lb)  Body mass index is 23.49 kg/m².     Physical Exam  Constitutional:       General: He is not in acute distress.     Appearance: He is not toxic-appearing or diaphoretic.   HENT:      Head: Normocephalic and atraumatic.      Nose: Nose normal.   Eyes:      General: No scleral icterus.     Extraocular Movements: Extraocular movements intact.      Pupils: Pupils are equal, round, and reactive to light.   Cardiovascular:      Rate and Rhythm: Regular rhythm. Tachycardia present.   Pulmonary:      Effort: Pulmonary effort is normal. No respiratory distress.      Breath sounds: Rales present. No wheezing.   Abdominal:      General: Abdomen is flat. There is no distension.      Palpations: Abdomen is soft.      Tenderness: There is no abdominal tenderness. There is no guarding.   Musculoskeletal:         General: Normal range of motion.      Cervical back: Normal range of motion and neck supple. No rigidity.      Right lower leg: No edema.      Left lower leg: No edema.   Skin:     General: Skin is warm and dry.      Coloration: Skin is not jaundiced.   Neurological:      General: No focal deficit present.      Mental Status: He is alert and oriented to person, place, and time.      Cranial Nerves: No cranial nerve deficit.   Psychiatric:         Mood and Affect: Mood normal.         Behavior: Behavior normal.              CRANIAL NERVES     CN III, IV, VI   Pupils are equal, round, and reactive to light.       Significant  Labs: All pertinent labs within the past 24 hours have been reviewed.  CBC:   Recent Labs   Lab 12/17/24  1433   WBC 20.55*   HGB 13.3*   HCT 37.7*        CMP:   Recent Labs   Lab 12/17/24  1433   *   K 3.7   CL 91*   CO2 22*   GLU 91   BUN 12   CREATININE 0.9   CALCIUM 9.6   PROT 7.4   ALBUMIN 3.0*   BILITOT 1.7*   ALKPHOS 129   AST 93*   ALT 71*   ANIONGAP 13       Significant Imaging: I have reviewed all pertinent imaging results/findings within the past 24 hours.  Assessment/Plan:     * Multifocal pneumonia  - Continue azithromycin and ceftriaxone  - follow up blood and respiratory cultures  - follow up legionella Ag and culture  - IVF  - CT Chest pending  - supplemental O2 and monitor pulseOx  - further management pending clinical course and future study review    Hyponatremia  - Na 126 on presentation  - setting of PNA and decreased po intake  - IVFs  - follow up legionella studies  - continue to monitor     Benign prostatic hyperplasia with urinary obstruction  - continue home tamsulosin  - strict I/Os      Essential hypertension  - continue home metoprolol  - holding home losartan and amlodipine for now while monitoring hemodynamics      VTE Risk Mitigation (From admission, onward)           Ordered     enoxaparin injection 40 mg  Daily         12/17/24 1657     IP VTE HIGH RISK PATIENT  Once         12/17/24 1657     Place sequential compression device  Until discontinued         12/17/24 1657                                    Rodrigue Farley MD  Department of Hospital Medicine  [facility]

## 2024-12-18 NOTE — SUBJECTIVE & OBJECTIVE
Interval History:  Pt reports continued productive cough, SOB, ROSARIO that is unchanged.  Denies any fevers, chills, chest pain, abdominal pain, palpitations, nausea, vomiting, diarrhea.    Labs personally reviewed:  WBC 17.42 from 20.55, Na 131 from 126, phosphorus 2.0, bilirubin 0.7 from 1.7, AST 69 from 93, ALT 57 from 71    CXR personally reviewed: Bilateral pulmonary opacities concerning for multifocal pneumonia possible left-sided pleural effusion      Objective:     Vital Signs (Most Recent):  Temp: 99 °F (37.2 °C) (12/18/24 1550)  Pulse: 99 (12/18/24 1550)  Resp: 18 (12/18/24 1550)  BP: 114/69 (12/18/24 1550)  SpO2: (!) 92 % (12/18/24 1550) Vital Signs (24h Range):  Temp:  [97.8 °F (36.6 °C)-99.4 °F (37.4 °C)] 99 °F (37.2 °C)  Pulse:  [] 99  Resp:  [15-18] 18  SpO2:  [92 %-95 %] 92 %  BP: (107-137)/(57-79) 114/69     Weight: 67.8 kg (149 lb 7.6 oz)  Body mass index is 23.41 kg/m².    Intake/Output Summary (Last 24 hours) at 12/18/2024 1621  Last data filed at 12/18/2024 1508  Gross per 24 hour   Intake 2040 ml   Output 700 ml   Net 1340 ml         Physical Exam  Constitutional:       General: He is not in acute distress.  Cardiovascular:      Rate and Rhythm: Normal rate and regular rhythm.   Pulmonary:      Effort: Pulmonary effort is normal. No respiratory distress.      Breath sounds: No rales (Bilateral).      Comments: Diminished bilaterally.  Frequent cough  Abdominal:      General: Abdomen is flat. There is no distension.      Palpations: Abdomen is soft.      Tenderness: There is no abdominal tenderness. There is no guarding.   Musculoskeletal:      Right lower leg: No edema.      Left lower leg: No edema.   Skin:     General: Skin is warm and dry.   Neurological:      General: No focal deficit present.      Mental Status: He is alert.   Psychiatric:         Mood and Affect: Mood normal.             Significant Labs: All pertinent labs within the past 24 hours have been reviewed.    Significant  Imaging: I have reviewed all pertinent imaging results/findings within the past 24 hours.

## 2024-12-19 PROBLEM — R53.81 DEBILITY: Status: ACTIVE | Noted: 2024-12-19

## 2024-12-19 PROCEDURE — 11000001 HC ACUTE MED/SURG PRIVATE ROOM

## 2024-12-19 PROCEDURE — 63600175 PHARM REV CODE 636 W HCPCS: Performed by: FAMILY MEDICINE

## 2024-12-19 PROCEDURE — 25000003 PHARM REV CODE 250: Performed by: FAMILY MEDICINE

## 2024-12-19 PROCEDURE — 63700000 PHARM REV CODE 250 ALT 637 W/O HCPCS: Performed by: FAMILY MEDICINE

## 2024-12-19 PROCEDURE — 25000242 PHARM REV CODE 250 ALT 637 W/ HCPCS: Performed by: FAMILY MEDICINE

## 2024-12-19 PROCEDURE — 87634 RSV DNA/RNA AMP PROBE: CPT | Performed by: HOSPITALIST

## 2024-12-19 RX ADMIN — FLUTICASONE PROPIONATE 50 MCG: 50 SPRAY, METERED NASAL at 10:12

## 2024-12-19 RX ADMIN — GUAIFENESIN AND DEXTROMETHORPHAN HYDROBROMIDE 2 TABLET: 600; 30 TABLET, EXTENDED RELEASE ORAL at 08:12

## 2024-12-19 RX ADMIN — BENZONATATE 200 MG: 100 CAPSULE ORAL at 08:12

## 2024-12-19 RX ADMIN — CEFTRIAXONE 2 G: 2 INJECTION, POWDER, FOR SOLUTION INTRAMUSCULAR; INTRAVENOUS at 02:12

## 2024-12-19 RX ADMIN — METOPROLOL SUCCINATE 25 MG: 25 TABLET, EXTENDED RELEASE ORAL at 08:12

## 2024-12-19 RX ADMIN — Medication 200 ML: at 07:12

## 2024-12-19 RX ADMIN — AZITHROMYCIN DIHYDRATE 500 MG: 250 TABLET ORAL at 08:12

## 2024-12-19 RX ADMIN — TAMSULOSIN HYDROCHLORIDE 0.8 MG: 0.4 CAPSULE ORAL at 08:12

## 2024-12-19 RX ADMIN — ENOXAPARIN SODIUM 40 MG: 40 INJECTION SUBCUTANEOUS at 04:12

## 2024-12-19 NOTE — PLAN OF CARE
Problem: Adult Inpatient Plan of Care  Goal: Plan of Care Review  Outcome: Progressing  Goal: Patient-Specific Goal (Individualized)  Outcome: Progressing  Goal: Absence of Hospital-Acquired Illness or Injury  Outcome: Progressing  Goal: Optimal Comfort and Wellbeing  Outcome: Progressing  Goal: Readiness for Transition of Care  Outcome: Progressing     Problem: Diabetes Comorbidity  Goal: Blood Glucose Level Within Targeted Range  Outcome: Progressing     Problem: Sepsis/Septic Shock  Goal: Optimal Coping  Outcome: Progressing  Goal: Absence of Bleeding  Outcome: Progressing  Goal: Blood Glucose Level Within Targeted Range  Outcome: Progressing  Goal: Absence of Infection Signs and Symptoms  Outcome: Progressing  Goal: Optimal Nutrition Intake  Outcome: Progressing     Problem: Pneumonia  Goal: Fluid Balance  Outcome: Progressing  Goal: Resolution of Infection Signs and Symptoms  Outcome: Progressing  Goal: Effective Oxygenation and Ventilation  Outcome: Progressing     Problem: Fall Injury Risk  Goal: Absence of Fall and Fall-Related Injury  Outcome: Progressing

## 2024-12-20 LAB
ALBUMIN SERPL BCP-MCNC: 2.1 G/DL (ref 3.5–5.2)
ANION GAP SERPL CALC-SCNC: 7 MMOL/L (ref 8–16)
BUN SERPL-MCNC: 8 MG/DL (ref 8–23)
CALCIUM SERPL-MCNC: 8.5 MG/DL (ref 8.7–10.5)
CHLORIDE SERPL-SCNC: 96 MMOL/L (ref 95–110)
CO2 SERPL-SCNC: 26 MMOL/L (ref 23–29)
CREAT SERPL-MCNC: 0.7 MG/DL (ref 0.5–1.4)
ERYTHROCYTE [DISTWIDTH] IN BLOOD BY AUTOMATED COUNT: 13.4 % (ref 11.5–14.5)
EST. GFR  (NO RACE VARIABLE): >60 ML/MIN/1.73 M^2
GLUCOSE SERPL-MCNC: 149 MG/DL (ref 70–110)
HCT VFR BLD AUTO: 33.4 % (ref 40–54)
HGB BLD-MCNC: 11.5 G/DL (ref 14–18)
MAGNESIUM SERPL-MCNC: 2 MG/DL (ref 1.6–2.6)
MCH RBC QN AUTO: 31.8 PG (ref 27–31)
MCHC RBC AUTO-ENTMCNC: 34.4 G/DL (ref 32–36)
MCV RBC AUTO: 92 FL (ref 82–98)
PHOSPHATE SERPL-MCNC: 2.5 MG/DL (ref 2.7–4.5)
PLATELET # BLD AUTO: 249 K/UL (ref 150–450)
PMV BLD AUTO: 9 FL (ref 9.2–12.9)
POTASSIUM SERPL-SCNC: 4 MMOL/L (ref 3.5–5.1)
RBC # BLD AUTO: 3.62 M/UL (ref 4.6–6.2)
RSV AG SPEC QL IA: NEGATIVE
SODIUM SERPL-SCNC: 129 MMOL/L (ref 136–145)
SPECIMEN SOURCE: NORMAL
WBC # BLD AUTO: 11.16 K/UL (ref 3.9–12.7)

## 2024-12-20 PROCEDURE — 11000001 HC ACUTE MED/SURG PRIVATE ROOM

## 2024-12-20 PROCEDURE — 83735 ASSAY OF MAGNESIUM: CPT | Performed by: HOSPITALIST

## 2024-12-20 PROCEDURE — 25000003 PHARM REV CODE 250: Performed by: FAMILY MEDICINE

## 2024-12-20 PROCEDURE — 63600175 PHARM REV CODE 636 W HCPCS: Performed by: HOSPITALIST

## 2024-12-20 PROCEDURE — 85027 COMPLETE CBC AUTOMATED: CPT | Performed by: HOSPITALIST

## 2024-12-20 PROCEDURE — 99900035 HC TECH TIME PER 15 MIN (STAT)

## 2024-12-20 PROCEDURE — 97165 OT EVAL LOW COMPLEX 30 MIN: CPT

## 2024-12-20 PROCEDURE — 97161 PT EVAL LOW COMPLEX 20 MIN: CPT

## 2024-12-20 PROCEDURE — 36415 COLL VENOUS BLD VENIPUNCTURE: CPT | Performed by: HOSPITALIST

## 2024-12-20 PROCEDURE — 63700000 PHARM REV CODE 250 ALT 637 W/O HCPCS: Performed by: FAMILY MEDICINE

## 2024-12-20 PROCEDURE — 63600175 PHARM REV CODE 636 W HCPCS: Performed by: FAMILY MEDICINE

## 2024-12-20 PROCEDURE — 25000003 PHARM REV CODE 250: Performed by: HOSPITALIST

## 2024-12-20 PROCEDURE — 97116 GAIT TRAINING THERAPY: CPT

## 2024-12-20 PROCEDURE — 94664 DEMO&/EVAL PT USE INHALER: CPT

## 2024-12-20 PROCEDURE — 97535 SELF CARE MNGMENT TRAINING: CPT

## 2024-12-20 PROCEDURE — 80069 RENAL FUNCTION PANEL: CPT | Performed by: HOSPITALIST

## 2024-12-20 PROCEDURE — 94761 N-INVAS EAR/PLS OXIMETRY MLT: CPT

## 2024-12-20 RX ORDER — SODIUM,POTASSIUM PHOSPHATES 280-250MG
2 POWDER IN PACKET (EA) ORAL
Status: DISCONTINUED | OUTPATIENT
Start: 2024-12-20 | End: 2024-12-20

## 2024-12-20 RX ORDER — SODIUM,POTASSIUM PHOSPHATES 280-250MG
2 POWDER IN PACKET (EA) ORAL
Status: COMPLETED | OUTPATIENT
Start: 2024-12-20 | End: 2024-12-20

## 2024-12-20 RX ADMIN — CEFTRIAXONE 2 G: 2 INJECTION, POWDER, FOR SOLUTION INTRAMUSCULAR; INTRAVENOUS at 04:12

## 2024-12-20 RX ADMIN — METOPROLOL SUCCINATE 25 MG: 25 TABLET, EXTENDED RELEASE ORAL at 10:12

## 2024-12-20 RX ADMIN — FLUTICASONE PROPIONATE 50 MCG: 50 SPRAY, METERED NASAL at 09:12

## 2024-12-20 RX ADMIN — GUAIFENESIN AND DEXTROMETHORPHAN HYDROBROMIDE 2 TABLET: 600; 30 TABLET, EXTENDED RELEASE ORAL at 10:12

## 2024-12-20 RX ADMIN — POTASSIUM & SODIUM PHOSPHATES POWDER PACK 280-160-250 MG 2 PACKET: 280-160-250 PACK at 04:12

## 2024-12-20 RX ADMIN — POTASSIUM & SODIUM PHOSPHATES POWDER PACK 280-160-250 MG 2 PACKET: 280-160-250 PACK at 01:12

## 2024-12-20 RX ADMIN — AZITHROMYCIN DIHYDRATE 500 MG: 250 TABLET ORAL at 10:12

## 2024-12-20 RX ADMIN — ENOXAPARIN SODIUM 40 MG: 40 INJECTION SUBCUTANEOUS at 04:12

## 2024-12-20 RX ADMIN — POTASSIUM & SODIUM PHOSPHATES POWDER PACK 280-160-250 MG 2 PACKET: 280-160-250 PACK at 09:12

## 2024-12-20 RX ADMIN — GUAIFENESIN AND DEXTROMETHORPHAN HYDROBROMIDE 2 TABLET: 600; 30 TABLET, EXTENDED RELEASE ORAL at 09:12

## 2024-12-20 RX ADMIN — TAMSULOSIN HYDROCHLORIDE 0.8 MG: 0.4 CAPSULE ORAL at 10:12

## 2024-12-20 NOTE — ASSESSMENT & PLAN NOTE
continue home metoprolol  holding home losartan and amlodipine for now due to low normal BP, resume when BP tolerates.

## 2024-12-20 NOTE — PLAN OF CARE
PT Evaluation completed and PT POC established.    Problem: Physical Therapy  Goal: Physical Therapy Goal  Description: Goals to be met by: 2025     Patient will increase functional independence with mobility by performin. Supine to sit with Modified [place]  2. Sit to supine with Modified [place]  3. Sit to stand transfer with Supervision  4. Bed to chair transfer with Supervision using LRAD  5. Gait  x 100 feet with Supervision using LRAD.    The mobility limitation cannot be sufficiently resolved by the use of a cane.   Patient's functional mobility deficit can be sufficiently resolved with the use of a (Rolling Walker or Walker).  Patient's mobility limitation significantly impairs their ability to participate in one of more activities of daily living.  The use of a (RW or Walker) will significantly improve the patient's ability to participate in MRADLS and the patient will use it on regular basis in the home.         Outcome: Progressing

## 2024-12-20 NOTE — PLAN OF CARE
Problem: Occupational Therapy  Goal: Occupational Therapy Goal  Description: Goals to be met by: 1/19/25     Patient will increase functional independence with ADLs by performing:    UE Dressing with Supervision.  LE Dressing with Stand-by Assistance.  Grooming while standing at sink with Supervision.  Toileting from toilet with Stand-by Assistance for hygiene and clothing management.   All functional transfers performed with SBA    Outcome: Progressing

## 2024-12-20 NOTE — ASSESSMENT & PLAN NOTE
Chest imaging consistent with multifocal pneumonia.  RSV, covid, flu negative.  Procal negative.  S/p 2 L bolus in ED and 1 L maintenance IVF.  Cont ceftriaxone and azithromycin.  May need longer course of azithromycin pending Legionella studies.  follow up blood and respiratory cultures

## 2024-12-20 NOTE — ASSESSMENT & PLAN NOTE
Patient with Acute on chronic debility due to age-related physical debility. The patient's latest AMPAC (Activity Measure for Post Acute Care) Score is listed below.    AM-PAC Score - How much help does the patient need for each activity listed       Plan  - Progressive mobility protocol initated  - PT/OT consulted  -- pt hoping for SNF

## 2024-12-20 NOTE — ASSESSMENT & PLAN NOTE
Chest imaging consistent with multifocal pneumonia.  S/p 2 L bolus in ED and 1 L maintenance IVF.  Cont ceftriaxone and azithromycin.  May need longer course of azithromycin pending Legionella studies.  follow up blood and respiratory cultures

## 2024-12-20 NOTE — SUBJECTIVE & OBJECTIVE
Interval History:    Symptoms:  Improved cough and SOB  Diet:  Adequate intake.    Activity level:  Impaired due to weakness.    Pain:  No pain.       Objective:     Vital Signs (Most Recent):  Temp: 98.1 °F (36.7 °C) (12/19/24 1608)  Pulse: 98 (12/19/24 1608)  Resp: 18 (12/19/24 1608)  BP: (!) 122/57 (12/19/24 1608)  SpO2: (!) 93 % (12/19/24 1608) Vital Signs (24h Range):  Temp:  [98.1 °F (36.7 °C)-98.6 °F (37 °C)] 98.1 °F (36.7 °C)  Pulse:  [82-99] 98  Resp:  [18] 18  SpO2:  [91 %-93 %] 93 %  BP: (114-150)/(55-68) 122/57     Weight: 67.8 kg (149 lb 7.6 oz)  Body mass index is 23.41 kg/m².    Intake/Output Summary (Last 24 hours) at 12/19/2024 1851  Last data filed at 12/19/2024 1648  Gross per 24 hour   Intake --   Output 1900 ml   Net -1900 ml         Physical Exam  Constitutional:       General: He is not in acute distress.  Cardiovascular:      Rate and Rhythm: Normal rate and regular rhythm.   Pulmonary:      Effort: Pulmonary effort is normal. No respiratory distress.      Breath sounds: Rales (Bilateral) present.      Comments: Diminished bilaterally.  Frequent cough  Abdominal:      General: Abdomen is flat. There is no distension.      Palpations: Abdomen is soft.      Tenderness: There is no abdominal tenderness. There is no guarding.   Musculoskeletal:      Right lower leg: No edema.      Left lower leg: No edema.   Skin:     General: Skin is warm and dry.   Neurological:      General: No focal deficit present.      Mental Status: He is alert.   Psychiatric:         Mood and Affect: Mood normal.             Significant Labs: All pertinent labs within the past 24 hours have been reviewed.    Significant Imaging: I have reviewed all pertinent imaging results/findings within the past 24 hours.    Review of Systems   Constitutional:  Positive for fatigue. Negative for fever.   Respiratory:  Positive for cough and shortness of breath.    Cardiovascular:  Negative for chest pain.   Gastrointestinal:  Negative  for abdominal pain.

## 2024-12-20 NOTE — SUBJECTIVE & OBJECTIVE
Interval History:    Symptoms:  Improved cough and SOB.  Concerned about weakness.  Diet:  Adequate intake.    Activity level:  Impaired due to weakness.    Pain:  No pain.       Objective:     Vital Signs (Most Recent):  Temp: 97.2 °F (36.2 °C) (12/20/24 1212)  Pulse: 93 (12/20/24 1212)  Resp: 18 (12/20/24 1212)  BP: 117/69 (12/20/24 1212)  SpO2: (!) 92 % (12/20/24 1212) Vital Signs (24h Range):  Temp:  [96.9 °F (36.1 °C)-98.5 °F (36.9 °C)] 97.2 °F (36.2 °C)  Pulse:  [86-99] 93  Resp:  [18] 18  SpO2:  [91 %-94 %] 92 %  BP: (117-146)/(56-70) 117/69     Weight: 67.8 kg (149 lb 7.6 oz)  Body mass index is 23.41 kg/m².    Intake/Output Summary (Last 24 hours) at 12/20/2024 1510  Last data filed at 12/20/2024 1452  Gross per 24 hour   Intake --   Output 1600 ml   Net -1600 ml         Physical Exam  Constitutional:       General: He is not in acute distress.  Cardiovascular:      Rate and Rhythm: Normal rate and regular rhythm.   Pulmonary:      Effort: Pulmonary effort is normal. No respiratory distress.      Breath sounds: Rales (Bilateral) present.      Comments: Diminished bilaterally.  Frequent cough  Abdominal:      General: Abdomen is flat. There is no distension.      Palpations: Abdomen is soft.      Tenderness: There is no abdominal tenderness. There is no guarding.   Musculoskeletal:      Right lower leg: No edema.      Left lower leg: No edema.   Skin:     General: Skin is warm and dry.   Neurological:      General: No focal deficit present.      Mental Status: He is alert.   Psychiatric:         Mood and Affect: Mood normal.             Significant Labs: All pertinent labs within the past 24 hours have been reviewed.    Significant Imaging: I have reviewed all pertinent imaging results/findings within the past 24 hours.    Review of Systems   Constitutional:  Positive for fatigue. Negative for fever.   Respiratory:  Positive for cough and shortness of breath.    Cardiovascular:  Negative for chest pain.    Gastrointestinal:  Negative for abdominal pain.

## 2024-12-20 NOTE — PROGRESS NOTES
[facility]  Progress Note    Patient Name: Jd Dill III  MRN: [id number]  Patient Class: IP- Inpatient   Admission Date: 12/17/2024  Length of Stay: 2 days  Attending Physician: Emmanuel Castillo MD  Primary Care Provider: Jabier Hinkle MD        Subjective     Principal Problem:Multifocal pneumonia        HPI:  Jd Dill III is an 79-year-old male with a history of BPH, GERD, hypertension transferred from clinic for tachycardia and generalized weakness.   He started feeling bad approximately 2 weeks ago.  Went to the clinic on 12/13, recommended conservative therapy with lozenges, Tylenol etc..  He went back to the clinic today because he was feeling worse.  He has had decreased p.o. intake.  He is feeling weak.  His cough is getting worse.  He was found to be tachycardic with wheezing, transferred to ED for further workup.  He denies chest pain.  No abdominal pain.  Denies dysuria or hematuria.  Denies current fever.    In the ED patient afebrile, tachycardic, hemodynamically stable saturating in mid 90's on room air at rest. WBC 20.55. LA 1.48. Patient Na 126. CXR performed and showed Scattered low-grade patchy airspace opacities and a suspected nodular lesion. CT Chest ordered to better characterize. Started on azithro and ceftriaxone for multifocal pna. Admitted to the care of medicine for further evaluation and management.      Overview/Hospital Course:  Admitted to hospital medicine for sepsis d/t multifocal pneumonia c/b hyponatremia and liver dysfunction.  Started on ceftriaxone and azithromycin.  Na improved with IV fluids and PO intake.  Liver dysfunction improved with antibiotics and fluids.  COVID and flu negative.  Blood cultures NGTD.    Interval History:    Symptoms:  Improved cough and SOB  Diet:  Adequate intake.    Activity level:  Impaired due to weakness.    Pain:  No pain.       Objective:     Vital Signs (Most Recent):  Temp: 98.1 °F (36.7 °C) (12/19/24  1608)  Pulse: 98 (12/19/24 1608)  Resp: 18 (12/19/24 1608)  BP: (!) 122/57 (12/19/24 1608)  SpO2: (!) 93 % (12/19/24 1608) Vital Signs (24h Range):  Temp:  [98.1 °F (36.7 °C)-98.6 °F (37 °C)] 98.1 °F (36.7 °C)  Pulse:  [82-99] 98  Resp:  [18] 18  SpO2:  [91 %-93 %] 93 %  BP: (114-150)/(55-68) 122/57     Weight: 67.8 kg (149 lb 7.6 oz)  Body mass index is 23.41 kg/m².    Intake/Output Summary (Last 24 hours) at 12/19/2024 1851  Last data filed at 12/19/2024 1648  Gross per 24 hour   Intake --   Output 1900 ml   Net -1900 ml         Physical Exam  Constitutional:       General: He is not in acute distress.  Cardiovascular:      Rate and Rhythm: Normal rate and regular rhythm.   Pulmonary:      Effort: Pulmonary effort is normal. No respiratory distress.      Breath sounds: Rales (Bilateral) present.      Comments: Diminished bilaterally.  Frequent cough  Abdominal:      General: Abdomen is flat. There is no distension.      Palpations: Abdomen is soft.      Tenderness: There is no abdominal tenderness. There is no guarding.   Musculoskeletal:      Right lower leg: No edema.      Left lower leg: No edema.   Skin:     General: Skin is warm and dry.   Neurological:      General: No focal deficit present.      Mental Status: He is alert.   Psychiatric:         Mood and Affect: Mood normal.             Significant Labs: All pertinent labs within the past 24 hours have been reviewed.    Significant Imaging: I have reviewed all pertinent imaging results/findings within the past 24 hours.    Review of Systems   Constitutional:  Positive for fatigue. Negative for fever.   Respiratory:  Positive for cough and shortness of breath.    Cardiovascular:  Negative for chest pain.   Gastrointestinal:  Negative for abdominal pain.       Assessment and Plan     * Multifocal pneumonia  Chest imaging consistent with multifocal pneumonia.  S/p 2 L bolus in ED and 1 L maintenance IVF.  Cont ceftriaxone and azithromycin.  May need longer  course of azithromycin pending Legionella studies.  follow up blood and respiratory cultures        Debility  Patient with Acute on chronic debility due to age-related physical debility. The patient's latest AMPAC (Activity Measure for Post Acute Care) Score is listed below.    AM-PAC Score - How much help does the patient need for each activity listed       Plan  - Progressive mobility protocol initated  - PT/OT consulted        Hypophosphatemia  Monitor and replace as needed    Liver dysfunction  Likely d/t sepsis and hypovolemia  Improved with IV fluids and antibiotics      Hyponatremia  Likely d/t decreased PO intake  Na 126 on presentation, improved with IV fluid and PO intake  setting of PNA and decreased po intake  S/p IV fluids, cont PO intake.  follow up legionella studies  continue to monitor     Sepsis  Due to Multifocal pneumonia  Tachycardia and leukocytosis on admission      Benign prostatic hyperplasia with urinary obstruction  continue home tamsulosin  strict I/Os    Essential hypertension  continue home metoprolol  holding home losartan and amlodipine for now due to low normal BP, resume when BP tolerates.      VTE Risk Mitigation (From admission, onward)           Ordered     enoxaparin injection 40 mg  Daily         12/17/24 1657     IP VTE HIGH RISK PATIENT  Once         12/17/24 1657     Place sequential compression device  Until discontinued         12/17/24 1657                    Discharge Planning   SIRIA: 12/20/2024     Code Status: Full Code   Medical Readiness for Discharge Date:   Discharge Plan A: Home                Please place Justification for DME        Emmanuel Castillo MD  Department of Hospital Medicine   [facility]

## 2024-12-20 NOTE — PROGRESS NOTES
[facility]  Progress Note    Patient Name: Jd Dill III  MRN: [id number]  Patient Class: IP- Inpatient   Admission Date: 12/17/2024  Length of Stay: 3 days  Attending Physician: Emmanuel Castillo MD  Primary Care Provider: Jabier Hinkle MD        Subjective     Principal Problem:Multifocal pneumonia        HPI:  Jd Dill III is an 79-year-old male with a history of BPH, GERD, hypertension transferred from clinic for tachycardia and generalized weakness.   He started feeling bad approximately 2 weeks ago.  Went to the clinic on 12/13, recommended conservative therapy with lozenges, Tylenol etc..  He went back to the clinic today because he was feeling worse.  He has had decreased p.o. intake.  He is feeling weak.  His cough is getting worse.  He was found to be tachycardic with wheezing, transferred to ED for further workup.  He denies chest pain.  No abdominal pain.  Denies dysuria or hematuria.  Denies current fever.    In the ED patient afebrile, tachycardic, hemodynamically stable saturating in mid 90's on room air at rest. WBC 20.55. LA 1.48. Patient Na 126. CXR performed and showed Scattered low-grade patchy airspace opacities and a suspected nodular lesion. CT Chest ordered to better characterize. Started on azithro and ceftriaxone for multifocal pna. Admitted to the care of medicine for further evaluation and management.      Overview/Hospital Course:  Admitted to hospital medicine for sepsis d/t multifocal pneumonia c/b hyponatremia and liver dysfunction.  Started on ceftriaxone and azithromycin.  Na improved with IV fluids and PO intake.  Liver dysfunction improved with antibiotics and fluids.  COVID and flu negative.  Blood cultures NGTD.    Interval History:    Symptoms:  Improved cough and SOB.  Concerned about weakness.  Diet:  Adequate intake.    Activity level:  Impaired due to weakness.    Pain:  No pain.       Objective:     Vital Signs (Most Recent):  Temp: 97.2  °F (36.2 °C) (12/20/24 1212)  Pulse: 93 (12/20/24 1212)  Resp: 18 (12/20/24 1212)  BP: 117/69 (12/20/24 1212)  SpO2: (!) 92 % (12/20/24 1212) Vital Signs (24h Range):  Temp:  [96.9 °F (36.1 °C)-98.5 °F (36.9 °C)] 97.2 °F (36.2 °C)  Pulse:  [86-99] 93  Resp:  [18] 18  SpO2:  [91 %-94 %] 92 %  BP: (117-146)/(56-70) 117/69     Weight: 67.8 kg (149 lb 7.6 oz)  Body mass index is 23.41 kg/m².    Intake/Output Summary (Last 24 hours) at 12/20/2024 1510  Last data filed at 12/20/2024 1452  Gross per 24 hour   Intake --   Output 1600 ml   Net -1600 ml         Physical Exam  Constitutional:       General: He is not in acute distress.  Cardiovascular:      Rate and Rhythm: Normal rate and regular rhythm.   Pulmonary:      Effort: Pulmonary effort is normal. No respiratory distress.      Breath sounds: Rales (Bilateral) present.      Comments: Diminished bilaterally.  Frequent cough  Abdominal:      General: Abdomen is flat. There is no distension.      Palpations: Abdomen is soft.      Tenderness: There is no abdominal tenderness. There is no guarding.   Musculoskeletal:      Right lower leg: No edema.      Left lower leg: No edema.   Skin:     General: Skin is warm and dry.   Neurological:      General: No focal deficit present.      Mental Status: He is alert.   Psychiatric:         Mood and Affect: Mood normal.             Significant Labs: All pertinent labs within the past 24 hours have been reviewed.    Significant Imaging: I have reviewed all pertinent imaging results/findings within the past 24 hours.    Review of Systems   Constitutional:  Positive for fatigue. Negative for fever.   Respiratory:  Positive for cough and shortness of breath.    Cardiovascular:  Negative for chest pain.   Gastrointestinal:  Negative for abdominal pain.       Assessment and Plan     * Multifocal pneumonia  Chest imaging consistent with multifocal pneumonia.  RSV, covid, flu negative.  Procal negative.  S/p 2 L bolus in ED and 1 L  maintenance IVF.  Cont ceftriaxone and azithromycin.  May need longer course of azithromycin pending Legionella studies.  follow up blood and respiratory cultures        Debility  Patient with Acute on chronic debility due to age-related physical debility. The patient's latest AMPAC (Activity Measure for Post Acute Care) Score is listed below.    AM-PAC Score - How much help does the patient need for each activity listed       Plan  - Progressive mobility protocol initated  - PT/OT consulted  -- pt hoping for SNF        Hypophosphatemia  Monitor and replace as needed    Liver dysfunction  Likely d/t sepsis and hypovolemia  Improved with IV fluids and antibiotics      Hyponatremia  Likely d/t decreased PO intake  Na 126 on presentation, improved with IV fluid and PO intake  setting of PNA and decreased po intake  S/p IV fluids, cont PO intake.  follow up legionella studies  continue to monitor     Sepsis  Due to Multifocal pneumonia  Tachycardia and leukocytosis on admission      Benign prostatic hyperplasia with urinary obstruction  continue home tamsulosin  strict I/Os    Essential hypertension  continue home metoprolol  holding home losartan and amlodipine for now due to low normal BP, resume when BP tolerates.      VTE Risk Mitigation (From admission, onward)           Ordered     enoxaparin injection 40 mg  Daily         12/17/24 1657     IP VTE HIGH RISK PATIENT  Once         12/17/24 1657     Place sequential compression device  Until discontinued         12/17/24 1657                    Discharge Planning   SIRIA: 12/21/2024     Code Status: Full Code   Medical Readiness for Discharge Date:   Discharge Plan A: Home                Please place Justification for DME        Emmanuel Castillo MD  Department of Hospital Medicine   [facility]

## 2024-12-20 NOTE — PT/OT/SLP EVAL
"Physical Therapy Co-Evaluation    Patient Name:  Jd Dill III   MRN:  [id number]    Recommendations:     Discharge Recommendations: Low Intensity Therapy   Discharge Equipment Recommendations: walker, rolling   Barriers to discharge: Decreased caregiver support    Assessment:     Jd Dill III is a 79 y.o. male admitted with a medical diagnosis of Multifocal pneumonia.  He presents with the following impairments/functional limitations: weakness, impaired endurance, impaired functional mobility, gait instability, impaired cardiopulmonary response to activity.    Pleasant and in good spirits. Pt performed all mobility with 1 person for safety. Amb in room 22' with RW and presented with improved steadiness and rosa with AD. Pt has limited support at home, stated neighbors can periodically check in on him. Pt will benefit from skilled PT services while in house in order to address the aforementioned deficits.    Pt is safe to amb with RW and staff outside of therapy services       Rehab Prognosis: Good; patient would benefit from acute skilled PT services to address these deficits and reach maximum level of function.    Recent Surgery: * No surgery found *      Plan:     During this hospitalization, patient to be seen 3 x/week to address the identified rehab impairments via therapeutic activities, gait training, therapeutic exercises, neuromuscular re-education and progress toward the following goals:    Plan of Care Expires:  01/20/25    Subjective     "Thank you"    Pain/Comfort:  Pain Rating 1: 0/10    Patients cultural, spiritual, Caodaism conflicts given the current situation: no    Living Environment:  Per pt, pt resides alone in [facility] with no JAYESH. Pt has a walk-in shower with built in bench and elevated toilet.  Prior to admission, patients level of function was I.  Equipment used at home: raised toilet.  DME owned (not currently used): none.  Upon discharge, patient will have limited assistance from " neighbor.    Objective:     Communicated with RN prior to session.  Patient found HOB elevated with telemetry, PureWick  upon PT entry to room.    General Precautions: Standard, fall  Orthopedic Precautions:N/A   Braces: N/A  Respiratory Status: Room air    Exams:  Cognitive Exam:  Patient is oriented to AAOx4  RLE ROM: WFL  RLE Strength: grossly 3+/5, except hip flexion 3/5  LLE ROM: WFL  LLE Strength: grossly 3+/5, except hip flexion 3/5    Functional Mobility:  Bed Mobility:     Scooting: stand by assistance  Supine to Sit: stand by assistance  Transfers:     Sit to Stand:  stand by assistance with rolling walker  Bed to Chair: stand by assistance with  rolling walker  using  Step Transfer  Gait: pt amb 22' RW SBA and presented with fair rosa, reciprocal gait, head down  Balance:   Good sitting balance  Good standing balance      AM-PAC 6 CLICK MOBILITY  Total Score:        Treatment & Education:  Discussed sitting upright in chair >1hr, pt agreeable  Discussed sitting up EOB and/or UIC with RW and RN/staff outside of therapy services  Discussed amb to restroom with RW and RN/staff outside of therapy services    Educated pt on PT role/POC  Educated pt on importance of OOB activity and daily ambulation   Pt educated on proper body mechanics, safety techniques, and energy conservation with PT facilitation and cueing throughout session   Pt verbalized understanding      Patient left up in chair with all lines intact, call button in reach, RN notified, and OT present.    GOALS:   Multidisciplinary Problems       Physical Therapy Goals          Problem: Physical Therapy    Goal Priority Disciplines Outcome Interventions   Physical Therapy Goal     PT, PT/OT Progressing    Description: Goals to be met by: 2025     Patient will increase functional independence with mobility by performin. Supine to sit with Modified [place]  2. Sit to supine with Modified [place]  3. Sit to stand transfer with  Supervision  4. Bed to chair transfer with Supervision using LRAD  5. Gait  x 100 feet with Supervision using LRAD.    The mobility limitation cannot be sufficiently resolved by the use of a cane.   Patient's functional mobility deficit can be sufficiently resolved with the use of a (Rolling Walker or Walker).  Patient's mobility limitation significantly impairs their ability to participate in one of more activities of daily living.  The use of a (RW or Walker) will significantly improve the patient's ability to participate in MRADLS and the patient will use it on regular basis in the home.                              History:     Past Medical History:   Diagnosis Date    Allergy     AR (allergic rhinitis)     Basal cell carcinoma of ear 2006    Right Ear    BPH (benign prostatic hyperplasia)     DDD (degenerative disc disease), lumbar     Degenerative disc disease     Gastroesophageal reflux disease 3/11/2019    Hypertension     Kidney stone     Undescended testicle     Right Orchiectomy       Past Surgical History:   Procedure Laterality Date    BASAL CELL CARCINOMA EXCISION      ESOPHAGOGASTRODUODENOSCOPY N/A 10/5/2022    Procedure: EGD (ESOPHAGOGASTRODUODENOSCOPY);  Surgeon: Franco Campoverde MD;  Location: [facility];  Service: Endoscopy;  Laterality: N/A;    HEMORRHOID SURGERY      HERNIA REPAIR      PILONIDAL CYST DRAINAGE      Right Orchiectomy      TRANSURETHRAL RESECTION OF PROSTATE  2010       Time Tracking:     PT Received On: 12/20/24  PT Start Time: 1214     PT Stop Time: 1240  PT Total Time (min): 26 min     Billable Minutes: Evaluation 10 and Gait Training 16    Co-treatment performed due to patient's multiple deficits requiring two skilled therapists to appropriately and safely assess patient's strength and endurance while facilitating functional tasks in addition to accommodating for patient's activity tolerance.       12/20/2024

## 2024-12-20 NOTE — PT/OT/SLP EVAL
Occupational Therapy   Evaluation    Name: Jd Dill III  MRN: [id number]  Admitting Diagnosis: Multifocal pneumonia  Recent Surgery: * No surgery found *      Recommendations:     Discharge Recommendations: Low Intensity Therapy  Discharge Equipment Recommendations:  walker, rolling  Barriers to discharge:  None    Justification for Walker HME   The mobility limitation cannot be sufficiently resolved by the use of a cane.   Patient's functional mobility deficit can be sufficiently resolved with the use of a walker.  Patient's mobility limitation significantly impairs their ability to participate in one of more activities of daily living.  The use of a walker will significantly improve the patients ability to participate in MRADLS and the patient will use it on regular basis in the home.   Assessment:   CO-TX with PT for pt safety and max participation with both disciplines.     Jd Dill III is a 79 y.o. male with a medical diagnosis of Multifocal pneumonia.  He presents with a cough and some weakness. Performance deficits affecting function: weakness, impaired self care skills, impaired functional mobility, gait instability, impaired balance.  PTA, pt reports being Indp with ADLs and mobility. Upon eval, pt is near baseline, but limited by endurance and weakness.      Rehab Prognosis: Good; patient would benefit from acute skilled OT services to address these deficits and reach maximum level of function.       Plan:     Patient to be seen 3 x/week to address the above listed problems via self-care/home management, therapeutic activities, therapeutic exercises  Plan of Care Expires: 01/19/25  Plan of Care Reviewed with: patient    Subjective     Chief Complaint: None  Patient/Family Comments/goals: Return home    Occupational Profile:  Living Environment: Lives alone, in H, 0 JAYESH and no hand rail, WIS in bathroom  Previous level of function: Indp  Equipment Used at Home: shower chair, raised  toilet  Assistance upon Discharge: Neighbor    Pain/Comfort:  Pain Rating 1: 0/10    Patients cultural, spiritual, Moravian conflicts given the current situation: no    Objective:     Communicated with: Nsg prior to session.  Patient found HOB elevated with telemetry upon OT entry to room.    General Precautions: Standard, fall  Orthopedic Precautions: N/A  Braces: N/A  Respiratory Status: Room air    Occupational Performance:    Bed Mobility:    Patient completed Supine to Sit with stand by assistance    Functional Mobility/Transfers:  Patient completed Sit <> Stand Transfer with stand by assistance  with  rolling walker   Patient completed Bed <> Chair Transfer using Step Transfer technique with stand by assistance with rolling walker  Functional Mobility: Pt ambulated room level c/ CGA to SBA using RW    Activities of Daily Living:  Feeding:  supervision self feed lunch  Grooming: stand by assistance facial H using wash cloth at EOB  Lower Body Dressing: moderate assistance adjust socks    Cognitive/Visual Perceptual:  A&O x4    Physical Exam:  BUE WNL  Balance: fair    AMPAC 6 Click ADL:  AMPAC Total Score: 20    Treatment & Education:  Pt educated on role and purpose of therapy  Pt educated on goal setting  Pt educated on benefits of OOB activity  Pt educated on self advocacy      Patient left up in chair with all lines intact, call button in reach, and nsg notified    GOALS:   Multidisciplinary Problems       Occupational Therapy Goals          Problem: Occupational Therapy    Goal Priority Disciplines Outcome Interventions   Occupational Therapy Goal     OT, PT/OT Progressing    Description: Goals to be met by: 1/19/25     Patient will increase functional independence with ADLs by performing:    UE Dressing with Supervision.  LE Dressing with Stand-by Assistance.  Grooming while standing at sink with Supervision.  Toileting from toilet with Stand-by Assistance for hygiene and clothing management.   All  functional transfers performed with SBA                         History:     Past Medical History:   Diagnosis Date    Allergy     AR (allergic rhinitis)     Basal cell carcinoma of ear 2006    Right Ear    BPH (benign prostatic hyperplasia)     DDD (degenerative disc disease), lumbar     Degenerative disc disease     Gastroesophageal reflux disease 3/11/2019    Hypertension     Kidney stone     Undescended testicle     Right Orchiectomy         Past Surgical History:   Procedure Laterality Date    BASAL CELL CARCINOMA EXCISION      ESOPHAGOGASTRODUODENOSCOPY N/A 10/5/2022    Procedure: EGD (ESOPHAGOGASTRODUODENOSCOPY);  Surgeon: Franco Campoverde MD;  Location: [facility];  Service: Endoscopy;  Laterality: N/A;    HEMORRHOID SURGERY      HERNIA REPAIR      PILONIDAL CYST DRAINAGE      Right Orchiectomy      TRANSURETHRAL RESECTION OF PROSTATE  2010       Time Tracking:     OT Date of Treatment: 12/20/24  OT Start Time: 1413  OT Stop Time: 1440  OT Total Time (min): 27 min    Billable Minutes:Evaluation 14  Self Care/Home Management 13    12/20/2024

## 2024-12-20 NOTE — ASSESSMENT & PLAN NOTE
Patient with Acute on chronic debility due to age-related physical debility. The patient's latest AMPAC (Activity Measure for Post Acute Care) Score is listed below.    AM-PAC Score - How much help does the patient need for each activity listed       Plan  - Progressive mobility protocol initated  - PT/OT consulted

## 2024-12-21 LAB
ALBUMIN SERPL BCP-MCNC: 2 G/DL (ref 3.5–5.2)
ANION GAP SERPL CALC-SCNC: 8 MMOL/L (ref 8–16)
BUN SERPL-MCNC: 8 MG/DL (ref 8–23)
CALCIUM SERPL-MCNC: 8.3 MG/DL (ref 8.7–10.5)
CHLORIDE SERPL-SCNC: 98 MMOL/L (ref 95–110)
CO2 SERPL-SCNC: 27 MMOL/L (ref 23–29)
CREAT SERPL-MCNC: 0.7 MG/DL (ref 0.5–1.4)
EST. GFR  (NO RACE VARIABLE): >60 ML/MIN/1.73 M^2
GLUCOSE SERPL-MCNC: 109 MG/DL (ref 70–110)
MAGNESIUM SERPL-MCNC: 2.2 MG/DL (ref 1.6–2.6)
PHOSPHATE SERPL-MCNC: 3.2 MG/DL (ref 2.7–4.5)
POTASSIUM SERPL-SCNC: 4.3 MMOL/L (ref 3.5–5.1)
SODIUM SERPL-SCNC: 133 MMOL/L (ref 136–145)

## 2024-12-21 PROCEDURE — 63600175 PHARM REV CODE 636 W HCPCS: Performed by: FAMILY MEDICINE

## 2024-12-21 PROCEDURE — 36415 COLL VENOUS BLD VENIPUNCTURE: CPT | Performed by: HOSPITALIST

## 2024-12-21 PROCEDURE — 25000003 PHARM REV CODE 250: Performed by: FAMILY MEDICINE

## 2024-12-21 PROCEDURE — 94664 DEMO&/EVAL PT USE INHALER: CPT

## 2024-12-21 PROCEDURE — 63600175 PHARM REV CODE 636 W HCPCS: Performed by: HOSPITALIST

## 2024-12-21 PROCEDURE — 63700000 PHARM REV CODE 250 ALT 637 W/O HCPCS: Performed by: HOSPITALIST

## 2024-12-21 PROCEDURE — 11000001 HC ACUTE MED/SURG PRIVATE ROOM

## 2024-12-21 PROCEDURE — 80069 RENAL FUNCTION PANEL: CPT | Performed by: HOSPITALIST

## 2024-12-21 PROCEDURE — 27000646 HC AEROBIKA DEVICE

## 2024-12-21 PROCEDURE — 99900035 HC TECH TIME PER 15 MIN (STAT)

## 2024-12-21 PROCEDURE — 83735 ASSAY OF MAGNESIUM: CPT | Performed by: HOSPITALIST

## 2024-12-21 RX ADMIN — TAMSULOSIN HYDROCHLORIDE 0.8 MG: 0.4 CAPSULE ORAL at 08:12

## 2024-12-21 RX ADMIN — GUAIFENESIN AND DEXTROMETHORPHAN HYDROBROMIDE 2 TABLET: 600; 30 TABLET, EXTENDED RELEASE ORAL at 08:12

## 2024-12-21 RX ADMIN — CEFTRIAXONE 2 G: 2 INJECTION, POWDER, FOR SOLUTION INTRAMUSCULAR; INTRAVENOUS at 03:12

## 2024-12-21 RX ADMIN — METOPROLOL SUCCINATE 25 MG: 25 TABLET, EXTENDED RELEASE ORAL at 08:12

## 2024-12-21 RX ADMIN — AZITHROMYCIN DIHYDRATE 500 MG: 250 TABLET ORAL at 08:12

## 2024-12-21 RX ADMIN — FLUTICASONE PROPIONATE 50 MCG: 50 SPRAY, METERED NASAL at 08:12

## 2024-12-21 RX ADMIN — ENOXAPARIN SODIUM 40 MG: 40 INJECTION SUBCUTANEOUS at 04:12

## 2024-12-21 NOTE — PLAN OF CARE
Problem: Pneumonia  Goal: Fluid Balance  12/21/2024 0545 by Felicity Price RN  Outcome: Progressing  12/21/2024 0544 by Felicity Price RN  Outcome: Progressing  Goal: Resolution of Infection Signs and Symptoms  12/21/2024 0545 by Felicity Price RN  Outcome: Progressing  12/21/2024 0544 by Felicity Price RN  Outcome: Progressing  Goal: Effective Oxygenation and Ventilation  12/21/2024 0545 by Felicity Price RN  Outcome: Progressing  12/21/2024 0544 by Felicity Price RN  Outcome: Progressing     Problem: Fall Injury Risk  Goal: Absence of Fall and Fall-Related Injury  12/21/2024 0545 by Felicity Price RN  Outcome: Progressing  12/21/2024 0544 by Felicity Price RN  Outcome: Progressing         Patient aaox3. Pt had to be redirected on last night not to pull male purewick.  Denies pain or discomfort on rounding. Bed alarm active. Has a nonproductive cough. No c/o shortness of breath or difficulty breathing. Fall precautions remain in place. Continue plan of care.

## 2024-12-22 LAB
BACTERIA BLD CULT: NORMAL
BACTERIA BLD CULT: NORMAL

## 2024-12-22 PROCEDURE — 11000001 HC ACUTE MED/SURG PRIVATE ROOM

## 2024-12-22 PROCEDURE — 99900035 HC TECH TIME PER 15 MIN (STAT)

## 2024-12-22 PROCEDURE — 27000646 HC AEROBIKA DEVICE

## 2024-12-22 PROCEDURE — 25000003 PHARM REV CODE 250: Performed by: FAMILY MEDICINE

## 2024-12-22 PROCEDURE — 63600175 PHARM REV CODE 636 W HCPCS: Performed by: HOSPITALIST

## 2024-12-22 PROCEDURE — 94761 N-INVAS EAR/PLS OXIMETRY MLT: CPT

## 2024-12-22 PROCEDURE — 63700000 PHARM REV CODE 250 ALT 637 W/O HCPCS: Performed by: HOSPITALIST

## 2024-12-22 PROCEDURE — 63600175 PHARM REV CODE 636 W HCPCS: Performed by: FAMILY MEDICINE

## 2024-12-22 PROCEDURE — 25000003 PHARM REV CODE 250: Performed by: HOSPITALIST

## 2024-12-22 PROCEDURE — 94664 DEMO&/EVAL PT USE INHALER: CPT

## 2024-12-22 RX ORDER — LOSARTAN POTASSIUM 25 MG/1
25 TABLET ORAL DAILY
Status: DISCONTINUED | OUTPATIENT
Start: 2024-12-23 | End: 2024-12-26 | Stop reason: HOSPADM

## 2024-12-22 RX ADMIN — GUAIFENESIN AND DEXTROMETHORPHAN HYDROBROMIDE 2 TABLET: 600; 30 TABLET, EXTENDED RELEASE ORAL at 09:12

## 2024-12-22 RX ADMIN — FLUTICASONE PROPIONATE 50 MCG: 50 SPRAY, METERED NASAL at 09:12

## 2024-12-22 RX ADMIN — SALINE NASAL SPRAY 1 SPRAY: 1.5 SOLUTION NASAL at 12:12

## 2024-12-22 RX ADMIN — CEFTRIAXONE 2 G: 2 INJECTION, POWDER, FOR SOLUTION INTRAMUSCULAR; INTRAVENOUS at 03:12

## 2024-12-22 RX ADMIN — SALINE NASAL SPRAY 1 SPRAY: 1.5 SOLUTION NASAL at 05:12

## 2024-12-22 RX ADMIN — SALINE NASAL SPRAY 1 SPRAY: 1.5 SOLUTION NASAL at 09:12

## 2024-12-22 RX ADMIN — ENOXAPARIN SODIUM 40 MG: 40 INJECTION SUBCUTANEOUS at 05:12

## 2024-12-22 RX ADMIN — TAMSULOSIN HYDROCHLORIDE 0.8 MG: 0.4 CAPSULE ORAL at 09:12

## 2024-12-22 RX ADMIN — SALINE NASAL SPRAY 1 SPRAY: 1.5 SOLUTION NASAL at 10:12

## 2024-12-22 RX ADMIN — AZITHROMYCIN DIHYDRATE 500 MG: 250 TABLET ORAL at 09:12

## 2024-12-22 RX ADMIN — METOPROLOL SUCCINATE 25 MG: 25 TABLET, EXTENDED RELEASE ORAL at 09:12

## 2024-12-22 NOTE — PROGRESS NOTES
[facility] Medicine  Progress Note    Patient Name: Jd Dill III  MRN: [id number]  Patient Class: IP- Inpatient   Admission Date: 12/17/2024  Length of Stay: 4 days  Attending Physician: Emmanuel Castillo MD  Primary Care Provider: Jabier Hinkle MD        Subjective     Principal Problem:Multifocal pneumonia        HPI:  Jd Dill III is an 79-year-old male with a history of BPH, GERD, hypertension transferred from clinic for tachycardia and generalized weakness.   He started feeling bad approximately 2 weeks ago.  Went to the clinic on 12/13, recommended conservative therapy with lozenges, Tylenol etc..  He went back to the clinic today because he was feeling worse.  He has had decreased p.o. intake.  He is feeling weak.  His cough is getting worse.  He was found to be tachycardic with wheezing, transferred to ED for further workup.  He denies chest pain.  No abdominal pain.  Denies dysuria or hematuria.  Denies current fever.    In the ED patient afebrile, tachycardic, hemodynamically stable saturating in mid 90's on room air at rest. WBC 20.55. LA 1.48. Patient Na 126. CXR performed and showed Scattered low-grade patchy airspace opacities and a suspected nodular lesion. CT Chest ordered to better characterize. Started on azithro and ceftriaxone for multifocal pna. Admitted to the care of medicine for further evaluation and management.      Overview/Hospital Course:  Admitted to hospital medicine for sepsis d/t multifocal pneumonia c/b hyponatremia and liver dysfunction.  Started on ceftriaxone and azithromycin.  Na improved with IV fluids and PO intake.  Liver dysfunction improved with antibiotics and fluids.  COVID and flu negative.  Blood cultures NGTD.    Interval History:    Symptoms:  Improved cough and SOB.  Concerned about weakness.    Diet:  Adequate intake.    Activity level:  Impaired due to weakness.    Pain:  No pain.       Objective:     Vital Signs (Most Recent):  Temp: 96  °F (35.6 °C) (12/21/24 2008)  Pulse: 91 (12/21/24 2008)  Resp: 18 (12/21/24 2008)  BP: (!) 130/57 (12/21/24 2008)  SpO2: (!) 92 % (12/21/24 2008) Vital Signs (24h Range):  Temp:  [96 °F (35.6 °C)-98.3 °F (36.8 °C)] 96 °F (35.6 °C)  Pulse:  [82-91] 91  Resp:  [18] 18  SpO2:  [92 %-95 %] 92 %  BP: (107-130)/(56-75) 130/57     Weight: 67.8 kg (149 lb 7.6 oz)  Body mass index is 23.41 kg/m².    Intake/Output Summary (Last 24 hours) at 12/21/2024 2203  Last data filed at 12/21/2024 1539  Gross per 24 hour   Intake 120 ml   Output 2475 ml   Net -2355 ml         Physical Exam  Constitutional:       General: He is not in acute distress.  Cardiovascular:      Rate and Rhythm: Normal rate and regular rhythm.   Pulmonary:      Effort: Pulmonary effort is normal. No respiratory distress.      Breath sounds: Rales (Bilateral) present.      Comments: Diminished bilaterally.  Frequent cough  Abdominal:      General: Abdomen is flat. There is no distension.      Palpations: Abdomen is soft.      Tenderness: There is no abdominal tenderness. There is no guarding.   Musculoskeletal:      Right lower leg: No edema.      Left lower leg: No edema.   Skin:     General: Skin is warm and dry.   Neurological:      General: No focal deficit present.      Mental Status: He is alert.   Psychiatric:         Mood and Affect: Mood normal.             Significant Labs: All pertinent labs within the past 24 hours have been reviewed.    Significant Imaging: I have reviewed all pertinent imaging results/findings within the past 24 hours.    Review of Systems   Constitutional:  Positive for fatigue. Negative for fever.   Respiratory:  Positive for cough and shortness of breath.    Cardiovascular:  Negative for chest pain.   Gastrointestinal:  Negative for abdominal pain.       Assessment and Plan     * Multifocal pneumonia  Chest imaging consistent with multifocal pneumonia.  RSV, covid, flu negative.  Procal negative.  S/p 2 L bolus in ED and 1 L  maintenance IVF.  Cont ceftriaxone and azithromycin.  May need longer course of azithromycin pending Legionella studies.  follow up blood and respiratory cultures        Debility  Patient with Acute on chronic debility due to age-related physical debility. The patient's latest AMPAC (Activity Measure for Post Acute Care) Score is listed below.    AM-PAC Score - How much help does the patient need for each activity listed       Plan  - Progressive mobility protocol initated  - PT/OT consulted  -- pt hoping for SNF        Hypophosphatemia  Monitor and replace as needed    Liver dysfunction  Likely d/t sepsis and hypovolemia  Improved with IV fluids and antibiotics      Hyponatremia  Likely d/t decreased PO intake  Na 126 on presentation, improved with IV fluid and PO intake  setting of PNA and decreased po intake  S/p IV fluids, cont PO intake.  follow up legionella studies  continue to monitor     Sepsis  Due to Multifocal pneumonia  Tachycardia and leukocytosis on admission      Benign prostatic hyperplasia with urinary obstruction  continue home tamsulosin  strict I/Os    Essential hypertension  continue home metoprolol  holding home losartan and amlodipine for now due to low normal BP, resume when BP tolerates.      VTE Risk Mitigation (From admission, onward)           Ordered     enoxaparin injection 40 mg  Daily         12/17/24 1657     IP VTE HIGH RISK PATIENT  Once         12/17/24 1657     Place sequential compression device  Until discontinued         12/17/24 1657                    Discharge Planning   SIRIA: 12/26/2024     Code Status: Full Code   Medical Readiness for Discharge Date:   Discharge Plan A: Home                Please place Justification for DME        Emmanuel Castillo MD  Department of Hospital Medicine   [facility]

## 2024-12-22 NOTE — SUBJECTIVE & OBJECTIVE
Interval History:    Symptoms:  Improved cough and SOB.  Concerned about weakness.    Diet:  Adequate intake.    Activity level:  Impaired due to weakness.    Pain:  No pain.       Objective:     Vital Signs (Most Recent):  Temp: 96 °F (35.6 °C) (12/21/24 2008)  Pulse: 91 (12/21/24 2008)  Resp: 18 (12/21/24 2008)  BP: (!) 130/57 (12/21/24 2008)  SpO2: (!) 92 % (12/21/24 2008) Vital Signs (24h Range):  Temp:  [96 °F (35.6 °C)-98.3 °F (36.8 °C)] 96 °F (35.6 °C)  Pulse:  [82-91] 91  Resp:  [18] 18  SpO2:  [92 %-95 %] 92 %  BP: (107-130)/(56-75) 130/57     Weight: 67.8 kg (149 lb 7.6 oz)  Body mass index is 23.41 kg/m².    Intake/Output Summary (Last 24 hours) at 12/21/2024 2203  Last data filed at 12/21/2024 1539  Gross per 24 hour   Intake 120 ml   Output 2475 ml   Net -2355 ml         Physical Exam  Constitutional:       General: He is not in acute distress.  Cardiovascular:      Rate and Rhythm: Normal rate and regular rhythm.   Pulmonary:      Effort: Pulmonary effort is normal. No respiratory distress.      Breath sounds: Rales (Bilateral) present.      Comments: Diminished bilaterally.  Frequent cough  Abdominal:      General: Abdomen is flat. There is no distension.      Palpations: Abdomen is soft.      Tenderness: There is no abdominal tenderness. There is no guarding.   Musculoskeletal:      Right lower leg: No edema.      Left lower leg: No edema.   Skin:     General: Skin is warm and dry.   Neurological:      General: No focal deficit present.      Mental Status: He is alert.   Psychiatric:         Mood and Affect: Mood normal.             Significant Labs: All pertinent labs within the past 24 hours have been reviewed.    Significant Imaging: I have reviewed all pertinent imaging results/findings within the past 24 hours.    Review of Systems   Constitutional:  Positive for fatigue. Negative for fever.   Respiratory:  Positive for cough and shortness of breath.    Cardiovascular:  Negative for chest pain.    Gastrointestinal:  Negative for abdominal pain.

## 2024-12-22 NOTE — PROGRESS NOTES
[facility] Medicine  Progress Note    Patient Name: Jd Dill III  MRN: [id number]  Patient Class: IP- Inpatient   Admission Date: 12/17/2024  Length of Stay: 5 days  Attending Physician: Emmanuel Castillo MD  Primary Care Provider: Jabier Hinkle MD        Subjective     Principal Problem:Multifocal pneumonia        HPI:  Jd Dill III is an 79-year-old male with a history of BPH, GERD, hypertension transferred from clinic for tachycardia and generalized weakness.   He started feeling bad approximately 2 weeks ago.  Went to the clinic on 12/13, recommended conservative therapy with lozenges, Tylenol etc..  He went back to the clinic today because he was feeling worse.  He has had decreased p.o. intake.  He is feeling weak.  His cough is getting worse.  He was found to be tachycardic with wheezing, transferred to ED for further workup.  He denies chest pain.  No abdominal pain.  Denies dysuria or hematuria.  Denies current fever.    In the ED patient afebrile, tachycardic, hemodynamically stable saturating in mid 90's on room air at rest. WBC 20.55. LA 1.48. Patient Na 126. CXR performed and showed Scattered low-grade patchy airspace opacities and a suspected nodular lesion. CT Chest ordered to better characterize. Started on azithro and ceftriaxone for multifocal pna. Admitted to the care of medicine for further evaluation and management.      Overview/Hospital Course:  Admitted to hospital medicine for sepsis d/t multifocal pneumonia c/b hyponatremia and liver dysfunction.  Started on ceftriaxone and azithromycin.  Na improved with IV fluids and PO intake.  Liver dysfunction improved with antibiotics and fluids.  COVID and flu negative.  Blood cultures NGTD.    Interval History:    Symptoms:  nearly resolved cough and SOB.  Concerned about weakness.    Diet:  Adequate intake.    Activity level:  Impaired due to weakness.    Pain:  No pain.       Objective:     Vital Signs (Most  Recent):  Temp: 97.2 °F (36.2 °C) (12/22/24 0743)  Pulse: 92 (12/22/24 1126)  Resp: 18 (12/22/24 0457)  BP: 121/72 (12/22/24 1126)  SpO2: (!) 92 % (12/22/24 1126) Vital Signs (24h Range):  Temp:  [93 °F (33.9 °C)-98 °F (36.7 °C)] 97.2 °F (36.2 °C)  Pulse:  [82-96] 92  Resp:  [18] 18  SpO2:  [91 %-95 %] 92 %  BP: (121-145)/(57-76) 121/72     Weight: 67.8 kg (149 lb 7.6 oz)  Body mass index is 23.41 kg/m².    Intake/Output Summary (Last 24 hours) at 12/22/2024 1458  Last data filed at 12/22/2024 0628  Gross per 24 hour   Intake --   Output 2550 ml   Net -2550 ml         Physical Exam  Constitutional:       General: He is not in acute distress.  Cardiovascular:      Rate and Rhythm: Normal rate and regular rhythm.   Pulmonary:      Effort: Pulmonary effort is normal. No respiratory distress.      Breath sounds: Rales (Bilateral) present.      Comments: Diminished bilaterally.  Frequent cough  Abdominal:      General: Abdomen is flat. There is no distension.      Palpations: Abdomen is soft.      Tenderness: There is no abdominal tenderness. There is no guarding.   Musculoskeletal:      Right lower leg: No edema.      Left lower leg: No edema.   Skin:     General: Skin is warm and dry.   Neurological:      General: No focal deficit present.      Mental Status: He is alert.   Psychiatric:         Mood and Affect: Mood normal.             Significant Labs: All pertinent labs within the past 24 hours have been reviewed.    Significant Imaging: I have reviewed all pertinent imaging results/findings within the past 24 hours.    Review of Systems   Constitutional:  Positive for fatigue. Negative for fever.   Respiratory:  Positive for cough and shortness of breath.    Cardiovascular:  Negative for chest pain.   Gastrointestinal:  Negative for abdominal pain.       Assessment and Plan     * Multifocal pneumonia  Chest imaging consistent with multifocal pneumonia.  RSV, covid, flu negative.  Procal negative.  S/p 2 L bolus in  ED and 1 L maintenance IVF.  Cont ceftriaxone and azithromycin.  May need longer course of azithromycin pending Legionella studies.  follow up blood and respiratory cultures        Debility  Patient with Acute on chronic debility due to age-related physical debility. The patient's latest AMPAC (Activity Measure for Post Acute Care) Score is listed below.    AM-PAC Score - How much help does the patient need for each activity listed       Plan  - Progressive mobility protocol initated  - PT/OT consulted  -- pt hoping for SNF        Hypophosphatemia  Monitor and replace as needed    Liver dysfunction  Likely d/t sepsis and hypovolemia  Improved with IV fluids and antibiotics      Hyponatremia  Likely d/t decreased PO intake  Na 126 on presentation, improved with IV fluid and PO intake  setting of PNA and decreased po intake  S/p IV fluids, cont PO intake.  follow up legionella studies  continue to monitor     Sepsis  RESOLVED  Due to Multifocal pneumonia  Tachycardia and leukocytosis on admission      Benign prostatic hyperplasia with urinary obstruction  continue home tamsulosin  strict I/Os    Essential hypertension  continue home metoprolol  holding home losartan and amlodipine for now due to low normal BP, resume when BP tolerates.      VTE Risk Mitigation (From admission, onward)           Ordered     enoxaparin injection 40 mg  Daily         12/17/24 1657     IP VTE HIGH RISK PATIENT  Once         12/17/24 1657     Place sequential compression device  Until discontinued         12/17/24 1657                    Discharge Planning   SIRIA: 12/23/2024     Code Status: Full Code   Medical Readiness for Discharge Date: 12/22/2024  Discharge Plan A: Home                Please place Justification for DME        Emmanuel Castillo MD  Department of Hospital Medicine   [facility] Surg

## 2024-12-22 NOTE — SUBJECTIVE & OBJECTIVE
Interval History:    Symptoms:  nearly resolved cough and SOB.  Concerned about weakness.    Diet:  Adequate intake.    Activity level:  Impaired due to weakness.    Pain:  No pain.       Objective:     Vital Signs (Most Recent):  Temp: 97.2 °F (36.2 °C) (12/22/24 0743)  Pulse: 92 (12/22/24 1126)  Resp: 18 (12/22/24 0457)  BP: 121/72 (12/22/24 1126)  SpO2: (!) 92 % (12/22/24 1126) Vital Signs (24h Range):  Temp:  [93 °F (33.9 °C)-98 °F (36.7 °C)] 97.2 °F (36.2 °C)  Pulse:  [82-96] 92  Resp:  [18] 18  SpO2:  [91 %-95 %] 92 %  BP: (121-145)/(57-76) 121/72     Weight: 67.8 kg (149 lb 7.6 oz)  Body mass index is 23.41 kg/m².    Intake/Output Summary (Last 24 hours) at 12/22/2024 1458  Last data filed at 12/22/2024 0628  Gross per 24 hour   Intake --   Output 2550 ml   Net -2550 ml         Physical Exam  Constitutional:       General: He is not in acute distress.  Cardiovascular:      Rate and Rhythm: Normal rate and regular rhythm.   Pulmonary:      Effort: Pulmonary effort is normal. No respiratory distress.      Breath sounds: Rales (Bilateral) present.      Comments: Diminished bilaterally.  Frequent cough  Abdominal:      General: Abdomen is flat. There is no distension.      Palpations: Abdomen is soft.      Tenderness: There is no abdominal tenderness. There is no guarding.   Musculoskeletal:      Right lower leg: No edema.      Left lower leg: No edema.   Skin:     General: Skin is warm and dry.   Neurological:      General: No focal deficit present.      Mental Status: He is alert.   Psychiatric:         Mood and Affect: Mood normal.             Significant Labs: All pertinent labs within the past 24 hours have been reviewed.    Significant Imaging: I have reviewed all pertinent imaging results/findings within the past 24 hours.    Review of Systems   Constitutional:  Positive for fatigue. Negative for fever.   Respiratory:  Positive for cough and shortness of breath.    Cardiovascular:  Negative for chest pain.    Gastrointestinal:  Negative for abdominal pain.

## 2024-12-22 NOTE — PLAN OF CARE
NURSING HOME ORDERS    12/25/2024  Merged with [facility] - MED SURG  [address] [address]  Dept: [phone]  Loc: [phone]     Admit to Nursing Home:  Skilled Nursing    Diagnoses:  Active Hospital Problems    Diagnosis  POA    *Multifocal pneumonia [J18.9]  Yes    Debility [R53.81]  Yes    Liver dysfunction [K76.89]  Yes    Hypophosphatemia [E83.39]  Yes    Sepsis [A41.9]  Yes    Hyponatremia [E87.1]  Yes    Essential hypertension [I10]  Yes    Benign prostatic hyperplasia with urinary obstruction [N40.1, N13.8]  Yes      Resolved Hospital Problems   No resolved problems to display.       Patient is homebound due to:  Multifocal pneumonia    Allergies:  Review of patient's allergies indicates:   Allergen Reactions    Ace inhibitors Other (See Comments)     Cough    Chlorthalidone      Hyponatremia    Feathers Other (See Comments)    Mite extract Other (See Comments)     Nose gets congested       Vitals:  Routine    Diet: regular diet    Activities:   Up in a chair each morning as tolerated and Activity as tolerated    Goals of Care Treatment Preferences:  Code Status: Full Code        Nursing Precautions:  Fall and Pressure ulcer prevention    Consults:   PT to evaluate and treat- 5 times a week and OT to evaluate and treat- 5 times a week     Miscellaneous Care: Routine Skin for Bedridden Patients:  Apply moisture barrier cream to all            Medications: Discontinue all previous medication orders, if any. See new list below.     Medication List        CHANGE how you take these medications      fluticasone propionate 50 mcg/actuation nasal spray  Commonly known as: FLONASE  USE 1 SPRAY (50 MCG TOTAL) IN EACH NOSTRIL ONCE DAILY  What changed: Another medication with the same name was removed. Continue taking this medication, and follow the directions you see here.            CONTINUE taking these medications      amLODIPine 5 MG tablet  Commonly known as:  NORVASC  TAKE 1 TABLET BY MOUTH EVERY DAY     azelastine 137 mcg (0.1 %) nasal spray  Commonly known as: ASTELIN  INSTILL 1-2 SPRAYS BY NASAL ROUTE 2 (TWO) TIMES DAILY AS NEEDED FOR RHINITIS.     blood sugar diagnostic Strp  To check BG 1 times daily, to use with insurance preferred meter     cetirizine 10 MG tablet  Commonly known as: ZYRTEC  Take 10 mg by mouth once daily.     INV atorvastatin/placebo 40 MG tablet  Take one tablet by mouth each morning. For Investigational Use Only.     ketoconazole 2 % cream  Commonly known as: NIZORAL  APPLY TO AREA OF RED, DRY SKIN ON BOTH FEET/HEELS ONE TO TWO TIMES DAILY FOR 2 TO 3 WEEKS.     lancets Misc  To check BG 1 times daily, to use with insurance preferred meter     losartan 100 MG tablet  Commonly known as: COZAAR  Take 1 tablet (100 mg total) by mouth once daily.     metoprolol succinate 25 MG 24 hr tablet  Commonly known as: TOPROL-XL  Take 1 tablet (25 mg total) by mouth once daily.     omeprazole 40 MG capsule  Commonly known as: PRILOSEC  TAKE 1 CAPSULE (40 MG TOTAL) BY MOUTH EVERY MORNING.     ONETOUCH VERIO FLEX METER Misc  Generic drug: blood-glucose meter  TO CHECK BLOOD GLUCOSE 1 TIME DAILY, TO USE WITH INSURANCE PREFERRED METER     tamsulosin 0.4 mg Cap  Commonly known as: FLOMAX  Take 0.8 mg by mouth once daily.            STOP taking these medications      benzonatate 200 MG capsule  Commonly known as: TESSALON                Immunizations Administered as of 12/22/2024       Name Date Dose VIS Date Route Exp Date    COVID-19, MRNA, LN-S, PF (Pfizer) (Purple Cap) 9/28/2021 0.3 mL -- Intramuscular --    Site: Left deltoid     : [employer]     Lot: [id number]     Comment: Adminis     COVID-19, MRNA, LN-S, PF (Pfizer) (Purple Cap) 3/6/2021 0.3 mL -- Intramuscular --    : [employer]     Lot: [id number]     Comment: Adminis     COVID-19, MRNA, LN-S, PF (Pfizer) (Purple Cap) 2/13/2021 0.3 mL -- Intramuscular --    Site: Left deltoid      : [employer]     Lot: [id number]     Comment: Adminis

## 2024-12-23 LAB
ALBUMIN SERPL BCP-MCNC: 2.4 G/DL (ref 3.5–5.2)
ANION GAP SERPL CALC-SCNC: 10 MMOL/L (ref 8–16)
BILIRUB UR QL STRIP: NEGATIVE
BUN SERPL-MCNC: 9 MG/DL (ref 8–23)
CALCIUM SERPL-MCNC: 8.8 MG/DL (ref 8.7–10.5)
CHLORIDE SERPL-SCNC: 95 MMOL/L (ref 95–110)
CLARITY UR REFRACT.AUTO: CLEAR
CO2 SERPL-SCNC: 23 MMOL/L (ref 23–29)
COLOR UR AUTO: COLORLESS
CREAT SERPL-MCNC: 0.7 MG/DL (ref 0.5–1.4)
ERYTHROCYTE [DISTWIDTH] IN BLOOD BY AUTOMATED COUNT: 13.7 % (ref 11.5–14.5)
EST. GFR  (NO RACE VARIABLE): >60 ML/MIN/1.73 M^2
GLUCOSE SERPL-MCNC: 103 MG/DL (ref 70–110)
GLUCOSE UR QL STRIP: NEGATIVE
HCT VFR BLD AUTO: 36 % (ref 40–54)
HGB BLD-MCNC: 12.5 G/DL (ref 14–18)
HGB UR QL STRIP: NEGATIVE
KETONES UR QL STRIP: NEGATIVE
LEUKOCYTE ESTERASE UR QL STRIP: NEGATIVE
MCH RBC QN AUTO: 31.7 PG (ref 27–31)
MCHC RBC AUTO-ENTMCNC: 34.7 G/DL (ref 32–36)
MCV RBC AUTO: 91 FL (ref 82–98)
NITRITE UR QL STRIP: NEGATIVE
OSMOLALITY UR: 192 MOSM/KG (ref 50–1200)
PH UR STRIP: 7 [PH] (ref 5–8)
PHOSPHATE SERPL-MCNC: 2.7 MG/DL (ref 2.7–4.5)
PLATELET # BLD AUTO: 300 K/UL (ref 150–450)
PMV BLD AUTO: 9 FL (ref 9.2–12.9)
POTASSIUM SERPL-SCNC: 4.7 MMOL/L (ref 3.5–5.1)
PROT UR QL STRIP: NEGATIVE
RBC # BLD AUTO: 3.94 M/UL (ref 4.6–6.2)
SODIUM SERPL-SCNC: 128 MMOL/L (ref 136–145)
SP GR UR STRIP: 1 (ref 1–1.03)
URN SPEC COLLECT METH UR: ABNORMAL
WBC # BLD AUTO: 9.68 K/UL (ref 3.9–12.7)

## 2024-12-23 PROCEDURE — 87449 NOS EACH ORGANISM AG IA: CPT | Performed by: FAMILY MEDICINE

## 2024-12-23 PROCEDURE — 25000003 PHARM REV CODE 250: Performed by: HOSPITALIST

## 2024-12-23 PROCEDURE — 63600175 PHARM REV CODE 636 W HCPCS: Performed by: FAMILY MEDICINE

## 2024-12-23 PROCEDURE — 80069 RENAL FUNCTION PANEL: CPT | Performed by: HOSPITALIST

## 2024-12-23 PROCEDURE — 25000003 PHARM REV CODE 250: Performed by: FAMILY MEDICINE

## 2024-12-23 PROCEDURE — 11000001 HC ACUTE MED/SURG PRIVATE ROOM

## 2024-12-23 PROCEDURE — 85027 COMPLETE CBC AUTOMATED: CPT | Performed by: HOSPITALIST

## 2024-12-23 PROCEDURE — 97535 SELF CARE MNGMENT TRAINING: CPT

## 2024-12-23 PROCEDURE — 83935 ASSAY OF URINE OSMOLALITY: CPT | Performed by: HOSPITALIST

## 2024-12-23 PROCEDURE — 81003 URINALYSIS AUTO W/O SCOPE: CPT | Performed by: HOSPITALIST

## 2024-12-23 PROCEDURE — 36415 COLL VENOUS BLD VENIPUNCTURE: CPT | Performed by: HOSPITALIST

## 2024-12-23 RX ORDER — SODIUM CHLORIDE 1 G/1
2000 TABLET ORAL 4 TIMES DAILY
Status: COMPLETED | OUTPATIENT
Start: 2024-12-23 | End: 2024-12-25

## 2024-12-23 RX ORDER — SODIUM CHLORIDE 1 G/1
1000 TABLET ORAL 4 TIMES DAILY
Status: DISCONTINUED | OUTPATIENT
Start: 2024-12-23 | End: 2024-12-23

## 2024-12-23 RX ORDER — SODIUM CHLORIDE 9 MG/ML
INJECTION, SOLUTION INTRAVENOUS CONTINUOUS
Status: ACTIVE | OUTPATIENT
Start: 2024-12-23 | End: 2024-12-24

## 2024-12-23 RX ADMIN — LOSARTAN POTASSIUM 25 MG: 25 TABLET, FILM COATED ORAL at 09:12

## 2024-12-23 RX ADMIN — SALINE NASAL SPRAY 1 SPRAY: 1.5 SOLUTION NASAL at 12:12

## 2024-12-23 RX ADMIN — ENOXAPARIN SODIUM 40 MG: 40 INJECTION SUBCUTANEOUS at 04:12

## 2024-12-23 RX ADMIN — SODIUM CHLORIDE 2000 MG: 1 TABLET ORAL at 08:12

## 2024-12-23 RX ADMIN — SALINE NASAL SPRAY 1 SPRAY: 1.5 SOLUTION NASAL at 08:12

## 2024-12-23 RX ADMIN — GUAIFENESIN AND DEXTROMETHORPHAN HYDROBROMIDE 2 TABLET: 600; 30 TABLET, EXTENDED RELEASE ORAL at 08:12

## 2024-12-23 RX ADMIN — SODIUM CHLORIDE 1000 MG: 1 TABLET ORAL at 04:12

## 2024-12-23 RX ADMIN — METOPROLOL SUCCINATE 25 MG: 25 TABLET, EXTENDED RELEASE ORAL at 09:12

## 2024-12-23 RX ADMIN — SALINE NASAL SPRAY 1 SPRAY: 1.5 SOLUTION NASAL at 09:12

## 2024-12-23 RX ADMIN — TAMSULOSIN HYDROCHLORIDE 0.8 MG: 0.4 CAPSULE ORAL at 09:12

## 2024-12-23 RX ADMIN — GUAIFENESIN AND DEXTROMETHORPHAN HYDROBROMIDE 2 TABLET: 600; 30 TABLET, EXTENDED RELEASE ORAL at 09:12

## 2024-12-23 RX ADMIN — FLUTICASONE PROPIONATE 50 MCG: 50 SPRAY, METERED NASAL at 09:12

## 2024-12-23 RX ADMIN — SODIUM CHLORIDE: 9 INJECTION, SOLUTION INTRAVENOUS at 04:12

## 2024-12-23 RX ADMIN — SALINE NASAL SPRAY 1 SPRAY: 1.5 SOLUTION NASAL at 04:12

## 2024-12-23 NOTE — SUBJECTIVE & OBJECTIVE
Interval History:    Symptoms:  nearly resolved cough and SOB.  Concerned about weakness.    Diet:  Adequate intake.    Activity level:  Impaired due to weakness.    Pain:  No pain.       Objective:     Vital Signs (Most Recent):  Temp: 97.2 °F (36.2 °C) (12/23/24 1141)  Pulse: 96 (12/23/24 1141)  Resp: 18 (12/23/24 1141)  BP: 126/84 (12/23/24 1141)  SpO2: 95 % (12/23/24 1141) Vital Signs (24h Range):  Temp:  [97.2 °F (36.2 °C)-97.7 °F (36.5 °C)] 97.2 °F (36.2 °C)  Pulse:  [83-96] 96  Resp:  [18] 18  SpO2:  [90 %-96 %] 95 %  BP: (125-137)/(69-84) 126/84     Weight: 67.8 kg (149 lb 7.6 oz)  Body mass index is 23.41 kg/m².    Intake/Output Summary (Last 24 hours) at 12/23/2024 1251  Last data filed at 12/23/2024 0017  Gross per 24 hour   Intake --   Output 950 ml   Net -950 ml         Physical Exam  Constitutional:       General: He is not in acute distress.  Cardiovascular:      Rate and Rhythm: Normal rate and regular rhythm.   Pulmonary:      Effort: Pulmonary effort is normal. No respiratory distress.      Breath sounds: Rales (Bilateral) present.      Comments: Diminished bilaterally.  Frequent cough  Abdominal:      General: Abdomen is flat. There is no distension.      Palpations: Abdomen is soft.      Tenderness: There is no abdominal tenderness. There is no guarding.   Musculoskeletal:      Right lower leg: No edema.      Left lower leg: No edema.   Skin:     General: Skin is warm and dry.   Neurological:      General: No focal deficit present.      Mental Status: He is alert.   Psychiatric:         Mood and Affect: Mood normal.           Significant Labs: All pertinent labs within the past 24 hours have been reviewed.    Significant Imaging: I have reviewed all pertinent imaging results/findings within the past 24 hours.    Review of Systems   Constitutional:  Positive for fatigue. Negative for fever.   Respiratory:  Negative for cough and shortness of breath.    Cardiovascular:  Negative for chest pain.    Gastrointestinal:  Negative for abdominal pain.

## 2024-12-23 NOTE — PLAN OF CARE
[facility]  Discharge Reassessment    Primary Care Provider: Jabier Hinkle MD    Expected Discharge Date: 12/24/2024    Reassessment (most recent)       Discharge Reassessment - 12/23/24 1529          Discharge Reassessment    Assessment Type Discharge Planning Reassessment (P)      Did the patient's condition or plan change since previous assessment? No (P)      Discharge Plan discussed with: Patient (P)      Communicated SIRIA with patient/caregiver Yes (P)      Discharge Plan A Skilled Nursing Facility (P)      Discharge Plan B Home;Home with family;Home Health (P)      DME Needed Upon Discharge  none (P)      Transition of Care Barriers None (P)         Post-Acute Status    Post-Acute Authorization Placement (P)      Post-Acute Placement Status Referrals Sent (P)      Coverage Medicare A&B (P)      Discharge Delays None known at this time (P)                      CM reassessed discharge plan, pt is requesting skilled secondary to he lives alone and would like more therapy prior to discharge. Referrals sent via [facility], not medically ready [id number], O-SNF considering pending correction of sodium. Will cont to follow until discharge.    Discharge Plan A and Plan B have been determined by review of patient's clinical status, future medical and therapeutic needs, and coverage/benefits for post-acute care in coordination with multidisciplinary team members.     Dona Hollingsworth, GAYATHRI Case Manager  [phone]

## 2024-12-23 NOTE — PT/OT/SLP PROGRESS
Occupational Therapy   Treatment and Discharge    Name: Jd Dill III  MRN: [id number]  Admitting Diagnosis:  Multifocal pneumonia       Recommendations:     Discharge Recommendations: Low Intensity Therapy  Discharge Equipment Recommendations:  walker, rolling  Barriers to discharge:  None    Assessment:     Jd Dill III is a 79 y.o. male with a medical diagnosis of Multifocal pneumonia.  He presents without pain today. Performance deficits affecting function are weakness, impaired endurance, impaired functional mobility, gait instability, impaired cardiopulmonary response to activity. Pt is at baseline for ADL performance, plan to DC acute OT.    Rehab Prognosis:  Good; patient would benefit from acute skilled OT services to address these deficits and reach maximum level of function.       Plan:     Patient to be seen 3 x/week to address the above listed problems via self-care/home management, therapeutic activities, therapeutic exercises  Plan of Care Expires: 01/19/25  Plan of Care Reviewed with: patient    Subjective     Chief Complaint: None  Patient/Family Comments/goals: return home  Pain/Comfort:  Pain Rating 1: 0/10    Objective:     Communicated with: Nsg prior to session.  Patient found  standing at sink c/ nsg  with telemetry, Erica upon OT entry to room.    General Precautions: Standard, fall    Orthopedic Precautions:N/A  Braces: N/A  Respiratory Status: Room air     Occupational Performance:     Bed Mobility:    Pt OOB     Functional Mobility/Transfers:  Patient completed Sit <> Stand Transfer with supervision  with  rolling walker   Chair t/f c/ Sup using RW  Functional Mobility: SBA to Sup using RW at room level.    Activities of Daily Living:  Grooming: supervision oral care and facial care standing at sink  Upper Body Dressing: contact guard assistance change gown in standing  Lower Body Dressing: stand by assistance adjusting socks      [facility] 6 Click ADL: 24    Treatment & Education:  Pt  educated on role and purpose of therapy  Pt educated on goal setting  Pt educated on benefits of OOB activity  Pt educated on self advocacy     Patient left up in chair with all lines intact, call button in reach, nsg notified, and PCT present    GOALS:   Multidisciplinary Problems       Occupational Therapy Goals          Problem: Occupational Therapy    Goal Priority Disciplines Outcome Interventions   Occupational Therapy Goal     OT, PT/OT Progressing    Description: Goals to be met by: 1/19/25     Patient will increase functional independence with ADLs by performing:    UE Dressing with Supervision.  LE Dressing with Stand-by Assistance.  Grooming while standing at sink with Supervision.  Toileting from toilet with Stand-by Assistance for hygiene and clothing management.   All functional transfers performed with SBA                         Time Tracking:     OT Date of Treatment: 12/23/24  OT Start Time: 1127  OT Stop Time: 1138  OT Total Time (min): 11 min    Billable Minutes:Self Care/Home Management 11    OT/MARCO: OT          12/23/2024

## 2024-12-23 NOTE — PROGRESS NOTES
[facility]  Progress Note    Patient Name: Jd Dill III  MRN: [id number]  Patient Class: IP- Inpatient   Admission Date: 12/17/2024  Length of Stay: 6 days  Attending Physician: Emmanuel Castillo MD  Primary Care Provider: Jabier Hinkle MD        Subjective     Principal Problem:Multifocal pneumonia        HPI:  dJ Dill III is an 79-year-old male with a history of BPH, GERD, hypertension transferred from clinic for tachycardia and generalized weakness.   He started feeling bad approximately 2 weeks ago.  Went to the clinic on 12/13, recommended conservative therapy with lozenges, Tylenol etc..  He went back to the clinic today because he was feeling worse.  He has had decreased p.o. intake.  He is feeling weak.  His cough is getting worse.  He was found to be tachycardic with wheezing, transferred to ED for further workup.  He denies chest pain.  No abdominal pain.  Denies dysuria or hematuria.  Denies current fever.    In the ED patient afebrile, tachycardic, hemodynamically stable saturating in mid 90's on room air at rest. WBC 20.55. LA 1.48. Patient Na 126. CXR performed and showed Scattered low-grade patchy airspace opacities and a suspected nodular lesion. CT Chest ordered to better characterize. Started on azithro and ceftriaxone for multifocal pna. Admitted to the care of medicine for further evaluation and management.      Overview/Hospital Course:  Admitted to hospital medicine for sepsis d/t multifocal pneumonia c/b hyponatremia and liver dysfunction.  Started on ceftriaxone and azithromycin.  Na improved with IV fluids and PO intake.  Liver dysfunction improved with antibiotics and fluids.  COVID and flu negative.  Blood cultures NGTD.    Interval History:    Symptoms:  nearly resolved cough and SOB.  Concerned about weakness.    Diet:  Adequate intake.    Activity level:  Impaired due to weakness.    Pain:  No pain.       Objective:     Vital Signs (Most  Recent):  Temp: 97.2 °F (36.2 °C) (12/23/24 1141)  Pulse: 96 (12/23/24 1141)  Resp: 18 (12/23/24 1141)  BP: 126/84 (12/23/24 1141)  SpO2: 95 % (12/23/24 1141) Vital Signs (24h Range):  Temp:  [97.2 °F (36.2 °C)-97.7 °F (36.5 °C)] 97.2 °F (36.2 °C)  Pulse:  [83-96] 96  Resp:  [18] 18  SpO2:  [90 %-96 %] 95 %  BP: (125-137)/(69-84) 126/84     Weight: 67.8 kg (149 lb 7.6 oz)  Body mass index is 23.41 kg/m².    Intake/Output Summary (Last 24 hours) at 12/23/2024 1251  Last data filed at 12/23/2024 0017  Gross per 24 hour   Intake --   Output 950 ml   Net -950 ml         Physical Exam  Constitutional:       General: He is not in acute distress.  Cardiovascular:      Rate and Rhythm: Normal rate and regular rhythm.   Pulmonary:      Effort: Pulmonary effort is normal. No respiratory distress.      Breath sounds: Rales (Bilateral) present.      Comments: Diminished bilaterally.  Frequent cough  Abdominal:      General: Abdomen is flat. There is no distension.      Palpations: Abdomen is soft.      Tenderness: There is no abdominal tenderness. There is no guarding.   Musculoskeletal:      Right lower leg: No edema.      Left lower leg: No edema.   Skin:     General: Skin is warm and dry.   Neurological:      General: No focal deficit present.      Mental Status: He is alert.   Psychiatric:         Mood and Affect: Mood normal.           Significant Labs: All pertinent labs within the past 24 hours have been reviewed.    Significant Imaging: I have reviewed all pertinent imaging results/findings within the past 24 hours.    Review of Systems   Constitutional:  Positive for fatigue. Negative for fever.   Respiratory:  Negative for cough and shortness of breath.    Cardiovascular:  Negative for chest pain.   Gastrointestinal:  Negative for abdominal pain.         Assessment and Plan     * Multifocal pneumonia  Chest imaging consistent with multifocal pneumonia.  RSV, covid, flu negative.  Procal negative.  S/p 2 L bolus in ED  and 1 L maintenance IVF.  Cont ceftriaxone and azithromycin.  May need longer course of azithromycin pending Legionella studies.  follow up blood and respiratory cultures        Debility  Patient with Acute on chronic debility due to age-related physical debility. The patient's latest AMPAC (Activity Measure for Post Acute Care) Score is listed below.    AM-PAC Score - How much help does the patient need for each activity listed       Plan  - Progressive mobility protocol initated  - PT/OT consulted  -- pt hoping for SNF        Hypophosphatemia  Monitor and replace as needed    Liver dysfunction  Likely d/t sepsis and hypovolemia  Improved with IV fluids and antibiotics      Hyponatremia  Likely d/t decreased PO intake  Na 126 on presentation, improved with IV fluid and PO intake  setting of PNA and decreased po intake  S/p IV fluids, cont PO intake.  follow up legionella studies  continue to monitor     Sepsis  RESOLVED  Due to Multifocal pneumonia  Tachycardia and leukocytosis on admission      Benign prostatic hyperplasia with urinary obstruction  continue home tamsulosin  strict I/Os    Essential hypertension  continue home metoprolol  holding home losartan and amlodipine for now due to low normal BP, resume when BP tolerates.      VTE Risk Mitigation (From admission, onward)           Ordered     enoxaparin injection 40 mg  Daily         12/17/24 1657     IP VTE HIGH RISK PATIENT  Once         12/17/24 1657     Place sequential compression device  Until discontinued         12/17/24 1657                    Discharge Planning   SIRAI: 12/23/2024     Code Status: Full Code   Medical Readiness for Discharge Date: 12/22/2024  Discharge Plan A: Home                Please place Justification for DME        Emmanuel Castillo MD  Department of Hospital Medicine   [facility]

## 2024-12-24 LAB
ALBUMIN SERPL BCP-MCNC: 2.5 G/DL (ref 3.5–5.2)
ANION GAP SERPL CALC-SCNC: 10 MMOL/L (ref 8–16)
BUN SERPL-MCNC: 8 MG/DL (ref 8–23)
CALCIUM SERPL-MCNC: 8.6 MG/DL (ref 8.7–10.5)
CHLORIDE SERPL-SCNC: 101 MMOL/L (ref 95–110)
CO2 SERPL-SCNC: 17 MMOL/L (ref 23–29)
CORTIS SERPL-MCNC: 9.2 UG/DL
CREAT SERPL-MCNC: 0.7 MG/DL (ref 0.5–1.4)
EST. GFR  (NO RACE VARIABLE): >60 ML/MIN/1.73 M^2
GLUCOSE SERPL-MCNC: 104 MG/DL (ref 70–110)
OSMOLALITY SERPL: 279 MOSM/KG (ref 280–300)
PHOSPHATE SERPL-MCNC: 2.3 MG/DL (ref 2.7–4.5)
POTASSIUM SERPL-SCNC: 5 MMOL/L (ref 3.5–5.1)
SODIUM SERPL-SCNC: 128 MMOL/L (ref 136–145)
T4 FREE SERPL-MCNC: 1.2 NG/DL (ref 0.71–1.51)
TSH SERPL DL<=0.005 MIU/L-ACNC: 0.81 UIU/ML (ref 0.4–4)

## 2024-12-24 PROCEDURE — 11000001 HC ACUTE MED/SURG PRIVATE ROOM

## 2024-12-24 PROCEDURE — 99900035 HC TECH TIME PER 15 MIN (STAT)

## 2024-12-24 PROCEDURE — 94761 N-INVAS EAR/PLS OXIMETRY MLT: CPT

## 2024-12-24 PROCEDURE — 83930 ASSAY OF BLOOD OSMOLALITY: CPT | Performed by: HOSPITALIST

## 2024-12-24 PROCEDURE — 25000003 PHARM REV CODE 250: Performed by: FAMILY MEDICINE

## 2024-12-24 PROCEDURE — 94664 DEMO&/EVAL PT USE INHALER: CPT

## 2024-12-24 PROCEDURE — 97116 GAIT TRAINING THERAPY: CPT | Mod: CQ

## 2024-12-24 PROCEDURE — 63600175 PHARM REV CODE 636 W HCPCS: Performed by: FAMILY MEDICINE

## 2024-12-24 PROCEDURE — 25000003 PHARM REV CODE 250: Performed by: HOSPITALIST

## 2024-12-24 PROCEDURE — 84439 ASSAY OF FREE THYROXINE: CPT | Performed by: HOSPITALIST

## 2024-12-24 PROCEDURE — 80069 RENAL FUNCTION PANEL: CPT | Performed by: HOSPITALIST

## 2024-12-24 PROCEDURE — 82533 TOTAL CORTISOL: CPT | Performed by: HOSPITALIST

## 2024-12-24 PROCEDURE — 84443 ASSAY THYROID STIM HORMONE: CPT | Performed by: HOSPITALIST

## 2024-12-24 PROCEDURE — 36415 COLL VENOUS BLD VENIPUNCTURE: CPT | Performed by: HOSPITALIST

## 2024-12-24 RX ORDER — AMLODIPINE BESYLATE 5 MG/1
5 TABLET ORAL DAILY
Status: CANCELLED | OUTPATIENT
Start: 2024-12-24

## 2024-12-24 RX ORDER — SODIUM,POTASSIUM PHOSPHATES 280-250MG
2 POWDER IN PACKET (EA) ORAL ONCE
Status: COMPLETED | OUTPATIENT
Start: 2024-12-24 | End: 2024-12-24

## 2024-12-24 RX ADMIN — SODIUM CHLORIDE 2000 MG: 1 TABLET ORAL at 06:12

## 2024-12-24 RX ADMIN — SALINE NASAL SPRAY 1 SPRAY: 1.5 SOLUTION NASAL at 09:12

## 2024-12-24 RX ADMIN — LOSARTAN POTASSIUM 25 MG: 25 TABLET, FILM COATED ORAL at 08:12

## 2024-12-24 RX ADMIN — METOPROLOL SUCCINATE 25 MG: 25 TABLET, EXTENDED RELEASE ORAL at 08:12

## 2024-12-24 RX ADMIN — SALINE NASAL SPRAY 1 SPRAY: 1.5 SOLUTION NASAL at 08:12

## 2024-12-24 RX ADMIN — SODIUM CHLORIDE 2000 MG: 1 TABLET ORAL at 09:12

## 2024-12-24 RX ADMIN — SODIUM CHLORIDE 2000 MG: 1 TABLET ORAL at 08:12

## 2024-12-24 RX ADMIN — SALINE NASAL SPRAY 1 SPRAY: 1.5 SOLUTION NASAL at 12:12

## 2024-12-24 RX ADMIN — ENOXAPARIN SODIUM 40 MG: 40 INJECTION SUBCUTANEOUS at 06:12

## 2024-12-24 RX ADMIN — TAMSULOSIN HYDROCHLORIDE 0.8 MG: 0.4 CAPSULE ORAL at 08:12

## 2024-12-24 RX ADMIN — SALINE NASAL SPRAY 1 SPRAY: 1.5 SOLUTION NASAL at 06:12

## 2024-12-24 RX ADMIN — FLUTICASONE PROPIONATE 50 MCG: 50 SPRAY, METERED NASAL at 08:12

## 2024-12-24 RX ADMIN — GUAIFENESIN AND DEXTROMETHORPHAN HYDROBROMIDE 2 TABLET: 600; 30 TABLET, EXTENDED RELEASE ORAL at 08:12

## 2024-12-24 RX ADMIN — POTASSIUM & SODIUM PHOSPHATES POWDER PACK 280-160-250 MG 2 PACKET: 280-160-250 PACK at 08:12

## 2024-12-24 RX ADMIN — GUAIFENESIN AND DEXTROMETHORPHAN HYDROBROMIDE 2 TABLET: 600; 30 TABLET, EXTENDED RELEASE ORAL at 09:12

## 2024-12-24 RX ADMIN — SODIUM CHLORIDE 2000 MG: 1 TABLET ORAL at 12:12

## 2024-12-24 NOTE — SUBJECTIVE & OBJECTIVE
Interval History:    Symptoms:  nearly resolved cough and SOB.  Concerned about weakness.    Diet:  poor intake. No appetite   Activity level:  Impaired due to weakness.    Pain:  No pain.       Objective:     Vital Signs (Most Recent):  Temp: 97.4 °F (36.3 °C) (12/24/24 1129)  Pulse: 84 (12/24/24 1129)  Resp: 18 (12/24/24 1129)  BP: 120/79 (12/24/24 1129)  SpO2: 97 % (12/24/24 1129) Vital Signs (24h Range):  Temp:  [97.2 °F (36.2 °C)-98.4 °F (36.9 °C)] 97.4 °F (36.3 °C)  Pulse:  [] 84  Resp:  [16-18] 18  SpO2:  [86 %-97 %] 97 %  BP: (116-150)/(65-79) 120/79     Weight: 67.8 kg (149 lb 7.6 oz)  Body mass index is 23.41 kg/m².    Intake/Output Summary (Last 24 hours) at 12/24/2024 1433  Last data filed at 12/24/2024 1418  Gross per 24 hour   Intake 720 ml   Output 700 ml   Net 20 ml         Physical Exam  Constitutional:       General: He is not in acute distress.  Cardiovascular:      Rate and Rhythm: Normal rate and regular rhythm.   Pulmonary:      Effort: Pulmonary effort is normal. No respiratory distress.      Breath sounds: Rales (Bilateral) present.      Comments: Diminished bilaterally.  Frequent cough  Abdominal:      General: Abdomen is flat. There is no distension.      Palpations: Abdomen is soft.      Tenderness: There is no abdominal tenderness. There is no guarding.   Musculoskeletal:      Right lower leg: No edema.      Left lower leg: No edema.   Skin:     General: Skin is warm and dry.   Neurological:      General: No focal deficit present.      Mental Status: He is alert.   Psychiatric:         Mood and Affect: Mood normal.             Significant Labs: All pertinent labs within the past 24 hours have been reviewed.    Significant Imaging: I have reviewed all pertinent imaging results/findings within the past 24 hours.    Review of Systems   Constitutional:  Positive for fatigue. Negative for fever.   Respiratory:  Negative for cough and shortness of breath.    Cardiovascular:  Negative for  chest pain.   Gastrointestinal:  Negative for abdominal pain.

## 2024-12-24 NOTE — ASSESSMENT & PLAN NOTE
RESOLVED  Chest imaging consistent with multifocal pneumonia.  RSV, covid, flu negative.  Procal negative.  S/p 2 L bolus in ED and 1 L maintenance IVF.  Completed 5 days of ceftriaxone and azithromycin.  May need longer course of azithromycin pending Legionella studies.

## 2024-12-24 NOTE — ASSESSMENT & PLAN NOTE
NOT BETTER  Likely d/t decreased PO intake  Na 126 on presentation, improved with IV fluid and PO intake.  Then worsened 12/23 to 128.  Given more IVF with no improvement.  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

## 2024-12-24 NOTE — PROGRESS NOTES
[facility]  Progress Note    Patient Name: Jd Dill III  MRN: [id number]  Patient Class: IP- Inpatient   Admission Date: 12/17/2024  Length of Stay: 7 days  Attending Physician: Emmanuel Castillo MD  Primary Care Provider: Jabier Hinkle MD        Subjective     Principal Problem:Multifocal pneumonia        HPI:  Jd Dill III is an 79-year-old male with a history of BPH, GERD, hypertension transferred from clinic for tachycardia and generalized weakness.   He started feeling bad approximately 2 weeks ago.  Went to the clinic on 12/13, recommended conservative therapy with lozenges, Tylenol etc..  He went back to the clinic today because he was feeling worse.  He has had decreased p.o. intake.  He is feeling weak.  His cough is getting worse.  He was found to be tachycardic with wheezing, transferred to ED for further workup.  He denies chest pain.  No abdominal pain.  Denies dysuria or hematuria.  Denies current fever.    In the ED patient afebrile, tachycardic, hemodynamically stable saturating in mid 90's on room air at rest. WBC 20.55. LA 1.48. Patient Na 126. CXR performed and showed Scattered low-grade patchy airspace opacities and a suspected nodular lesion. CT Chest ordered to better characterize. Started on azithro and ceftriaxone for multifocal pna. Admitted to the care of medicine for further evaluation and management.      Overview/Hospital Course:  Admitted to hospital medicine for sepsis d/t multifocal pneumonia c/b hyponatremia and liver dysfunction.  Started on ceftriaxone and azithromycin.  Na improved with IV fluids and PO intake.  Liver dysfunction improved with antibiotics and fluids.  COVID and flu negative.  Blood cultures NGTD.    Interval History:    Symptoms:  nearly resolved cough and SOB.  Concerned about weakness.    Diet:  poor intake. No appetite   Activity level:  Impaired due to weakness.    Pain:  No pain.       Objective:     Vital Signs (Most  Recent):  Temp: 97.4 °F (36.3 °C) (12/24/24 1129)  Pulse: 84 (12/24/24 1129)  Resp: 18 (12/24/24 1129)  BP: 120/79 (12/24/24 1129)  SpO2: 97 % (12/24/24 1129) Vital Signs (24h Range):  Temp:  [97.2 °F (36.2 °C)-98.4 °F (36.9 °C)] 97.4 °F (36.3 °C)  Pulse:  [] 84  Resp:  [16-18] 18  SpO2:  [86 %-97 %] 97 %  BP: (116-150)/(65-79) 120/79     Weight: 67.8 kg (149 lb 7.6 oz)  Body mass index is 23.41 kg/m².    Intake/Output Summary (Last 24 hours) at 12/24/2024 1433  Last data filed at 12/24/2024 1418  Gross per 24 hour   Intake 720 ml   Output 700 ml   Net 20 ml         Physical Exam  Constitutional:       General: He is not in acute distress.  Cardiovascular:      Rate and Rhythm: Normal rate and regular rhythm.   Pulmonary:      Effort: Pulmonary effort is normal. No respiratory distress.      Breath sounds: Rales (Bilateral) present.      Comments: Diminished bilaterally.  Frequent cough  Abdominal:      General: Abdomen is flat. There is no distension.      Palpations: Abdomen is soft.      Tenderness: There is no abdominal tenderness. There is no guarding.   Musculoskeletal:      Right lower leg: No edema.      Left lower leg: No edema.   Skin:     General: Skin is warm and dry.   Neurological:      General: No focal deficit present.      Mental Status: He is alert.   Psychiatric:         Mood and Affect: Mood normal.             Significant Labs: All pertinent labs within the past 24 hours have been reviewed.    Significant Imaging: I have reviewed all pertinent imaging results/findings within the past 24 hours.    Review of Systems   Constitutional:  Positive for fatigue. Negative for fever.   Respiratory:  Negative for cough and shortness of breath.    Cardiovascular:  Negative for chest pain.   Gastrointestinal:  Negative for abdominal pain.       Assessment and Plan     * Multifocal pneumonia  RESOLVED  Chest imaging consistent with multifocal pneumonia.  RSV, covid, flu negative.  Procal negative.  S/p  2 L bolus in ED and 1 L maintenance IVF.  Completed 5 days of ceftriaxone and azithromycin.  May need longer course of azithromycin pending Legionella studies.          Debility  Patient with Acute on chronic debility due to age-related physical debility. The patient's latest AMPAC (Activity Measure for Post Acute Care) Score is listed below.    AM-PAC Score - How much help does the patient need for each activity listed       Plan  - Progressive mobility protocol initated  - PT/OT consulted  -- pt hoping for SNF        Hypophosphatemia  Monitor and replace as needed    Liver dysfunction  Likely d/t sepsis and hypovolemia  Improved with IV fluids and antibiotics      Hyponatremia  NOT BETTER  Likely d/t decreased PO intake  Na 126 on presentation, improved with IV fluid and PO intake.  Then worsened 12/23 to 128.  Given more IVF with no improvement.  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      VTE Risk Mitigation (From admission, onward)           Ordered     enoxaparin injection 40 mg  Daily         12/17/24 1657     IP VTE HIGH RISK PATIENT  Once         12/17/24 1657     Place sequential compression device  Until discontinued         12/17/24 1657                    Discharge Planning   SIRIA: 12/26/2024     Code Status: Full Code   Medical Readiness for Discharge Date:   Discharge Plan A: Skilled Nursing Facility   Discharge Delays: None known at this time                    Emmanuel Castillo MD  Department of Hospital Medicine   [facility]

## 2024-12-24 NOTE — PT/OT/SLP PROGRESS
Physical Therapy Treatment    Patient Name:  Jd Dill III   MRN:  [id number]    Recommendations:     Discharge Recommendations: Low Intensity Therapy  Discharge Equipment Recommendations: walker, rolling  Barriers to discharge: Decreased caregiver support    Assessment:     Jd Dill III is a 79 y.o. male admitted with a medical diagnosis of Multifocal pneumonia.  He presents with the following impairments/functional limitations: weakness, impaired endurance, gait instability, decreased safety awareness.    Rehab Prognosis: Good; patient would benefit from acute skilled PT services to address these deficits and reach maximum level of function.    Recent Surgery: * No surgery found *      Plan:     During this hospitalization, patient to be seen 3 x/week to address the identified rehab impairments via therapeutic activities, gait training, therapeutic exercises, neuromuscular re-education and progress toward the following goals:    Plan of Care Expires:  01/20/25    Subjective     Chief Complaint: pt agreeable   Patient/Family Comments/goals: none   Pain/Comfort:  Pain Rating 1: 0/10      Objective:     Communicated with RN prior to session.  Patient found up in chair with   upon PT entry to room.     General Precautions: Standard, fall  Orthopedic Precautions: N/A  Braces: N/A  Respiratory Status: Room air     Functional Mobility:  Bed Mobility:     Supine to Sit: stand by assistance  Transfers:     Sit to Stand:  stand by assistance with rolling walker  Gait: 200 total (60 no AD and 140 with RW) CGA required without AD for anterior lean       AM-PAC 6 CLICK MOBILITY  Turning over in bed (including adjusting bedclothes, sheets and blankets)?: 4  Sitting down on and standing up from a chair with arms (e.g., wheelchair, bedside commode, etc.): 4  Moving from lying on back to sitting on the side of the bed?: 4  Moving to and from a bed to a chair (including a wheelchair)?: 4  Need to walk in hospital room?:  4  Climbing 3-5 steps with a railing?: 3  Basic Mobility Total Score: 23       Treatment & Education:  Bedside table in front of patient and area set up for function, convenience, and safety. RN aware of patient's mobility needs and status. Questions/concerns addressed within PTA scope of practice; patient  with no further questions. Time was provided for active listening, discussion of health disposition, and discussion of safe discharge.    Patient left up in chair with all lines intact and call button in reach..    GOALS:   Multidisciplinary Problems       Physical Therapy Goals          Problem: Physical Therapy    Goal Priority Disciplines Outcome Interventions   Physical Therapy Goal     PT, PT/OT Progressing    Description: Goals to be met by: 2025     Patient will increase functional independence with mobility by performin. Supine to sit with Modified [place]  2. Sit to supine with Modified [place]  3. Sit to stand transfer with Supervision  4. Bed to chair transfer with Supervision using LRAD  5. Gait  x 100 feet with Supervision using LRAD.    The mobility limitation cannot be sufficiently resolved by the use of a cane.   Patient's functional mobility deficit can be sufficiently resolved with the use of a (Rolling Walker or Walker).  Patient's mobility limitation significantly impairs their ability to participate in one of more activities of daily living.  The use of a (RW or Walker) will significantly improve the patient's ability to participate in MRADLS and the patient will use it on regular basis in the home.                              Time Tracking:     PT Received On: 24  PT Start Time: 911     PT Stop Time: 922  PT Total Time (min): 11 min     Billable Minutes: Gait Training 11    Treatment Type: Treatment  PT/PTA: PTA     Number of PTA visits since last PT visit: 2024

## 2024-12-25 LAB
ALBUMIN SERPL BCP-MCNC: 2.4 G/DL (ref 3.5–5.2)
ALBUMIN SERPL BCP-MCNC: 2.4 G/DL (ref 3.5–5.2)
ALP SERPL-CCNC: 67 U/L (ref 40–150)
ALT SERPL W/O P-5'-P-CCNC: 91 U/L (ref 10–44)
ANION GAP SERPL CALC-SCNC: 6 MMOL/L (ref 8–16)
AST SERPL-CCNC: 57 U/L (ref 10–40)
BILIRUB DIRECT SERPL-MCNC: 0.2 MG/DL (ref 0.1–0.3)
BILIRUB SERPL-MCNC: 0.5 MG/DL (ref 0.1–1)
BUN SERPL-MCNC: 11 MG/DL (ref 8–23)
CALCIUM SERPL-MCNC: 8.5 MG/DL (ref 8.7–10.5)
CHLORIDE SERPL-SCNC: 100 MMOL/L (ref 95–110)
CO2 SERPL-SCNC: 24 MMOL/L (ref 23–29)
CREAT SERPL-MCNC: 0.7 MG/DL (ref 0.5–1.4)
ERYTHROCYTE [DISTWIDTH] IN BLOOD BY AUTOMATED COUNT: 14.2 % (ref 11.5–14.5)
EST. GFR  (NO RACE VARIABLE): >60 ML/MIN/1.73 M^2
GLUCOSE SERPL-MCNC: 118 MG/DL (ref 70–110)
HCT VFR BLD AUTO: 32.9 % (ref 40–54)
HGB BLD-MCNC: 11.2 G/DL (ref 14–18)
MAGNESIUM SERPL-MCNC: 2.2 MG/DL (ref 1.6–2.6)
MCH RBC QN AUTO: 31.7 PG (ref 27–31)
MCHC RBC AUTO-ENTMCNC: 34 G/DL (ref 32–36)
MCV RBC AUTO: 93 FL (ref 82–98)
PHOSPHATE SERPL-MCNC: 1.9 MG/DL (ref 2.7–4.5)
PLATELET # BLD AUTO: 305 K/UL (ref 150–450)
PMV BLD AUTO: 9.1 FL (ref 9.2–12.9)
POTASSIUM SERPL-SCNC: 4.3 MMOL/L (ref 3.5–5.1)
PROT SERPL-MCNC: 5.7 G/DL (ref 6–8.4)
RBC # BLD AUTO: 3.53 M/UL (ref 4.6–6.2)
SODIUM SERPL-SCNC: 130 MMOL/L (ref 136–145)
WBC # BLD AUTO: 7.47 K/UL (ref 3.9–12.7)

## 2024-12-25 PROCEDURE — 25000003 PHARM REV CODE 250: Performed by: FAMILY MEDICINE

## 2024-12-25 PROCEDURE — 85027 COMPLETE CBC AUTOMATED: CPT | Performed by: HOSPITALIST

## 2024-12-25 PROCEDURE — 83735 ASSAY OF MAGNESIUM: CPT | Performed by: HOSPITALIST

## 2024-12-25 PROCEDURE — 80069 RENAL FUNCTION PANEL: CPT | Performed by: HOSPITALIST

## 2024-12-25 PROCEDURE — 25000003 PHARM REV CODE 250: Performed by: HOSPITALIST

## 2024-12-25 PROCEDURE — 84075 ASSAY ALKALINE PHOSPHATASE: CPT | Performed by: HOSPITALIST

## 2024-12-25 PROCEDURE — 63600175 PHARM REV CODE 636 W HCPCS: Performed by: FAMILY MEDICINE

## 2024-12-25 PROCEDURE — 94664 DEMO&/EVAL PT USE INHALER: CPT

## 2024-12-25 PROCEDURE — 11000001 HC ACUTE MED/SURG PRIVATE ROOM

## 2024-12-25 PROCEDURE — 36415 COLL VENOUS BLD VENIPUNCTURE: CPT | Performed by: HOSPITALIST

## 2024-12-25 RX ADMIN — METOPROLOL SUCCINATE 25 MG: 25 TABLET, EXTENDED RELEASE ORAL at 09:12

## 2024-12-25 RX ADMIN — SODIUM CHLORIDE 2000 MG: 1 TABLET ORAL at 09:12

## 2024-12-25 RX ADMIN — TAMSULOSIN HYDROCHLORIDE 0.8 MG: 0.4 CAPSULE ORAL at 09:12

## 2024-12-25 RX ADMIN — GUAIFENESIN AND DEXTROMETHORPHAN HYDROBROMIDE 2 TABLET: 600; 30 TABLET, EXTENDED RELEASE ORAL at 11:12

## 2024-12-25 RX ADMIN — SALINE NASAL SPRAY 1 SPRAY: 1.5 SOLUTION NASAL at 01:12

## 2024-12-25 RX ADMIN — SALINE NASAL SPRAY 1 SPRAY: 1.5 SOLUTION NASAL at 11:12

## 2024-12-25 RX ADMIN — SODIUM CHLORIDE 2000 MG: 1 TABLET ORAL at 01:12

## 2024-12-25 RX ADMIN — SALINE NASAL SPRAY 1 SPRAY: 1.5 SOLUTION NASAL at 09:12

## 2024-12-25 RX ADMIN — SALINE NASAL SPRAY 1 SPRAY: 1.5 SOLUTION NASAL at 04:12

## 2024-12-25 RX ADMIN — SODIUM PHOSPHATE, MONOBASIC, MONOHYDRATE AND SODIUM PHOSPHATE, DIBASIC, ANHYDROUS 39.99 MMOL: 142; 276 INJECTION, SOLUTION INTRAVENOUS at 10:12

## 2024-12-25 RX ADMIN — ENOXAPARIN SODIUM 40 MG: 40 INJECTION SUBCUTANEOUS at 04:12

## 2024-12-25 RX ADMIN — LOSARTAN POTASSIUM 25 MG: 25 TABLET, FILM COATED ORAL at 09:12

## 2024-12-25 RX ADMIN — GUAIFENESIN AND DEXTROMETHORPHAN HYDROBROMIDE 2 TABLET: 600; 30 TABLET, EXTENDED RELEASE ORAL at 09:12

## 2024-12-25 RX ADMIN — FLUTICASONE PROPIONATE 50 MCG: 50 SPRAY, METERED NASAL at 09:12

## 2024-12-25 NOTE — PROGRESS NOTES
[facility]  Progress Note    Patient Name: Jd Dill III  MRN: [id number]  Patient Class: IP- Inpatient   Admission Date: 12/17/2024  Length of Stay: 8 days  Attending Physician: Emmanuel Castillo MD  Primary Care Provider: Jabier Hinkle MD        Subjective     Principal Problem:Multifocal pneumonia        HPI:  Jd Dill III is an 79-year-old male with a history of BPH, GERD, hypertension transferred from clinic for tachycardia and generalized weakness.   He started feeling bad approximately 2 weeks ago.  Went to the clinic on 12/13, recommended conservative therapy with lozenges, Tylenol etc..  He went back to the clinic today because he was feeling worse.  He has had decreased p.o. intake.  He is feeling weak.  His cough is getting worse.  He was found to be tachycardic with wheezing, transferred to ED for further workup.  He denies chest pain.  No abdominal pain.  Denies dysuria or hematuria.  Denies current fever.    In the ED patient afebrile, tachycardic, hemodynamically stable saturating in mid 90's on room air at rest. WBC 20.55. LA 1.48. Patient Na 126. CXR performed and showed Scattered low-grade patchy airspace opacities and a suspected nodular lesion. CT Chest ordered to better characterize. Started on azithro and ceftriaxone for multifocal pna. Admitted to the care of medicine for further evaluation and management.      Overview/Hospital Course:  Admitted to hospital medicine for sepsis d/t multifocal pneumonia c/b hyponatremia and liver dysfunction.  Started on ceftriaxone and azithromycin.  Na improved with IV fluids and PO intake.  Liver dysfunction improved with antibiotics and fluids.  COVID and flu negative.  Blood cultures NGTD.    Interval History:    Symptoms:  resolved cough and SOB.  Concerned about weakness.    Diet:  poor intake, but better  Activity level:  Impaired due to weakness.    Pain:  No pain.       Objective:     Vital Signs (Most  Recent):  Temp: 98.2 °F (36.8 °C) (12/25/24 1156)  Pulse: 86 (12/25/24 1156)  Resp: 20 (12/25/24 1156)  BP: 131/63 (12/25/24 1156)  SpO2: 97 % (12/25/24 1156) Vital Signs (24h Range):  Temp:  [97.5 °F (36.4 °C)-98.2 °F (36.8 °C)] 98.2 °F (36.8 °C)  Pulse:  [] 86  Resp:  [16-20] 20  SpO2:  [94 %-97 %] 97 %  BP: (127-168)/(63-97) 131/63     Weight: 67.8 kg (149 lb 7.6 oz)  Body mass index is 23.41 kg/m².    Intake/Output Summary (Last 24 hours) at 12/25/2024 1312  Last data filed at 12/25/2024 1046  Gross per 24 hour   Intake 720 ml   Output 650 ml   Net 70 ml         Physical Exam  Constitutional:       General: He is not in acute distress.  Cardiovascular:      Rate and Rhythm: Normal rate and regular rhythm.   Pulmonary:      Effort: Pulmonary effort is normal. No respiratory distress.      Breath sounds: Rales (Bilateral) present.   Abdominal:      General: Abdomen is flat. There is no distension.      Palpations: Abdomen is soft.      Tenderness: There is no abdominal tenderness. There is no guarding.   Musculoskeletal:      Right lower leg: No edema.      Left lower leg: No edema.   Skin:     General: Skin is warm and dry.   Neurological:      General: No focal deficit present.      Mental Status: He is alert.   Psychiatric:         Mood and Affect: Mood normal.             Significant Labs: All pertinent labs within the past 24 hours have been reviewed.    Significant Imaging: I have reviewed all pertinent imaging results/findings within the past 24 hours.    Review of Systems   Constitutional:  Positive for fatigue. Negative for fever.   Respiratory:  Negative for cough and shortness of breath.    Cardiovascular:  Negative for chest pain.   Gastrointestinal:  Negative for abdominal pain.       Assessment and Plan     * Multifocal pneumonia  RESOLVED  Chest imaging consistent with multifocal pneumonia.  RSV, covid, flu negative.  Procal negative.  S/p 2 L bolus in ED and 1 L maintenance IVF.  Completed 5  days of ceftriaxone and azithromycin.  May need longer course of azithromycin pending Legionella studies.          Debility  Patient with Acute on chronic debility due to age-related physical debility. The patient's latest AMPAC (Activity Measure for Post Acute Care) Score is listed below.    AM-PAC Score - How much help does the patient need for each activity listed       Plan  - Progressive mobility protocol initated  - PT/OT consulted  -- pt hoping for SNF        Hypophosphatemia  Monitor and replace as needed    Liver dysfunction  IMPROVED  Likely d/t sepsis and hypovolemia  Improved with IV fluids and antibiotics      Hyponatremia  BETTER  Likely d/t decreased PO intake  Na 126 on presentation, improved with IV fluid and PO intake.  Then worsened 12/23 to 128.  Given more IVF with no improvement.  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      VTE Risk Mitigation (From admission, onward)           Ordered     enoxaparin injection 40 mg  Daily         12/17/24 1657     IP VTE HIGH RISK PATIENT  Once         12/17/24 1657     Place sequential compression device  Until discontinued         12/17/24 1657                    Discharge Planning   SIRIA: 12/26/2024     Code Status: Full Code   Medical Readiness for Discharge Date:   Discharge Plan A: Skilled Nursing Facility   Discharge Delays: None known at this time                    Emmanuel Castillo MD  Department of Hospital Medicine   [facility]

## 2024-12-25 NOTE — ASSESSMENT & PLAN NOTE
BETTER  Likely d/t decreased PO intake  Na 126 on presentation, improved with IV fluid and PO intake.  Then worsened 12/23 to 128.  Given more IVF with no improvement.  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

## 2024-12-25 NOTE — PLAN OF CARE
Problem: Adult Inpatient Plan of Care  Goal: Plan of Care Review  Outcome: Progressing  Goal: Patient-Specific Goal (Individualized)  Outcome: Progressing  Goal: Absence of Hospital-Acquired Illness or Injury  Outcome: Progressing  Goal: Optimal Comfort and Wellbeing  Outcome: Progressing  Goal: Readiness for Transition of Care  Outcome: Progressing     Problem: Diabetes Comorbidity  Goal: Blood Glucose Level Within Targeted Range  Outcome: Progressing     Problem: Sepsis/Septic Shock  Goal: Optimal Coping  Outcome: Progressing  Goal: Absence of Bleeding  Outcome: Progressing  Goal: Blood Glucose Level Within Targeted Range  Outcome: Progressing  Goal: Absence of Infection Signs and Symptoms  Outcome: Progressing  Goal: Optimal Nutrition Intake  Outcome: Progressing     Problem: Pneumonia  Goal: Fluid Balance  Outcome: Progressing  Goal: Resolution of Infection Signs and Symptoms  Outcome: Progressing  Goal: Effective Oxygenation and Ventilation  Outcome: Progressing     Problem: Fall Injury Risk  Goal: Absence of Fall and Fall-Related Injury  Outcome: Progressing     Problem: Skin Injury Risk Increased  Goal: Skin Health and Integrity  Outcome: Progressing     Problem: Diabetes Comorbidity  Goal: Blood Glucose Level Within Targeted Range  Outcome: Progressing     Problem: Sepsis/Septic Shock  Goal: Optimal Coping  Outcome: Progressing     Problem: Pneumonia  Goal: Fluid Balance  Outcome: Progressing     Problem: Fall Injury Risk  Goal: Absence of Fall and Fall-Related Injury  Outcome: Progressing

## 2024-12-25 NOTE — SUBJECTIVE & OBJECTIVE
Interval History:    Symptoms:  resolved cough and SOB.  Concerned about weakness.    Diet:  poor intake, but better  Activity level:  Impaired due to weakness.    Pain:  No pain.       Objective:     Vital Signs (Most Recent):  Temp: 98.2 °F (36.8 °C) (12/25/24 1156)  Pulse: 86 (12/25/24 1156)  Resp: 20 (12/25/24 1156)  BP: 131/63 (12/25/24 1156)  SpO2: 97 % (12/25/24 1156) Vital Signs (24h Range):  Temp:  [97.5 °F (36.4 °C)-98.2 °F (36.8 °C)] 98.2 °F (36.8 °C)  Pulse:  [] 86  Resp:  [16-20] 20  SpO2:  [94 %-97 %] 97 %  BP: (127-168)/(63-97) 131/63     Weight: 67.8 kg (149 lb 7.6 oz)  Body mass index is 23.41 kg/m².    Intake/Output Summary (Last 24 hours) at 12/25/2024 1312  Last data filed at 12/25/2024 1046  Gross per 24 hour   Intake 720 ml   Output 650 ml   Net 70 ml         Physical Exam  Constitutional:       General: He is not in acute distress.  Cardiovascular:      Rate and Rhythm: Normal rate and regular rhythm.   Pulmonary:      Effort: Pulmonary effort is normal. No respiratory distress.      Breath sounds: Rales (Bilateral) present.   Abdominal:      General: Abdomen is flat. There is no distension.      Palpations: Abdomen is soft.      Tenderness: There is no abdominal tenderness. There is no guarding.   Musculoskeletal:      Right lower leg: No edema.      Left lower leg: No edema.   Skin:     General: Skin is warm and dry.   Neurological:      General: No focal deficit present.      Mental Status: He is alert.   Psychiatric:         Mood and Affect: Mood normal.             Significant Labs: All pertinent labs within the past 24 hours have been reviewed.    Significant Imaging: I have reviewed all pertinent imaging results/findings within the past 24 hours.    Review of Systems   Constitutional:  Positive for fatigue. Negative for fever.   Respiratory:  Negative for cough and shortness of breath.    Cardiovascular:  Negative for chest pain.   Gastrointestinal:  Negative for abdominal pain.

## 2024-12-25 NOTE — PLAN OF CARE
Problem: Adult Inpatient Plan of Care  Goal: Plan of Care Review  Outcome: Progressing  Goal: Patient-Specific Goal (Individualized)  Outcome: Progressing  Goal: Absence of Hospital-Acquired Illness or Injury  Outcome: Progressing  Goal: Optimal Comfort and Wellbeing  Outcome: Progressing  Goal: Readiness for Transition of Care  Outcome: Progressing     Problem: Diabetes Comorbidity  Goal: Blood Glucose Level Within Targeted Range  Outcome: Progressing     Problem: Sepsis/Septic Shock  Goal: Optimal Coping  Outcome: Progressing  Goal: Absence of Bleeding  Outcome: Progressing  Goal: Blood Glucose Level Within Targeted Range  Outcome: Progressing  Goal: Absence of Infection Signs and Symptoms  Outcome: Progressing  Goal: Optimal Nutrition Intake  Outcome: Progressing     Problem: Pneumonia  Goal: Fluid Balance  Outcome: Progressing  Goal: Resolution of Infection Signs and Symptoms  Outcome: Progressing  Goal: Effective Oxygenation and Ventilation  Outcome: Progressing     Problem: Fall Injury Risk  Goal: Absence of Fall and Fall-Related Injury  Outcome: Progressing     Problem: Skin Injury Risk Increased  Goal: Skin Health and Integrity  Outcome: Progressing

## 2024-12-26 ENCOUNTER — HOSPITAL ENCOUNTER (INPATIENT)
Facility: HOSPITAL | Age: 79
LOS: 15 days | Discharge: HOME HEALTH | DRG: 194 | End: 2025-01-10
Attending: HOSPITALIST | Admitting: HOSPITALIST
Payer: MEDICARE

## 2024-12-26 VITALS
BODY MASS INDEX: 23.46 KG/M2 | SYSTOLIC BLOOD PRESSURE: 110 MMHG | WEIGHT: 149.5 LBS | RESPIRATION RATE: 20 BRPM | OXYGEN SATURATION: 96 % | DIASTOLIC BLOOD PRESSURE: 58 MMHG | TEMPERATURE: 98 F | HEIGHT: 67 IN | HEART RATE: 78 BPM

## 2024-12-26 DIAGNOSIS — E11.9 TYPE 2 DIABETES MELLITUS WITHOUT COMPLICATION, WITHOUT LONG-TERM CURRENT USE OF INSULIN: ICD-10-CM

## 2024-12-26 DIAGNOSIS — J18.9 PNEUMONIA: ICD-10-CM

## 2024-12-26 DIAGNOSIS — I10 ESSENTIAL HYPERTENSION: ICD-10-CM

## 2024-12-26 DIAGNOSIS — J18.9 MULTIFOCAL PNEUMONIA: Primary | ICD-10-CM

## 2024-12-26 LAB
ALBUMIN SERPL BCP-MCNC: 2.4 G/DL (ref 3.5–5.2)
ANION GAP SERPL CALC-SCNC: 6 MMOL/L (ref 8–16)
BUN SERPL-MCNC: 13 MG/DL (ref 8–23)
CALCIUM SERPL-MCNC: 8.4 MG/DL (ref 8.7–10.5)
CHLORIDE SERPL-SCNC: 101 MMOL/L (ref 95–110)
CO2 SERPL-SCNC: 24 MMOL/L (ref 23–29)
CREAT SERPL-MCNC: 0.8 MG/DL (ref 0.5–1.4)
EST. GFR  (NO RACE VARIABLE): >60 ML/MIN/1.73 M^2
GLUCOSE SERPL-MCNC: 110 MG/DL (ref 70–110)
MAGNESIUM SERPL-MCNC: 2.1 MG/DL (ref 1.6–2.6)
PHOSPHATE SERPL-MCNC: 2.5 MG/DL (ref 2.7–4.5)
POCT GLUCOSE: 127 MG/DL (ref 70–110)
POCT GLUCOSE: 154 MG/DL (ref 70–110)
POTASSIUM SERPL-SCNC: 4.4 MMOL/L (ref 3.5–5.1)
SARS-COV-2 RNA RESP QL NAA+PROBE: NOT DETECTED
SODIUM SERPL-SCNC: 131 MMOL/L (ref 136–145)

## 2024-12-26 PROCEDURE — 11000004 HC SNF PRIVATE

## 2024-12-26 PROCEDURE — 25000003 PHARM REV CODE 250: Performed by: HOSPITALIST

## 2024-12-26 PROCEDURE — 83735 ASSAY OF MAGNESIUM: CPT | Performed by: HOSPITALIST

## 2024-12-26 PROCEDURE — 87635 SARS-COV-2 COVID-19 AMP PRB: CPT | Performed by: FAMILY MEDICINE

## 2024-12-26 PROCEDURE — 80069 RENAL FUNCTION PANEL: CPT | Performed by: HOSPITALIST

## 2024-12-26 PROCEDURE — 36415 COLL VENOUS BLD VENIPUNCTURE: CPT | Performed by: HOSPITALIST

## 2024-12-26 PROCEDURE — 25000003 PHARM REV CODE 250: Performed by: FAMILY MEDICINE

## 2024-12-26 RX ORDER — AMOXICILLIN 250 MG
1 CAPSULE ORAL 2 TIMES DAILY
Status: DISCONTINUED | OUTPATIENT
Start: 2024-12-26 | End: 2025-01-10 | Stop reason: HOSPADM

## 2024-12-26 RX ORDER — MUPIROCIN 20 MG/G
OINTMENT TOPICAL 2 TIMES DAILY
Status: DISPENSED | OUTPATIENT
Start: 2024-12-26 | End: 2024-12-31

## 2024-12-26 RX ORDER — GLUCAGON 1 MG
1 KIT INJECTION
Status: DISCONTINUED | OUTPATIENT
Start: 2024-12-26 | End: 2024-12-27

## 2024-12-26 RX ORDER — TAMSULOSIN HYDROCHLORIDE 0.4 MG/1
0.8 CAPSULE ORAL DAILY
Status: DISCONTINUED | OUTPATIENT
Start: 2024-12-27 | End: 2025-01-10 | Stop reason: HOSPADM

## 2024-12-26 RX ORDER — LOSARTAN POTASSIUM 25 MG/1
25 TABLET ORAL DAILY
Status: DISCONTINUED | OUTPATIENT
Start: 2024-12-27 | End: 2025-01-06

## 2024-12-26 RX ORDER — METOPROLOL SUCCINATE 25 MG/1
25 TABLET, EXTENDED RELEASE ORAL DAILY
Status: DISCONTINUED | OUTPATIENT
Start: 2024-12-27 | End: 2025-01-10 | Stop reason: HOSPADM

## 2024-12-26 RX ORDER — CETIRIZINE HYDROCHLORIDE 10 MG/1
10 TABLET ORAL DAILY
Status: DISCONTINUED | OUTPATIENT
Start: 2024-12-27 | End: 2025-01-10 | Stop reason: HOSPADM

## 2024-12-26 RX ORDER — PANTOPRAZOLE SODIUM 40 MG/1
40 TABLET, DELAYED RELEASE ORAL DAILY
Status: DISCONTINUED | OUTPATIENT
Start: 2024-12-27 | End: 2025-01-10 | Stop reason: HOSPADM

## 2024-12-26 RX ORDER — FLUTICASONE PROPIONATE 50 MCG
1 SPRAY, SUSPENSION (ML) NASAL DAILY
Status: DISCONTINUED | OUTPATIENT
Start: 2024-12-27 | End: 2025-01-10 | Stop reason: HOSPADM

## 2024-12-26 RX ORDER — TALC
6 POWDER (GRAM) TOPICAL NIGHTLY PRN
Status: DISCONTINUED | OUTPATIENT
Start: 2024-12-26 | End: 2025-01-10 | Stop reason: HOSPADM

## 2024-12-26 RX ORDER — CALCIUM CARBONATE 200(500)MG
500 TABLET,CHEWABLE ORAL 2 TIMES DAILY PRN
Status: DISCONTINUED | OUTPATIENT
Start: 2024-12-26 | End: 2025-01-10 | Stop reason: HOSPADM

## 2024-12-26 RX ORDER — IBUPROFEN 200 MG
24 TABLET ORAL
Status: DISCONTINUED | OUTPATIENT
Start: 2024-12-26 | End: 2024-12-27

## 2024-12-26 RX ORDER — IBUPROFEN 200 MG
16 TABLET ORAL
Status: DISCONTINUED | OUTPATIENT
Start: 2024-12-26 | End: 2024-12-27

## 2024-12-26 RX ORDER — ACETAMINOPHEN 325 MG/1
650 TABLET ORAL EVERY 6 HOURS PRN
Status: DISCONTINUED | OUTPATIENT
Start: 2024-12-26 | End: 2025-01-10 | Stop reason: HOSPADM

## 2024-12-26 RX ORDER — LOSARTAN POTASSIUM 25 MG/1
25 TABLET ORAL DAILY
Status: ON HOLD
Start: 2024-12-26 | End: 2025-12-26

## 2024-12-26 RX ORDER — INSULIN ASPART 100 [IU]/ML
0-5 INJECTION, SOLUTION INTRAVENOUS; SUBCUTANEOUS
Status: DISCONTINUED | OUTPATIENT
Start: 2024-12-26 | End: 2024-12-27

## 2024-12-26 RX ADMIN — FLUTICASONE PROPIONATE 50 MCG: 50 SPRAY, METERED NASAL at 09:12

## 2024-12-26 RX ADMIN — METOPROLOL SUCCINATE 25 MG: 25 TABLET, EXTENDED RELEASE ORAL at 09:12

## 2024-12-26 RX ADMIN — SALINE NASAL SPRAY 1 SPRAY: 1.5 SOLUTION NASAL at 12:12

## 2024-12-26 RX ADMIN — SALINE NASAL SPRAY 1 SPRAY: 1.5 SOLUTION NASAL at 09:12

## 2024-12-26 RX ADMIN — TAMSULOSIN HYDROCHLORIDE 0.8 MG: 0.4 CAPSULE ORAL at 09:12

## 2024-12-26 RX ADMIN — LOSARTAN POTASSIUM 25 MG: 25 TABLET, FILM COATED ORAL at 09:12

## 2024-12-26 RX ADMIN — GUAIFENESIN AND DEXTROMETHORPHAN HYDROBROMIDE 2 TABLET: 600; 30 TABLET, EXTENDED RELEASE ORAL at 09:12

## 2024-12-26 NOTE — PLAN OF CARE
NURSING HOME ORDERS    12/26/2024  [facility] - MED SURG  [address] [address]  Dept: [phone]  Loc: [phone]     Admit to Nursing Home:  Skilled Nursing Facility    Diagnoses:  Active Hospital Problems    Diagnosis  POA    *Multifocal pneumonia [J18.9]  Yes    Sepsis [A41.9]  Yes     Priority: 1 - High    Hyponatremia [E87.1]  Yes     Priority: 6     Liver dysfunction [K76.89]  Yes     Priority: 10     Debility [R53.81]  Yes    Hypophosphatemia [E83.39]  Yes    Essential hypertension [I10]  Yes    Benign prostatic hyperplasia with urinary obstruction [N40.1, N13.8]  Yes      Resolved Hospital Problems   No resolved problems to display.       Patient is homebound due to:  Multifocal pneumonia    Allergies:  Review of patient's allergies indicates:   Allergen Reactions    Ace inhibitors Other (See Comments)     Cough    Chlorthalidone      Hyponatremia    Feathers Other (See Comments)    Mite extract Other (See Comments)     Nose gets congested       Vitals:  Routine    Diet: regular diet, 1 L fluid restriction    Activities:   Activity as tolerated    Goals of Care Treatment Preferences:  Code Status: Full Code      Labs:  BMP twice weekly to monitor sodium levels until WNL. Expect to improve with continued fluid restriction. Labs to be followed up by PCP or SNF physician.     Nursing Precautions:  Fall    Consults:   PT to evaluate and treat- 5 times a week and OT to evaluate and treat- 5 times a week                    Medications: Discontinue all previous medication orders, if any. See new list below.     Medication List        CHANGE how you take these medications      amLODIPine 5 MG tablet  Commonly known as: NORVASC  TAKE 1 TABLET BY MOUTH EVERY DAY  Notes to patient: HOLD THIS MEDICATION FOR NOW, BP HAS BEEN LOWER SINCE ADMISSION. CAN RESUME IF BECOMES HYPERTENSIVE     fluticasone propionate 50 mcg/actuation nasal spray  Commonly known as:  FLONASE  USE 1 SPRAY (50 MCG TOTAL) IN EACH NOSTRIL ONCE DAILY  What changed: Another medication with the same name was removed. Continue taking this medication, and follow the directions you see here.     losartan 25 MG tablet  Commonly known as: COZAAR  Take 1 tablet (25 mg total) by mouth once daily.  What changed:   medication strength  how much to take  Notes to patient: DECREASED DOSE FROM 100MG DAILY TO 25 MG DAILY DUE TO LOW BP DURING ADMISSION. CAN INCREASE IF BECOMES HYPERTENSIVE.            CONTINUE taking these medications      azelastine 137 mcg (0.1 %) nasal spray  Commonly known as: ASTELIN  INSTILL 1-2 SPRAYS BY NASAL ROUTE 2 (TWO) TIMES DAILY AS NEEDED FOR RHINITIS.     blood sugar diagnostic Strp  To check BG 1 times daily, to use with insurance preferred meter     cetirizine 10 MG tablet  Commonly known as: ZYRTEC  Take 10 mg by mouth once daily.     INV atorvastatin/placebo 40 MG tablet  Take one tablet by mouth each morning. For Investigational Use Only.     ketoconazole 2 % cream  Commonly known as: NIZORAL  APPLY TO AREA OF RED, DRY SKIN ON BOTH FEET/HEELS ONE TO TWO TIMES DAILY FOR 2 TO 3 WEEKS.     lancets Misc  To check BG 1 times daily, to use with insurance preferred meter     metoprolol succinate 25 MG 24 hr tablet  Commonly known as: TOPROL-XL  Take 1 tablet (25 mg total) by mouth once daily.     omeprazole 40 MG capsule  Commonly known as: PRILOSEC  TAKE 1 CAPSULE (40 MG TOTAL) BY MOUTH EVERY MORNING.     ONETOUCH VERIO FLEX METER Misc  Generic drug: blood-glucose meter  TO CHECK BLOOD GLUCOSE 1 TIME DAILY, TO USE WITH INSURANCE PREFERRED METER     tamsulosin 0.4 mg Cap  Commonly known as: FLOMAX  Take 0.8 mg by mouth once daily.            STOP taking these medications      benzonatate 200 MG capsule  Commonly known as: TESSALON                Immunizations Administered as of 12/26/2024       Name Date Dose VIS Date Route Exp Date    COVID-19, MRNA, LN-S, PF (Pfizer) (Purple Cap)  9/28/2021 0.3 mL -- Intramuscular --    Site: Left deltoid     : [employer]     Lot: [id number]     Comment: Adminis     COVID-19, MRNA, LN-S, PF (Pfizer) (Purple Cap) 3/6/2021 0.3 mL -- Intramuscular --    : [employer]     Lot: [id number]     Comment: Adminis     COVID-19, MRNA, LN-S, PF (Pfizer) (Purple Cap) 2/13/2021 0.3 mL -- Intramuscular --    Site: Left deltoid     : [employer]     Lot: [id number]     Comment: Adminis             Some patients may experience side effects after vaccination.  These may include fever, headache, muscle or joint aches.  Most symptoms resolve with 24-48 hours and do not require urgent medical evaluation unless they persist for more than 72 hours or symptoms are concerning for an unrelated medical condition.          _________________________________  Franky Gonzalez III, MD  12/26/2024

## 2024-12-26 NOTE — PLAN OF CARE
[facility]  Discharge Reassessment    Primary Care Provider: Jabier Hinkle MD    Expected Discharge Date: 12/26/2024    Reassessment (most recent)       Discharge Reassessment - 12/26/24 1250          Discharge Reassessment    Assessment Type Discharge Planning Reassessment (P)      Did the patient's condition or plan change since previous assessment? No (P)      Discharge Plan discussed with: Patient (P)      Communicated SIRIA with patient/caregiver Yes (P)      Discharge Plan A Skilled Nursing Facility (P)      Discharge Plan B Home;Home with family;Home Health (P)      DME Needed Upon Discharge  none (P)      Transition of Care Barriers None (P)         Post-Acute Status    Post-Acute Authorization Placement (P)      Post-Acute Placement Status Set-up Complete/Auth obtained (P)      Coverage Medicare A&B (P)      Discharge Delays None known at this time (P)                      CM confirmed acceptance to [facility], COVID negative, Orders for transfer reviewed and approved, transportation set up for 3pm. Nurse to call report to [phone]. Will cont to follow until discharge.    Discharge Plan A and Plan B have been determined by review of patient's clinical status, future medical and therapeutic needs, and coverage/benefits for post-acute care in coordination with multidisciplinary team members.     Dona Hollingsworth RN Case Manager  [phone]

## 2024-12-26 NOTE — NURSING
Report given to nurse Sandy at [facility]. Informed her that pt is set up for wheelchair transport around 3 pm. Pt was already cleaned up and is ready for .

## 2024-12-26 NOTE — PLAN OF CARE
Problem: Adult Inpatient Plan of Care  Goal: Plan of Care Review  Outcome: Progressing  Goal: Patient-Specific Goal (Individualized)  Outcome: Progressing  Goal: Absence of Hospital-Acquired Illness or Injury  Outcome: Progressing  Goal: Optimal Comfort and Wellbeing  Outcome: Progressing  Goal: Readiness for Transition of Care  Outcome: Progressing     Problem: Diabetes Comorbidity  Goal: Blood Glucose Level Within Targeted Range  Outcome: Progressing     Problem: Sepsis/Septic Shock  Goal: Optimal Coping  Outcome: Progressing  Goal: Absence of Bleeding  Outcome: Progressing  Goal: Blood Glucose Level Within Targeted Range  Outcome: Progressing  Goal: Absence of Infection Signs and Symptoms  Outcome: Progressing  Goal: Optimal Nutrition Intake  Outcome: Progressing     Problem: Pneumonia  Goal: Fluid Balance  Outcome: Progressing  Goal: Resolution of Infection Signs and Symptoms  Outcome: Progressing  Goal: Effective Oxygenation and Ventilation  Outcome: Progressing     Problem: Fall Injury Risk  Goal: Absence of Fall and Fall-Related Injury  Outcome: Progressing     Problem: Skin Injury Risk Increased  Goal: Skin Health and Integrity  Outcome: Progressing     Problem: Diabetes Comorbidity  Goal: Blood Glucose Level Within Targeted Range  Outcome: Progressing     Problem: Sepsis/Septic Shock  Goal: Optimal Coping  Outcome: Progressing     Problem: Pneumonia  Goal: Fluid Balance  Outcome: Progressing     Problem: Fall Injury Risk  Goal: Absence of Fall and Fall-Related Injury  Outcome: Progressing     Problem: Skin Injury Risk Increased  Goal: Skin Health and Integrity  Outcome: Progressing

## 2024-12-26 NOTE — PLAN OF CARE
[facility]  Discharge Final Note    Primary Care Provider: Jabier Hinkle MD    Expected Discharge Date: 12/26/2024    Final Discharge Note (most recent)       Final Note - 12/26/24 1542          Final Note    Assessment Type Final Discharge Note (P)      Anticipated Discharge Disposition Skilled Nursing Facility (P)         Post-Acute Status    Post-Acute Authorization Placement (P)      Post-Acute Placement Status Set-up Complete/Auth obtained (P)      Coverage Medicare A&B (P)      Discharge Delays None known at this time (P)                      Important Message from Medicare         [facility] Care to resume at [facility] no further needs at this time.    Discharge Plan A and Plan B have been determined by review of patient's clinical status, future medical and therapeutic needs, and coverage/benefits for post-acute care in coordination with multidisciplinary team members.     Dona Hollingsworth, RN Case Manager  [phone]

## 2024-12-26 NOTE — NURSING
Pt picked up by wheelchair transport to go to [facility]. Peripheral IV removed, pt has left the unit to Rehab with belongings.

## 2024-12-26 NOTE — PLAN OF CARE
Pt arrived to floor for skilled services with admitting diagnosis of Pneumonia, unspecified organism via wheelchair. Pt required 1 person assistance from wheelchair to bed. Pt is AAOx4, continent of B/B. Pt is on a regular diet with 1000 fluid restriction . Skin assessment done: abrasion noted on left knee (4x) & right elbow. Family informed of arrival.(Cousin Emory). POC reviewed with patient. Pt denies pain at this time and is educated to use call light and not get OOB w/o assistance.      Problem: Adult Inpatient Plan of Care  Goal: Plan of Care Review  Outcome: Progressing  Goal: Patient-Specific Goal (Individualized)  Outcome: Progressing  Goal: Absence of Hospital-Acquired Illness or Injury  Outcome: Progressing  Goal: Optimal Comfort and Wellbeing  Outcome: Progressing  Goal: Readiness for Transition of Care  Outcome: Progressing

## 2024-12-26 NOTE — PLAN OF CARE
Problem: Pneumonia  Goal: Fluid Balance  Outcome: Progressing  Intervention: Monitor and Manage Fluid Balance  Flowsheets (Taken 12/25/2024 1900)  Fluid/Electrolyte Management:   fluids restricted   electrolyte supplement initiated  Goal: Resolution of Infection Signs and Symptoms  Outcome: Progressing  Intervention: Prevent Infection Progression  Flowsheets (Taken 12/25/2024 1900)  Fever Reduction/Comfort Measures: lightweight clothing  Infection Management: aseptic technique maintained  Isolation Precautions: protective  Goal: Effective Oxygenation and Ventilation  Outcome: Progressing  Intervention: Promote Airway Secretion Clearance  Flowsheets (Taken 12/25/2024 1900)  Breathing Techniques/Airway Clearance: dumas-cough encouraged  Cough And Deep Breathing: done independently per patient  Intervention: Optimize Oxygenation and Ventilation  Flowsheets (Taken 12/25/2024 1900)  Airway/Ventilation Management:   pulmonary hygiene promoted   airway patency maintained  Head of Bed (HOB) Positioning: HOB elevated     Problem: Fall Injury Risk  Goal: Absence of Fall and Fall-Related Injury  Outcome: Progressing  Intervention: Identify and Manage Contributors  Flowsheets (Taken 12/25/2024 1900)  Self-Care Promotion:   independence encouraged   BADL personal objects within reach   BADL personal routines maintained   meal set-up provided   adaptive equipment use encouraged  Medication Review/Management:   medications reviewed   high-risk medications identified  Intervention: Promote Injury-Free Environment  Flowsheets (Taken 12/25/2024 1900)  Safety Promotion/Fall Prevention:   assistive device/personal item within reach   bed alarm set   commode/urinal/bedpan at bedside   Fall Risk reviewed with patient/family   instructed to call staff for mobility   medications reviewed   nonskid shoes/socks when out of bed   muscle strengthening facilitated   side rails raised x 2   supervised activity   Supervised toileting - stay  within arms reach   toileting scheduled     Problem: Skin Injury Risk Increased  Goal: Skin Health and Integrity  Outcome: Progressing  Intervention: Optimize Skin Protection  Flowsheets (Taken 12/25/2024 1900)  Pressure Reduction Techniques:   frequent weight shift encouraged   pressure points protected   sit time limited to 2 hours  Pressure Reduction Devices: positioning supports utilized  Skin Protection:   incontinence pads utilized   skin sealant/moisture barrier applied   transparent dressing maintained  Activity Management:   Ambulated -L4   Ambulated to bathroom - L4   Ambulated in room - L4   Rolling - L1   Leg kicks - L2   Sitting at edge of bed - L2   Standing - L3   Step forward and back - L3   Step side-to-side along edge of bed - L3   Up in chair - L3   Walk with assistive devise and /or staff member - L3   Walking in place - L3  Head of Bed (HOB) Positioning: HOB elevated  Intervention: Promote and Optimize Oral Intake  Flowsheets (Taken 12/25/2024 1900)  Oral Nutrition Promotion:   adaptive equipment use encouraged   physical activity promoted   rest periods promoted   social interaction promoted  Nutrition Interventions:   food preferences provided   meal set-up provided     Problem: Electrolyte Imbalance  Goal: Electrolyte Balance  Outcome: Progressing  Intervention: Monitor and Manage Electrolyte Imbalance  Flowsheets (Taken 12/25/2024 1900)  Fluid/Electrolyte Management:   fluids restricted   electrolyte supplement initiated

## 2024-12-26 NOTE — NURSING
Attempted to call report to [facility], left call back number and will attempt to call back to give report shortly.

## 2024-12-27 ENCOUNTER — TELEPHONE (OUTPATIENT)
Dept: ADMINISTRATIVE | Facility: CLINIC | Age: 79
End: 2024-12-27
Payer: MEDICARE

## 2024-12-27 PROBLEM — A41.9 SEPSIS: Status: RESOLVED | Noted: 2024-12-17 | Resolved: 2024-12-27

## 2024-12-27 LAB
POCT GLUCOSE: 116 MG/DL (ref 70–110)
POCT GLUCOSE: 116 MG/DL (ref 70–110)
POCT GLUCOSE: 145 MG/DL (ref 70–110)

## 2024-12-27 PROCEDURE — 11000004 HC SNF PRIVATE

## 2024-12-27 PROCEDURE — 97116 GAIT TRAINING THERAPY: CPT

## 2024-12-27 PROCEDURE — 25000003 PHARM REV CODE 250: Performed by: HOSPITALIST

## 2024-12-27 PROCEDURE — 25000242 PHARM REV CODE 250 ALT 637 W/ HCPCS: Performed by: HOSPITALIST

## 2024-12-27 PROCEDURE — 97165 OT EVAL LOW COMPLEX 30 MIN: CPT

## 2024-12-27 PROCEDURE — 97530 THERAPEUTIC ACTIVITIES: CPT

## 2024-12-27 PROCEDURE — 97535 SELF CARE MNGMENT TRAINING: CPT

## 2024-12-27 PROCEDURE — 97161 PT EVAL LOW COMPLEX 20 MIN: CPT

## 2024-12-27 RX ADMIN — MUPIROCIN: 20 OINTMENT TOPICAL at 09:12

## 2024-12-27 RX ADMIN — TAMSULOSIN HYDROCHLORIDE 0.8 MG: 0.4 CAPSULE ORAL at 08:12

## 2024-12-27 RX ADMIN — CETIRIZINE HYDROCHLORIDE 10 MG: 10 TABLET, FILM COATED ORAL at 08:12

## 2024-12-27 RX ADMIN — SENNOSIDES AND DOCUSATE SODIUM 1 TABLET: 50; 8.6 TABLET ORAL at 08:12

## 2024-12-27 RX ADMIN — METOPROLOL SUCCINATE 25 MG: 25 TABLET, EXTENDED RELEASE ORAL at 08:12

## 2024-12-27 RX ADMIN — LOSARTAN POTASSIUM 25 MG: 25 TABLET, FILM COATED ORAL at 08:12

## 2024-12-27 RX ADMIN — FLUTICASONE PROPIONATE 50 MCG: 50 SPRAY, METERED NASAL at 09:12

## 2024-12-27 RX ADMIN — PANTOPRAZOLE SODIUM 40 MG: 40 TABLET, DELAYED RELEASE ORAL at 08:12

## 2024-12-27 RX ADMIN — SENNOSIDES AND DOCUSATE SODIUM 1 TABLET: 50; 8.6 TABLET ORAL at 09:12

## 2024-12-27 NOTE — NURSING
Patient's non formulary 40mg INV atorvastation/placebo not available with him. Patient report its a medication he's being [facility] and has been missing to take a dose since he got hospitalized.

## 2024-12-27 NOTE — PLAN OF CARE
Problem: Adult Inpatient Plan of Care  Goal: Plan of Care Review  Outcome: Progressing  Goal: Patient-Specific Goal (Individualized)  Outcome: Progressing  Goal: Absence of Hospital-Acquired Illness or Injury  Outcome: Progressing  Goal: Optimal Comfort and Wellbeing  Outcome: Progressing  Goal: Readiness for Transition of Care  Outcome: Progressing     Problem: Diabetes Comorbidity  Goal: Blood Glucose Level Within Targeted Range  Outcome: Progressing     Problem: Sepsis/Septic Shock  Goal: Optimal Coping  Outcome: Progressing  Goal: Absence of Bleeding  Outcome: Progressing  Goal: Blood Glucose Level Within Targeted Range  Outcome: Progressing  Goal: Absence of Infection Signs and Symptoms  Outcome: Progressing  Goal: Optimal Nutrition Intake  Outcome: Progressing     Problem: Pneumonia  Goal: Fluid Balance  Outcome: Progressing  Goal: Resolution of Infection Signs and Symptoms  Outcome: Progressing  Goal: Effective Oxygenation and Ventilation  Outcome: Progressing     Problem: Wound  Goal: Optimal Coping  Outcome: Progressing  Goal: Optimal Functional Ability  Outcome: Progressing  Goal: Absence of Infection Signs and Symptoms  Outcome: Progressing  Goal: Improved Oral Intake  Outcome: Progressing  Goal: Optimal Pain Control and Function  Outcome: Progressing  Goal: Skin Health and Integrity  Outcome: Progressing  Goal: Optimal Wound Healing  Outcome: Progressing

## 2024-12-27 NOTE — PT/OT/SLP EVAL
Physical Therapy Evaluation/Treatment Note    Patient Name:  Jd Dill III   MRN:  [id number]  Admit Date: 12/26/2024  Admitting Diagnosis: multifocal pneumonia  Recent Surgeries: N/A    General Precautions: Standard, fall   Orthopedic Precautions: N/A   Braces: N/A    Recommendations:     Discharge Recommendations: home health PT   Level of Assistance Recommended: Intermittent assistance   Discharge Equipment Recommendations: walker, rolling (continue to assess with progression of mobility)   Barriers to discharge: Decreased caregiver support    Assessment:     Jd Dill III is a 79 y.o. male admitted with a medical diagnosis of Multifocal pneumonia.  Performance deficits affecting function  weakness, impaired endurance, impaired self care skills, impaired functional mobility, gait instability, impaired balance, decreased lower extremity function, decreased safety awareness. Patient agreeable to PT evaluation and treatment this AM. Patient reports being Independent with all mobility and ADLs prior to recent hospital admission. Patient currently functioning below baseline level of mobility requiring increased assistance for safety with functional transfers, gait training and curb step/stair training. Patient using RW for BUE support and overall stability/balance with mobility for prevention of falls. Patient pleasant throughout session. Patient will benefit from continued SNF rehabilitation services to address above mentioned deficits as well as progress mobility towards PLOF and increased functional independence for safe transition to home environment at time of discharge.     Rehab Potential is good     Activity Tolerance: Good    Plan:     Patient to be seen 5 x/week to address the above listed problems via gait training, therapeutic activities, therapeutic exercises, neuromuscular re-education, wheelchair management/training     Plan of Care Expires: 01/26/25  Plan of Care Reviewed with: patient    Subjective      Chief Complaint: generalized weakness  Patient/Family Comments/goals: Return home at [facility]  Pain/Comfort:  Pain Rating 1: 0/10  Pain Rating Post-Intervention 1: 0/10    Living Environment:   Patient resides home alone in [facility] with no JAYESH. Patient has WIS with built in bench. Patient has elevated seat height on toilet in additional bathroom.     Prior to admission, patients level of function was Independent.  Equipment used at home: elevated toilet, bath bench (built in) .  DME owned (not currently used): none.  Upon discharge, patient will have assistance from unknown; continue to assess.    Patients cultural, spiritual, Judaism conflicts given the current situation: no    Objective:     Communicated with nursing staff prior to session.  Patient found supine with  (no active lines)  upon PT entry to room.    Exams:  Cognitive Exam:  Patient is oriented to Person, Place, Time, and Situation  RLE ROM: Grossly WFL; decreased AROM/PROM into knee extension  RLE Strength: Grossly 4-/5 to 4/5  LLE ROM: Grossly WFL; decreased AROM/PROM into knee extension  LLE Strength: Grossly 4-/5 to 4/5    Functional Mobility:     12/27/24 1102   Prior Functioning: Everyday Activities   Self Care Independent   Indoor Mobility (Ambulation) Independent   Stairs Unknown   Functional Cognition Independent   Prior Device Use None of the given options   Functional Limitation in Range of Motion   Upper Extremity No impairment   Lower Extremity Impairment on both sides  (Mild decrease in knee joint ROM)   Mobility Devices Walker;Wheelchair (manual or electric)   Roll Left and Right   Was the activity attempted? Yes   Was the activity done independently? No   Assistance Needed Supervision  (SBA with HOB flat and use of bed rails; performed multiple trials for hygiene needs.)   Was adaptive equipment used? Yes   CARE Score - Roll Left and Right 4   Sit to Lying   Was the activity attempted? Yes   Was the activity done independently? No    Assistance Needed Supervision  (SBA)   Was adaptive equipment used? No   CARE Score - Sit to Lying 4   Lying to Sitting on Side of Bed   Was the activity attempted? Yes   Was the activity done independently? No   Assistance Needed Supervision  (SBA with HOB flat and use of bed rails)   Was adaptive equipment used? Yes   CARE Score - Lying to Sitting on Side of Bed 4   Sit to Stand   Was the activity attempted? Yes   Was the activity done independently? No   Assistance Needed Physical assistance  (Kenneth using RW from bed)   Physical Assistance Level Less than half   Was adaptive equipment used? Yes   CARE Score - Sit to Stand 3   Chair/Bed-to-Chair Transfer   Was the activity attempted? Yes   Was the activity done independently? No   Assistance Needed Physical assistance;Touching assistance  (Kenneth for sit to stand transfer/CGA for step transfer using RW)   Physical Assistance Level Less than half   Was adaptive equipment used? Yes   CARE Score - Chair/Bed-to-Chair Transfer 3   Car Transfer   Was the activity attempted? No   Reason if not Attempted Environmental limitations   CARE Score - Car Transfer 10   Walk 10 Feet   Was the activity attempted? Yes   Was the activity done independently? No   Assistance Needed Touching assistance   Was adaptive equipment used? Yes   CARE Score - Walk 10 Feet 4   Walk 50 Feet with Two Turns   Was the activity attempted? Yes   Was the activity done independently? No   Assistance Needed Touching assistance   Was adaptive equipment used? Yes   CARE Score - Walk 50 Feet with Two Turns 4   Walk 150 Feet   Was the activity attempted? Yes   Was the activity done independently? No   Assistance Needed Touching assistance  (Patient ambulated 158 feet using RW with CGA. Steady gait speed. Patient stepping outside of RW with turns. No LOB.)   Was adaptive equipment used? Yes   CARE Score - Walk 150 Feet 4   Walking 10 Feet on Uneven Surfaces   Was the activity attempted? Yes   Was the activity  done independently? No   Assistance Needed Touching assistance  (CGA using RW. CUes for walker placement.)   Was adaptive equipment used? Yes   CARE Score - Walking 10 Feet on Uneven Surfaces 4   1 Step (Curb)   Was the activity attempted? Yes   Was the activity done independently? No   Assistance Needed Touching assistance  (CGA using RW)   Was adaptive equipment used? Yes   CARE Score - 1 Step (Curb) 4   4 Steps   Was the activity attempted? Yes   Was the activity done independently? No   Assistance Needed Touching assistance  (Patient ascended/descended 4 steps using BHR with CGA)   Was adaptive equipment used? Yes   CARE Score - 4 Steps 4   12 Steps   Was the activity attempted? No   Reason if not Attempted Safety concerns   CARE Score - 12 Steps 88   Picking Up Object   Was the activity attempted? Yes   Was the activity done independently? No   Assistance Needed Touching assistance  (CGA to stoop down to reach object from floor with unilateral UE support on RW)   Was adaptive equipment used? Yes   CARE Score - Picking Up Object 4   OTHER   Uses a Wheelchair/Scooter? Yes   Wheel 50 Feet with Two Turns   Was the activity attempted? Yes   Was the activity done independently? No   Assistance Needed Supervision  (SBA)   Type of Wheelchair/Scooter Manual   CARE Score - Wheel 50 Feet with Two Turns 4   Wheel 150 Feet   Was the activity attempted? Yes   Was the activity done independently? No   Assistance Needed Supervision  (Patient propelled W/C approximately 200 feet using BUE with SBA. Cues initially for steering.)   Type of Wheelchair/Scooter Manual   CARE Score - Wheel 150 Feet 4     Education:  Patient provided with daily orientation and goals of this PT session.  Patient educated to transfer to bedside chair/bedside commode/bathroom with RN/PCT present.   Patient educated on assistive device use, bed mobility training, Fall risk, gait training, home safety, Home exercise program, plan of care, stair training,  and transfer training by explanation and demonstration.   Patient Verbalized Understanding and Demonstrated Understanding .  Time provided for therapeutic counseling and discussion of current health disposition. All questions answered to satisfaction, within scope of PT practice; voiced no other concerns. White board updated in patient's room, nursing staff notified of session.    Therapeutic Activities and Exercises:   PT assisted patient with changing of brief and hygiene needs while supine in bed. Repeated rolling performed to complete task. PT assisted patient with donning of pants seated/standing EOB. Patient changed out of nonskid socks to personal socks with shoes (heel lift in L shoe for LLD) per PT assistance.     Seated BLE exercises x 20 reps including ankle DF/PF, LAQs, hip flexion. Performed to improve ROM and strengthening for safe and efficient engagement in functional mobility.     AM-PAC 6 CLICK MOBILITY  Total Score:18     Patient left up in chair with call button in reach and nursing staff notified.    GOALS:   Multidisciplinary Problems       Physical Therapy Goals          Problem: Physical Therapy    Goal Priority Disciplines Outcome Interventions   Physical Therapy Goal     PT, PT/OT Progressing    Description: Goals to be met by: 25       Patient will increase functional independence with mobility by performin. Supine to sit with [place]  2. Sit to supine with [place]  3. Rolling to Left and Right with [place]  4. Sit to stand transfer with Modified [place]  5. Bed to chair transfer with Modified [place] using Rolling Walker/without AD  6. Gait  x 200 feet with Modified [place] using Rolling Walker  7. Gait x 50 feet (progress as tolerated) with SBA without an AD  8. Wheelchair propulsion x 200 feet with Modified [place] using bilateral upper extremities  9. Ascend/descend 12 stair with bilateral Handrails and Supervision using No Assistive  Device  10. Ascend/Descend 4 inch curb step with Supervision using Rolling Walker                         History:     Past Medical History:   Diagnosis Date    Allergy     AR (allergic rhinitis)     Basal cell carcinoma of ear 2006    Right Ear    BPH (benign prostatic hyperplasia)     DDD (degenerative disc disease), lumbar     Degenerative disc disease     Gastroesophageal reflux disease 3/11/2019    Hypertension     Kidney stone     Undescended testicle     Right Orchiectomy       Past Surgical History:   Procedure Laterality Date    BASAL CELL CARCINOMA EXCISION      ESOPHAGOGASTRODUODENOSCOPY N/A 10/5/2022    Procedure: EGD (ESOPHAGOGASTRODUODENOSCOPY);  Surgeon: Franco Campoverde MD;  Location: [facility];  Service: Endoscopy;  Laterality: N/A;    HEMORRHOID SURGERY      HERNIA REPAIR      PILONIDAL CYST DRAINAGE      Right Orchiectomy      TRANSURETHRAL RESECTION OF PROSTATE  2010       Time Tracking:     PT Received On: 12/27/24  PT Start Time: 1102     PT Stop Time: 1145  PT Total Time (min): 43 min     Billable Minutes: Evaluation 20, Gait Training 8, and Therapeutic Activity 15      12/27/2024

## 2024-12-27 NOTE — PT/OT/SLP EVAL
"Occupational Therapy   Evaluation    Name: Jd Dill III  MRN: [id number]  Admit Date: 12/26/2024  Recent Surgeries: NONE     General Precautions: Standard, fall  Orthopedic Precautions:N/A   Braces: N/A    Recommendations:     Discharge Recommendations: home health OT  Level of Assistance Recommended: 24 hours light assistance  Discharge Equipment Recommendations:  walker, rolling  Barriers to discharge:  Decreased caregiver support    Assessment:     Jd Dill III is a 79 y.o. male with a medical diagnosis of Multifocal pneumonia. Pt tolerated today's session well w/o c/o pain or discomfort. He presented with good ROM and strength in BUEs, in addition to, good sitting and standing balance. Prior to hospitalization, he was IND with all ADLs and functional mobility. Since then, he requires some assistance with ADLs and functional mobility. Despite limitations, he is motivated to returning back home independently. Performance deficits affecting function: weakness, impaired endurance, impaired self care skills, impaired functional mobility, gait instability, impaired balance, decreased lower extremity function. Pt would benefit from cont'd OT services in the SNF setting to improve safety and independence /c functional tasks and ADLs upon discharge.     Rehab Potential is good    Activity Tolerance: Good    Plan:     Patient to be seen 5 x/week to address the above listed problems via self-care/home management, community/work re-entry, therapeutic activities, therapeutic exercises, therapeutic groups, neuromuscular re-education, cognitive retraining, sensory integration, wheelchair management/training, splinting  Plan of Care Expires: 01/26/25  Plan of Care Reviewed with: patient    Subjective     Chief Complaint: getting stronger  Patient/Family Comments/goals: " I would like to shave today"    Occupational Profile:  Living Environment: pt lives in a [facility] home alone, 0 JAYESH, walk-in shower with built in bench and no " Gbs; raised toilet w/o grab bars  Previous level of function: Ind with ADLs and functional mob  Roles and Routines: retired  Equipment Used at Home: raised toilet  Assistance upon Discharge: friends and neighbors; pt lives alone with no family living in [place]    Pain/Comfort:  Pain Rating 1: 0/10  Pain Rating Post-Intervention 1: 0/10    Patients cultural, spiritual, Quaker conflicts given the current situation: no    Objective:     Communicated with: NSG prior to session.  Patient found up in chair with  (no active lines) upon OT entry to room.    Occupational Performance:     Activities of Daily Livin/27/24 3047   Eating   Was the activity attempted? Yes   Was the activity done independently? Yes   Was adaptive equipment used? Yes  (bed or w/c level)   CARE Score - Eating 6   Oral Hygiene   Was the activity attempted? Yes   Was the activity done independently? Yes   Was adaptive equipment used? Yes  (w/c at sink side)   CARE Score - Oral Hygiene 6   Toileting Hygiene   Was the activity attempted? Yes   Was the activity done independently? No   Assistance Needed Touching assistance  (CGA during hygiene mgmt)   Was adaptive equipment used? Yes  (grab bars to stand)   CARE Score - Toileting Hygiene 4   Shower/Bathe Self   Was the activity attempted? Yes   Was the activity done independently? No   Assistance Needed Physical assistance  (Min A B feet)   Physical Assistance Level Less than half   Was adaptive equipment used? Yes  (seated on BSC over toilet)   CARE Score - Shower/Bathe Self 3   Upper Body Dressing   Was the activity attempted? Yes   Was the activity done independently? Yes   Assistance Needed   (pt doffed/donned pull over shirts)   Was adaptive equipment used? Yes  (seated in w/c)   CARE Score - Upper Body Dressing 6   Lower Body Dressing   Was the activity attempted? Yes   Was the activity done independently? No   Assistance Needed Physical assistance  (Mod A B feet mgmt and hip)    Physical Assistance Level Less than half   Was adaptive equipment used? Yes  (seated in w/c)   CARE Score - Lower Body Dressing 3   Putting On/Taking Off Footwear   Was the activity attempted? Yes   Was the activity done independently? No   Assistance Needed Physical assistance  (Mod A; Ind doffing B socks; TA donning B socks)   Physical Assistance Level Less than half   Was adaptive equipment used? Yes  (seated in w/c)   CARE Score - Putting On/Taking Off Footwear 3   Personal Hygiene   Was the activity attempted? Yes   Was the activity done independently? Yes   Was adaptive equipment used? Yes  (w/c at sink side)   CARE Score - Personal Hygiene 6   Lying to Sitting on Side of Bed   Was the activity attempted? Yes   Was the activity done independently? No   Assistance Needed Touching assistance  (SBA)   Was adaptive equipment used? Yes  (HOB elevated)   CARE Score - Lying to Sitting on Side of Bed 4   Sit to Stand   Was the activity attempted? Yes   Was the activity done independently? No   Assistance Needed Physical assistance  (Min A with RW)   Physical Assistance Level Less than half   Was adaptive equipment used? Yes  (RW)   CARE Score - Sit to Stand 3   Chair/Bed-to-Chair Transfer   Was the activity attempted? Yes   Was the activity done independently? No   Assistance Needed Physical assistance  (Min A with RW)   Physical Assistance Level Less than half   Was adaptive equipment used? Yes  (RW)   CARE Score - Chair/Bed-to-Chair Transfer 3   Toilet Transfer   Was the activity attempted? Yes   Was the activity done independently? No   Assistance Needed Physical assistance  (Min A with RW)   Physical Assistance Level Less than half   Was adaptive equipment used? Yes  (RW)   CARE Score - Toilet Transfer 3   Tub/Shower Transfer   Was the activity attempted? No   Reason if not Attempted Safety concerns   CARE Score - Tub/Shower Transfer 88       Cognitive/Visual Perceptual:  Cognitive/Psychosocial Skills:     -        Oriented to: Person, Place, Time, and Situation   -       Follows Commands/attention:Follows one-step commands  -       Communication: clear/fluent  -       Memory: No Deficits noted  -       Safety awareness/insight to disability: intact   -       Mood/Affect/Coping skills/emotional control: Appropriate to situation    Physical Exam:  Balance:    -       good sitting and standing balance  Motor Planning:    -       intact  Dominant hand:    -       R handed  Upper Extremity Range of Motion:     -       Right Upper Extremity: WFL  -       Left Upper Extremity: WFL  Upper Extremity Strength: -       Right Upper Extremity: WFL  -       Left Upper Extremity: WFL   Strength:    -       Right Upper Extremity: WFL  -       Left Upper Extremity: WFL  Fine Motor Coordination:    -       Intact  Gross motor coordination:   WFL    AMPAC 6 Click ADL:  AMPAC Total Score: 21    Treatment & Education:  Role of OT  POC    Patient left up in chair with call button in reach and all needs met    GOALS:   Multidisciplinary Problems       Occupational Therapy Goals          Problem: Occupational Therapy    Goal Priority Disciplines Outcome Interventions   Occupational Therapy Goal     OT, PT/OT Progressing    Description: Goals to be met by: 1/26/2025     Patient will increase functional independence with ADLs by performing:  UBD: with MI seated in w/c  LBD: with CGA-SBA seated in w/c using AE as needed  FWM: SBA seated in w/c using AE as needed   Bathing: SPV seated on bath bench  Toileting: with SPV during hygiene mgmt   Pt will complete 10 mins on UE ergometer seated in w/c  Pt will complete 5-8 mins standing with RW/SBA during dynamic standing activity                       History:     Past Medical History:   Diagnosis Date    Allergy     AR (allergic rhinitis)     Basal cell carcinoma of ear 2006    Right Ear    BPH (benign prostatic hyperplasia)     DDD (degenerative disc disease), lumbar     Degenerative disc disease      Gastroesophageal reflux disease 3/11/2019    Hypertension     Kidney stone     Undescended testicle     Right Orchiectomy         Past Surgical History:   Procedure Laterality Date    BASAL CELL CARCINOMA EXCISION      ESOPHAGOGASTRODUODENOSCOPY N/A 10/5/2022    Procedure: EGD (ESOPHAGOGASTRODUODENOSCOPY);  Surgeon: Franco Campoverde MD;  Location: [facility];  Service: Endoscopy;  Laterality: N/A;    HEMORRHOID SURGERY      HERNIA REPAIR      PILONIDAL CYST DRAINAGE      Right Orchiectomy      TRANSURETHRAL RESECTION OF PROSTATE  2010       Time Tracking:     OT Date of Treatment: 12/27/24  OT Start Time: 1320  OT Stop Time: 1420  OT Total Time (min): 60 min    Billable Minutes:Evaluation 10  Self Care/Home Management 50    12/27/2024  Chase Tao Jr., OTR/L

## 2024-12-27 NOTE — PROGRESS NOTES
[facility]                                  Skilled Nursing Facility                   Progress Note     Patient Name: Jd Dill III  YOB: 1945  MRN: [id number]  Room: [id number]/SFHC951 A     Admit Date: 12/26/2024   SIRIA:      Principal Problem: Multifocal pneumonia    HPI obtained from patient interview and chart review     Chief Complaint:   Establish Care/ Initial Visit      HPI:   Jd Dill III is a 79 y.o. male with PMH of BPH, GERD, hypertension transferred from clinic for tachycardia and generalized weakness.  Admitted to the hospital for sepsis due to multifocal pneumonia complicated by hyponatremia and liver dysfunction. Patient became debilitated during hospital stay due to illness.    Patient will be treated at [facility] with PT and OT to improve functional status and ability to perform ADLs.     Interval History  Patient seen today while brushing his teeth  Patient denies shortness of breath, abdominal discomfort, nausea, or vomiting.  Patient reports an adequate appetite. Patient reports having regular bowel movements.    Vital signs stable, afebrile on room air O2 sats 96%  Last bowel movement 12/26  Most recent labs from 12/25 reviewed sodium 131, stable kidney function.  Liver enzymes remain slightly elevated will continue to trend.  24 hour glucose range 116-154, he does not have a history of diabetes  Last hemoglobin A1c 5.8 on 7/08/2024. Discontinue diabetes management protocol and sliding scale insulin.    Past Medical History: Patient has a past medical history of Allergy, AR (allergic rhinitis), Basal cell carcinoma of ear (2006), BPH (benign prostatic hyperplasia), DDD (degenerative disc disease), lumbar, Degenerative disc disease, Gastroesophageal reflux disease (3/11/2019), Hypertension, Kidney stone, and Undescended testicle.    Past Surgical History: Patient has a past surgical history that  includes Hemorrhoid surgery; Hernia repair; Right Orchiectomy; Pilonidal cyst drainage; Transurethral resection of prostate (2010); Excision basal cell carcinoma; and Esophagogastroduodenoscopy (N/A, 10/5/2022).    Social History: Patient reports that he quit smoking about 28 years ago. His smoking use included cigarettes. He started smoking about 38 years ago. He has a 5 pack-year smoking history. He has never been exposed to tobacco smoke. He has never used smokeless tobacco. He reports current alcohol use. He reports that he does not use drugs.    Family History: family history includes Allergies in his mother; Dementia in his mother; Diabetes in his father; Heart disease in his father; Hypertension in his father.    Allergies: Patient is allergic to ace inhibitors, chlorthalidone, feathers, and mite extract.    Review of Systems   Constitutional:  Positive for malaise/fatigue. Negative for chills and fever.   HENT:  Negative for congestion, hearing loss and sore throat.    Eyes:  Negative for blurred vision and double vision.   Respiratory:  Negative for cough, sputum production, shortness of breath and wheezing.    Cardiovascular:  Negative for chest pain, palpitations and leg swelling.   Gastrointestinal:  Negative for abdominal pain, constipation, diarrhea, heartburn, nausea and vomiting.   Genitourinary:  Negative for dysuria and urgency.   Musculoskeletal:  Negative for back pain.   Skin:  Negative for rash.   Neurological:  Positive for weakness. Negative for dizziness and headaches.   Endo/Heme/Allergies:  Negative for polydipsia. Does not bruise/bleed easily.   Psychiatric/Behavioral:  Negative for depression and hallucinations. The patient is not nervous/anxious.          24 hour Vital Sign Range   Temp:  [97.9 °F (36.6 °C)-98.2 °F (36.8 °C)]   Pulse:  [80-88]   Resp:  [16-23]   BP: (139-142)/(52-65)   SpO2:  [96 %-98 %]       Physical Exam  Vitals and nursing note reviewed.   Constitutional:        General: He is not in acute distress.     Appearance: Normal appearance. He is not ill-appearing.   Eyes:      Extraocular Movements: Extraocular movements intact.      Conjunctiva/sclera: Conjunctivae normal.      Pupils: Pupils are equal, round, and reactive to light.   Cardiovascular:      Rate and Rhythm: Normal rate and regular rhythm.      Pulses: Normal pulses.      Heart sounds: Normal heart sounds. No murmur heard.  Pulmonary:      Effort: Pulmonary effort is normal. No respiratory distress.      Breath sounds: Normal breath sounds. No wheezing or rhonchi.   Abdominal:      General: Bowel sounds are normal. There is no distension.      Palpations: Abdomen is soft. There is no mass.      Tenderness: There is no abdominal tenderness.   Musculoskeletal:         General: No swelling or tenderness.      Cervical back: Normal range of motion and neck supple. No tenderness.      Right lower leg: No edema.      Left lower leg: No edema.   Skin:     General: Skin is warm and dry.      Capillary Refill: Capillary refill takes less than 2 seconds.      Findings: No erythema or rash.   Neurological:      Mental Status: He is alert and oriented to person, place, and time. Mental status is at baseline.      Motor: Weakness present.   Psychiatric:         Mood and Affect: Mood normal.         Behavior: Behavior normal.         Thought Content: Thought content normal.         Judgment: Judgment normal.       Full skin assessment completed by this NP--skin intact      Labs:  Recent Labs   Lab 12/23/24 0223 12/25/24 0447   WBC 9.68 7.47   HGB 12.5* 11.2*   HCT 36.0* 32.9*    305     Recent Labs   Lab 12/21/24  0448 12/23/24  0223 12/24/24  0248 12/25/24  0447 12/26/24  0331   *   < > 128* 130* 131*   K 4.3   < > 5.0 4.3 4.4   CL 98   < > 101 100 101   CO2 27   < > 17* 24 24   BUN 8   < > 8 11 13   CREATININE 0.7   < > 0.7 0.7 0.8      < > 104 118* 110   CALCIUM 8.3*   < > 8.6* 8.5* 8.4*   MG 2.2  --    --  2.2 2.1   PHOS 3.2   < > 2.3* 1.9* 2.5*    < > = values in this interval not displayed.     Recent Labs   Lab 12/24/24  0248 12/25/24  0447 12/26/24  0331   ALKPHOS  --  67  --    ALT  --  91*  --    AST  --  57*  --    ALBUMIN 2.5* 2.4*  2.4* 2.4*   PROT  --  5.7*  --    BILITOT  --  0.5  --        Recent Labs     12/26/24  1616 12/26/24 2013 12/27/24  0735 12/27/24  1212   POCTGLUCOSE 127* 154* 116* 116*       Meds Scheduled:   cetirizine  10 mg Oral Daily    fluticasone propionate  1 spray Each Nostril Daily    losartan  25 mg Oral Daily    metoprolol succinate  25 mg Oral Daily    mupirocin   Nasal BID    pantoprazole  40 mg Oral Daily    senna-docusate 8.6-50 mg  1 tablet Oral BID    tamsulosin  0.8 mg Oral Daily       PRN:     Current Facility-Administered Medications:     acetaminophen, 650 mg, Oral, Q6H PRN    acetaminophen, 650 mg, Oral, Q6H PRN    calcium carbonate, 500 mg, Oral, BID PRN    dextrose 50%, 12.5 g, Intravenous, PRN    dextrose 50%, 25 g, Intravenous, PRN    glucagon (human recombinant), 1 mg, Intramuscular, PRN    glucose, 16 g, Oral, PRN    glucose, 24 g, Oral, PRN    insulin aspart U-100, 0-5 Units, Subcutaneous, QID (AC + HS) PRN    melatonin, 6 mg, Oral, Nightly PRN    sodium chloride, 1 spray, Each Nostril, BID PRN      Assessment and Plan:    Debility   Acute on chronic due to age-related physical debility  Consult PT/OT    Hypophosphatemia   Monitor. Replace as needed     Hyponatremia  Trend sodium    Benign prostatic hyperplasia with urinary obstruction   Continue home tamsulosin    Essential hypertension   Continue home metoprolol   Holding home losartan and amlodipine       Anticipate disposition:    Home with home health      Follow-up needed during SNF admission:   none    Follow-up needed after discharge from SNF:   - PCP within 1-2 weeks  - See appt scheduled below     Future Appointments   Date Time Provider Department Center   1/13/2025 10:40 AM Jabier Hinkle MD [facility]  [facility] [place] [facility]   2/14/2025 10:00 AM APPOINTMENT LAB, [facility]   2/17/2025 10:00 AM Johny Celis MD [facility]         I certify that SNF services are required to be given on an inpatient basis because Jd Dill III needs for skilled nursing care and/or skilled rehabilitation are required on a daily basis and such services can only practically be provided in a skilled nursing facility setting and are for an ongoing condition for which she received inpatient care in the hospital.       Extended Visit:   Total time spent: 87 minutes  Description of Time: counseling provided on clinical condition, therapies provided, plan of care, emotional support, coordinating patient care with other care team members, reviewing and interpreting labs and imaging, collaboration with physician, initiating new orders, chart review, and documentation. See interval hx.     Dana Arellano NP  Department of Hospital Medicine   [facility]     DOS: 12/27/2024

## 2024-12-27 NOTE — PLAN OF CARE
Problem: Occupational Therapy  Goal: Occupational Therapy Goal  Description: Goals to be met by: 1/26/2025     Patient will increase functional independence with ADLs by performing:    UBD: with MI seated in w/c  LBD: with CGA-SBA seated in w/c using AE as needed  FWM: SBA seated in w/c using AE as needed   Bathing: SPV seated on bath bench  Toileting: with SPV during hygiene mgmt   Pt will complete 10 mins on UE ergometer seated in w/c  Pt will complete 5-8 mins standing with RW/SBA during dynamic standing activity    Outcome: Progressing   Chase Tao Jr., OTR/L

## 2024-12-27 NOTE — PLAN OF CARE
Evaluation completed. POC established.     Problem: Physical Therapy  Goal: Physical Therapy Goal  Description: Goals to be met by: 25       Patient will increase functional independence with mobility by performin. Supine to sit with [place]  2. Sit to supine with [place]  3. Rolling to Left and Right with [place]  4. Sit to stand transfer with Modified [place]  5. Bed to chair transfer with Modified [place] using Rolling Walker/without AD  6. Gait  x 200 feet with Modified [place] using Rolling Walker  7. Gait x 50 feet (progress as tolerated) with SBA without an AD  8. Wheelchair propulsion x 200 feet with Modified [place] using bilateral upper extremities  9. Ascend/descend 12 stair with bilateral Handrails and Supervision using No Assistive Device  10. Ascend/Descend 4 inch curb step with Supervision using Rolling Walker    Outcome: Progressing  2024

## 2024-12-28 LAB — POCT GLUCOSE: 125 MG/DL (ref 70–110)

## 2024-12-28 PROCEDURE — 97530 THERAPEUTIC ACTIVITIES: CPT | Mod: CQ

## 2024-12-28 PROCEDURE — 25000242 PHARM REV CODE 250 ALT 637 W/ HCPCS: Performed by: HOSPITALIST

## 2024-12-28 PROCEDURE — 97116 GAIT TRAINING THERAPY: CPT | Mod: CQ

## 2024-12-28 PROCEDURE — 97110 THERAPEUTIC EXERCISES: CPT | Mod: CQ

## 2024-12-28 PROCEDURE — 11000004 HC SNF PRIVATE

## 2024-12-28 PROCEDURE — 25000003 PHARM REV CODE 250: Performed by: HOSPITALIST

## 2024-12-28 RX ORDER — SODIUM,POTASSIUM PHOSPHATES 280-250MG
1 POWDER IN PACKET (EA) ORAL
Status: COMPLETED | OUTPATIENT
Start: 2024-12-29 | End: 2024-12-29

## 2024-12-28 RX ADMIN — MUPIROCIN: 20 OINTMENT TOPICAL at 09:12

## 2024-12-28 RX ADMIN — FLUTICASONE PROPIONATE 50 MCG: 50 SPRAY, METERED NASAL at 09:12

## 2024-12-28 RX ADMIN — METOPROLOL SUCCINATE 25 MG: 25 TABLET, EXTENDED RELEASE ORAL at 09:12

## 2024-12-28 RX ADMIN — SENNOSIDES AND DOCUSATE SODIUM 1 TABLET: 50; 8.6 TABLET ORAL at 09:12

## 2024-12-28 RX ADMIN — CETIRIZINE HYDROCHLORIDE 10 MG: 10 TABLET, FILM COATED ORAL at 09:12

## 2024-12-28 RX ADMIN — LOSARTAN POTASSIUM 25 MG: 25 TABLET, FILM COATED ORAL at 09:12

## 2024-12-28 RX ADMIN — MUPIROCIN: 20 OINTMENT TOPICAL at 08:12

## 2024-12-28 RX ADMIN — TAMSULOSIN HYDROCHLORIDE 0.8 MG: 0.4 CAPSULE ORAL at 09:12

## 2024-12-28 RX ADMIN — PANTOPRAZOLE SODIUM 40 MG: 40 TABLET, DELAYED RELEASE ORAL at 09:12

## 2024-12-28 RX ADMIN — SENNOSIDES AND DOCUSATE SODIUM 1 TABLET: 50; 8.6 TABLET ORAL at 08:12

## 2024-12-28 NOTE — PT/OT/SLP PROGRESS
"Physical Therapy Treatment    Patient Name:  Jd Dill III   MRN:  [id number]  Admit Date: 12/26/2024  Admitting Diagnosis: Multifocal pneumonia  Recent Surgeries: N/A    General Precautions: Standard, fall  Orthopedic Precautions: N/A  Braces: N/A    Recommendations:     Discharge Recommendations: home health PT  Level of Assistance Recommended at Discharge: Intermittent assistance   Discharge Equipment Recommendations: walker, rolling (continue to assess with progression of mobility)  Barriers to discharge: Decreased caregiver support    Assessment:     Jd Dill III is a 79 y.o. male admitted with a medical diagnosis of Multifocal pneumonia .     Pt overall tolerated tx session well today without any incident. Pt started session by ambulating from recliner chair to bathroom, and stood at bathroom sink for a total of 10 minutes to brush his teeth, wash his face, and wash his hands. Pt performed ADL's at sink using RW and SBA. Pt with no complaints of pain or LOB episode during today's session. Patient would continue to benefit from skilled PT services to improve overall functional mobility, strength and endurance.    Performance deficits affecting function: weakness, impaired endurance, impaired self care skills, impaired functional mobility, gait instability, impaired balance, decreased lower extremity function, decreased safety awareness.    Rehab Potential is good    Activity Tolerance: Good    Plan:     Patient to be seen 5 x/week to address the above listed problems via gait training, therapeutic activities, therapeutic exercises, neuromuscular re-education, wheelchair management/training    Plan of Care Expires: 01/26/25  Plan of Care Reviewed with: patient    Subjective     "I am ready to go, but I want to brush my teeth first".     Pain/Comfort:  Pain Rating 1: 0/10  Pain Rating Post-Intervention 1: 0/10    Patient's cultural, spiritual, Gnosticist conflicts given the current situation:  no    Objective: "     Communicated with PCT prior to session.  Patient found  up in recliner chair  with  (no active lines) upon PT entry to room.     Therapeutic Activities and Exercises:     Mini LE bike x 12 minutes for BLE strengthening, ROM, and cardio endurance with light resistance and no rest breaks needed.      Patient educated on role of therapy, goals of session, and benefits of out of bed mobility.   Instructed on use of call button and importance of calling nursing staff for assistance with mobility   Questions/concerns addressed within PTA scope of practice  Pt verbalized understanding.    Functional Mobility:  Transfers:     Sit to Stand:  stand by assistance with rolling walker  Recliner Chair <> Wheelchair: contact guard assistance with  rolling walker  using  Step Transfer  Gait:   Pt ambulated 86ft + 106ft with SBA/CGA with a RW.   Pt required seated rest break between trials to don heel lift in L shoe.   Pt required frequent verbal cues to remain upright posture and forward gaze instead of downward gaze.   Balance:   Static Sitting Balance: Good  Dynamic Sitting Balance: Fair+   Static Standing Balance: Fair+   Dynamic Standing Balance: Fair     AM-PAC 6 CLICK MOBILITY  18    Patient left  up in recliner chair  with all lines intact, call button in reach, and belongings in reach .    GOALS:   Multidisciplinary Problems       Physical Therapy Goals          Problem: Physical Therapy    Goal Priority Disciplines Outcome Interventions   Physical Therapy Goal     PT, PT/OT Progressing    Description: Goals to be met by: 25       Patient will increase functional independence with mobility by performin. Supine to sit with [place]  2. Sit to supine with [place]  3. Rolling to Left and Right with [place]  4. Sit to stand transfer with Modified [place]  5. Bed to chair transfer with Modified [place] using Rolling Walker/without AD  6. Gait  x 200 feet with Modified [place] using  Rolling Walker  7. Gait x 50 feet (progress as tolerated) with SBA without an AD  8. Wheelchair propulsion x 200 feet with Modified [place] using bilateral upper extremities  9. Ascend/descend 12 stair with bilateral Handrails and Supervision using No Assistive Device  10. Ascend/Descend 4 inch curb step with Supervision using Rolling Walker                         Time Tracking:     PT Received On: 12/28/24  PT Start Time: 1016  PT Stop Time: 1105  PT Total Time (min): 49 min    Billable Minutes: Gait Training 18, Therapeutic Activity 17, and Therapeutic Exercise 14    Treatment Type: Treatment  PT/PTA: PTA     Number of PTA visits since last PT visit: 1 12/28/2024

## 2024-12-29 PROCEDURE — 25000003 PHARM REV CODE 250: Performed by: FAMILY MEDICINE

## 2024-12-29 PROCEDURE — 11000004 HC SNF PRIVATE

## 2024-12-29 PROCEDURE — 99306 1ST NF CARE HIGH MDM 50: CPT | Mod: AI,,, | Performed by: HOSPITALIST

## 2024-12-29 PROCEDURE — 25000003 PHARM REV CODE 250: Performed by: HOSPITALIST

## 2024-12-29 RX ADMIN — PANTOPRAZOLE SODIUM 40 MG: 40 TABLET, DELAYED RELEASE ORAL at 08:12

## 2024-12-29 RX ADMIN — SENNOSIDES AND DOCUSATE SODIUM 1 TABLET: 50; 8.6 TABLET ORAL at 08:12

## 2024-12-29 RX ADMIN — MUPIROCIN: 20 OINTMENT TOPICAL at 08:12

## 2024-12-29 RX ADMIN — CETIRIZINE HYDROCHLORIDE 10 MG: 10 TABLET, FILM COATED ORAL at 08:12

## 2024-12-29 RX ADMIN — LOSARTAN POTASSIUM 25 MG: 25 TABLET, FILM COATED ORAL at 08:12

## 2024-12-29 RX ADMIN — POTASSIUM & SODIUM PHOSPHATES POWDER PACK 280-160-250 MG 1 PACKET: 280-160-250 PACK at 12:12

## 2024-12-29 RX ADMIN — POTASSIUM & SODIUM PHOSPHATES POWDER PACK 280-160-250 MG 1 PACKET: 280-160-250 PACK at 08:12

## 2024-12-29 RX ADMIN — METOPROLOL SUCCINATE 25 MG: 25 TABLET, EXTENDED RELEASE ORAL at 08:12

## 2024-12-29 RX ADMIN — TAMSULOSIN HYDROCHLORIDE 0.8 MG: 0.4 CAPSULE ORAL at 08:12

## 2024-12-29 RX ADMIN — FLUTICASONE PROPIONATE 50 MCG: 50 SPRAY, METERED NASAL at 08:12

## 2024-12-29 RX ADMIN — POTASSIUM & SODIUM PHOSPHATES POWDER PACK 280-160-250 MG 1 PACKET: 280-160-250 PACK at 05:12

## 2024-12-29 NOTE — H&P
"Hospital Medicine  Skilled Nursing Facility   History and Physical Exam      Date of Service: 12/29/2024      Patient Name: Jd Dill III  MRN: [id number]  Admission Date: 12/26/2024  Attending Physician: Alex Sifuentes MD  Primary Care Provider: Jabier Hinkle MD  Code Status: Full Code      Principal problem:  Multifocal pneumonia      Chief Complaint:   Chief Complaint   Patient presents with    Pneumonia     Admitted to [facility] following hospital stay for multifocal pneumonia, hyponatremia, and debility.          HPI:   "Jd Dill III is a 79 y.o. male with PMH of BPH, GERD, hypertension transferred from clinic for tachycardia and generalized weakness.  Admitted to the hospital for sepsis due to multifocal pneumonia complicated by hyponatremia and liver dysfunction. Patient became debilitated during hospital stay due to illness.     Patient will be treated at [facility] with PT and OT to improve functional status and ability to perform ADLs.      Interval History  Patient seen today while brushing his teeth  Patient denies shortness of breath, abdominal discomfort, nausea, or vomiting.  Patient reports an adequate appetite. Patient reports having regular bowel movements.    Vital signs stable, afebrile on room air O2 sats 96%  Last bowel movement 12/26  Most recent labs from 12/25 reviewed sodium 131, stable kidney function.  Liver enzymes remain slightly elevated will continue to trend.  24 hour glucose range 116-154, he does not have a history of diabetes  Last hemoglobin A1c 5.8 on 7/08/2024. Discontinue diabetes management protocol and sliding scale insulin." Per Dana Arellano NP on 12/27/24.     He is doing okay. He is eating well and denies any nausea. He is having regular BMs. He denies any cough or shortness of breath. He is working well with therapy and walked 86 feet and 106 feet with CGA and RW with PT yesterday. He denies any pain presently. He is sleeping well.     Patient admitted with " skilled services with PT and OT to improve functional status and ability to perform ADLs.       Past Medical History:   Past Medical History:   Diagnosis Date    Allergy     AR (allergic rhinitis)     Basal cell carcinoma of ear     Right Ear    BPH (benign prostatic hyperplasia)     DDD (degenerative disc disease), lumbar     Degenerative disc disease     Gastroesophageal reflux disease 3/11/2019    Hypertension     Kidney stone     Undescended testicle     Right Orchiectomy       Past Surgical History:   Past Surgical History:   Procedure Laterality Date    BASAL CELL CARCINOMA EXCISION      ESOPHAGOGASTRODUODENOSCOPY N/A 10/5/2022    Procedure: EGD (ESOPHAGOGASTRODUODENOSCOPY);  Surgeon: Franco Campoverde MD;  Location: [facility];  Service: Endoscopy;  Laterality: N/A;    HEMORRHOID SURGERY      HERNIA REPAIR      PILONIDAL CYST DRAINAGE      Right Orchiectomy      TRANSURETHRAL RESECTION OF PROSTATE         Social History:   Tobacco Use    Smoking status: Former     Current packs/day: 0.00     Average packs/day: 0.5 packs/day for 10.0 years (5.0 ttl pk-yrs)     Types: Cigarettes     Start date: 1987     Quit date: 1997     Years since quittin.0     Passive exposure: Never    Smokeless tobacco: Never   Substance and Sexual Activity    Alcohol use: Yes     Comment: Rare    Drug use: Never    Sexual activity: Not on file       Family History:   Family History       Problem Relation (Age of Onset)    Allergies Mother    Dementia Mother    Diabetes Father    Heart disease Father    Hypertension Father            No current facility-administered medications on file prior to encounter.     Current Outpatient Medications on File Prior to Encounter   Medication Sig    amLODIPine (NORVASC) 5 MG tablet TAKE 1 TABLET BY MOUTH EVERY DAY    azelastine (ASTELIN) 137 mcg (0.1 %) nasal spray INSTILL 1-2 SPRAYS BY NASAL ROUTE 2 (TWO) TIMES DAILY AS NEEDED FOR RHINITIS.    blood sugar diagnostic Strp To check  BG 1 times daily, to use with insurance preferred meter    cetirizine (ZYRTEC) 10 MG tablet Take 10 mg by mouth once daily.    fluticasone propionate (FLONASE) 50 mcg/actuation nasal spray USE 1 SPRAY (50 MCG TOTAL) IN EACH NOSTRIL ONCE DAILY    INV atorvastatin tablet 40 mg/placebo Take one tablet by mouth each morning. For Investigational Use Only.    ketoconazole (NIZORAL) 2 % cream APPLY TO AREA OF RED, DRY SKIN ON BOTH FEET/HEELS ONE TO TWO TIMES DAILY FOR 2 TO 3 WEEKS.    lancets Misc To check BG 1 times daily, to use with insurance preferred meter    losartan (COZAAR) 25 MG tablet Take 1 tablet (25 mg total) by mouth once daily.    metoprolol succinate (TOPROL-XL) 25 MG 24 hr tablet Take 1 tablet (25 mg total) by mouth once daily.    omeprazole (PRILOSEC) 40 MG capsule TAKE 1 CAPSULE (40 MG TOTAL) BY MOUTH EVERY MORNING.    ONETOUCH VERIO FLEX METER Misc TO CHECK BLOOD GLUCOSE 1 TIME DAILY, TO USE WITH INSURANCE PREFERRED METER    tamsulosin (FLOMAX) 0.4 mg Cp24 Take 0.8 mg by mouth once daily.        Allergies:   Review of patient's allergies indicates:   Allergen Reactions    Ace inhibitors Other (See Comments)     Cough    Chlorthalidone      Hyponatremia    Feathers Other (See Comments)    Mite extract Other (See Comments)     Nose gets congested       ROS:  Review of Systems   Constitutional:  Negative for appetite change and fever.   Respiratory:  Negative for cough and shortness of breath.    Cardiovascular:  Negative for chest pain and palpitations.   Gastrointestinal:  Negative for abdominal pain, nausea and vomiting.   Genitourinary:  Negative for dysuria.   Musculoskeletal:  Negative for arthralgias and back pain.   Neurological:  Positive for weakness. Negative for dizziness and light-headedness.   Psychiatric/Behavioral:  Negative for sleep disturbance. The patient is not nervous/anxious.      Objective:  Temp:  [97.5 °F (36.4 °C)-98.3 °F (36.8 °C)]   Pulse:  [79-83]   Resp:  [17]   BP:  (102-127)/(53-65)   SpO2:  [95 %-98 %]     Body mass index is 22.79 kg/m².    Physical Exam  Vitals and nursing note reviewed.   Constitutional:       General: He is not in acute distress.     Appearance: He is well-developed. He is not diaphoretic.   Cardiovascular:      Rate and Rhythm: Normal rate and regular rhythm.      Heart sounds: S1 normal and S2 normal. No murmur heard.  Pulmonary:      Effort: Pulmonary effort is normal. No respiratory distress.      Breath sounds: Normal breath sounds. No wheezing or rales.   Abdominal:      General: Bowel sounds are normal. There is no distension.      Palpations: Abdomen is soft.      Tenderness: There is no abdominal tenderness.   Musculoskeletal:      Right lower leg: No edema.      Left lower leg: No edema.   Skin:     General: Skin is warm and dry.   Neurological:      Mental Status: He is alert and oriented to person, place, and time.   Psychiatric:         Mood and Affect: Mood normal.         Behavior: Behavior normal. Behavior is cooperative.         Thought Content: Thought content normal.         Significant Labs:   A1C:   Recent Labs   Lab 07/08/24  0722   HGBA1C 5.8*     POCT Glucose:   Recent Labs   Lab 12/27/24  1212 12/27/24  1614 12/28/24  0724   POCTGLUCOSE 116* 145* 125*     TSH:   Recent Labs   Lab 12/24/24  0248   TSH 0.811     BMP  Lab Results   Component Value Date     (L) 12/26/2024    K 4.4 12/26/2024     12/26/2024    CO2 24 12/26/2024    BUN 13 12/26/2024    CREATININE 0.8 12/26/2024    CALCIUM 8.4 (L) 12/26/2024    ANIONGAP 6 (L) 12/26/2024    EGFRNORACEVR >60.0 12/26/2024       LFTS:  Lab Results   Component Value Date    ALT 91 (H) 12/25/2024    AST 57 (H) 12/25/2024    ALKPHOS 67 12/25/2024    BILITOT 0.5 12/25/2024       CBC:  Lab Results   Component Value Date    WBC 7.47 12/25/2024    HGB 11.2 (L) 12/25/2024    HCT 32.9 (L) 12/25/2024    MCV 93 12/25/2024     12/25/2024       Significant Imaging: I have reviewed all  pertinent imaging results/findings completed during prior hospitalization.      Assessment and Plan:  Active Diagnoses:    Diagnosis Date Noted POA    PRINCIPAL PROBLEM:  Multifocal pneumonia [J18.9] 12/18/2024 Yes    Debility [R53.81] 12/19/2024 Yes    Hypophosphatemia [E83.39] 12/18/2024 Yes    Hyponatremia [E87.1] 12/17/2024 Yes    Diabetic polyneuropathy associated with type 2 diabetes mellitus [E11.42] 12/01/2023 Yes    Gastroesophageal reflux disease [K21.9] 03/11/2019 Yes    Benign prostatic hyperplasia with urinary obstruction [N40.1, N13.8] 05/12/2016 Yes    Essential hypertension [I10] 05/12/2016 Yes    AR (allergic rhinitis) [J30.9] 01/09/2013 Yes      Problems Resolved During this Admission:       Multifocal pneumonia  Completed course of IV ceftriaxone and azithromycin during hospital stay.   Legionella antigen still pending at this time.   Blood cultures were negative.     Hyponatremia  Sodium was 126 on admission to the hospital on 12/17/24.   Sodium stable at 131 most recently on 12/26.  Continue fluid restriction  Monitor BMP at least twice weekly.     Debility   - Continue with PT/OT for gait training and strengthening and restoration of ADL's   - Encourage mobility, OOB in chair, and early ambulation as appropriate  - Fall precautions   - Monitor for bowel and bladder dysfunction  - Monitor for and prevent skin breakdown and pressure ulcers     Benign prostatic hyperplasia with urinary obstruction   Continue home tamsulosin 0.8 mg daily.      Essential hypertension   Continue home metoprolol succinate 25 mg daily, losartan 25 mg daily.   Holding home amlodipine.     GERD  Continue pantoprazole 40 mg daily.     Allergic rhinitis  Continue cetirizine 10 mg daily and flonase daily.        IP [facility] RISK OF UNPLANNED READMISSION Model: Low      Anticipated Disposition:  Home with home health      Future Appointments   Date Time Provider Department Center   1/13/2025 10:40 AM Jabier Hinkle MD [facility]  [facility] PCW   2/14/2025 10:00 AM APPOINTMENT LAB, [facility]   2/17/2025 10:00 AM Johny Celis MD [facility] HEM ONC [facility]       I certify that SNF services are required to be given on an inpatient basis because Jd Dill III needs for skilled nursing care and/or skilled rehabilitation are required on a daily basis and such services can only practically be provided in a skilled nursing facility setting and are for an ongoing condition for which she received inpatient care in the hospital.       Alex Sifuentes MD  Department of Hospital Medicine   [facility] - Skilled Nursing

## 2024-12-29 NOTE — CONSULTS
"  [facility] - Skilled Nursing  Adult Nutrition  Consult Note    SUMMARY   Recommendations  Continue regular diet, fluid restriction, follow glucose, assess need for diabetic education.   RD [email or web address]  Goals: PO to meet 85% of EEN by next RD follow up  Nutrition Goal Status: new  Communication of RD Recs: other (comment) (POC)    Assessment and Plan    No nutrition diagnosis at this time      Malnutrition Assessment pending assessment completed remotely                                       Reason for Assessment    Reason For Assessment: consult  Diagnosis:  (pneumonia)  General Information Comments: Med Hx. liver dysfunction, BPH, GERD, HTN, Debility, hyponatremia.. Patient did not answer either phone.  Nutrition Discharge Planning: DC on regular diet,    Nutrition/Diet History    Patient Reported Diet/Restrictions/Preferences: general  Food Preferences: none  Spiritual, Cultural Beliefs, Uatsdin Practices, Values that Affect Care: no  Food Allergies: NKFA  Factors Affecting Nutritional Intake: None identified at this time  Food Insecurity: No Food Insecurity (2024)    Hunger Vital Sign     Worried About Running Out of Food in the Last Year: Never true     Ran Out of Food in the Last Year: Never true      Anthropometrics    Temp: 97.9 °F (36.6 °C)  Height Method: Stated  Height: 5' 7" (170.2 cm)  Height (inches): 67 in  Weight Method: Standard Scale  Weight: 66 kg (145 lb 8.1 oz)  Weight (lb): 145.51 lb  Ideal Body Weight (IBW), Male: 148 lb  % Ideal Body Weight, Male (lb): 98.32 %  BMI (Calculated): 22.8  BMI Grade: 18.5-24.9 - normal  Weight Loss: unintentional  Usual Body Weight (UBW), k.8 kg  % Usual Body Weight: 96.13  % Weight Change From Usual Weight: -4.07 %       Lab/Procedures/Meds    Pertinent Labs Reviewed: reviewed  Pertinent Labs Comments: HgA1c 5.8(pre-diabetic), Hg 11.2, Hct 32.9, Ca 8.4, Na 131, PO4 2.5,  Pertinent Medications Reviewed: reviewed  Pertinent Medications Comments: Linda, " pantoprazole, NaKPO4, senna-docusate       Estimated/Assessed Needs    Weight Used For Calorie Calculations: 66 kg (145 lb 8.1 oz)  Energy Calorie Requirements (kcal): 1728  Energy Need Method: [place]-St Jeor (x 1.3(PAL))  Protein Requirements: 79  Weight Used For Protein Calculations: 66 kg (145 lb 8.1 oz) (x 1.2)  Fluid Requirements (mL): 1000 per MD     RDA Method (mL): 1728  CHO Requirement: 216      Nutrition Prescription Ordered    Current Diet Order: Regular  Nutrition Order Comments: PO %    Evaluation of Received Nutrient/Fluid Intake    I/O: +510  Energy Calories Required: meeting needs  Protein Required: meeting needs  Fluid Required: meeting needs  Comments: [facility] 12/29, will not order ONS due to fluid restriction at this time.  Tolerance: tolerating  % Intake of Estimated Energy Needs: 75 - 100 %  % Meal Intake: 75 - 100 %    Nutrition Risk    Level of Risk/Frequency of Follow-up: low (one time per week)       Monitor and Evaluation    Food and Nutrient Intake: food and beverage intake  Knowledge/Beliefs/Attitudes: food and nutrition knowledge/skill  Physical Activity and Function: nutrition-related ADLs and IADLs  Anthropometric Measurements: weight change  Biochemical Data, Medical Tests and Procedures: gastrointestinal profile, electrolyte and renal panel  Nutrition-Focused Physical Findings: overall appearance       Nutrition Follow-Up    RD Follow-up?: Yes

## 2024-12-29 NOTE — DISCHARGE SUMMARY
[facility] Medicine  Discharge Summary      Patient Name: Jd Dill III  MRN: [id number]  ABHAY: [id number]  Patient Class: IP- Inpatient  Admission Date: 12/17/2024  Hospital Length of Stay: 9 days  Discharge Date and Time: 12/26/2024  3:26 PM  Attending Physician: Lianna att. providers found   Discharging Provider: Franky Gonzalez III, MD  Primary Care Provider: Jabier Hinkle MD  [facility] Medicine Team: [facility] Franky Gonzalez III, MD  Primary Care Team: [facility]    HPI:   Jd Dill III is an 79-year-old male with a history of BPH, GERD, hypertension transferred from clinic for tachycardia and generalized weakness.   He started feeling bad approximately 2 weeks ago.  Went to the clinic on 12/13, recommended conservative therapy with lozenges, Tylenol etc..  He went back to the clinic today because he was feeling worse.  He has had decreased p.o. intake.  He is feeling weak.  His cough is getting worse.  He was found to be tachycardic with wheezing, transferred to ED for further workup.  He denies chest pain.  No abdominal pain.  Denies dysuria or hematuria.  Denies current fever.    In the ED patient afebrile, tachycardic, hemodynamically stable saturating in mid 90's on room air at rest. WBC 20.55. LA 1.48. Patient Na 126. CXR performed and showed Scattered low-grade patchy airspace opacities and a suspected nodular lesion. CT Chest ordered to better characterize. Started on azithro and ceftriaxone for multifocal pna. Admitted to the care of medicine for further evaluation and management.      * No surgery found *      Hospital Course:   Admitted to hospital medicine for sepsis d/t multifocal pneumonia c/b hyponatremia and liver dysfunction.  Started on ceftriaxone and azithromycin.  Na improved with IV fluids and PO intake.  Liver dysfunction improved with antibiotics and fluids.  COVID and flu negative.  Pt completed course of antibiotics during admission.   Vital signs  stabilized and blood cultures negative.  Hyponatremia continued to improve with fluid restriction and Na tabs.  CM/SW assisted in placement to SNF.  Recommend continued fluid restriction and Na tabs for further Na improvement.  The patient's chronic medical conditions were managed appropriately.  Plan for discharge to [facility] discussed at length and pt agreeable.    Physical Exam  Constitutional:  well-developed.   Cardiovascular: Normal rate and regular rhythm.   Pulmonary: Pulmonary effort is normal.  Clear to auscultation bilateral  Abdominal: No distension, soft and nontender  Skin: warm and dry.   Neurological:  No focal neuro deficit  Psychiatric:    Behavior normal.         Goals of Care Treatment Preferences:  Code Status: Full Code      SDOH Screening:  The patient was screened for utility difficulties, food insecurity, transport difficulties, housing insecurity, and interpersonal safety and there were no concerns identified this admission.     Consults:     Final Active Diagnoses:    Diagnosis Date Noted POA    PRINCIPAL PROBLEM:  Multifocal pneumonia [J18.9] 12/18/2024 Yes    Hyponatremia [E87.1] 12/17/2024 Yes    Liver dysfunction [K76.89] 12/18/2024 Yes    Debility [R53.81] 12/19/2024 Yes    Hypophosphatemia [E83.39] 12/18/2024 Yes    Essential hypertension [I10] 05/12/2016 Yes    Benign prostatic hyperplasia with urinary obstruction [N40.1, N13.8] 05/12/2016 Yes      Problems Resolved During this Admission:    Diagnosis Date Noted Date Resolved POA    Sepsis [A41.9] 12/17/2024 12/27/2024 Yes       Discharged Condition: stable    Disposition: Skilled Nursing Facility    Follow Up:    Patient Instructions:      Diet Adult Regular     Order Specific Question Answer Comments   Fluid restriction: Fluid - 1000mL      Activity as tolerated       Significant Diagnostic Studies: N/A    Pending Diagnostic Studies:       Procedure Component Value Units Date/Time    Legionella antigen, urine [[id number]]  Collected: 12/23/24 1838    Order Status: Sent Lab Status: In process Updated: 12/23/24 1934    Specimen: Urine, Clean Catch            Medications:  Reconciled Home Medications:      Medication List        CHANGE how you take these medications      amLODIPine 5 MG tablet  Commonly known as: NORVASC  TAKE 1 TABLET BY MOUTH EVERY DAY  Notes to patient: HOLD THIS MEDICATION FOR NOW, BP HAS BEEN LOWER SINCE ADMISSION. CAN RESUME IF BECOMES HYPERTENSIVE     fluticasone propionate 50 mcg/actuation nasal spray  Commonly known as: FLONASE  USE 1 SPRAY (50 MCG TOTAL) IN EACH NOSTRIL ONCE DAILY  What changed: Another medication with the same name was removed. Continue taking this medication, and follow the directions you see here.     losartan 25 MG tablet  Commonly known as: COZAAR  Take 1 tablet (25 mg total) by mouth once daily.  What changed:   medication strength  how much to take  Notes to patient: DECREASED DOSE FROM 100MG DAILY TO 25 MG DAILY DUE TO LOW BP DURING ADMISSION. CAN INCREASE IF BECOMES HYPERTENSIVE.            CONTINUE taking these medications      azelastine 137 mcg (0.1 %) nasal spray  Commonly known as: ASTELIN  INSTILL 1-2 SPRAYS BY NASAL ROUTE 2 (TWO) TIMES DAILY AS NEEDED FOR RHINITIS.     blood sugar diagnostic Strp  To check BG 1 times daily, to use with insurance preferred meter     cetirizine 10 MG tablet  Commonly known as: ZYRTEC  Take 10 mg by mouth once daily.     INV atorvastatin/placebo 40 MG tablet  Take one tablet by mouth each morning. For Investigational Use Only.     ketoconazole 2 % cream  Commonly known as: NIZORAL  APPLY TO AREA OF RED, DRY SKIN ON BOTH FEET/HEELS ONE TO TWO TIMES DAILY FOR 2 TO 3 WEEKS.     lancets Misc  To check BG 1 times daily, to use with insurance preferred meter     metoprolol succinate 25 MG 24 hr tablet  Commonly known as: TOPROL-XL  Take 1 tablet (25 mg total) by mouth once daily.     omeprazole 40 MG capsule  Commonly known as: PRILOSEC  TAKE 1 CAPSULE  (40 MG TOTAL) BY MOUTH EVERY MORNING.     ONETOUCH VERIO FLEX METER Misc  Generic drug: blood-glucose meter  TO CHECK BLOOD GLUCOSE 1 TIME DAILY, TO USE WITH INSURANCE PREFERRED METER     tamsulosin 0.4 mg Cap  Commonly known as: FLOMAX  Take 0.8 mg by mouth once daily.            STOP taking these medications      benzonatate 200 MG capsule  Commonly known as: TESSALON              Indwelling Lines/Drains at time of discharge:   Lines/Drains/Airways       None                   Time spent on the discharge of patient: 55 minutes         Franky Gonzalez III, MD  Department of Hospital Medicine  [facility]

## 2024-12-30 LAB
ALBUMIN SERPL BCP-MCNC: 2.6 G/DL (ref 3.5–5.2)
ALP SERPL-CCNC: 74 U/L (ref 40–150)
ALT SERPL W/O P-5'-P-CCNC: 42 U/L (ref 10–44)
ANION GAP SERPL CALC-SCNC: 6 MMOL/L (ref 8–16)
AST SERPL-CCNC: 26 U/L (ref 10–40)
BASOPHILS # BLD AUTO: 0.01 K/UL (ref 0–0.2)
BASOPHILS NFR BLD: 0.2 % (ref 0–1.9)
BILIRUB SERPL-MCNC: 0.4 MG/DL (ref 0.1–1)
BUN SERPL-MCNC: 17 MG/DL (ref 8–23)
CALCIUM SERPL-MCNC: 8.8 MG/DL (ref 8.7–10.5)
CHLORIDE SERPL-SCNC: 99 MMOL/L (ref 95–110)
CHOLEST SERPL-MCNC: 138 MG/DL (ref 120–199)
CHOLEST/HDLC SERPL: 3.5 {RATIO} (ref 2–5)
CO2 SERPL-SCNC: 25 MMOL/L (ref 23–29)
CREAT SERPL-MCNC: 0.8 MG/DL (ref 0.5–1.4)
DIFFERENTIAL METHOD BLD: ABNORMAL
EOSINOPHIL # BLD AUTO: 0.2 K/UL (ref 0–0.5)
EOSINOPHIL NFR BLD: 3.9 % (ref 0–8)
ERYTHROCYTE [DISTWIDTH] IN BLOOD BY AUTOMATED COUNT: 14.5 % (ref 11.5–14.5)
EST. GFR  (NO RACE VARIABLE): >60 ML/MIN/1.73 M^2
GLUCOSE SERPL-MCNC: 110 MG/DL (ref 70–110)
HCT VFR BLD AUTO: 30.1 % (ref 40–54)
HDLC SERPL-MCNC: 39 MG/DL (ref 40–75)
HDLC SERPL: 28.3 % (ref 20–50)
HGB BLD-MCNC: 10.3 G/DL (ref 14–18)
IMM GRANULOCYTES # BLD AUTO: 0.02 K/UL (ref 0–0.04)
IMM GRANULOCYTES NFR BLD AUTO: 0.4 % (ref 0–0.5)
L PNEUMO AG UR QL IA: NEGATIVE
LDLC SERPL CALC-MCNC: 85.4 MG/DL (ref 63–159)
LYMPHOCYTES # BLD AUTO: 1.2 K/UL (ref 1–4.8)
LYMPHOCYTES NFR BLD: 23 % (ref 18–48)
MAGNESIUM SERPL-MCNC: 2.1 MG/DL (ref 1.6–2.6)
MCH RBC QN AUTO: 32.6 PG (ref 27–31)
MCHC RBC AUTO-ENTMCNC: 34.2 G/DL (ref 32–36)
MCV RBC AUTO: 95 FL (ref 82–98)
MONOCYTES # BLD AUTO: 0.9 K/UL (ref 0.3–1)
MONOCYTES NFR BLD: 16.6 % (ref 4–15)
NEUTROPHILS # BLD AUTO: 2.9 K/UL (ref 1.8–7.7)
NEUTROPHILS NFR BLD: 55.9 % (ref 38–73)
NONHDLC SERPL-MCNC: 99 MG/DL
NRBC BLD-RTO: 0 /100 WBC
PHOSPHATE SERPL-MCNC: 3.4 MG/DL (ref 2.7–4.5)
PLATELET # BLD AUTO: 338 K/UL (ref 150–450)
PMV BLD AUTO: 9.7 FL (ref 9.2–12.9)
POTASSIUM SERPL-SCNC: 4.4 MMOL/L (ref 3.5–5.1)
PROT SERPL-MCNC: 5.8 G/DL (ref 6–8.4)
RBC # BLD AUTO: 3.16 M/UL (ref 4.6–6.2)
SODIUM SERPL-SCNC: 130 MMOL/L (ref 136–145)
TRIGL SERPL-MCNC: 68 MG/DL (ref 30–150)
WBC # BLD AUTO: 5.12 K/UL (ref 3.9–12.7)

## 2024-12-30 PROCEDURE — 25000003 PHARM REV CODE 250: Performed by: HOSPITALIST

## 2024-12-30 PROCEDURE — 97110 THERAPEUTIC EXERCISES: CPT

## 2024-12-30 PROCEDURE — 36415 COLL VENOUS BLD VENIPUNCTURE: CPT | Performed by: HOSPITALIST

## 2024-12-30 PROCEDURE — 25000003 PHARM REV CODE 250: Performed by: NURSE PRACTITIONER

## 2024-12-30 PROCEDURE — 80053 COMPREHEN METABOLIC PANEL: CPT | Performed by: FAMILY MEDICINE

## 2024-12-30 PROCEDURE — 84100 ASSAY OF PHOSPHORUS: CPT | Performed by: HOSPITALIST

## 2024-12-30 PROCEDURE — 97116 GAIT TRAINING THERAPY: CPT

## 2024-12-30 PROCEDURE — 85025 COMPLETE CBC W/AUTO DIFF WBC: CPT | Performed by: HOSPITALIST

## 2024-12-30 PROCEDURE — 97535 SELF CARE MNGMENT TRAINING: CPT

## 2024-12-30 PROCEDURE — 11000004 HC SNF PRIVATE

## 2024-12-30 PROCEDURE — 83735 ASSAY OF MAGNESIUM: CPT | Performed by: HOSPITALIST

## 2024-12-30 PROCEDURE — 80061 LIPID PANEL: CPT | Performed by: FAMILY MEDICINE

## 2024-12-30 RX ORDER — SODIUM CHLORIDE 1 G/1
1000 TABLET ORAL 2 TIMES DAILY
Status: DISCONTINUED | OUTPATIENT
Start: 2024-12-30 | End: 2025-01-10 | Stop reason: HOSPADM

## 2024-12-30 RX ADMIN — SODIUM CHLORIDE 1000 MG: 1 TABLET ORAL at 08:12

## 2024-12-30 RX ADMIN — CETIRIZINE HYDROCHLORIDE 10 MG: 10 TABLET, FILM COATED ORAL at 08:12

## 2024-12-30 RX ADMIN — MUPIROCIN: 20 OINTMENT TOPICAL at 09:12

## 2024-12-30 RX ADMIN — PANTOPRAZOLE SODIUM 40 MG: 40 TABLET, DELAYED RELEASE ORAL at 08:12

## 2024-12-30 RX ADMIN — FLUTICASONE PROPIONATE 50 MCG: 50 SPRAY, METERED NASAL at 08:12

## 2024-12-30 RX ADMIN — LOSARTAN POTASSIUM 25 MG: 25 TABLET, FILM COATED ORAL at 08:12

## 2024-12-30 RX ADMIN — SENNOSIDES AND DOCUSATE SODIUM 1 TABLET: 50; 8.6 TABLET ORAL at 08:12

## 2024-12-30 RX ADMIN — METOPROLOL SUCCINATE 25 MG: 25 TABLET, EXTENDED RELEASE ORAL at 08:12

## 2024-12-30 RX ADMIN — MUPIROCIN: 20 OINTMENT TOPICAL at 08:12

## 2024-12-30 RX ADMIN — TAMSULOSIN HYDROCHLORIDE 0.8 MG: 0.4 CAPSULE ORAL at 08:12

## 2024-12-30 NOTE — PLAN OF CARE
Problem: Occupational Therapy  Goal: Occupational Therapy Goal  Description: Goals to be met by: 1/26/2025     Patient will increase functional independence with ADLs by performing:  UBD: with MI seated in w/c- Ongoing  LBD: with CGA-SBA seated in w/c using AE as needed- Ongoing  FWM: SBA seated in w/c using AE as needed- Ongoing  Bathing: SPV seated on bath bench  Toileting: with SPV during hygiene mgmt- Ongoing  Pt will complete 10 mins on UE ergometer seated in w/c  Pt will complete 5-8 mins standing with RW/SBA during dynamic standing activity  Outcome: Progressing

## 2024-12-30 NOTE — CLINICAL REVIEW
Clinical Pharmacy Chart Review Note      Admit Date: 12/26/2024   LOS: 4 days       Jd Dill III is a 79 y.o. male admitted to SNF for PT/OT after hospitalization for multifocal pneumonia.    Active Hospital Problems    Diagnosis  POA    *Multifocal pneumonia [J18.9]  Yes    Debility [R53.81]  Yes    Hypophosphatemia [E83.39]  Yes    Hyponatremia [E87.1]  Yes    Diabetic polyneuropathy associated with type 2 diabetes mellitus [E11.42]  Yes    Gastroesophageal reflux disease [K21.9]  Yes    Benign prostatic hyperplasia with urinary obstruction [N40.1, N13.8]  Yes    Essential hypertension [I10]  Yes    AR (allergic rhinitis) [J30.9]  Yes      Resolved Hospital Problems   No resolved problems to display.     Review of patient's allergies indicates:   Allergen Reactions    Ace inhibitors Other (See Comments)     Cough    Chlorthalidone      Hyponatremia    Feathers Other (See Comments)    Mite extract Other (See Comments)     Nose gets congested     Patient Active Problem List    Diagnosis Date Noted    Debility 12/19/2024    Multifocal pneumonia 12/18/2024    Liver dysfunction 12/18/2024    Hypophosphatemia 12/18/2024    Hyponatremia 12/17/2024    Allergic conjunctivitis, bilateral 03/07/2024    Diabetic polyneuropathy associated with type 2 diabetes mellitus 12/01/2023    Type 2 diabetes mellitus without complication, without long-term current use of insulin 07/06/2022    Gastroesophageal reflux disease 03/11/2019    Laryngopharyngeal reflux 03/11/2019    Essential hypertension 05/12/2016    Benign prostatic hyperplasia with urinary obstruction 05/12/2016    History of kidney stones 11/11/2015    Pre-diabetes 11/07/2014    History of colon polyps 10/29/2014    AR (allergic rhinitis) 01/09/2013    Undescended right testicle 01/09/2013       Scheduled Meds:    cetirizine  10 mg Oral Daily    fluticasone propionate  1 spray Each Nostril Daily    losartan  25 mg Oral Daily    metoprolol succinate  25 mg Oral Daily     mupirocin   Nasal BID    pantoprazole  40 mg Oral Daily    senna-docusate 8.6-50 mg  1 tablet Oral BID    tamsulosin  0.8 mg Oral Daily     Continuous Infusions:   PRN Meds:   Current Facility-Administered Medications:     acetaminophen, 650 mg, Oral, Q6H PRN    acetaminophen, 650 mg, Oral, Q6H PRN    calcium carbonate, 500 mg, Oral, BID PRN    melatonin, 6 mg, Oral, Nightly PRN    sodium chloride, 1 spray, Each Nostril, BID PRN    OBJECTIVE:     Vital Signs (Last 24H)  Temp:  [98.4 °F (36.9 °C)-98.8 °F (37.1 °C)]   Pulse:  [82-88]   Resp:  [17]   BP: (103-146)/(56-66)   SpO2:  [94 %-96 %]     Laboratory:  CBC:   Recent Labs   Lab 12/25/24 0447 12/30/24 0459   WBC 7.47 5.12   RBC 3.53* 3.16*   HGB 11.2* 10.3*   HCT 32.9* 30.1*    338   MCV 93 95   MCH 31.7* 32.6*   MCHC 34.0 34.2     BMP:   Recent Labs   Lab 12/25/24 0447 12/26/24  0331 12/30/24  0459   * 110 110   * 131* 130*   K 4.3 4.4 4.4    101 99   CO2 24 24 25   BUN 11 13 17   CREATININE 0.7 0.8 0.8   CALCIUM 8.5* 8.4* 8.8   MG 2.2 2.1 2.1     CMP:   Recent Labs   Lab 12/25/24 0447 12/26/24  0331 12/30/24  0459   * 110 110   CALCIUM 8.5* 8.4* 8.8   ALBUMIN 2.4*  2.4* 2.4* 2.6*   PROT 5.7*  --  5.8*   * 131* 130*   K 4.3 4.4 4.4   CO2 24 24 25    101 99   BUN 11 13 17   CREATININE 0.7 0.8 0.8   ALKPHOS 67  --  74   ALT 91*  --  42   AST 57*  --  26   BILITOT 0.5  --  0.4     LFTs:   Recent Labs   Lab 12/25/24 0447 12/26/24  0331 12/30/24  0459   ALT 91*  --  42   AST 57*  --  26   ALKPHOS 67  --  74   BILITOT 0.5  --  0.4   PROT 5.7*  --  5.8*   ALBUMIN 2.4*  2.4* 2.4* 2.6*     Others:   Recent Labs   Lab 12/24/24  0248   TSH 0.811         ASSESSMENT/PLAN:     I have reviewed the medications in compliance with CMS Regulation F756 of the [place]. No irregularities were noted at this time.    **According to PMH and home medication list, patient takes the following medications at home. These medications are not  currently ordered at SNF:  Amlodipine 5 mg daily (on hold , currently normotensive)  Omeprazole 40 mg daily (currently taking pantoprazole)    Medications reviewed by PharmD, please re-consult if needed.      Asmita France, Pharm. D.  Clinical Pharmacist  [facility]

## 2024-12-30 NOTE — PROGRESS NOTES
[facility]                                  Skilled Nursing Facility                   Progress Note     Patient Name: Jd Dill III  YOB: 1945  MRN: [id number]  Room: APNM828/ASRO827 A     Admit Date: 12/26/2024   SIRIA: 1/10/2025     Principal Problem: Multifocal pneumonia    HPI obtained from patient interview and chart review     Chief Complaint:   Lab review, PNA follow up    HPI:   Jd Dill III is a 79 y.o. male with PMH of BPH, GERD, hypertension transferred from clinic for tachycardia and generalized weakness.  Admitted to the hospital for sepsis due to multifocal pneumonia complicated by hyponatremia and liver dysfunction. Patient became debilitated during hospital stay due to illness.    Patient will be treated at [facility] with PT and OT to improve functional status and ability to perform ADLs.     Interval History  Patient seen today laying in bed  Patient denies shortness of breath, abdominal discomfort, nausea, or vomiting.    Patient reports an adequate appetite but does not like the food.  Patient reports having regular bowel movements.    Vital signs stable, afebrile on room air  CXR with improvement in bibasilar opacities  Labs reviewed no critical results.     Past Medical History: Patient has a past medical history of Allergy, AR (allergic rhinitis), Basal cell carcinoma of ear (2006), BPH (benign prostatic hyperplasia), DDD (degenerative disc disease), lumbar, Degenerative disc disease, Gastroesophageal reflux disease (3/11/2019), Hypertension, Kidney stone, and Undescended testicle.    Past Surgical History: Patient has a past surgical history that includes Hemorrhoid surgery; Hernia repair; Right Orchiectomy; Pilonidal cyst drainage; Transurethral resection of prostate (2010); Excision basal cell carcinoma; and Esophagogastroduodenoscopy (N/A, 10/5/2022).    Social History: Patient reports that he  quit smoking about 28 years ago. His smoking use included cigarettes. He started smoking about 38 years ago. He has a 5 pack-year smoking history. He has never been exposed to tobacco smoke. He has never used smokeless tobacco. He reports current alcohol use. He reports that he does not use drugs.    Family History: family history includes Allergies in his mother; Dementia in his mother; Diabetes in his father; Heart disease in his father; Hypertension in his father.    Allergies: Patient is allergic to ace inhibitors, chlorthalidone, feathers, and mite extract.    Review of Systems   Constitutional:  Positive for malaise/fatigue. Negative for fever.   Respiratory:  Negative for cough, sputum production, shortness of breath and wheezing.    Cardiovascular:  Negative for chest pain, palpitations and leg swelling.   Gastrointestinal:  Negative for abdominal pain, constipation, diarrhea, nausea and vomiting.   Neurological:  Positive for weakness. Negative for dizziness and headaches.         24 hour Vital Sign Range   Temp:  [98.4 °F (36.9 °C)-98.8 °F (37.1 °C)]   Pulse:  [82-88]   Resp:  [17]   BP: (103-146)/(56-66)   SpO2:  [94 %-96 %]       Physical Exam  Vitals and nursing note reviewed.   Constitutional:       General: He is not in acute distress.     Appearance: Normal appearance. He is not ill-appearing.   Cardiovascular:      Rate and Rhythm: Normal rate and regular rhythm.      Pulses: Normal pulses.      Heart sounds: Normal heart sounds.   Pulmonary:      Effort: Pulmonary effort is normal. No respiratory distress.      Breath sounds: Normal breath sounds. No wheezing or rhonchi.   Abdominal:      General: Bowel sounds are normal. There is no distension.      Palpations: Abdomen is soft. There is no mass.      Tenderness: There is no abdominal tenderness.   Musculoskeletal:         General: No swelling or tenderness.      Right lower leg: No edema.      Left lower leg: No edema.   Skin:     General: Skin is  warm and dry.      Capillary Refill: Capillary refill takes less than 2 seconds.      Findings: No erythema or rash.      Comments: Skin intact   Neurological:      Mental Status: He is alert and oriented to person, place, and time. Mental status is at baseline.      Motor: Weakness present.   Psychiatric:         Mood and Affect: Mood normal.         Behavior: Behavior normal.         Thought Content: Thought content normal.         Judgment: Judgment normal.         Labs:  Recent Labs   Lab 12/25/24 0447 12/30/24 0459   WBC 7.47 5.12   HGB 11.2* 10.3*   HCT 32.9* 30.1*    338     Recent Labs   Lab 12/25/24 0447 12/26/24 0331 12/30/24 0459   * 131* 130*   K 4.3 4.4 4.4    101 99   CO2 24 24 25   BUN 11 13 17   CREATININE 0.7 0.8 0.8   * 110 110   CALCIUM 8.5* 8.4* 8.8   MG 2.2 2.1 2.1   PHOS 1.9* 2.5* 3.4     Recent Labs   Lab 12/25/24 0447 12/26/24 0331 12/30/24 0459   ALKPHOS 67  --  74   ALT 91*  --  42   AST 57*  --  26   ALBUMIN 2.4*  2.4* 2.4* 2.6*   PROT 5.7*  --  5.8*   BILITOT 0.5  --  0.4       Recent Labs     12/28/24  0724   POCTGLUCOSE 125*       Meds Scheduled:   cetirizine  10 mg Oral Daily    fluticasone propionate  1 spray Each Nostril Daily    losartan  25 mg Oral Daily    metoprolol succinate  25 mg Oral Daily    mupirocin   Nasal BID    pantoprazole  40 mg Oral Daily    senna-docusate 8.6-50 mg  1 tablet Oral BID    tamsulosin  0.8 mg Oral Daily       PRN:     Current Facility-Administered Medications:     acetaminophen, 650 mg, Oral, Q6H PRN    acetaminophen, 650 mg, Oral, Q6H PRN    calcium carbonate, 500 mg, Oral, BID PRN    melatonin, 6 mg, Oral, Nightly PRN    sodium chloride, 1 spray, Each Nostril, BID PRN      Assessment and Plan:    Debility   Acute on chronic due to age-related physical debility  Continue PT/OT    Multifocal pneumonia, Improving  CXR with improvement in bibasilar opacities    Hypophosphatemia   Monitor. Replace as needed      Hyponatremia  Trend sodium    Benign prostatic hyperplasia with urinary obstruction   Continue home tamsulosin    Essential hypertension   Continue home metoprolol   Holding home losartan and amlodipine       Anticipate disposition:    Home with home health      Follow-up needed during SNF admission:   none    Follow-up needed after discharge from SNF:   - PCP within 1-2 weeks  - See appt scheduled below     Future Appointments   Date Time Provider Department Center   1/13/2025 10:40 AM [facility]   2/14/2025 10:00 AM APPOINTMENT [facility]   2/17/2025 10:00 AM Johny Celis MD [facility] HEM ONC [facility]         I certify that SNF services are required to be given on an inpatient basis because Jd Dill III needs for skilled nursing care and/or skilled rehabilitation are required on a daily basis and such services can only practically be provided in a skilled nursing facility setting and are for an ongoing condition for which she received inpatient care in the hospital.        Dana Arellano NP  Department of Hospital Medicine   [facility]     DOS: 12/30/2024

## 2024-12-30 NOTE — PROGRESS NOTES
Pt has an okay appetite, he's not thrilled with the food. Pt likes hamburgers. Noted moderate muscle and fat wasting in facial and upper extremities, mild wasting in LE. NFPE conducted 12/30/24.

## 2024-12-30 NOTE — PT/OT/SLP PROGRESS
Occupational Therapy   Treatment    Name: Jd Dill III  MRN: [id number]  Admit Date: 12/26/2024  Admitting Diagnosis:  Multifocal pneumonia    General Precautions: Standard, fall   Orthopedic Precautions: N/A   Braces: N/A    Recommendations:     Discharge Recommendations:  home health OT  Level of Assistance Recommended at Discharge: 24 hours light assistance for ADL's and homemaking tasks  Discharge Equipment Recommendations: walker, rolling  Barriers to discharge:  Decreased caregiver support    Assessment:     Jd Dill III is a 79 y.o. male with a medical diagnosis of Multifocal pneumonia. Pt tolerated session well and without incident and shows excellent motivation and potential to improve, but the pt continues to require assistance to perform self-care tasks and mobility. Pt strengths include Functional cognition, Following multi-step tasks, and Attention to task and Motivation and Willingness to participate. Pt improved UBD, LBD, Grooming/hygiene, Toileting, and Toilet transfers. However, pt would continue to benefit from cont'd OT services in the SNF setting to improve safety and independence /c functional tasks and ADLs upon discharge.  Performance deficits affecting function are weakness, impaired endurance, impaired self care skills, impaired functional mobility, gait instability, impaired balance, decreased lower extremity function.     Rehab Potential is good    Activity tolerance:  Good    Plan:     Patient to be seen 5 x/week to address the above listed problems via self-care/home management, community/work re-entry, therapeutic activities, therapeutic exercises, therapeutic groups, neuromuscular re-education, cognitive retraining, sensory integration, wheelchair management/training, splinting    Plan of Care Expires: 01/26/25  Plan of Care Reviewed with: patient    Subjective     Communicated with: NANETTE prior to session.     Pain/Comfort:  Pain Rating 1: 0/10  Pain Rating Post-Intervention 1:  0/10    Patient's cultural, spiritual, Bahai conflicts given the current situation:  no    Objective:     Patient found HOB elevated with  (no active lines) upon OT entry to room. Pt alert and agreeable to therapy.       Bed Mobility:    Patient completed Supine to Sit with stand by assistance, with side rail, and HOB flat      Functional Mobility/Transfers:  Patient completed Sit <> Stand Transfer with stand by assistance  with  rolling walker and vcs for proper use of RW   Patient completed Chair Transfer using Step Transfer technique with stand by assistance with rolling walker  Patient completed Toilet Transfer Step Transfer technique with stand by assistance with  rolling walker and to standard height toilet  Functional Mobility: Pt ambulated around room for performance of functional tasks /c SBA/CGA and RW    Activities of Daily Living:  Grooming: stand by assistance standing at sink to wash hands and brush teeth  Upper Body Dressing: Set-up (A) to doff/don pullover shirt    Lower Body Dressing: stand by assistance to doff/don pants. Vcs to sit to don pants over BLE for safety. RW for standing balance  Toileting: stand by assistance /c pt able to perform seated perirectal hygiene and standing /c RW for clothing mgmt  Footwear: set-up (A) to don shoes    AMPAC 6 Click ADL: 21    OT Exercises:   ~Pt participated in 12 minute UBE activity seated in WC at moderate resistance to address endurance and strength of UE to improve performance of ADLs and other functional tasks.        Treatment & Education:  Pt educated on POC, role of OT, and safety. Pt performed tasks to improve safety and independence in functional tasks and ADLs as mentioned above.       Patient left up in chair with  tech present and all needs met, returning to room from OT gym    GOALS:   Multidisciplinary Problems       Occupational Therapy Goals          Problem: Occupational Therapy    Goal Priority Disciplines Outcome Interventions    Occupational Therapy Goal     OT, PT/OT Progressing    Description: Goals to be met by: 1/26/2025     Patient will increase functional independence with ADLs by performing:  UBD: with MI seated in w/c- Ongoing  LBD: with CGA-SBA seated in w/c using AE as needed- Ongoing  FWM: SBA seated in w/c using AE as needed- Ongoing  Bathing: SPV seated on bath bench  Toileting: with SPV during hygiene mgmt- Ongoing  Pt will complete 10 mins on UE ergometer seated in w/c  Pt will complete 5-8 mins standing with RW/SBA during dynamic standing activity                       Time Tracking:     OT Date of Treatment: 12/30/24  OT Start Time: 0840    OT Stop Time: 0921  OT Total Time (min): 41 min    Billable Minutes:Self Care/Home Management 25  Therapeutic Exercise 16    12/30/2024

## 2024-12-30 NOTE — PT/OT/SLP PROGRESS
Physical Therapy Treatment    Patient Name:  Jd Dill III   MRN:  [id number]  Admit Date: 12/26/2024  Admitting Diagnosis: Multifocal pneumonia  Recent Surgeries: N/A    General Precautions: Standard, fall  Orthopedic Precautions: N/A  Braces: N/A    Recommendations:     Discharge Recommendations: home health PT  Level of Assistance Recommended at Discharge: 24 hours light assistance  Discharge Equipment Recommendations: walker, rolling (continue to assess with progression of mobility)  Barriers to discharge: Decreased caregiver support    Assessment:     Jd Dill III is a 79 y.o. male admitted with a medical diagnosis of Multifocal pneumonia . Pt with good participation in PT today, advancing to ambulating with no AD with no AD and ascending/descending 8 steps with B rails with CGA. Pt remains motivated and would benefit from continued SNF rehab.      Performance deficits affecting function: weakness, impaired endurance, impaired self care skills, impaired functional mobility, gait instability, impaired balance, decreased upper extremity function, decreased lower extremity function, impaired cardiopulmonary response to activity.    Rehab Potential is good    Activity Tolerance: Good    Plan:     Patient to be seen 5 x/week to address the above listed problems via gait training, therapeutic activities, therapeutic exercises, neuromuscular re-education, wheelchair management/training    Plan of Care Expires: 01/26/25  Plan of Care Reviewed with: patient    Subjective     Pt agreeable to work with PT.     Pain/Comfort:  Pain Rating 1: 0/10  Pain Rating Post-Intervention 1: 0/10    Patient's cultural, spiritual, Christian conflicts given the current situation:  no    Objective:     Communicated with pt prior to session.  Patient found up in chair with   upon PT entry to room.     Therapeutic Activities and Exercises: Nustep with resistance set at 5 for 10mins to help improve pt's overall MMT and cardiovascular  endurance.    Functional Mobility:  Transfers:     Sit to Stand:  contact guard assistance with no AD and rolling walker  Gait: 223 ft with RW and CGA/SBA for safety; 220ft x 2 trials with no AD and CGA for safety d.t pt walking guarded.  Stairs:  Pt ascended/descended 8 stair(s) with No Assistive Device with bilateral handrails with Contact Guard Assistance.     AM-PAC 6 CLICK MOBILITY  18    Patient left up in chair with call button in reach.    GOALS:   Multidisciplinary Problems       Physical Therapy Goals          Problem: Physical Therapy    Goal Priority Disciplines Outcome Interventions   Physical Therapy Goal     PT, PT/OT Progressing    Description: Goals to be met by: 25       Patient will increase functional independence with mobility by performin. Supine to sit with [place]  2. Sit to supine with [place]  3. Rolling to Left and Right with [place]  4. Sit to stand transfer with Modified [place]  5. Bed to chair transfer with Modified [place] using Rolling Walker/without AD  6. Gait  x 200 feet with Modified [place] using Rolling Walker  7. Gait x 50 feet (progress as tolerated) with SBA without an AD  8. Wheelchair propulsion x 200 feet with Modified [place] using bilateral upper extremities  9. Ascend/descend 12 stair with bilateral Handrails and Supervision using No Assistive Device  10. Ascend/Descend 4 inch curb step with Supervision using Rolling Walker                         Time Tracking:     PT Received On: 24  PT Start Time: 952  PT Stop Time:   PT Total Time (min): 41 min    Billable Minutes: Gait Training 25 and Therapeutic Exercise 16    Treatment Type: Treatment  PT/PTA: PT     Number of PTA visits since last PT visit: 0     2024

## 2024-12-30 NOTE — PLAN OF CARE
Problem: Adult Inpatient Plan of Care  Goal: Plan of Care Review  Outcome: Progressing  Goal: Patient-Specific Goal (Individualized)  Outcome: Progressing  Goal: Absence of Hospital-Acquired Illness or Injury  Outcome: Progressing  Goal: Optimal Comfort and Wellbeing  Outcome: Progressing  Goal: Readiness for Transition of Care  Outcome: Progressing     Problem: Diabetes Comorbidity  Goal: Blood Glucose Level Within Targeted Range  Outcome: Progressing     Problem: Sepsis/Septic Shock  Goal: Optimal Coping  Outcome: Progressing  Goal: Absence of Bleeding  Outcome: Progressing  Goal: Blood Glucose Level Within Targeted Range  Outcome: Progressing  Goal: Absence of Infection Signs and Symptoms  Outcome: Progressing  Goal: Optimal Nutrition Intake  Outcome: Progressing     Problem: Pneumonia  Goal: Fluid Balance  Outcome: Progressing  Goal: Resolution of Infection Signs and Symptoms  Outcome: Progressing  Goal: Effective Oxygenation and Ventilation  Outcome: Progressing     Problem: Wound  Goal: Optimal Coping  Outcome: Progressing  Goal: Optimal Functional Ability  Outcome: Progressing  Goal: Absence of Infection Signs and Symptoms  Outcome: Progressing  Goal: Improved Oral Intake  Outcome: Progressing  Goal: Optimal Pain Control and Function  Outcome: Progressing  Goal: Skin Health and Integrity  Outcome: Progressing  Goal: Optimal Wound Healing  Outcome: Progressing     Problem: Skin Injury Risk Increased  Goal: Skin Health and Integrity  Outcome: Progressing

## 2024-12-31 ENCOUNTER — TELEPHONE (OUTPATIENT)
Dept: ADMINISTRATIVE | Facility: CLINIC | Age: 79
End: 2024-12-31
Payer: MEDICARE

## 2024-12-31 LAB
ESTIMATED AVG GLUCOSE: 128 MG/DL (ref 68–131)
HBA1C MFR BLD: 6.1 % (ref 4–5.6)

## 2024-12-31 PROCEDURE — 97110 THERAPEUTIC EXERCISES: CPT

## 2024-12-31 PROCEDURE — 97530 THERAPEUTIC ACTIVITIES: CPT

## 2024-12-31 PROCEDURE — 36415 COLL VENOUS BLD VENIPUNCTURE: CPT | Performed by: FAMILY MEDICINE

## 2024-12-31 PROCEDURE — 25000003 PHARM REV CODE 250: Performed by: NURSE PRACTITIONER

## 2024-12-31 PROCEDURE — 11000004 HC SNF PRIVATE

## 2024-12-31 PROCEDURE — 83036 HEMOGLOBIN GLYCOSYLATED A1C: CPT | Performed by: FAMILY MEDICINE

## 2024-12-31 PROCEDURE — 25000003 PHARM REV CODE 250: Performed by: HOSPITALIST

## 2024-12-31 PROCEDURE — 97116 GAIT TRAINING THERAPY: CPT

## 2024-12-31 RX ADMIN — MUPIROCIN: 20 OINTMENT TOPICAL at 09:12

## 2024-12-31 RX ADMIN — PANTOPRAZOLE SODIUM 40 MG: 40 TABLET, DELAYED RELEASE ORAL at 09:12

## 2024-12-31 RX ADMIN — SENNOSIDES AND DOCUSATE SODIUM 1 TABLET: 50; 8.6 TABLET ORAL at 08:12

## 2024-12-31 RX ADMIN — LOSARTAN POTASSIUM 25 MG: 25 TABLET, FILM COATED ORAL at 09:12

## 2024-12-31 RX ADMIN — SODIUM CHLORIDE 1000 MG: 1 TABLET ORAL at 08:12

## 2024-12-31 RX ADMIN — FLUTICASONE PROPIONATE 50 MCG: 50 SPRAY, METERED NASAL at 09:12

## 2024-12-31 RX ADMIN — SODIUM CHLORIDE 1000 MG: 1 TABLET ORAL at 09:12

## 2024-12-31 RX ADMIN — METOPROLOL SUCCINATE 25 MG: 25 TABLET, EXTENDED RELEASE ORAL at 09:12

## 2024-12-31 RX ADMIN — CETIRIZINE HYDROCHLORIDE 10 MG: 10 TABLET, FILM COATED ORAL at 09:12

## 2024-12-31 RX ADMIN — TAMSULOSIN HYDROCHLORIDE 0.8 MG: 0.4 CAPSULE ORAL at 09:12

## 2024-12-31 RX ADMIN — SENNOSIDES AND DOCUSATE SODIUM 1 TABLET: 50; 8.6 TABLET ORAL at 09:12

## 2024-12-31 NOTE — PLAN OF CARE
Problem: Physical Therapy  Goal: Physical Therapy Goal  Description: Goals to be met by: 25       Patient will increase functional independence with mobility by performin. Supine to sit with [place]  2. Sit to supine with [place]  3. Rolling to Left and Right with [place]  4. Sit to stand transfer with Modified [place]  5. Bed to chair transfer with Modified [place] using Rolling Walker/without AD  6. Gait  x 200 feet with Modified [place] using Rolling Walker  7. Gait x 50 feet (progress as tolerated) with SBA without an AD-met  Updated 2024 Gait x 200 feet (progress as tolerated) with (I) without an AD  8. Wheelchair propulsion x 200 feet with Modified [place] using bilateral upper extremities  9. Ascend/descend 12 stair with bilateral Handrails and Supervision using No Assistive Device  10. Ascend/Descend 4 inch curb step with Supervision using Rolling Walker    Outcome: Progressing

## 2024-12-31 NOTE — PT/OT/SLP PROGRESS
Occupational Therapy   Treatment    Name: Jd Dill III  MRN: [id number]  Admit Date: 12/26/2024  Admitting Diagnosis:  Multifocal pneumonia    General Precautions: Standard, fall   Orthopedic Precautions: N/A   Braces: N/A    Recommendations:     Discharge Recommendations:  home health OT  Level of Assistance Recommended at Discharge: 24 hours light assistance for ADL's and homemaking tasks  Discharge Equipment Recommendations: walker, rolling  Barriers to discharge:  Decreased caregiver support    Assessment:     Jd Dill III is a 79 y.o. male with a medical diagnosis of Multifocal pneumonia. Pt tolerated session well and without incident and shows excellent motivation and potential to improve, but the pt continues to require assistance to perform self-care tasks and mobility. Pt strengths include Functional cognition and Attention to task and PLOF, Motivation, and Willingness to participate. However, pt would continue to benefit from cont'd OT services in the SNF setting to improve safety and independence /c functional tasks and ADLs upon discharge. Performance deficits affecting function are weakness, impaired endurance, impaired self care skills, impaired functional mobility, gait instability, impaired balance, decreased lower extremity function.     Rehab Potential is good    Activity tolerance:  Good    Plan:     Patient to be seen 5 x/week to address the above listed problems via self-care/home management, community/work re-entry, therapeutic activities, therapeutic exercises, therapeutic groups, neuromuscular re-education, cognitive retraining, sensory integration, wheelchair management/training, splinting    Plan of Care Expires: 01/26/25  Plan of Care Reviewed with: patient    Subjective     Communicated with: NANETTE prior to session.     Pain/Comfort:  Pain Rating 1: 0/10  Pain Rating Post-Intervention 1: 0/10    Patient's cultural, spiritual, Scientology conflicts given the current  "situation:  no    Objective:     Patient found up in chair with  (no active lines) upon OT entry to room. Pt alert and agreeable to therapy.       Functional Mobility/Transfers:  Patient completed Sit <> Stand Transfer with stand by assistance  with  rolling walker   Patient completed Bed <> Chair Transfer using Step Transfer technique with stand by assistance with rolling walker and vcs for completion of pivot prior to sitting  Functional Mobility: Pt ambulated ~15 feet /c CGA and RW    [facility] 6 Click ADL: 21    OT Exercises:   ~Pt performed standing balance exercise  to improve functional reach, standing tolerance, hand-eye coordination, and visual scanning. Pt asked to hit balloon back and forth /c 3rd party. Pt able to tolerate participation for ~10 min /c CGA and RW in place for safety.     ~Pt performed task to improve standing tolerance, dynamic standing balance, functional cognition, dual tasking, and functional reach. Pt asked to bounce ball /c 3rd party while taking turns recalling words in a designated pattern.  Pt able to complete task /c CGA and RW in place for safety. Moderate vcs for cognitive aspect of task.     ~Pt participated in task to improve dynamic standing balance and hand-eye coordination. Pt asked to "chest pass" ball back and forth /c 3rd party while standing. Pt able to complete 3x10 reps /c CGA and RW.     ~Pt participated in task to improve hip flexion and trunk mobility. Pt asked to place rings onto cones located anterior/anterolateral to pt while seated in WC. Pt preformed 2 trials /c 2nd trial graded for increased difficulty by increasing distance from cones and hip flexion req for proper performance.     ~Pt participated in 12 minute UBE activity seated in WC at moderate resistance to address endurance and strength of UE to improve performance of ADLs and other functional tasks.       Treatment & Education:  Pt educated on POC, role of OT, and safety. Pt performed tasks to improve " safety and independence in functional tasks and ADLs as mentioned above.       Patient left up in chair with  tech present and all needs met    GOALS:   Multidisciplinary Problems       Occupational Therapy Goals          Problem: Occupational Therapy    Goal Priority Disciplines Outcome Interventions   Occupational Therapy Goal     OT, PT/OT Progressing    Description: Goals to be met by: 1/26/2025     Patient will increase functional independence with ADLs by performing:  UBD: with MI seated in w/c- Ongoing  LBD: with CGA-SBA seated in w/c using AE as needed- Ongoing  FWM: SBA seated in w/c using AE as needed- Ongoing  Bathing: SPV seated on bath bench  Toileting: with SPV during hygiene mgmt- Ongoing  Pt will complete 10 mins on UE ergometer seated in w/c  Pt will complete 5-8 mins standing with RW/SBA during dynamic standing activity                       Time Tracking:     OT Date of Treatment: 12/31/24  OT Start Time: 1350    OT Stop Time: 1430  OT Total Time (min): 40 min    Billable Minutes:Therapeutic Activity 23  Therapeutic Exercise 15    12/31/2024

## 2024-12-31 NOTE — PLAN OF CARE
Plan of care reviewed with pt:   -AAOx4, RA, VS stable  -Tolerated his diet  - 1 BM, urinated without difficulty   -Worked with PT/OT, sat up on the chair most the time, ambulated to bathroom with walker and 1 person assist  -Call light in reach, WCTM

## 2024-12-31 NOTE — PLAN OF CARE
Interdisciplinary team, Alicia Casanova, RN Nurse Manager, Yoly Wilde, RN Charge Nurse, Susy Conde LPN, , Ted Sylvester, OT, Rehab, Malgorzata Saldivar, Activities, and Joellen Kim, Dietician, spoke to patient farhat Cat via phone for care plan conference, weekly status update, and therapy progress update. Tentative discharge date set for 1/10/25.    Preferred Home Health: [facility]  Preferred Pharmacy: [facility]  Discharge Disposition: Home alone

## 2024-12-31 NOTE — PT/OT/SLP PROGRESS
Physical Therapy Treatment    Patient Name:  Jd Dill III   MRN:  [id number]  Admit Date: 12/26/2024  Admitting Diagnosis: Multifocal pneumonia  Recent Surgeries: N/A    General Precautions: Standard, fall  Orthopedic Precautions: N/A  Braces: N/A    Recommendations:     Discharge Recommendations: home health PT  Level of Assistance Recommended at Discharge: Intermittent assistance   Discharge Equipment Recommendations: walker, rolling (continue to assess with progression of mobility)  Barriers to discharge: Decreased caregiver support    Assessment:     Jd Dill III is a 79 y.o. male admitted with a medical diagnosis of Multifocal pneumonia . Pt continues to make good functional progress, ambulating with no AD with SBA. Pt also with improved endurance and needing less seated rest breaks. Pt remains motivated and would benefit from continued SNF rehab.    Performance deficits affecting function: weakness, impaired endurance, impaired self care skills, impaired functional mobility, gait instability, impaired balance, decreased upper extremity function, decreased lower extremity function, impaired cardiopulmonary response to activity.    Rehab Potential is good    Activity Tolerance: Good    Plan:     Patient to be seen 5 x/week to address the above listed problems via gait training, therapeutic activities, therapeutic exercises, neuromuscular re-education, wheelchair management/training    Plan of Care Expires: 01/26/25  Plan of Care Reviewed with: patient    Subjective     Pt agreeable to work with PT.     Pain/Comfort:  Pain Rating 1: 0/10  Pain Rating Post-Intervention 1: 0/10    Patient's cultural, spiritual, Judaism conflicts given the current situation:  no    Objective:     Communicated with pt prior to session.  Patient found up in chair with   upon PT entry to room.     Therapeutic Activities and Exercises: Nustep with resistance set at 5 for 10mins to help improve pt's overall MMT and cardiovascular  endurance.    Functional Mobility:  Transfers:     Sit to Stand:  stand by assistance with no AD  Gait: ~220ft x 2 trials with no AD and SBA for safety d.t pt walking guarded with decrease trunk rotation and absent alternating B U/E mvt.  Stairs:  Pt ascended/descended 8 stair(s) with No Assistive Device with bilateral handrails with Stand-by Assistance.     AM-PAC 6 CLICK MOBILITY  18    Patient left up in chair with call button in reach.    GOALS:   Multidisciplinary Problems       Physical Therapy Goals          Problem: Physical Therapy    Goal Priority Disciplines Outcome Interventions   Physical Therapy Goal     PT, PT/OT Progressing    Description: Goals to be met by: 25       Patient will increase functional independence with mobility by performin. Supine to sit with [place]  2. Sit to supine with [place]  3. Rolling to Left and Right with [place]  4. Sit to stand transfer with Modified [place]  5. Bed to chair transfer with Modified [place] using Rolling Walker/without AD  6. Gait  x 200 feet with Modified [place] using Rolling Walker  7. Gait x 50 feet (progress as tolerated) with SBA without an AD-met  Updated 2024 Gait x 200 feet (progress as tolerated) with (I) without an AD  8. Wheelchair propulsion x 200 feet with Modified [place] using bilateral upper extremities  9. Ascend/descend 12 stair with bilateral Handrails and Supervision using No Assistive Device  10. Ascend/Descend 4 inch curb step with Supervision using Rolling Walker                         Time Tracking:     PT Received On: 24  PT Start Time: 1038  PT Stop Time: 1116  PT Total Time (min): 38 min    Billable Minutes: Gait Training 23 and Therapeutic Exercise 15    Treatment Type: Treatment  PT/PTA: PT     Number of PTA visits since last PT visit: 0     2024

## 2025-01-01 PROCEDURE — 25000003 PHARM REV CODE 250: Performed by: NURSE PRACTITIONER

## 2025-01-01 PROCEDURE — 11000004 HC SNF PRIVATE

## 2025-01-01 PROCEDURE — 25000003 PHARM REV CODE 250: Performed by: HOSPITALIST

## 2025-01-01 RX ADMIN — TAMSULOSIN HYDROCHLORIDE 0.8 MG: 0.4 CAPSULE ORAL at 08:01

## 2025-01-01 RX ADMIN — LOSARTAN POTASSIUM 25 MG: 25 TABLET, FILM COATED ORAL at 08:01

## 2025-01-01 RX ADMIN — CETIRIZINE HYDROCHLORIDE 10 MG: 10 TABLET, FILM COATED ORAL at 08:01

## 2025-01-01 RX ADMIN — SODIUM CHLORIDE 1000 MG: 1 TABLET ORAL at 08:01

## 2025-01-01 RX ADMIN — SENNOSIDES AND DOCUSATE SODIUM 1 TABLET: 50; 8.6 TABLET ORAL at 08:01

## 2025-01-01 RX ADMIN — PANTOPRAZOLE SODIUM 40 MG: 40 TABLET, DELAYED RELEASE ORAL at 08:01

## 2025-01-01 RX ADMIN — FLUTICASONE PROPIONATE 50 MCG: 50 SPRAY, METERED NASAL at 08:01

## 2025-01-01 RX ADMIN — METOPROLOL SUCCINATE 25 MG: 25 TABLET, EXTENDED RELEASE ORAL at 08:01

## 2025-01-02 LAB
ALBUMIN SERPL BCP-MCNC: 2.6 G/DL (ref 3.5–5.2)
ALP SERPL-CCNC: 82 U/L (ref 40–150)
ALT SERPL W/O P-5'-P-CCNC: 34 U/L (ref 10–44)
ANION GAP SERPL CALC-SCNC: 6 MMOL/L (ref 8–16)
AST SERPL-CCNC: 25 U/L (ref 10–40)
BASOPHILS # BLD AUTO: 0.01 K/UL (ref 0–0.2)
BASOPHILS NFR BLD: 0.2 % (ref 0–1.9)
BILIRUB SERPL-MCNC: 0.4 MG/DL (ref 0.1–1)
BUN SERPL-MCNC: 17 MG/DL (ref 8–23)
CALCIUM SERPL-MCNC: 8.6 MG/DL (ref 8.7–10.5)
CHLORIDE SERPL-SCNC: 104 MMOL/L (ref 95–110)
CO2 SERPL-SCNC: 25 MMOL/L (ref 23–29)
CREAT SERPL-MCNC: 0.9 MG/DL (ref 0.5–1.4)
DIFFERENTIAL METHOD BLD: ABNORMAL
EOSINOPHIL # BLD AUTO: 0.3 K/UL (ref 0–0.5)
EOSINOPHIL NFR BLD: 5.6 % (ref 0–8)
ERYTHROCYTE [DISTWIDTH] IN BLOOD BY AUTOMATED COUNT: 14.7 % (ref 11.5–14.5)
EST. GFR  (NO RACE VARIABLE): >60 ML/MIN/1.73 M^2
GLUCOSE SERPL-MCNC: 108 MG/DL (ref 70–110)
HCT VFR BLD AUTO: 29.9 % (ref 40–54)
HGB BLD-MCNC: 10 G/DL (ref 14–18)
IMM GRANULOCYTES # BLD AUTO: 0.02 K/UL (ref 0–0.04)
IMM GRANULOCYTES NFR BLD AUTO: 0.4 % (ref 0–0.5)
LYMPHOCYTES # BLD AUTO: 1.2 K/UL (ref 1–4.8)
LYMPHOCYTES NFR BLD: 24.6 % (ref 18–48)
MAGNESIUM SERPL-MCNC: 2 MG/DL (ref 1.6–2.6)
MCH RBC QN AUTO: 32.1 PG (ref 27–31)
MCHC RBC AUTO-ENTMCNC: 33.4 G/DL (ref 32–36)
MCV RBC AUTO: 96 FL (ref 82–98)
MONOCYTES # BLD AUTO: 0.8 K/UL (ref 0.3–1)
MONOCYTES NFR BLD: 15.1 % (ref 4–15)
NEUTROPHILS # BLD AUTO: 2.7 K/UL (ref 1.8–7.7)
NEUTROPHILS NFR BLD: 54.1 % (ref 38–73)
NRBC BLD-RTO: 0 /100 WBC
PHOSPHATE SERPL-MCNC: 3.2 MG/DL (ref 2.7–4.5)
PLATELET # BLD AUTO: 250 K/UL (ref 150–450)
PMV BLD AUTO: 9.8 FL (ref 9.2–12.9)
POTASSIUM SERPL-SCNC: 4.4 MMOL/L (ref 3.5–5.1)
PROT SERPL-MCNC: 5.7 G/DL (ref 6–8.4)
RBC # BLD AUTO: 3.12 M/UL (ref 4.6–6.2)
SODIUM SERPL-SCNC: 135 MMOL/L (ref 136–145)
WBC # BLD AUTO: 4.96 K/UL (ref 3.9–12.7)

## 2025-01-02 PROCEDURE — 84100 ASSAY OF PHOSPHORUS: CPT | Performed by: HOSPITALIST

## 2025-01-02 PROCEDURE — 97530 THERAPEUTIC ACTIVITIES: CPT | Mod: CQ

## 2025-01-02 PROCEDURE — 11000004 HC SNF PRIVATE

## 2025-01-02 PROCEDURE — 97110 THERAPEUTIC EXERCISES: CPT | Mod: CQ

## 2025-01-02 PROCEDURE — 97530 THERAPEUTIC ACTIVITIES: CPT

## 2025-01-02 PROCEDURE — 97116 GAIT TRAINING THERAPY: CPT | Mod: CQ

## 2025-01-02 PROCEDURE — 85025 COMPLETE CBC W/AUTO DIFF WBC: CPT | Performed by: HOSPITALIST

## 2025-01-02 PROCEDURE — 83735 ASSAY OF MAGNESIUM: CPT | Performed by: HOSPITALIST

## 2025-01-02 PROCEDURE — 80053 COMPREHEN METABOLIC PANEL: CPT | Performed by: FAMILY MEDICINE

## 2025-01-02 PROCEDURE — 97110 THERAPEUTIC EXERCISES: CPT

## 2025-01-02 PROCEDURE — 25000003 PHARM REV CODE 250: Performed by: HOSPITALIST

## 2025-01-02 PROCEDURE — 25000003 PHARM REV CODE 250: Performed by: NURSE PRACTITIONER

## 2025-01-02 PROCEDURE — 97535 SELF CARE MNGMENT TRAINING: CPT

## 2025-01-02 PROCEDURE — 36415 COLL VENOUS BLD VENIPUNCTURE: CPT | Performed by: HOSPITALIST

## 2025-01-02 RX ADMIN — SODIUM CHLORIDE 1000 MG: 1 TABLET ORAL at 09:01

## 2025-01-02 RX ADMIN — FLUTICASONE PROPIONATE 50 MCG: 50 SPRAY, METERED NASAL at 09:01

## 2025-01-02 RX ADMIN — SENNOSIDES AND DOCUSATE SODIUM 1 TABLET: 50; 8.6 TABLET ORAL at 09:01

## 2025-01-02 RX ADMIN — CETIRIZINE HYDROCHLORIDE 10 MG: 10 TABLET, FILM COATED ORAL at 09:01

## 2025-01-02 RX ADMIN — METOPROLOL SUCCINATE 25 MG: 25 TABLET, EXTENDED RELEASE ORAL at 09:01

## 2025-01-02 RX ADMIN — LOSARTAN POTASSIUM 25 MG: 25 TABLET, FILM COATED ORAL at 09:01

## 2025-01-02 RX ADMIN — PANTOPRAZOLE SODIUM 40 MG: 40 TABLET, DELAYED RELEASE ORAL at 09:01

## 2025-01-02 RX ADMIN — TAMSULOSIN HYDROCHLORIDE 0.8 MG: 0.4 CAPSULE ORAL at 09:01

## 2025-01-02 NOTE — PT/OT/SLP PROGRESS
Physical Therapy Treatment    Patient Name:  Jd Dill III   MRN:  [id number]  Admit Date: 12/26/2024  Admitting Diagnosis: Multifocal pneumonia  Recent Surgeries: N/A    General Precautions: Standard, fall  Orthopedic Precautions: N/A  Braces: N/A    Recommendations:     Discharge Recommendations: home health PT  Level of Assistance Recommended at Discharge: Intermittent assistance   Discharge Equipment Recommendations: walker, rolling (continue to assess with progression of mobility)  Barriers to discharge: Decreased caregiver support    Assessment:     Jd Dill III is a 79 y.o. male admitted with a medical diagnosis of Multifocal pneumonia .   Pt was agreeable and tolerated session well. Pt completed multiple gait trials with stepping over object and visual scanning activities. Pt is progressing well towards goals and continues to demonstrate the need for therapy on a scheduled basis exhibited by decreased independence with functional mobility.      Performance deficits affecting function: weakness, impaired endurance, impaired self care skills, impaired functional mobility, gait instability, impaired balance, decreased upper extremity function, decreased lower extremity function, impaired cardiopulmonary response to activity.    Rehab Potential is good    Activity Tolerance: Good    Plan:     Patient to be seen 5 x/week to address the above listed problems via gait training, therapeutic activities, therapeutic exercises, neuromuscular re-education, wheelchair management/training    Plan of Care Expires: 01/26/25  Plan of Care Reviewed with: patient    Subjective     Pt reports LLE is short than RLE and has lifts in shoes to correct it.     Pain/Comfort:  Pain Rating 1: 0/10  Pain Rating Post-Intervention 1: 0/10    Patient's cultural, spiritual, Episcopalian conflicts given the current situation:  no    Objective:     Patient found up in chair with  (no actives lines) upon PT entry to room.     Therapeutic  Activities and Exercises:   Seated BLE therex x30 reps: ankle pumps, long arc quads, and marches  Recumbent cross  x10 mins (lvl 5)  To improve BLE endurance, strength, and ROM  Patient educated on role of therapy, goals of session, and benefits of out of bed mobility.   Instructed on use of call button and importance of calling nursing staff for assistance with mobility   Questions/concerns addressed within PTA scope of practice  Pt verbalized understanding.    Functional Mobility:  Transfers:   Sit <> Stand Transfer: stand by assistance with no assistive device   Chair <> Wheelchair Transfer: stand by assistance with no assistive device using Step Transfer technique   Gait:  Pt ambulated 2x ~100ft + 2x ~150 ft with  CGA-closeSBA  and no assistive device.  All lines remained intact throughout ambulation trial, gait belt utilized.  Shoes on to correct leg length discrepancy   1st trial - normal gait trial  2nd trial - stepping over objects (bar) 2 minor LOB   3rd and 4th trial - vision scanning for rings at varies height in therapy gym  Gait Deviation(s):  mostly steady gait with good rosa and step length and decrease arm swinging.   Stairs:  Pt ascended/descended  8+4 stair(s) with bilateral handrails with Stand-by Assistance.   Initially step-to pattern, then reciprocal pattern  1 minor LOB. No physical assistance needed  Cues to have whole foot on step instead of half of foot on step      AM-PAC 6 CLICK MOBILITY  19    Patient left up in chair with call button in reach.    GOALS:   Multidisciplinary Problems       Physical Therapy Goals          Problem: Physical Therapy    Goal Priority Disciplines Outcome Interventions   Physical Therapy Goal     PT, PT/OT Progressing    Description: Goals to be met by: 25       Patient will increase functional independence with mobility by performin. Supine to sit with [place]  2. Sit to supine with [place]  3. Rolling to Left and Right with  [place]  4. Sit to stand transfer with Modified [place]  5. Bed to chair transfer with Modified [place] using Rolling Walker/without AD  6. Gait  x 200 feet with Modified [place] using Rolling Walker  7. Gait x 50 feet (progress as tolerated) with SBA without an AD-met  Updated 12/31/2024 Gait x 200 feet (progress as tolerated) with (I) without an AD  8. Wheelchair propulsion x 200 feet with Modified [place] using bilateral upper extremities  9. Ascend/descend 12 stair with bilateral Handrails and Supervision using No Assistive Device  10. Ascend/Descend 4 inch curb step with Supervision using Rolling Walker                         Time Tracking:     PT Received On: 01/02/25  PT Start Time: 1352  PT Stop Time: 1431  PT Total Time (min): 39 min    Billable Minutes: Gait Training 15, Therapeutic Activity 10, and Therapeutic Exercise 14    Treatment Type: Treatment  PT/PTA: PTA     Number of PTA visits since last PT visit: 1 01/02/2025

## 2025-01-02 NOTE — PLAN OF CARE
Problem: Occupational Therapy  Goal: Occupational Therapy Goal  Description: Goals to be met by: 1/26/2025     Patient will increase functional independence with ADLs by performing:  UBD: with MI seated in w/c- Ongoing  LBD: with CGA-SBA seated in w/c using AE as needed- Ongoing  FWM: SBA seated in w/c using AE as needed- Ongoing  Bathing: SPV seated on bath bench  Toileting: with SPV during hygiene mgmt- Met 1/2/2025  Pt will complete 10 mins on UE ergometer seated in w/c  Pt will complete 5-8 mins standing with RW/SBA during dynamic standing activity  Outcome: Progressing

## 2025-01-02 NOTE — PT/OT/SLP PROGRESS
"Occupational Therapy   Treatment    Name: Jd Dill III  MRN: [id number]  Admit Date: 12/26/2024  Admitting Diagnosis:  Multifocal pneumonia    General Precautions: Standard, fall   Orthopedic Precautions: N/A   Braces: N/A    Recommendations:     Discharge Recommendations:  home health OT  Level of Assistance Recommended at Discharge: 24 hours light assistance for ADL's and homemaking tasks  Discharge Equipment Recommendations: walker, rolling  Barriers to discharge:  Decreased caregiver support    Assessment:     Jd Dill III is a 79 y.o. male with a medical diagnosis of Multifocal pneumonia. Pt tolerated today's session well w/o incident. Performance deficits affecting function are weakness, impaired endurance, impaired self care skills, impaired functional mobility, gait instability, impaired balance, decreased lower extremity function. Pt would benefit from cont'd OT services in the SNF setting to improve safety and independence /c functional tasks and ADLs upon discharge.     Rehab Potential is good    Activity tolerance:  Good    Plan:     Patient to be seen 5 x/week to address the above listed problems via self-care/home management, community/work re-entry, therapeutic activities, therapeutic exercises, therapeutic groups, neuromuscular re-education, cognitive retraining, sensory integration, wheelchair management/training, splinting    Plan of Care Expires: 01/26/25  Plan of Care Reviewed with: patient    Subjective     Communicated with: NSG prior to session.  " Boy that was a good workout"    Pain/Comfort:  Pain Rating 1: 0/10  Pain Rating Post-Intervention 1: 0/10    Patient's cultural, spiritual, Advent conflicts given the current situation:  no    Objective:     Patient found  bedside chair   with  (no active lines) upon OT entry to room.    Bed Mobility:    none     Functional Mobility/Transfers:  Patient completed Sit <> Stand Transfer with stand by assistance  with  rolling walker   Patient " completed Bed <> Chair Transfer using Stand Pivot technique with stand by assistance with rolling walker    Activities of Daily Living:  Complete prior to therapy session    AMPAC 6 Click ADL: 21    OT Exercises: UE Ergometer 12 mins seated in w/c; focus on BUEs endurance and cardiovascular function in order to improve performance with ADLs/daily tasks    BUEs strengthening exercises: 30 reps each; seated in w/c; focus on BUEs endurance and strengthening in order to improve performance with ADLs/daily tasks  Bicep curls: 4lb dowel   Shoulder press; 4lb dowel   Chest press: 4lb dowel   Scapular squeezes; green tband  Scapular protraction/retraction: green tband    Dynamic standing activities in stance with RW/SBA; focus on standing balance, weight shifting, trunk flex/ext/lateral flex/rotation, reaching, GMC & FMC skills, and crossing midline in order to improve performance with ADLs/daily tasks  Pt placed and removed rings to/from anterior, lateral, and anterolateral ring tree target  Pt retrieved fish from stevie using a modified fishing estella and placing into a lateral target basket  Treatment & Education:  Role of OT  POC    Patient left  bedside chair  with call button in reach and all needs met    GOALS:   Multidisciplinary Problems       Occupational Therapy Goals          Problem: Occupational Therapy    Goal Priority Disciplines Outcome Interventions   Occupational Therapy Goal     OT, PT/OT Progressing    Description: Goals to be met by: 1/26/2025     Patient will increase functional independence with ADLs by performing:  UBD: with MI seated in w/c- Ongoing  LBD: with CGA-SBA seated in w/c using AE as needed- Ongoing  FWM: SBA seated in w/c using AE as needed- Ongoing  Bathing: SPV seated on bath bench  Toileting: with SPV during hygiene mgmt- Met 1/2/2025  Pt will complete 10 mins on UE ergometer seated in w/c  Pt will complete 5-8 mins standing with RW/SBA during dynamic standing activity                        Time Tracking:     OT Date of Treatment: 01/02/25  OT Start Time: 1545    OT Stop Time: 1630  OT Total Time (min): 45 min    Billable Minutes:Therapeutic Activity 20  Therapeutic Exercise 25 1/2/2025  Chase Tao Jr., OTR/L

## 2025-01-02 NOTE — PLAN OF CARE
Problem: Adult Inpatient Plan of Care  Goal: Plan of Care Review  Outcome: Progressing  Flowsheets (Taken 1/2/2025 9870)  Plan of Care Reviewed With: patient  Goal: Patient-Specific Goal (Individualized)  Outcome: Progressing  Goal: Absence of Hospital-Acquired Illness or Injury  Outcome: Progressing  Goal: Optimal Comfort and Wellbeing  Outcome: Progressing  Goal: Readiness for Transition of Care  Outcome: Progressing     Problem: Adult Inpatient Plan of Care  Goal: Patient-Specific Goal (Individualized)  Outcome: Progressing     Problem: Adult Inpatient Plan of Care  Goal: Absence of Hospital-Acquired Illness or Injury  Outcome: Progressing     Problem: Adult Inpatient Plan of Care  Goal: Optimal Comfort and Wellbeing  Outcome: Progressing     Problem: Adult Inpatient Plan of Care  Goal: Readiness for Transition of Care  Outcome: Progressing     Problem: Diabetes Comorbidity  Goal: Blood Glucose Level Within Targeted Range  Outcome: Progressing     Problem: Sepsis/Septic Shock  Goal: Optimal Coping  Outcome: Progressing  Goal: Absence of Bleeding  Outcome: Progressing  Goal: Blood Glucose Level Within Targeted Range  Outcome: Progressing  Goal: Absence of Infection Signs and Symptoms  Outcome: Progressing  Goal: Optimal Nutrition Intake  Outcome: Progressing     Problem: Sepsis/Septic Shock  Goal: Absence of Bleeding  Outcome: Progressing     Problem: Sepsis/Septic Shock  Goal: Blood Glucose Level Within Targeted Range  Outcome: Progressing     Problem: Sepsis/Septic Shock  Goal: Absence of Infection Signs and Symptoms  Outcome: Progressing     Problem: Sepsis/Septic Shock  Goal: Optimal Nutrition Intake  Outcome: Progressing     Problem: Pneumonia  Goal: Fluid Balance  Outcome: Progressing  Goal: Resolution of Infection Signs and Symptoms  Outcome: Progressing  Goal: Effective Oxygenation and Ventilation  Outcome: Progressing     Problem: Pneumonia  Goal: Resolution of Infection Signs and Symptoms  Outcome:  Progressing     Problem: Pneumonia  Goal: Effective Oxygenation and Ventilation  Outcome: Progressing     Problem: Wound  Goal: Optimal Coping  Outcome: Progressing  Goal: Optimal Functional Ability  Outcome: Progressing  Goal: Absence of Infection Signs and Symptoms  Outcome: Progressing  Goal: Improved Oral Intake  Outcome: Progressing  Goal: Optimal Pain Control and Function  Outcome: Progressing  Goal: Skin Health and Integrity  Outcome: Progressing  Goal: Optimal Wound Healing  Outcome: Progressing     Problem: Wound  Goal: Optimal Functional Ability  Outcome: Progressing     Problem: Wound  Goal: Absence of Infection Signs and Symptoms  Outcome: Progressing     Problem: Wound  Goal: Improved Oral Intake  Outcome: Progressing     Problem: Wound  Goal: Optimal Pain Control and Function  Outcome: Progressing     Problem: Wound  Goal: Skin Health and Integrity  Outcome: Progressing     Problem: Wound  Goal: Optimal Wound Healing  Outcome: Progressing     Problem: Skin Injury Risk Increased  Goal: Skin Health and Integrity  Outcome: Progressing

## 2025-01-02 NOTE — PT/OT/SLP PROGRESS
Occupational Therapy   Treatment    Name: Jd Dill III  MRN: [id number]  Admit Date: 12/26/2024  Admitting Diagnosis:  Multifocal pneumonia    General Precautions: Standard, fall   Orthopedic Precautions: N/A   Braces: N/A    Recommendations:     Discharge Recommendations:  home health OT  Level of Assistance Recommended at Discharge: 24 hours supervision for safety in performing ADL's and home management tasks  Discharge Equipment Recommendations: walker, rolling  Barriers to discharge:  Decreased caregiver support    Assessment:     Jd Dill III is a 79 y.o. male with a medical diagnosis of Multifocal pneumonia. Pt tolerated session well and without incident and shows excellent motivation and potential to improve, but the pt continues to require assistance to perform self-care tasks and mobility. Pt strengths include Functional cognition, Following multi-step tasks, and Attention to task and PLOF, Motivation, and Willingness to participate. Pt improved Toileting. However, pt would continue to benefit from cont'd OT services in the SNF setting to improve safety and independence /c functional tasks and ADLs upon discharge.  Performance deficits affecting function are weakness, impaired endurance, impaired self care skills, impaired functional mobility, gait instability, impaired balance, decreased lower extremity function.     Rehab Potential is good    Activity tolerance:  Good    Plan:     Patient to be seen 5 x/week to address the above listed problems via self-care/home management, community/work re-entry, therapeutic activities, therapeutic exercises, therapeutic groups, neuromuscular re-education, cognitive retraining, sensory integration, wheelchair management/training, splinting    Plan of Care Expires: 01/26/25  Plan of Care Reviewed with: patient    Subjective     Communicated with: NSG prior to session.     Pain/Comfort:  Pain Rating 1: 0/10  Pain Rating Post-Intervention 1: 0/10    Patient's  "cultural, spiritual, Church conflicts given the current situation:  no    Objective:     Patient found up in chair with  (no active lines) upon OT entry to room. Pt alert and agreeable to therapy.     Functional Mobility/Transfers:  Patient completed Sit <> Stand Transfer with stand by assistance  with  rolling walker   Patient completed Chair <> Mat Step Transfer technique with stand by assistance with rolling walker  Functional Mobility: Pt ambulated around room for functional tasks /c SBA and RW. Pt propelled WC ~100 ft /c SPV    Activities of Daily Living:  Toileting: supervision standing at toilet    [facility] 6 Click ADL: 21    OT Exercises:     ~Pt performed 3x10 reps of the following exercises with 3 lb dowel while standing using BUE.     Chest presses, Shoulder presses, Biceps curls, and Shoulder flexion    Pt req seated rest break between sets.     ~Pt participated in task to improve standing tolerance, standing balance, functional use of UE, cross-body and ipsilateral reach in standing, visual scanning, and hand-eye coordination. Pt asked to "clean"  markings on vertical surface placed anterior to patient. Pt able to complete task /c SBA and RW      Treatment & Education:  Pt educated on POC, role of OT, and safety. Pt performed tasks to improve safety and independence in functional tasks and ADLs as mentioned above.       Patient left up in chair with call button in reach and all needs met    GOALS:   Multidisciplinary Problems       Occupational Therapy Goals          Problem: Occupational Therapy    Goal Priority Disciplines Outcome Interventions   Occupational Therapy Goal     OT, PT/OT Progressing    Description: Goals to be met by: 1/26/2025     Patient will increase functional independence with ADLs by performing:  UBD: with MI seated in w/c- Ongoing  LBD: with CGA-SBA seated in w/c using AE as needed- Ongoing  FWM: SBA seated in w/c using AE as needed- Ongoing  Bathing: SPV seated on bath " bench  Toileting: with SPV during hygiene mgmt- Met 1/2/2025  Pt will complete 10 mins on UE ergometer seated in w/c  Pt will complete 5-8 mins standing with RW/SBA during dynamic standing activity                       Time Tracking:     OT Date of Treatment: 01/02/25  OT Start Time: 1130    OT Stop Time: 1158  OT Total Time (min): 28 min    Billable Minutes:Self Care/Home Management 10  Therapeutic Activity 18    1/2/2025

## 2025-01-03 PROCEDURE — 25000242 PHARM REV CODE 250 ALT 637 W/ HCPCS: Performed by: HOSPITALIST

## 2025-01-03 PROCEDURE — 11000004 HC SNF PRIVATE

## 2025-01-03 PROCEDURE — 97116 GAIT TRAINING THERAPY: CPT | Mod: CQ

## 2025-01-03 PROCEDURE — 25000003 PHARM REV CODE 250: Performed by: HOSPITALIST

## 2025-01-03 PROCEDURE — 25000003 PHARM REV CODE 250: Performed by: NURSE PRACTITIONER

## 2025-01-03 PROCEDURE — 97530 THERAPEUTIC ACTIVITIES: CPT | Mod: CO

## 2025-01-03 PROCEDURE — 97110 THERAPEUTIC EXERCISES: CPT | Mod: CO

## 2025-01-03 PROCEDURE — 97112 NEUROMUSCULAR REEDUCATION: CPT | Mod: CQ

## 2025-01-03 PROCEDURE — 97110 THERAPEUTIC EXERCISES: CPT | Mod: CQ

## 2025-01-03 RX ADMIN — FLUTICASONE PROPIONATE 50 MCG: 50 SPRAY, METERED NASAL at 08:01

## 2025-01-03 RX ADMIN — TAMSULOSIN HYDROCHLORIDE 0.8 MG: 0.4 CAPSULE ORAL at 08:01

## 2025-01-03 RX ADMIN — PANTOPRAZOLE SODIUM 40 MG: 40 TABLET, DELAYED RELEASE ORAL at 08:01

## 2025-01-03 RX ADMIN — SENNOSIDES AND DOCUSATE SODIUM 1 TABLET: 50; 8.6 TABLET ORAL at 09:01

## 2025-01-03 RX ADMIN — METOPROLOL SUCCINATE 25 MG: 25 TABLET, EXTENDED RELEASE ORAL at 08:01

## 2025-01-03 RX ADMIN — SENNOSIDES AND DOCUSATE SODIUM 1 TABLET: 50; 8.6 TABLET ORAL at 08:01

## 2025-01-03 RX ADMIN — SODIUM CHLORIDE 1000 MG: 1 TABLET ORAL at 09:01

## 2025-01-03 RX ADMIN — CETIRIZINE HYDROCHLORIDE 10 MG: 10 TABLET, FILM COATED ORAL at 08:01

## 2025-01-03 RX ADMIN — SODIUM CHLORIDE 1000 MG: 1 TABLET ORAL at 08:01

## 2025-01-03 RX ADMIN — LOSARTAN POTASSIUM 25 MG: 25 TABLET, FILM COATED ORAL at 08:01

## 2025-01-03 NOTE — PROGRESS NOTES
[facility]                                  Skilled Nursing Facility                   Progress Note     Admit Date: 12/26/2024  SIRIA 1/10/2025  [id number]  Principal Problem:  Multifocal pneumonia   HPI obtained from patient interview and chart review     Chief Complaint: Establish Care    HPI:   Jd Dill III is a 79 y.o. male with PMH of BPH, GERD, hypertension transferred from clinic for tachycardia and generalized weakness.  Admitted to the hospital for sepsis due to multifocal pneumonia complicated by hyponatremia and liver dysfunction. Patient became debilitated during hospital stay due to illness.     Interval history: All of today's labs reviewed and are listed below.  24 hr vital sign ranges reviewed and listed below.  Patient denies shortness of breath, abdominal discomfort, nausea, or vomiting.  Patient reports an adequate appetite.  Patient denies dysuria.  Patient reports having regular bowel movements.  Patient progessing with PT/OT- 350 ft using no AD with SBA; no chair follow provided. Vc/tc given to improve bilateral step height to ensure BLE clearance. No LOB noted. Continuing to follow and treat all acute and chronic conditions.    Past Medical History: Patient has a past medical history of Allergy, AR (allergic rhinitis), Basal cell carcinoma of ear (2006), BPH (benign prostatic hyperplasia), DDD (degenerative disc disease), lumbar, Degenerative disc disease, Gastroesophageal reflux disease (3/11/2019), Hypertension, Kidney stone, and Undescended testicle.    Past Surgical History: Patient has a past surgical history that includes Hemorrhoid surgery; Hernia repair; Right Orchiectomy; Pilonidal cyst drainage; Transurethral resection of prostate (2010); Excision basal cell carcinoma; and Esophagogastroduodenoscopy (N/A, 10/5/2022).    Social History: Patient reports that he quit smoking about 28 years ago. His  "smoking use included cigarettes. He started smoking about 38 years ago. He has a 5 pack-year smoking history. He has never been exposed to tobacco smoke. He has never used smokeless tobacco. He reports current alcohol use. He reports that he does not use drugs.    Family History: family history includes Allergies in his mother; Dementia in his mother; Diabetes in his father; Heart disease in his father; Hypertension in his father.    Allergies: Patient is allergic to ace inhibitors, chlorthalidone, feathers, and mite extract.    ROS  Constitutional: Negative for fever   Respiratory: Negative for cough, shortness of breath   Cardiovascular: Negative for chest pain, palpitations, and leg swelling.   Gastrointestinal: Negative for abdominal pain, constipation, diarrhea, nausea, vomiting.   Genitourinary: Negative for dysuria, frequency   Musculoskeletal:  + generalized weakness. Negative for back pain and myalgias.   Neurological: Negative for dizziness, headaches.   Psychiatric/Behavioral: Negative for depression. The patient is not nervous/anxious.      24 hour Vital Sign Range   Temp:  [97.8 °F (36.6 °C)-98.6 °F (37 °C)]   Pulse:  [80-85]   Resp:  [18-20]   BP: (125-132)/(61-63)   SpO2:  [97 %-100 %]     Current BMI: Body mass index is 21.99 kg/m².    PEx  Constitutional: Patient appears debilitated.  No distress noted  Cardiovascular: Normal rate, regular rhythm and normal heart sounds.    Pulmonary/Chest: Effort normal and breath sounds are clear  Abdominal: Soft. Bowel sounds are normal.   Musculoskeletal: Normal range of motion.   Neurological: Alert and oriented to person, place, and time.   Psychiatric: Normal mood and affect. Behavior is normal.   Skin: Skin is warm and dry.     No results for input(s): "GLUCOSE", "NA", "K", "CL", "CO2", "BUN", "CREATININE", "CALCIUM", "MG" in the last 24 hours.  No results for input(s): "WBC", "RBC", "HGB", "HCT", "PLT", "MCV", "MCH", "MCHC" in the last 24 hours.  Recent " Labs   Lab 12/28/24  0724   POCTGLUCOSE 125*        Assessment and Plan:    Multifocal pneumonia, Improving  - CXR with improvement in bibasilar opacities  - Completed 5 days of ceftriaxone and azithromycin.    - tolerating room air, denies cough    Essential hypertension   - Continue home metoprolol XL 25 mg daily, losartan 25 mg daily  - Holding home amlodipine     Hyponatremia  - asymptomatic  - continue monitor twice weekly BMPs  - improvement, continue Na Cl 1000 mg BID     Benign prostatic hyperplasia with urinary obstruction   - Continue home tamsulosin 0.4 mg daily     Seasonal allergies  - continue fluticasone nasal spray, cetirizine 10 mg daily    GERD  - continue Protonix 40 mg daily    Debility   - Continue with PT/OT for gait training and strengthening and restoration of ADL's   - Encourage mobility, OOB in chair, and early ambulation as appropriate  - Fall precautions   - Monitor for bowel and bladder dysfunction  - Monitor for and prevent skin breakdown and pressure ulcers  - Continue DVT prophylaxis with frequent ambulation      Anticipate disposition:  Home with home health    IP [facility] RISK OF UNPLANNED READMISSION Model: MODERATE     Follow-up needed during SNF stay-    Follow-up needed after discharge from SNF: PCP     Future Appointments   Date Time Provider Department Center   1/13/2025 10:40 AM [facility]   2/14/2025 10:00 AM APPOINTMENT [facility]   2/17/2025 10:00 AM Johny Celis MD [facility]EM ONC [facility]       I certify that SNF services are required to be given on an inpatient basis because Jd COLLAZO Ketanernst III needs for skilled nursing care and/or skilled rehabilitation are required on a daily basis and such services can only practically be provided in a skilled nursing facility setting and are for an ongoing condition for which she received inpatient care in the hospital.     Total time of the visit 46 minutes   Description of  non physical exam/non charting time: counseling patient on clinical conditions and therapies provided.  Extensive chart review completed including all consultation notes.  All pertinent laboratory and radiographical images reviewed.        Malina Montgomery NP  Department of Hospital Medicine   [facility]     DOS: 1/3/2025       Patient note was created using MModal Dictation.  Any errors in syntax or even information may not have been identified and edited on initial review prior to signing this note.

## 2025-01-03 NOTE — PT/OT/SLP PROGRESS
"Occupational Therapy   Treatment    Name: Jd Dill III  MRN: [id number]  Admit Date: 12/26/2024  Admitting Diagnosis:  Multifocal pneumonia    General Precautions: Standard, fall   Orthopedic Precautions: N/A   Braces: N/A    Recommendations:     Discharge Recommendations:  home health OT  Level of Assistance Recommended at Discharge: 24 hours light assistance for ADL's and homemaking tasks  Discharge Equipment Recommendations: walker, rolling  Barriers to discharge:  Decreased caregiver support    Assessment:     Jd Dill III is a 79 y.o. male with a medical diagnosis of Multifocal pneumonia .  He presents with performance deficits affecting function are weakness, impaired endurance, impaired self care skills, impaired functional mobility, gait instability, impaired balance, decreased lower extremity function. Pt participated well during today's session. Pt tolerated tx session without incident and is making progress, however, continues to demonstrate deficits with self care skills, balance, functional mobility, UB strength and endurance. Pt will benefit from continued OT services to progress towards goals.     Rehab Potential is good    Activity tolerance:  Good    Plan:     Patient to be seen 5 x/week to address the above listed problems via self-care/home management, community/work re-entry, therapeutic activities, therapeutic exercises, therapeutic groups, neuromuscular re-education, cognitive retraining, sensory integration, wheelchair management/training, splinting    Plan of Care Expires: 01/26/25  Plan of Care Reviewed with: patient    Subjective   "Sure I can go now"  Communicated with: Jesusita prior to session.    Pain/Comfort:  Pain Rating 1: 0/10  Pain Rating Post-Intervention 1: 0/10    Patient's cultural, spiritual, Sabianism conflicts given the current situation:  no    Objective:     Patient found  seated in bedside chair  with   upon OT entry to room.    Functional Mobility/Transfers:  Patient " completed Sit <> Stand Transfer with contact guard assistance  with  no assistive device   Patient completed Bed <> Chair Transfer using Stand Pivot technique with contact guard assistance with no assistive device    Activities of Daily Living:  N/A, already completed.    [facility] 6 Click ADL: 21    OT Exercises: UE Ergometer 10 min with min resistance   AROM x 2 sets of 15 with #3 dowel. Pt perform shoulder flex/ext, forward flex motion and bicep curls.   Therex performed to improve overall endurance, ROM and UB strength required for functional transfers, ADL's and w/c propulsion.     Treatment & Education:  Pt with functional activity on today with CGA/SBA and no AD Pt at raised counter to perform matching card activity with focus on standing tolerance, dynamic standing bal, crossing midline, and to promote independence with homemaking and self care tasks. Pt tolerate stance for - 9:26 min/sec with no rest break required.       Pt educated on:  - role of OT  - level of assistance  - energy conservation and task modification to maximize independence with ADL's and mobility   -  safety while performing functional transfers and self care tasks  - progress towards OT goals     Patient left up in chair with  PTA present in rehab gym.     GOALS:   Multidisciplinary Problems       Occupational Therapy Goals          Problem: Occupational Therapy    Goal Priority Disciplines Outcome Interventions   Occupational Therapy Goal     OT, PT/OT Progressing    Description: Goals to be met by: 1/26/2025     Patient will increase functional independence with ADLs by performing:  UBD: with MI seated in w/c- Ongoing  LBD: with CGA-SBA seated in w/c using AE as needed- Ongoing  FWM: SBA seated in w/c using AE as needed- Ongoing  Bathing: SPV seated on bath bench  Toileting: with SPV during hygiene mgmt- Met 1/2/2025  Pt will complete 10 mins on UE ergometer seated in w/c  Pt will complete 5-8 mins standing with RW/SBA during dynamic  standing activity                       Time Tracking:     OT Date of Treatment: 01/03/25  OT Start Time: 0918    OT Stop Time: 0958  OT Total Time (min): 40 min    Billable Minutes:Therapeutic Activity 26  Therapeutic Exercise 14    1/3/2025  A client care conference was performed between the LOTR and HONG, prior to treatment by HONG, to discuss the patient's status, treatment plan and established goals.

## 2025-01-03 NOTE — PT/OT/SLP PROGRESS
Physical Therapy Treatment    Patient Name:  Jd Dill III   MRN:  [id number]  Admit Date: 12/26/2024  Admitting Diagnosis: Multifocal pneumonia  Recent Surgeries: N/A    General Precautions: Standard, fall  Orthopedic Precautions: N/A  Braces: N/A    Recommendations:     Discharge Recommendations: home health PT  Level of Assistance Recommended at Discharge: Intermittent assistance   Discharge Equipment Recommendations: walker, rolling (continue to assess with progression of mobility)  Barriers to discharge: Decreased caregiver support    Assessment:     Jd Dill III is a 79 y.o. male admitted with a medical diagnosis of Multifocal pneumonia. Pt tolerated session(s) well and without incident. Pt continuing to improve balance strategies with new challenges introduced during gait training. Most difficulty noted when ambulating over objects. Pt seen in both AM and PM this date; pt cleared to ambulate with nursing using RW in hallway. Pt will continue to benefit from skilled PT services in order to further promote functional mobility, endurance, and BLE strengthening. Pt remains appropriate for PT treatment at this time.      Performance deficits affecting function: weakness, impaired endurance, impaired self care skills, impaired functional mobility, gait instability, impaired balance, decreased upper extremity function, decreased lower extremity function, impaired cardiopulmonary response to activity.    Rehab Potential is good    Activity Tolerance: Good    Plan:     Patient to be seen 5 x/week to address the above listed problems via gait training, therapeutic activities, therapeutic exercises, neuromuscular re-education, wheelchair management/training    Plan of Care Expires: 01/26/25  Plan of Care Reviewed with: patient    Subjective     Pt agreeable to treatment.     Pain/Comfort:  Pain Rating 1: 0/10  Pain Rating Post-Intervention 1: 0/10    Patient's cultural, spiritual, Buddhism conflicts given the  "current situation:  no    Objective:     Communicated with nursing prior to session.  Patient found up in chair with  (no active lines) upon PT entry to room.     Therapeutic Activities and Exercises: Recumbent cross  x 10 minutes at level 5 resistance incorporated to promote BLE strengthening, tissue extensibility, endurance and functional mobility.    Patient educated on role of therapy, goals of session, and benefits of out of bed mobility.   Instructed on use of call button and importance of calling nursing staff for assistance with mobility   Questions/concerns addressed within PTA scope of practice  Pt verbalized understanding.    Functional Mobility:  Transfers:     Sit to Stand:  stand by assistance with no AD  Bed to Chair: stand by assistance with  no AD  using  Stand Pivot  Chair to mat: supervision with  no AD  using  Stand Pivot  Gait AM: 208 ft + 268 ft + 174 ft using no AD with close SBA, final trial CGA no AD; no chair follow provided with each trial. Dynamic tasks incorporated with second and third trials. See below.  Gait PM: 350 ft using no AD with SBA; no chair follow provided. Vc/tc given to improve bilateral step height to ensure BLE clearance. No LOB noted.  Balance: dynamic standing tasks no AD/close SBA incorporated with gait training. Pt identifying objects by color, verbalizing name of object and pointing with RUE (1st gait trial). Pt retrieving objects from multiple heights using single UE and tossing into basket; intermittent UE support on rails and countertops as needed, no LOB noted. Pt ambulating over objects set at multiple heights x 4 trials (36 ft each trial). Pt with minor LOB x 2 when ambulating over 5" and 6" objects; CGA maintained throughout for pt safety (third gait trial).  Curb step:  Pt ascended/descended 4" curb step with No Assistive Device with no handrails with Stand-by Assistance.   Stairs: Pt ascended/descended 12 stairs with No Assistive Device with bilateral " handrails with close Stand-by Assistance. Cues given for BUE advancement on handrails when descending, increased step length/height to ensure BLE clearance. No LOB noted, reciprocal pattern maintained throughout.    AM-PAC 6 CLICK MOBILITY  19    Patient left up in chair with call button in reach and all needs met (in both AM and PM) .    GOALS:   Multidisciplinary Problems       Physical Therapy Goals          Problem: Physical Therapy    Goal Priority Disciplines Outcome Interventions   Physical Therapy Goal     PT, PT/OT Progressing    Description: Goals to be met by: 25       Patient will increase functional independence with mobility by performin. Supine to sit with [place]  2. Sit to supine with [place]  3. Rolling to Left and Right with [place]  4. Sit to stand transfer with Modified [place]  5. Bed to chair transfer with Modified [place] using Rolling Walker/without AD  6. Gait  x 200 feet with Modified [place] using Rolling Walker  7. Gait x 50 feet (progress as tolerated) with SBA without an AD-met  Updated 2024 Gait x 200 feet (progress as tolerated) with (I) without an AD  8. Wheelchair propulsion x 200 feet with Modified [place] using bilateral upper extremities  9. Ascend/descend 12 stair with bilateral Handrails and Supervision using No Assistive Device  10. Ascend/Descend 4 inch curb step with Supervision using Rolling Walker                         Time Tracking:     PT Received On: 25  PT Start Time: 0959  PT Stop Time: 1039  PT Total Time (min): 40 min    PT Start Time: 1422  PT Stop Time: 1432  PT Total Time (min): 10 min    Billable Minutes: Gait Training 30, Therapeutic Exercise 10, and Neuromuscular Re-education 10    Treatment Type: Treatment  PT/PTA: PTA     Number of PTA visits since last PT visit: 2     2025

## 2025-01-04 PROCEDURE — 25000003 PHARM REV CODE 250: Performed by: NURSE PRACTITIONER

## 2025-01-04 PROCEDURE — 11000004 HC SNF PRIVATE

## 2025-01-04 PROCEDURE — 25000003 PHARM REV CODE 250: Performed by: HOSPITALIST

## 2025-01-04 RX ORDER — DOCUSATE SODIUM 283 MG/5ML
1 LIQUID RECTAL DAILY PRN
Status: DISCONTINUED | OUTPATIENT
Start: 2025-01-04 | End: 2025-01-10 | Stop reason: HOSPADM

## 2025-01-04 RX ORDER — POLYETHYLENE GLYCOL 3350 17 G/17G
17 POWDER, FOR SOLUTION ORAL DAILY
Status: DISCONTINUED | OUTPATIENT
Start: 2025-01-04 | End: 2025-01-06

## 2025-01-04 RX ADMIN — SENNOSIDES AND DOCUSATE SODIUM 1 TABLET: 50; 8.6 TABLET ORAL at 08:01

## 2025-01-04 RX ADMIN — TAMSULOSIN HYDROCHLORIDE 0.8 MG: 0.4 CAPSULE ORAL at 08:01

## 2025-01-04 RX ADMIN — FLUTICASONE PROPIONATE 50 MCG: 50 SPRAY, METERED NASAL at 08:01

## 2025-01-04 RX ADMIN — CETIRIZINE HYDROCHLORIDE 10 MG: 10 TABLET, FILM COATED ORAL at 08:01

## 2025-01-04 RX ADMIN — PANTOPRAZOLE SODIUM 40 MG: 40 TABLET, DELAYED RELEASE ORAL at 08:01

## 2025-01-04 RX ADMIN — METOPROLOL SUCCINATE 25 MG: 25 TABLET, EXTENDED RELEASE ORAL at 08:01

## 2025-01-04 RX ADMIN — LOSARTAN POTASSIUM 25 MG: 25 TABLET, FILM COATED ORAL at 08:01

## 2025-01-04 RX ADMIN — SODIUM CHLORIDE 1000 MG: 1 TABLET ORAL at 08:01

## 2025-01-04 NOTE — PLAN OF CARE
Problem: Adult Inpatient Plan of Care  Goal: Plan of Care Review  Outcome: Progressing  Flowsheets (Taken 1/4/2025 1041)  Plan of Care Reviewed With: patient  Goal: Patient-Specific Goal (Individualized)  Outcome: Progressing  Goal: Absence of Hospital-Acquired Illness or Injury  Outcome: Progressing  Goal: Optimal Comfort and Wellbeing  Outcome: Progressing  Goal: Readiness for Transition of Care  Outcome: Progressing     Problem: Adult Inpatient Plan of Care  Goal: Patient-Specific Goal (Individualized)  Outcome: Progressing     Problem: Adult Inpatient Plan of Care  Goal: Absence of Hospital-Acquired Illness or Injury  Outcome: Progressing     Problem: Adult Inpatient Plan of Care  Goal: Optimal Comfort and Wellbeing  Outcome: Progressing     Problem: Adult Inpatient Plan of Care  Goal: Readiness for Transition of Care  Outcome: Progressing     Problem: Diabetes Comorbidity  Goal: Blood Glucose Level Within Targeted Range  Outcome: Progressing     Problem: Sepsis/Septic Shock  Goal: Optimal Coping  Outcome: Progressing  Goal: Absence of Bleeding  Outcome: Progressing  Goal: Blood Glucose Level Within Targeted Range  Outcome: Progressing  Goal: Absence of Infection Signs and Symptoms  Outcome: Progressing  Goal: Optimal Nutrition Intake  Outcome: Progressing     Problem: Sepsis/Septic Shock  Goal: Absence of Bleeding  Outcome: Progressing     Problem: Sepsis/Septic Shock  Goal: Blood Glucose Level Within Targeted Range  Outcome: Progressing     Problem: Sepsis/Septic Shock  Goal: Absence of Infection Signs and Symptoms  Outcome: Progressing     Problem: Sepsis/Septic Shock  Goal: Optimal Nutrition Intake  Outcome: Progressing     Problem: Pneumonia  Goal: Fluid Balance  Outcome: Progressing  Goal: Resolution of Infection Signs and Symptoms  Outcome: Progressing  Goal: Effective Oxygenation and Ventilation  Outcome: Progressing     Problem: Pneumonia  Goal: Resolution of Infection Signs and Symptoms  Outcome:  Progressing     Problem: Pneumonia  Goal: Effective Oxygenation and Ventilation  Outcome: Progressing     Problem: Wound  Goal: Optimal Coping  Outcome: Progressing  Goal: Optimal Functional Ability  Outcome: Progressing  Goal: Absence of Infection Signs and Symptoms  Outcome: Progressing  Goal: Improved Oral Intake  Outcome: Progressing  Goal: Optimal Pain Control and Function  Outcome: Progressing  Goal: Skin Health and Integrity  Outcome: Progressing  Goal: Optimal Wound Healing  Outcome: Progressing     Problem: Wound  Goal: Optimal Functional Ability  Outcome: Progressing     Problem: Wound  Goal: Absence of Infection Signs and Symptoms  Outcome: Progressing     Problem: Wound  Goal: Improved Oral Intake  Outcome: Progressing     Problem: Wound  Goal: Optimal Pain Control and Function  Outcome: Progressing     Problem: Wound  Goal: Skin Health and Integrity  Outcome: Progressing     Problem: Wound  Goal: Optimal Wound Healing  Outcome: Progressing     Problem: Skin Injury Risk Increased  Goal: Skin Health and Integrity  Outcome: Progressing

## 2025-01-04 NOTE — PLAN OF CARE
Problem: Adult Inpatient Plan of Care  Goal: Plan of Care Review  Outcome: Progressing  Flowsheets (Taken 1/4/2025 0500)  Plan of Care Reviewed With: patient  Goal: Patient-Specific Goal (Individualized)  Outcome: Progressing  Goal: Absence of Hospital-Acquired Illness or Injury  Outcome: Progressing  Goal: Optimal Comfort and Wellbeing  Outcome: Progressing  Goal: Readiness for Transition of Care  Outcome: Progressing     Problem: Diabetes Comorbidity  Goal: Blood Glucose Level Within Targeted Range  Outcome: Progressing     Problem: Sepsis/Septic Shock  Goal: Optimal Coping  Outcome: Progressing  Goal: Absence of Bleeding  Outcome: Progressing  Goal: Blood Glucose Level Within Targeted Range  Outcome: Progressing  Goal: Absence of Infection Signs and Symptoms  Outcome: Progressing  Goal: Optimal Nutrition Intake  Outcome: Progressing     Problem: Pneumonia  Goal: Fluid Balance  Outcome: Progressing  Goal: Resolution of Infection Signs and Symptoms  Outcome: Progressing  Goal: Effective Oxygenation and Ventilation  Outcome: Progressing     Problem: Wound  Goal: Optimal Coping  Outcome: Progressing  Goal: Optimal Functional Ability  Outcome: Progressing  Goal: Absence of Infection Signs and Symptoms  Outcome: Progressing  Goal: Improved Oral Intake  Outcome: Progressing  Goal: Optimal Pain Control and Function  Outcome: Progressing  Goal: Skin Health and Integrity  Outcome: Progressing  Goal: Optimal Wound Healing  Outcome: Progressing     Problem: Skin Injury Risk Increased  Goal: Skin Health and Integrity  Outcome: Progressing

## 2025-01-05 PROCEDURE — 11000004 HC SNF PRIVATE

## 2025-01-05 PROCEDURE — 97110 THERAPEUTIC EXERCISES: CPT | Mod: CQ

## 2025-01-05 PROCEDURE — 97116 GAIT TRAINING THERAPY: CPT | Mod: CQ

## 2025-01-05 PROCEDURE — 97112 NEUROMUSCULAR REEDUCATION: CPT | Mod: CQ

## 2025-01-05 PROCEDURE — 25000003 PHARM REV CODE 250: Performed by: NURSE PRACTITIONER

## 2025-01-05 PROCEDURE — 25000003 PHARM REV CODE 250: Performed by: HOSPITALIST

## 2025-01-05 RX ADMIN — SODIUM CHLORIDE 1000 MG: 1 TABLET ORAL at 08:01

## 2025-01-05 RX ADMIN — PANTOPRAZOLE SODIUM 40 MG: 40 TABLET, DELAYED RELEASE ORAL at 08:01

## 2025-01-05 RX ADMIN — TAMSULOSIN HYDROCHLORIDE 0.8 MG: 0.4 CAPSULE ORAL at 08:01

## 2025-01-05 RX ADMIN — METOPROLOL SUCCINATE 25 MG: 25 TABLET, EXTENDED RELEASE ORAL at 08:01

## 2025-01-05 RX ADMIN — SENNOSIDES AND DOCUSATE SODIUM 1 TABLET: 50; 8.6 TABLET ORAL at 08:01

## 2025-01-05 RX ADMIN — CETIRIZINE HYDROCHLORIDE 10 MG: 10 TABLET, FILM COATED ORAL at 08:01

## 2025-01-05 RX ADMIN — FLUTICASONE PROPIONATE 50 MCG: 50 SPRAY, METERED NASAL at 08:01

## 2025-01-05 NOTE — PT/OT/SLP PROGRESS
Physical Therapy Treatment    Patient Name:  Jd Dill III   MRN:  [id number]  Admit Date: 12/26/2024  Admitting Diagnosis: Multifocal pneumonia  Recent Surgeries: N/A    General Precautions: Standard, fall  Orthopedic Precautions: N/A  Braces: N/A    Recommendations:     Discharge Recommendations: home health PT  Level of Assistance Recommended at Discharge: Intermittent assistance   Discharge Equipment Recommendations: walker, rolling (continue to assess with progression of mobility)  Barriers to discharge: Decreased caregiver support    Assessment:     Jd Dill III is a 79 y.o. male admitted with a medical diagnosis of Multifocal pneumonia. Pt continuing to respond well to PT session focused on improving balance strategies and functional strength/endurance. Added challenges with obstacle course this date; pt requiring Min A-brief Mod A at most to stand safely on dynadisc x 10 sec without use of AD (two trials). Activity discontinued and replaced with ambulation over uneven mat surface. Pt ambulating to and from gym environment with SPV and no AD; WC left in hospital room. Pt pleasant and motivated to continue progressing with PT. Pt will continue to benefit from skilled PT services in order to further promote functional mobility, endurance, and BLE strengthening. Pt remains appropriate for PT treatment at this time.    Performance deficits affecting function: weakness, impaired endurance, impaired self care skills, impaired functional mobility, gait instability, impaired balance, decreased upper extremity function, decreased lower extremity function, impaired cardiopulmonary response to activity.    Rehab Potential is good    Activity Tolerance: Good    Plan:     Patient to be seen 5 x/week to address the above listed problems via gait training, therapeutic activities, therapeutic exercises, neuromuscular re-education, wheelchair management/training    Plan of Care Expires: 01/26/25  Plan of Care Reviewed  "with: patient    Subjective     Pt agreeable to treatment.     Pain/Comfort:  Pain Rating 1: 0/10  Pain Rating Post-Intervention 1: 0/10    Patient's cultural, spiritual, Yarsani conflicts given the current situation:  no    Objective:     Communicated with nursing prior to session.  Patient found up in chair with  (no active lines) upon PT entry to room.     Therapeutic Activities and Exercises: Recumbent cross  x 15 minutes at min-mod resistance incorporated to promote BLE strengthening, tissue extensibility, endurance and functional mobility. Rest break x 2 taken (Level 5, arms 8, and seat 8).     Patient educated on role of therapy, goals of session, and benefits of out of bed mobility.   Instructed on use of call button and importance of calling nursing staff for assistance with mobility   Questions/concerns addressed within PTA scope of practice.  Pt verbalized understanding.     Functional Mobility:  Transfers:     Sit to Stand:  supervision and stand by assistance with no AD  Mat to Nust[place]: stand by assistance with  no AD  using ~12 ft Step Transfer  Gait: 280 ft + 90 ft (obstacle course incorporated) + 90 ft (obstacle course incorporated) + 245 ft (curb step incorporated); seated rest break between trials. Pt using no AD with SBA/close SPV maintained. CGA maintained to complete obstacle course/dynamic tasks.  Balance: dynamic standing/ambulation tasks requiring CGA without use of AD (pt walking over various~1-6 inch objects, picking up 10# kettlebell with BUE and setting on table, two standing balance trials on [place] x 10 sec each Min A-Mod A, curb step using 4" step stool x 5, and ambulation over uneven mat surface x 25 total ft). Slightly increased time required for initiation/motor planning/completion of tasks without LOB. Pt experiencing minor stumble x 3 however able to recover with CGA maintained. No significant LOB noted.  Curb step:  Pt ascended/descended 4" curb step with No Assistive " Device with no handrails with Stand-by Assistance (platform). Curb step (stool) completed x 5 trials with CGA and no AD.    Stairs: Pt ascended/descended 8 stairs with No Assistive Device with bilateral handrails with  Stand-by Assistance. Cues given for BUE advancement on handrails when descending. No LOB noted, reciprocal pattern maintained throughout.    AM-PAC 6 CLICK MOBILITY  19    Patient left up in chair with call button in reach, RN notified, and all needs met .    GOALS:   Multidisciplinary Problems       Physical Therapy Goals          Problem: Physical Therapy    Goal Priority Disciplines Outcome Interventions   Physical Therapy Goal     PT, PT/OT Progressing    Description: Goals to be met by: 25       Patient will increase functional independence with mobility by performin. Supine to sit with [place]  2. Sit to supine with [place]  3. Rolling to Left and Right with [place]  4. Sit to stand transfer with Modified [place]  5. Bed to chair transfer with Modified [place] using Rolling Walker/without AD  6. Gait  x 200 feet with Modified [place] using Rolling Walker  7. Gait x 50 feet (progress as tolerated) with SBA without an AD-met  Updated 2024 Gait x 200 feet (progress as tolerated) with (I) without an AD  8. Wheelchair propulsion x 200 feet with Modified [place] using bilateral upper extremities  9. Ascend/descend 12 stair with bilateral Handrails and Supervision using No Assistive Device  10. Ascend/Descend 4 inch curb step with Supervision using Rolling Walker                         Time Tracking:     PT Received On: 25  PT Start Time: 0856  PT Stop Time: 0949  PT Total Time (min): 53 min    Billable Minutes: Gait Training 26, Therapeutic Exercise 15, and Neuromuscular Re-education 12    Treatment Type: Treatment  PT/PTA: PTA     Number of PTA visits since last PT visit: 3     2025

## 2025-01-05 NOTE — PLAN OF CARE
Problem: Adult Inpatient Plan of Care  Goal: Plan of Care Review  Outcome: Progressing  Flowsheets (Taken 1/4/2025 2234)  Plan of Care Reviewed With: patient  Goal: Patient-Specific Goal (Individualized)  Outcome: Progressing  Goal: Absence of Hospital-Acquired Illness or Injury  Outcome: Progressing  Goal: Optimal Comfort and Wellbeing  Outcome: Progressing  Goal: Readiness for Transition of Care  Outcome: Progressing     Problem: Diabetes Comorbidity  Goal: Blood Glucose Level Within Targeted Range  Outcome: Progressing     Problem: Sepsis/Septic Shock  Goal: Optimal Coping  Outcome: Progressing  Goal: Absence of Bleeding  Outcome: Progressing  Goal: Blood Glucose Level Within Targeted Range  Outcome: Progressing  Goal: Absence of Infection Signs and Symptoms  Outcome: Progressing  Goal: Optimal Nutrition Intake  Outcome: Progressing     Problem: Pneumonia  Goal: Fluid Balance  Outcome: Progressing  Goal: Resolution of Infection Signs and Symptoms  Outcome: Progressing  Goal: Effective Oxygenation and Ventilation  Outcome: Progressing     Problem: Wound  Goal: Optimal Coping  Outcome: Progressing  Goal: Optimal Functional Ability  Outcome: Progressing  Goal: Absence of Infection Signs and Symptoms  Outcome: Progressing  Goal: Improved Oral Intake  Outcome: Progressing  Goal: Optimal Pain Control and Function  Outcome: Progressing  Goal: Skin Health and Integrity  Outcome: Progressing  Goal: Optimal Wound Healing  Outcome: Progressing     Problem: Skin Injury Risk Increased  Goal: Skin Health and Integrity  Outcome: Progressing  Intervention: Optimize Skin Protection  Flowsheets (Taken 1/4/2025 2234)  Pressure Reduction Techniques: frequent weight shift encouraged

## 2025-01-05 NOTE — PLAN OF CARE
Problem: Adult Inpatient Plan of Care  Goal: Plan of Care Review  Outcome: Progressing  Flowsheets (Taken 1/5/2025 2353)  Plan of Care Reviewed With: patient  Goal: Patient-Specific Goal (Individualized)  Outcome: Progressing  Goal: Absence of Hospital-Acquired Illness or Injury  Outcome: Progressing  Goal: Optimal Comfort and Wellbeing  Outcome: Progressing  Goal: Readiness for Transition of Care  Outcome: Progressing     Problem: Adult Inpatient Plan of Care  Goal: Patient-Specific Goal (Individualized)  Outcome: Progressing     Problem: Adult Inpatient Plan of Care  Goal: Absence of Hospital-Acquired Illness or Injury  Outcome: Progressing     Problem: Adult Inpatient Plan of Care  Goal: Optimal Comfort and Wellbeing  Outcome: Progressing     Problem: Adult Inpatient Plan of Care  Goal: Readiness for Transition of Care  Outcome: Progressing     Problem: Diabetes Comorbidity  Goal: Blood Glucose Level Within Targeted Range  Outcome: Progressing     Problem: Sepsis/Septic Shock  Goal: Optimal Coping  Outcome: Progressing  Goal: Absence of Bleeding  Outcome: Progressing  Goal: Blood Glucose Level Within Targeted Range  Outcome: Progressing  Goal: Absence of Infection Signs and Symptoms  Outcome: Progressing  Goal: Optimal Nutrition Intake  Outcome: Progressing     Problem: Sepsis/Septic Shock  Goal: Absence of Bleeding  Outcome: Progressing     Problem: Sepsis/Septic Shock  Goal: Blood Glucose Level Within Targeted Range  Outcome: Progressing     Problem: Sepsis/Septic Shock  Goal: Absence of Infection Signs and Symptoms  Outcome: Progressing     Problem: Sepsis/Septic Shock  Goal: Optimal Nutrition Intake  Outcome: Progressing     Problem: Pneumonia  Goal: Fluid Balance  Outcome: Progressing  Goal: Resolution of Infection Signs and Symptoms  Outcome: Progressing  Goal: Effective Oxygenation and Ventilation  Outcome: Progressing     Problem: Pneumonia  Goal: Resolution of Infection Signs and Symptoms  Outcome:  Progressing     Problem: Pneumonia  Goal: Effective Oxygenation and Ventilation  Outcome: Progressing     Problem: Wound  Goal: Optimal Coping  Outcome: Progressing  Goal: Optimal Functional Ability  Outcome: Progressing  Goal: Absence of Infection Signs and Symptoms  Outcome: Progressing  Goal: Improved Oral Intake  Outcome: Progressing  Goal: Optimal Pain Control and Function  Outcome: Progressing  Goal: Skin Health and Integrity  Outcome: Progressing  Goal: Optimal Wound Healing  Outcome: Progressing     Problem: Wound  Goal: Optimal Functional Ability  Outcome: Progressing     Problem: Wound  Goal: Absence of Infection Signs and Symptoms  Outcome: Progressing     Problem: Wound  Goal: Improved Oral Intake  Outcome: Progressing     Problem: Wound  Goal: Optimal Pain Control and Function  Outcome: Progressing     Problem: Wound  Goal: Skin Health and Integrity  Outcome: Progressing     Problem: Wound  Goal: Optimal Wound Healing  Outcome: Progressing     Problem: Skin Injury Risk Increased  Goal: Skin Health and Integrity  Outcome: Progressing

## 2025-01-06 LAB
ALBUMIN SERPL BCP-MCNC: 2.6 G/DL (ref 3.5–5.2)
ALP SERPL-CCNC: 81 U/L (ref 40–150)
ALT SERPL W/O P-5'-P-CCNC: 22 U/L (ref 10–44)
ANION GAP SERPL CALC-SCNC: 5 MMOL/L (ref 8–16)
AST SERPL-CCNC: 20 U/L (ref 10–40)
BASOPHILS # BLD AUTO: 0.02 K/UL (ref 0–0.2)
BASOPHILS NFR BLD: 0.4 % (ref 0–1.9)
BILIRUB SERPL-MCNC: 0.2 MG/DL (ref 0.1–1)
BUN SERPL-MCNC: 17 MG/DL (ref 8–23)
CALCIUM SERPL-MCNC: 8.6 MG/DL (ref 8.7–10.5)
CHLORIDE SERPL-SCNC: 107 MMOL/L (ref 95–110)
CO2 SERPL-SCNC: 25 MMOL/L (ref 23–29)
CREAT SERPL-MCNC: 0.8 MG/DL (ref 0.5–1.4)
DIFFERENTIAL METHOD BLD: ABNORMAL
EOSINOPHIL # BLD AUTO: 0.4 K/UL (ref 0–0.5)
EOSINOPHIL NFR BLD: 7.6 % (ref 0–8)
ERYTHROCYTE [DISTWIDTH] IN BLOOD BY AUTOMATED COUNT: 14.9 % (ref 11.5–14.5)
EST. GFR  (NO RACE VARIABLE): >60 ML/MIN/1.73 M^2
GLUCOSE SERPL-MCNC: 106 MG/DL (ref 70–110)
HCT VFR BLD AUTO: 29.2 % (ref 40–54)
HGB BLD-MCNC: 9.8 G/DL (ref 14–18)
IMM GRANULOCYTES # BLD AUTO: 0.01 K/UL (ref 0–0.04)
IMM GRANULOCYTES NFR BLD AUTO: 0.2 % (ref 0–0.5)
LYMPHOCYTES # BLD AUTO: 1.3 K/UL (ref 1–4.8)
LYMPHOCYTES NFR BLD: 25.6 % (ref 18–48)
MAGNESIUM SERPL-MCNC: 2 MG/DL (ref 1.6–2.6)
MCH RBC QN AUTO: 31.8 PG (ref 27–31)
MCHC RBC AUTO-ENTMCNC: 33.6 G/DL (ref 32–36)
MCV RBC AUTO: 95 FL (ref 82–98)
MONOCYTES # BLD AUTO: 0.9 K/UL (ref 0.3–1)
MONOCYTES NFR BLD: 17.9 % (ref 4–15)
NEUTROPHILS # BLD AUTO: 2.4 K/UL (ref 1.8–7.7)
NEUTROPHILS NFR BLD: 48.3 % (ref 38–73)
NRBC BLD-RTO: 0 /100 WBC
PHOSPHATE SERPL-MCNC: 3.2 MG/DL (ref 2.7–4.5)
PLATELET # BLD AUTO: 179 K/UL (ref 150–450)
PMV BLD AUTO: 10 FL (ref 9.2–12.9)
POTASSIUM SERPL-SCNC: 4.6 MMOL/L (ref 3.5–5.1)
PROT SERPL-MCNC: 5.5 G/DL (ref 6–8.4)
RBC # BLD AUTO: 3.08 M/UL (ref 4.6–6.2)
SODIUM SERPL-SCNC: 137 MMOL/L (ref 136–145)
WBC # BLD AUTO: 5.03 K/UL (ref 3.9–12.7)

## 2025-01-06 PROCEDURE — 85025 COMPLETE CBC W/AUTO DIFF WBC: CPT | Performed by: HOSPITALIST

## 2025-01-06 PROCEDURE — 97116 GAIT TRAINING THERAPY: CPT | Mod: CQ

## 2025-01-06 PROCEDURE — 97110 THERAPEUTIC EXERCISES: CPT | Mod: CQ

## 2025-01-06 PROCEDURE — 97112 NEUROMUSCULAR REEDUCATION: CPT | Mod: CQ

## 2025-01-06 PROCEDURE — 11000004 HC SNF PRIVATE

## 2025-01-06 PROCEDURE — 97530 THERAPEUTIC ACTIVITIES: CPT

## 2025-01-06 PROCEDURE — 25000003 PHARM REV CODE 250: Performed by: NURSE PRACTITIONER

## 2025-01-06 PROCEDURE — 80053 COMPREHEN METABOLIC PANEL: CPT | Performed by: FAMILY MEDICINE

## 2025-01-06 PROCEDURE — 97110 THERAPEUTIC EXERCISES: CPT

## 2025-01-06 PROCEDURE — 25000003 PHARM REV CODE 250: Performed by: HOSPITALIST

## 2025-01-06 PROCEDURE — 83735 ASSAY OF MAGNESIUM: CPT | Performed by: HOSPITALIST

## 2025-01-06 PROCEDURE — 84100 ASSAY OF PHOSPHORUS: CPT | Performed by: HOSPITALIST

## 2025-01-06 PROCEDURE — 36415 COLL VENOUS BLD VENIPUNCTURE: CPT | Performed by: HOSPITALIST

## 2025-01-06 RX ORDER — POLYETHYLENE GLYCOL 3350 17 G/17G
17 POWDER, FOR SOLUTION ORAL 2 TIMES DAILY PRN
Status: DISCONTINUED | OUTPATIENT
Start: 2025-01-06 | End: 2025-01-10 | Stop reason: HOSPADM

## 2025-01-06 RX ADMIN — SENNOSIDES AND DOCUSATE SODIUM 1 TABLET: 50; 8.6 TABLET ORAL at 08:01

## 2025-01-06 RX ADMIN — PANTOPRAZOLE SODIUM 40 MG: 40 TABLET, DELAYED RELEASE ORAL at 08:01

## 2025-01-06 RX ADMIN — TAMSULOSIN HYDROCHLORIDE 0.8 MG: 0.4 CAPSULE ORAL at 08:01

## 2025-01-06 RX ADMIN — SODIUM CHLORIDE 1000 MG: 1 TABLET ORAL at 09:01

## 2025-01-06 RX ADMIN — SODIUM CHLORIDE 1000 MG: 1 TABLET ORAL at 08:01

## 2025-01-06 RX ADMIN — FLUTICASONE PROPIONATE 50 MCG: 50 SPRAY, METERED NASAL at 08:01

## 2025-01-06 RX ADMIN — CETIRIZINE HYDROCHLORIDE 10 MG: 10 TABLET, FILM COATED ORAL at 08:01

## 2025-01-06 NOTE — PLAN OF CARE
Recommendations  1. Continue regular diet   2. Monitor need for ONS   3. Monitor weight and labs   4. Encourage PO intake   5. Honor food preferences    Goals: PO to meet 85% of EEN by next RD follow up

## 2025-01-06 NOTE — PT/OT/SLP PROGRESS
Physical Therapy Treatment    Patient Name:  Jd Dill III   MRN:  [id number]  Admit Date: 12/26/2024  Admitting Diagnosis: Multifocal pneumonia  Recent Surgeries: N/A    General Precautions: Standard, fall  Orthopedic Precautions: N/A  Braces: N/A    Recommendations:     Discharge Recommendations: home health PT  Level of Assistance Recommended at Discharge: Intermittent assistance   Discharge Equipment Recommendations: walker, rolling (continue to assess with progression of mobility)  Barriers to discharge: Decreased caregiver support    Assessment:     Jd Dill III is a 79 y.o. male admitted with a medical diagnosis of Multifocal pneumonia. Pt continues to tolerate treatment well and without incident. Increased asst required with incorporation of lateral gait training tasks as pt completed obstacle course (CGA-Min A with no AD). PT emphasis placed on balance strategies. Pt ambulating prolonged distances in gym and hallway environments with SPV and no AD. Bed mobility also performed today with SPV/mod I. Pt will continue to benefit from skilled PT services in order to further promote functional mobility, endurance, and BLE strengthening. Pt remains appropriate for PT treatment at this time.    Performance deficits affecting function: weakness, impaired endurance, impaired self care skills, impaired functional mobility, gait instability, impaired balance, decreased upper extremity function, decreased lower extremity function, impaired cardiopulmonary response to activity.    Rehab Potential is good    Activity Tolerance: Good    Plan:     Patient to be seen 5 x/week to address the above listed problems via gait training, therapeutic activities, therapeutic exercises, neuromuscular re-education, wheelchair management/training    Plan of Care Expires: 01/26/25  Plan of Care Reviewed with: patient    Subjective     Pt agreeable to treatment.     Pain/Comfort:  Pain Rating 1: 0/10  Pain Rating Post-Intervention 1:  0/10    Patient's cultural, spiritual, Worship conflicts given the current situation:  no    Objective:     Communicated with nursing prior to session.  Patient found up in chair with  (no active lines) upon PT entry to room.     Therapeutic Activities and Exercises: Recumbent cross  x 10 minutes at min-mod resistance incorporated to promote BLE strengthening, tissue extensibility, endurance and functional mobility. Rest break x 2 taken (Level 5, arms 8, and seat 7).     Patient educated on role of therapy, goals of session, and benefits of out of bed mobility.   Instructed on use of call button and importance of calling nursing staff for assistance with mobility   Questions/concerns addressed within PTA scope of practice.  Pt verbalized understanding.    Functional Mobility:  Bed Mobility:     Supine to Sit: supervision, bed flat, no rail  Sit to Supine: supervision, bed flat, bed rail  Transfers:     Sit to Stand:  supervision with no AD  Chair to bed: supervision with  no AD  using 10 ft Step Transfer  Gait: 200 ft + 70 ft + 160 ft using no AD with SPV; seated rest break provided but not required. Pt with steady gait pattern, good posture. No LOB noted though pt occasionally reaching for rail or table top for steadying.  Balance: dynamic standing/ambulation tasks requiring CGA-Min A without use of AD (pt walking over various~1-5 inch objects in both forward and lateral directions; Min A required to clear BLE laterally over taller objects). Pt also picking up 10# kettlebell with BUE from floor and walking to set object on table with SBA. Pt experiencing intermittent minor stumble with lateral stepping over objects due to decreased step length; CGA maintained.   Balance: static sitting at EOB with mod I  Balance: pt also completing 10 reps of dynamic stepping to clear BLE over 1 inch object with CGA/no AD (5 reps RLE leading, 5 reps LLE leading). No significant LOB noted; increased asst (brief Min A) x 1  "instance for steadying due to anterior LOB. Pt clearing BLE over object with cues given to improve LE TRESSA, safety, bilateral step length (obstacle course not utilized for activity).  Curb step: Pt ascended/descended 4" curb step with No Assistive Device with no handrails with Stand-by Assistance x 4 trials.   Stairs:  Pt ascended/descended 12 stair(s) with No Assistive Device with bilateral handrails with Stand-by Assistance/close SPV.     AM-PAC 6 CLICK MOBILITY  19    Patient left up in chair with call button in reach and all needs met .    GOALS:   Multidisciplinary Problems       Physical Therapy Goals          Problem: Physical Therapy    Goal Priority Disciplines Outcome Interventions   Physical Therapy Goal     PT, PT/OT Progressing    Description: Goals to be met by: 25       Patient will increase functional independence with mobility by performin. Supine to sit with [place]  2. Sit to supine with [place]  3. Rolling to Left and Right with [place]  4. Sit to stand transfer with Modified [place]  5. Bed to chair transfer with Modified [place] using Rolling Walker/without AD  6. Gait  x 200 feet with Modified [place] using Rolling Walker  7. Gait x 50 feet (progress as tolerated) with SBA without an AD-met  Updated 2024 Gait x 200 feet (progress as tolerated) with (I) without an AD  8. Wheelchair propulsion x 200 feet with Modified [place] using bilateral upper extremities  9. Ascend/descend 12 stair with bilateral Handrails and Supervision using No Assistive Device  10. Ascend/Descend 4 inch curb step with Supervision using Rolling Walker                         Time Tracking:     PT Received On: 25  PT Start Time: 1022  PT Stop Time: 1102  PT Total Time (min): 40 min    Billable Minutes: Gait Training 15, Therapeutic Exercise 10, and Neuromuscular Re-education 15    Treatment Type: Treatment  PT/PTA: PTA     Number of PTA visits since last PT visit: " 4     01/06/2025

## 2025-01-06 NOTE — PLAN OF CARE
Problem: Adult Inpatient Plan of Care  Goal: Plan of Care Review  Outcome: Progressing  Flowsheets (Taken 1/5/2025 2059)  Plan of Care Reviewed With: patient  Goal: Patient-Specific Goal (Individualized)  Outcome: Progressing  Goal: Absence of Hospital-Acquired Illness or Injury  Outcome: Progressing  Goal: Optimal Comfort and Wellbeing  Outcome: Progressing  Goal: Readiness for Transition of Care  Outcome: Progressing     Problem: Diabetes Comorbidity  Goal: Blood Glucose Level Within Targeted Range  Outcome: Progressing     Problem: Sepsis/Septic Shock  Goal: Optimal Coping  Outcome: Progressing  Goal: Absence of Bleeding  Outcome: Progressing  Goal: Blood Glucose Level Within Targeted Range  Outcome: Progressing  Goal: Absence of Infection Signs and Symptoms  Outcome: Progressing  Goal: Optimal Nutrition Intake  Outcome: Progressing     Problem: Pneumonia  Goal: Fluid Balance  Outcome: Progressing  Goal: Resolution of Infection Signs and Symptoms  Outcome: Progressing  Goal: Effective Oxygenation and Ventilation  Outcome: Progressing     Problem: Wound  Goal: Optimal Coping  Outcome: Progressing  Goal: Optimal Functional Ability  Outcome: Progressing  Goal: Absence of Infection Signs and Symptoms  Outcome: Progressing  Goal: Improved Oral Intake  Outcome: Progressing  Goal: Optimal Pain Control and Function  Outcome: Progressing  Goal: Skin Health and Integrity  Outcome: Progressing  Goal: Optimal Wound Healing  Outcome: Progressing     Problem: Skin Injury Risk Increased  Goal: Skin Health and Integrity  Outcome: Progressing

## 2025-01-06 NOTE — PROGRESS NOTES
[facility]                                  Skilled Nursing Facility                   Progress Note     Admit Date: 12/26/2024  SIRIA 1/10/2025  [id number]  Principal Problem:  Multifocal pneumonia   HPI obtained from patient interview and chart review     Chief Complaint: Re-evaluation of medical treatment and therapy status    HPI:   Jd Dill III is a 79 y.o. male with PMH of BPH, GERD, hypertension transferred from clinic for tachycardia and generalized weakness.  Admitted to the hospital for sepsis due to multifocal pneumonia complicated by hyponatremia and liver dysfunction. Patient became debilitated during hospital stay due to illness.     Interval history:  24 hr vital sign ranges reviewed and listed below.  Patient is tolerating room air.  Denies cough.  Patient denies shortness of breath, abdominal discomfort, nausea, or vomiting.  Patient reports an adequate appetite.  Patient denies dysuria.  Patient reports having regular bowel movements.  Patient progessing with PT/OT- 350 ft using no AD with SBA; no chair follow provided. Vc/tc given to improve bilateral step height to ensure BLE clearance. No LOB noted. Continuing to follow and treat all acute and chronic conditions.    Past Medical History: Patient has a past medical history of Allergy, AR (allergic rhinitis), Basal cell carcinoma of ear (2006), BPH (benign prostatic hyperplasia), DDD (degenerative disc disease), lumbar, Degenerative disc disease, Gastroesophageal reflux disease (3/11/2019), Hypertension, Kidney stone, and Undescended testicle.    Past Surgical History: Patient has a past surgical history that includes Hemorrhoid surgery; Hernia repair; Right Orchiectomy; Pilonidal cyst drainage; Transurethral resection of prostate (2010); Excision basal cell carcinoma; and Esophagogastroduodenoscopy (N/A, 10/5/2022).    Social History: Patient reports that he quit  "smoking about 28 years ago. His smoking use included cigarettes. He started smoking about 38 years ago. He has a 5 pack-year smoking history. He has never been exposed to tobacco smoke. He has never used smokeless tobacco. He reports current alcohol use. He reports that he does not use drugs.    Family History: family history includes Allergies in his mother; Dementia in his mother; Diabetes in his father; Heart disease in his father; Hypertension in his father.    Allergies: Patient is allergic to ace inhibitors, chlorthalidone, feathers, and mite extract.    ROS  Constitutional: Negative for fever   Respiratory: Negative for cough, shortness of breath   Cardiovascular: Negative for chest pain, palpitations, and leg swelling.   Gastrointestinal: Negative for abdominal pain, constipation, diarrhea, nausea, vomiting.   Genitourinary: Negative for dysuria, frequency   Musculoskeletal:  + generalized weakness. Negative for back pain and myalgias.   Neurological: Negative for dizziness, headaches.   Psychiatric/Behavioral: Negative for depression. The patient is not nervous/anxious.      24 hour Vital Sign Range   Temp:  [98.2 °F (36.8 °C)-98.5 °F (36.9 °C)]   Pulse:  [76-86]   Resp:  [16-17]   BP: (117-121)/(57-82)   SpO2:  [97 %]     Current BMI: Body mass index is 23.17 kg/m².    PEx  Constitutional: Patient appears debilitated.  No distress noted  Cardiovascular: Normal rate, regular rhythm and normal heart sounds.    Pulmonary/Chest: Effort normal and breath sounds are clear  Abdominal: Soft. Bowel sounds are normal.   Musculoskeletal: Normal range of motion.   Neurological: Alert and oriented to person, place, and time.   Psychiatric: Normal mood and affect. Behavior is normal.   Skin: Skin is warm and dry.       No results for input(s): "POCTGLUCOSE" in the last 168 hours.       Assessment and Plan:    Multifocal pneumonia, Improving  - CXR with improvement in bibasilar opacities  - Completed 5 days of " ceftriaxone and azithromycin.    - tolerating room air, denies cough    Essential hypertension   - Continue home metoprolol XL 25 mg daily, losartan 25 mg daily with holding parameters  - continue to hold home amlodipine     Hyponatremia  - asymptomatic  - continue monitor twice weekly BMPs  - improvement, continue Na Cl 1000 mg BID     Benign prostatic hyperplasia with urinary obstruction   - Continue home tamsulosin 0.4 mg daily     Seasonal allergies  - stable, continue fluticasone nasal spray, cetirizine 10 mg daily    GERD  - continue Protonix 40 mg daily    Debility   - Continue with PT/OT for gait training and strengthening and restoration of ADL's   - Encourage mobility, OOB in chair, and early ambulation as appropriate  - Fall precautions   - Monitor for bowel and bladder dysfunction  - Monitor for and prevent skin breakdown and pressure ulcers  - Continue DVT prophylaxis with frequent ambulation      Anticipate disposition:  Home with home health    IP [facility] RISK OF UNPLANNED READMISSION Model: MODERATE     Follow-up needed during SNF stay-    Follow-up needed after discharge from SNF: PCP     Future Appointments   Date Time Provider Department Center   1/13/2025 10:40 AM Jabier Hinkle MD Count [facility] PCW   2/14/2025 10:00 AM APPOINTMENT LAB, [facility]   2/17/2025 10:00 AM Johny Celis MD [facility] HEM ONC [facility]       I certify that SNF services are required to be given on an inpatient basis because Jd Dill III needs for skilled nursing care and/or skilled rehabilitation are required on a daily basis and such services can only practically be provided in a skilled nursing facility setting and are for an ongoing condition for which she received inpatient care in the hospital.         Malina Montgomery NP  Department of Hospital Medicine   [facility]- Skilled Nursing Facility     DOS: 1/4/2025       Patient note was created using MModal Dictation.  Any errors  in syntax or even information may not have been identified and edited on initial review prior to signing this note.

## 2025-01-06 NOTE — PROGRESS NOTES
[facility] - Skilled Nursing  Adult Nutrition  Progress Note    SUMMARY     Recommendations  1. Continue regular diet   2. Monitor need for ONS   3. Monitor weight and labs   4. Encourage PO intake   5. Honor food preferences    Goals: PO to meet 85% of EEN by next RD follow up    Nutrition Goal Status: new  Communication of RD Recs: other (comment) (POC)    Assessment and Plan  No nutrition diagnosis at this time     Malnutrition Assessment 12/30/24  Orbital Region (Subcutaneous Fat Loss): moderate depletion  Upper Arm Region (Subcutaneous Fat Loss): moderate depletion   [place] Region (Muscle Loss): moderate depletion  Clavicle Bone Region (Muscle Loss): moderate depletion  Clavicle and Acromion Bone Region (Muscle Loss): moderate depletion  Dorsal Hand (Muscle Loss): mild depletion  Patellar Region (Muscle Loss): mild depletion  Anterior Thigh Region (Muscle Loss): mild depletion  Posterior Calf Region (Muscle Loss): mild depletion     Reason for Assessment  Reason For Assessment: RD follow-up  Diagnosis:  (pneumonia)  General Information Comments: Pt is currently on a regular diet 1000 ml. Vern-20/abrasion left knee. Noted 100% meal intake. Admitted to the hospital for sepsis d/t multifocal pneumonia complicated by hypoatremia and liver dysfunction. Noted 4 lb weight loss in 1 year. NFPE conducted 12/30. Not thrilled with the food here, per previous visit.  Nutrition Discharge Planning: d/c on regular diet    Nutrition/Diet History  Patient Reported Diet/Restrictions/Preferences: general  Food Preferences: none  Spiritual, Cultural Beliefs, Methodist Practices, Values that Affect Care: no  Food Allergies: NKFA  Factors Affecting Nutritional Intake: None identified at this time    Food Insecurity: No Food Insecurity (12/18/2024)    Hunger Vital Sign     Worried About Running Out of Food in the Last Year: Never true     Ran Out of Food in the Last Year: Never true     Anthropometrics  Temp: 98.2 °F (36.8  "°C)  Height Method: Stated  Height: 5' 7" (170.2 cm)  Height (inches): 67 in  Weight Method: Standard Scale  Weight: 67.1 kg (147 lb 14.9 oz)  Weight (lb): 147.93 lb  Ideal Body Weight (IBW), Male: 148 lb  % Ideal Body Weight, Male (lb): 98.32 %  BMI (Calculated): 23.2  BMI Grade: 18.5-24.9 - normal  Weight Loss: unintentional  Usual Body Weight (UBW), k.8 kg  % Usual Body Weight: 96.13  % Weight Change From Usual Weight: -4.07 %     Lab/Procedures/Meds  Pertinent Labs Reviewed: reviewed  Pertinent Labs Comments: Ca 8.6  Pertinent Medications Reviewed: reviewed  Pertinent Medications Comments: fluticasone, losartan, pantoprazole, senna, sodium chloride, tamsulosin    Estimated/Assessed Needs  Weight Used For Calorie Calculations: 67.1 kg (147 lb 14.9 oz)  Energy Calorie Requirements (kcal): 1740 kcals (PAL 1.3)  Energy Need Method: [place]  Protein Requirements: 80g (1.2g/kg)  Weight Used For Protein Calculations: 67.1 kg (147 lb 14.9 oz)  Fluid Requirements (mL): 1000 per MD  Estimated Fluid Requirement Method: RDA Method  RDA Method (mL): 1740  CHO Requirement: 216    Nutrition Prescription Ordered  Current Diet Order: Regular  Nutrition Order Comments: 100% PO    Evaluation of Received Nutrient/Fluid Intake  I/O: 240/0  Energy Calories Required: meeting needs  Protein Required: meeting needs  Fluid Required: meeting needs  Comments: LBM-25  Tolerance: tolerating  % Intake of Estimated Energy Needs: 75 - 100 %  % Meal Intake: 75 - 100 %    Nutrition Risk  Level of Risk/Frequency of Follow-up: low (one time per week)     Monitor and Evaluation  Food and Nutrient Intake: food and beverage intake  Knowledge/Beliefs/Attitudes: food and nutrition knowledge/skill  Physical Activity and Function: nutrition-related ADLs and IADLs  Anthropometric Measurements: weight change  Biochemical Data, Medical Tests and Procedures: gastrointestinal profile, electrolyte and renal panel  Nutrition-Focused Physical " Findings: overall appearance     Nutrition Follow-Up  RD Follow-up?: Yes

## 2025-01-06 NOTE — PROGRESS NOTES
[facility]                                  Skilled Nursing Facility                   Progress Note     Admit Date: 12/26/2024  SIRIA 1/10/2025  [id number] A  Principal Problem:  Multifocal pneumonia   HPI obtained from patient interview and chart review     Chief Complaint: Re-evaluation of medical treatment and therapy status: Lab review    HPI:   Jd Dill III is a 79 y.o. male with PMH of BPH, GERD, hypertension transferred from clinic for tachycardia and generalized weakness.  Admitted to the hospital for sepsis due to multifocal pneumonia complicated by hyponatremia and liver dysfunction. Patient became debilitated during hospital stay due to illness.     Interval history: All of today's labs reviewed and are listed below.  24 hr vital sign ranges reviewed and listed below.  Tolerating room air.  Patient denies shortness of breath, abdominal discomfort, nausea, or vomiting.  Patient reports an adequate appetite.  Patient denies dysuria.  Patient reports having regular bowel movements.  Patient progessing with PT/OT- Gait: 280 ft + 90 ft (obstacle course incorporated) + 90 ft (obstacle course incorporated) + 245 ft (curb step incorporated); seated rest break between trials. Pt using no AD with SBA/close SPV maintained. CGA maintained to complete obstacle course/dynamic tasks. Continuing to follow and treat all acute and chronic conditions.    Past Medical History: Patient has a past medical history of Allergy, AR (allergic rhinitis), Basal cell carcinoma of ear (2006), BPH (benign prostatic hyperplasia), DDD (degenerative disc disease), lumbar, Degenerative disc disease, Gastroesophageal reflux disease (3/11/2019), Hypertension, Kidney stone, and Undescended testicle.    Past Surgical History: Patient has a past surgical history that includes Hemorrhoid surgery; Hernia repair; Right Orchiectomy; Pilonidal cyst drainage;  Transurethral resection of prostate (2010); Excision basal cell carcinoma; and Esophagogastroduodenoscopy (N/A, 10/5/2022).    Social History: Patient reports that he quit smoking about 28 years ago. His smoking use included cigarettes. He started smoking about 38 years ago. He has a 5 pack-year smoking history. He has never been exposed to tobacco smoke. He has never used smokeless tobacco. He reports current alcohol use. He reports that he does not use drugs.    Family History: family history includes Allergies in his mother; Dementia in his mother; Diabetes in his father; Heart disease in his father; Hypertension in his father.    Allergies: Patient is allergic to ace inhibitors, chlorthalidone, feathers, and mite extract.    ROS  Constitutional: Negative for fever   Respiratory: Negative for cough, shortness of breath   Cardiovascular: Negative for chest pain, palpitations, and leg swelling.   Gastrointestinal: Negative for abdominal pain, constipation, diarrhea, nausea, vomiting.   Genitourinary: Negative for dysuria, frequency   Musculoskeletal:  + generalized weakness. Negative for back pain and myalgias.   Neurological: Negative for dizziness, headaches.   Psychiatric/Behavioral: Negative for depression. The patient is not nervous/anxious.      24 hour Vital Sign Range   Temp:  [98.2 °F (36.8 °C)-98.5 °F (36.9 °C)]   Pulse:  [76-86]   Resp:  [16-17]   BP: (117-121)/(57-82)   SpO2:  [97 %]     Current BMI: Body mass index is 23.17 kg/m².    PEx  Constitutional: Patient appears debilitated.  No distress noted  Cardiovascular: Normal rate, regular rhythm and normal heart sounds.    Pulmonary/Chest: Effort normal and breath sounds are clear  Abdominal: Soft. Bowel sounds are normal.   Musculoskeletal: Normal range of motion.   Neurological: Alert and oriented to person, place, and time.   Psychiatric: Normal mood and affect. Behavior is normal.   Skin: Skin is warm and dry.     Recent Labs   Lab 01/06/25  5841  "     K 4.6      CO2 25   BUN 17   CREATININE 0.8   CALCIUM 8.6*   MG 2.0     Recent Labs   Lab 01/06/25  0445   WBC 5.03   RBC 3.08*   HGB 9.8*   HCT 29.2*      MCV 95   MCH 31.8*   MCHC 33.6     No results for input(s): "POCTGLUCOSE" in the last 168 hours.       Assessment and Plan:    Multifocal pneumonia, Improving  - CXR with improvement in bibasilar opacities  - Completed 5 days of ceftriaxone and azithromycin.    - tolerating room air, denies cough    Essential hypertension   - home regimen with metoprolol XL 25 mg daily, losartan 100 mg daily and amlodipine 5 mg daily  - BP soft, discontinue losartan 25 mg daily, continue to hold home amlodipine; Continue home metoprolol XL 25 mg daily with holding parameter    Hyponatremia  - asymptomatic  - continue monitor twice weekly BMPs  - resolved, continue Na Cl 1000 mg BID for now     Benign prostatic hyperplasia with urinary obstruction   - Continue home tamsulosin 0.4 mg daily     Seasonal allergies  - continue fluticasone nasal spray, cetirizine 10 mg daily    GERD  - continue Protonix 40 mg daily    Debility   - Continue with PT/OT for gait training and strengthening and restoration of ADL's   - Encourage mobility, OOB in chair, and early ambulation as appropriate  - Fall precautions   - Monitor for bowel and bladder dysfunction  - Monitor for and prevent skin breakdown and pressure ulcers  - Continue DVT prophylaxis with frequent ambulation      Anticipate disposition:  Home with home health    IP [facility] RISK OF UNPLANNED READMISSION Model: MODERATE     Follow-up needed during SNF stay-    Follow-up needed after discharge from SNF: PCP     Future Appointments   Date Time Provider Department Center   1/13/2025 10:40 AM [facility]   2/14/2025 10:00 AM [facility]MENT [facility]   2/17/2025 10:00 AM Johny Celis MD [facility] HEM ONC [facility]       I certify that SNF services are required to " be given on an inpatient basis because Jd Dill III needs for skilled nursing care and/or skilled rehabilitation are required on a daily basis and such services can only practically be provided in a skilled nursing facility setting and are for an ongoing condition for which she received inpatient care in the hospital.       Malina Montgomery NP  Department of Hospital Medicine   [facility]     DOS: 1/6/2025       Patient note was created using MModal Dictation.  Any errors in syntax or even information may not have been identified and edited on initial review prior to signing this note.

## 2025-01-06 NOTE — PT/OT/SLP PROGRESS
Occupational Therapy   Treatment    Name: Jd Dill III  MRN: [id number]  Admit Date: 12/26/2024  Admitting Diagnosis:  Multifocal pneumonia    General Precautions: Standard, fall   Orthopedic Precautions: N/A   Braces: N/A    Recommendations:     Discharge Recommendations:  home health OT  Level of Assistance Recommended at Discharge: Intermittent assistance for ADL's and homemaking tasks  Discharge Equipment Recommendations: walker, rolling  Barriers to discharge:  Decreased caregiver support    Assessment:     Jd Dill III is a 79 y.o. male with a medical diagnosis of Multifocal pneumonia.   Pt tolerated session well and without incident, but he continues to require assistance to safely perform self-care tasks and mobility.  He would continue to benefit from skilled OT services at SNF to maximize his gains in functional independence.  He presents with the following. Performance deficits affecting function are weakness, impaired endurance, impaired self care skills, impaired functional mobility, gait instability, impaired balance.     Rehab Potential is good    Activity tolerance:  Good    Plan:     Patient to be seen 5 x/week to address the above listed problems via self-care/home management, therapeutic activities, therapeutic exercises    Plan of Care Expires: 01/26/25  Plan of Care Reviewed with: patient    Subjective   Pt was pleasant and agreeable to therapy.  Communicated with: nurse prior to session.    Pain/Comfort:  Pain Rating 1: 0/10  Pain Rating Post-Intervention 1: 0/10    Patient's cultural, spiritual, Mandaeism conflicts given the current situation:  no    Objective:     Patient found HOB elevated with Other (comments) (no lines attached) upon OT entry to room.    Bed Mobility:    Patient completed Rolling/Turning to Right with modified independence  Patient completed Scooting/Bridging with modified independence  Patient completed Supine to Sit with modified independence     Functional  Mobility/Transfers:  Patient completed Sit <> Stand Transfer from EOB x 1 trial, from bedside sofa x 1 trial, and from w/c x 3 trials with supervision  with  no assistive device   Patient completed Bed <> Chair Transfer using Step Transfer technique with supervision with no assistive device  Patient completed Wheelchair <> Bedside Chair using Step Transfer technique with supervision with no assistive device  Functional Mobility: Pt ambulated ~360 ft total with supervision with no AD and with chair follow in the hallway.  He had no reports of dizziness.      Activities of Daily Living:  Feeding:  setup assistance to drink water while seated in w/c  Grooming: supervision to wash his hands while standing at the sink  Lower Body Dressing: setup assistance to doff non-skid socks and to don his socks and tennis shoes while seated on bedside sofa    Toileting: supervision to urinate into the toilet while standing    AMPAC 6 Click ADL: 23    OT Exercises: UE exercises performed to increase functional endurance and strength in order increase independence when performing self care tasks, functional ambulation, W/C propulsion, and functional standing activities.  Pt performed the following using 3 lb dowel while seated in w/c: 1 set of 15 reps of shoulder presses, 1 set of 15 reps of chest presses, and 1 set of 25 reps of biceps curls.  He then performed the following in standing using 3 lb dowel with SBA: 1 set of 25 reps of shoulder flex/ext, 1 set of 25 reps of forward chest presses, and 1 set of 30 reps of biceps curls.      Treatment & Education:  Pt performed visual perception activity with fine motor component in standing to address his standing endurance that's required for daily activities, such as ADLs and functional transfers.  He stood at the rehab gym counter for 13 min and 39 sec with supervision, completing the task.      Pt edu on role of OT, POC, safety when performing self care tasks, benefit of performing  OOB activity, and safety when performing functional transfers and mobility.  - Self care tasks completed-- as noted above      Patient left up in chair with call button in reach    GOALS:   Multidisciplinary Problems       Occupational Therapy Goals          Problem: Occupational Therapy    Goal Priority Disciplines Outcome Interventions   Occupational Therapy Goal     OT, PT/OT Progressing    Description: Goals to be met by: 1/26/2025     Patient will increase functional independence with ADLs by performing:  UBD: with MI seated in w/c- Ongoing  LBD: with CGA-SBA seated in w/c using AE as needed- Ongoing  FWM: SBA seated in w/c using AE as needed- Met  Bathing: SPV seated on bath bench  Toileting: with SPV during hygiene mgmt- Met 1/2/2025  Pt will complete 10 mins on UE ergometer seated in w/c  Pt will complete 5-8 mins standing with RW/SBA during dynamic standing activity - Met                       Time Tracking:     OT Date of Treatment: 01/06/25  OT Start Time: 0932    OT Stop Time: 1016  OT Total Time (min): 44 min    Billable Minutes:Therapeutic Activity 30 min  Therapeutic Exercise 14 min    1/6/2025

## 2025-01-06 NOTE — PLAN OF CARE
Problem: Occupational Therapy  Goal: Occupational Therapy Goal  Description: Goals to be met by: 1/26/2025     Patient will increase functional independence with ADLs by performing:  UBD: with MI seated in w/c- Ongoing  LBD: with CGA-SBA seated in w/c using AE as needed- Ongoing  FWM: SBA seated in w/c using AE as needed- Met  Bathing: SPV seated on bath bench  Toileting: with SPV during hygiene mgmt- Met 1/2/2025  Pt will complete 10 mins on UE ergometer seated in w/c  Pt will complete 5-8 mins standing with RW/SBA during dynamic standing activity - Met    Outcome: Progressing     Continue OT POC.

## 2025-01-07 ENCOUNTER — TELEPHONE (OUTPATIENT)
Dept: ADMINISTRATIVE | Facility: CLINIC | Age: 80
End: 2025-01-07
Payer: MEDICARE

## 2025-01-07 PROCEDURE — 97530 THERAPEUTIC ACTIVITIES: CPT

## 2025-01-07 PROCEDURE — 25000003 PHARM REV CODE 250: Performed by: NURSE PRACTITIONER

## 2025-01-07 PROCEDURE — 97116 GAIT TRAINING THERAPY: CPT

## 2025-01-07 PROCEDURE — 97110 THERAPEUTIC EXERCISES: CPT

## 2025-01-07 PROCEDURE — 25000003 PHARM REV CODE 250: Performed by: HOSPITALIST

## 2025-01-07 PROCEDURE — 11000004 HC SNF PRIVATE

## 2025-01-07 RX ADMIN — METOPROLOL SUCCINATE 25 MG: 25 TABLET, EXTENDED RELEASE ORAL at 08:01

## 2025-01-07 RX ADMIN — SENNOSIDES AND DOCUSATE SODIUM 1 TABLET: 50; 8.6 TABLET ORAL at 08:01

## 2025-01-07 RX ADMIN — SODIUM CHLORIDE 1000 MG: 1 TABLET ORAL at 08:01

## 2025-01-07 RX ADMIN — TAMSULOSIN HYDROCHLORIDE 0.8 MG: 0.4 CAPSULE ORAL at 08:01

## 2025-01-07 RX ADMIN — CETIRIZINE HYDROCHLORIDE 10 MG: 10 TABLET, FILM COATED ORAL at 08:01

## 2025-01-07 RX ADMIN — PANTOPRAZOLE SODIUM 40 MG: 40 TABLET, DELAYED RELEASE ORAL at 08:01

## 2025-01-07 RX ADMIN — FLUTICASONE PROPIONATE 50 MCG: 50 SPRAY, METERED NASAL at 08:01

## 2025-01-07 NOTE — PT/OT/SLP PROGRESS
Occupational Therapy   Treatment    Name: Jd Dill III  MRN: [id number]  Admit Date: 12/26/2024  Admitting Diagnosis:  Multifocal pneumonia    General Precautions: Standard, fall   Orthopedic Precautions: N/A   Braces: N/A    Recommendations:     Discharge Recommendations:  home health OT  Level of Assistance Recommended at Discharge: 24 hours supervision for safety in performing ADL's and home management tasks  Discharge Equipment Recommendations: walker, rolling  Barriers to discharge:  Decreased caregiver support    Assessment:     Jd Dill III is a 79 y.o. male with a medical diagnosis of Multifocal pneumonia. Pt tolerated session well and without incident and shows excellent motivation and potential to improve, but the pt continues to require assistance to perform self-care tasks and mobility. Pt strengths include Functional cognition and Attention to task and Motivation and Willingness to participate. However, pt would continue to benefit from cont'd OT services in the SNF setting to improve safety and independence /c functional tasks and ADLs upon discharge.  Performance deficits affecting function are weakness, impaired endurance, impaired self care skills, impaired functional mobility, gait instability, impaired balance.     Rehab Potential is good    Activity tolerance:  Good    Plan:     Patient to be seen 5 x/week to address the above listed problems via self-care/home management, therapeutic activities, therapeutic exercises    Plan of Care Expires: 01/26/25  Plan of Care Reviewed with: patient    Subjective     Communicated with: NSVALERIANO prior to session.   Pain/Comfort:  Pain Rating 1: 0/10  Pain Rating Post-Intervention 1: 0/10    Patient's cultural, spiritual, Rastafari conflicts given the current situation:  no    Objective:     Patient found up in chair with  (no active lines) in rehabilitation gym. Pt alert and agreeable to therapy.         Functional Mobility/Transfers:  Patient completed Sit  <> Stand Transfer with supervision  with  no assistive device     [facility] 6 Click ADL: 23    OT Exercises:   ~Pt participated in 12 minute UBE activity seated in WC at maximal resistance to address endurance and strength of UE to improve performance of ADLs and other functional tasks.     ~Pt participated in task to improve standing balance, alternating standing hip flexion,  visual scanning, sequencing, command follow, and gross motor coordination. Pt asked to alternate stepping laterally, cross-body, and anteriorly onto flat and elevated surfaces in pattern designated by OT. Pt able to complete /c CGA/Min A for standing balance. Pt noted to have difficulty /c balance during hip flexion and req max vcs for following correct step sequences.     ~Pt performed 3x12 reps of the following exercises while standing using BUE.     3 lb dowel- Chest presses, Shoulder presses, Biceps curls, Shoulder flexion, and Shoulder extension    2 lb dumbbells- Shoulder abduction/adduction and shoulder horizontal abduction/adduction    Pt req intermittent seated rest break between sets/circuits.     Treatment & Education:  Pt educated on POC, role of OT, and safety. Pt performed tasks to improve safety and independence in functional tasks and ADLs as mentioned above.       Patient left up in chair with  tech present and all needs met    GOALS:   Multidisciplinary Problems       Occupational Therapy Goals          Problem: Occupational Therapy    Goal Priority Disciplines Outcome Interventions   Occupational Therapy Goal     OT, PT/OT Progressing    Description: Goals to be met by: 1/26/2025     Patient will increase functional independence with ADLs by performing:  UBD: with MI seated in w/c- Ongoing  LBD: with CGA-SBA seated in w/c using AE as needed- Ongoing  FWM: SBA seated in w/c using AE as needed- Met  Bathing: SPV seated on bath bench  Toileting: with SPV during hygiene mgmt- Met 1/2/2025  Pt will complete 10 mins on UE ergometer  seated in w/c  Pt will complete 5-8 mins standing with RW/SBA during dynamic standing activity - Met                       Time Tracking:     OT Date of Treatment: 01/07/25  OT Start Time: 0943    OT Stop Time: 1022  OT Total Time (min): 39 min    Billable Minutes:Therapeutic Activity 15  Therapeutic Exercise 24    1/7/2025

## 2025-01-07 NOTE — PROGRESS NOTES
[facility]                                  Skilled Nursing Facility                   Progress Note     Admit Date: 12/26/2024  SIRIA 1/10/2025  [id number]  Principal Problem:  Multifocal pneumonia   HPI obtained from patient interview and chart review     Chief Complaint: Re-evaluation of medical treatment and therapy status    HPI:   Jd Dill III is a 79 y.o. male with PMH of BPH, GERD, hypertension transferred from clinic for tachycardia and generalized weakness.  Admitted to the hospital for sepsis due to multifocal pneumonia complicated by hyponatremia and liver dysfunction. Patient became debilitated during hospital stay due to illness.     Interval history:  24 hr vital sign ranges reviewed and listed below.  BP with improvement.  Tolerating room air.  Patient denies shortness of breath, abdominal discomfort, nausea, or vomiting.  Patient reports an adequate appetite.  Patient denies dysuria.  Patient reports having regular bowel movements.  Patient progessing with PT/OT- ambulated 158 feet from room to therapy gym with Supervision without an AD. Patient ambulated an additional 98 feet and 87 feet without AD with close SBA while navigating obstacles (see balance below). Patient ambulated 120 feet to/from curb step without an AD and with Supervision. Patient engaging in short distance gait to transition from one activity to another within gym throughout session. No LOB. Steady pace of gait. Seated rest breaks between trials. Continuing to follow and treat all acute and chronic conditions.    Past Medical History: Patient has a past medical history of Allergy, AR (allergic rhinitis), Basal cell carcinoma of ear (2006), BPH (benign prostatic hyperplasia), DDD (degenerative disc disease), lumbar, Degenerative disc disease, Gastroesophageal reflux disease (3/11/2019), Hypertension, Kidney stone, and Undescended testicle.    Past  Surgical History: Patient has a past surgical history that includes Hemorrhoid surgery; Hernia repair; Right Orchiectomy; Pilonidal cyst drainage; Transurethral resection of prostate (2010); Excision basal cell carcinoma; and Esophagogastroduodenoscopy (N/A, 10/5/2022).    Social History: Patient reports that he quit smoking about 28 years ago. His smoking use included cigarettes. He started smoking about 38 years ago. He has a 5 pack-year smoking history. He has never been exposed to tobacco smoke. He has never used smokeless tobacco. He reports current alcohol use. He reports that he does not use drugs.    Family History: family history includes Allergies in his mother; Dementia in his mother; Diabetes in his father; Heart disease in his father; Hypertension in his father.    Allergies: Patient is allergic to ace inhibitors, chlorthalidone, feathers, and mite extract.    ROS  Constitutional: Negative for fever   Respiratory: Negative for cough, shortness of breath   Cardiovascular: Negative for chest pain, palpitations, and leg swelling.   Gastrointestinal: Negative for abdominal pain, constipation, diarrhea, nausea, vomiting.   Genitourinary: Negative for dysuria, frequency   Musculoskeletal:  + generalized weakness. Negative for back pain and myalgias.   Neurological: Negative for dizziness, headaches.   Psychiatric/Behavioral: Negative for depression. The patient is not nervous/anxious.      24 hour Vital Sign Range   Temp:  [98 °F (36.7 °C)-98.1 °F (36.7 °C)]   Pulse:  [68-78]   Resp:  [16-18]   BP: (123-138)/(52-63)   SpO2:  [96 %-98 %]     Current BMI: Body mass index is 23.17 kg/m².    PEx  Constitutional: Patient appears debilitated.  No distress noted  Cardiovascular: Normal rate, regular rhythm and normal heart sounds.    Pulmonary/Chest: Effort normal and breath sounds are clear  Abdominal: Soft. Bowel sounds are normal.   Musculoskeletal: Normal range of motion.   Neurological: Alert and oriented to  "person, place, and time.   Psychiatric: Normal mood and affect. Behavior is normal.   Skin: Skin is warm and dry.     No results for input(s): "GLUCOSE", "NA", "K", "CL", "CO2", "BUN", "CREATININE", "CALCIUM", "MG" in the last 24 hours.    No results for input(s): "WBC", "RBC", "HGB", "HCT", "PLT", "MCV", "MCH", "MCHC" in the last 24 hours.    No results for input(s): "POCTGLUCOSE" in the last 168 hours.       Assessment and Plan:    Multifocal pneumonia, Improving  - CXR with improvement in bibasilar opacities  - Completed 5 days of ceftriaxone and azithromycin.    - tolerating room air, denies cough    Essential hypertension   - home regimen with metoprolol XL 25 mg daily, losartan 100 mg daily and amlodipine 5 mg daily  - BP improved, continue to hold losartan 25 mg daily, and amlodipine; Continue home metoprolol XL 25 mg daily with holding parameter    Hyponatremia  - asymptomatic  - continue monitor twice weekly BMPs  - resolved, continue Na Cl 1000 mg BID for now     Benign prostatic hyperplasia with urinary obstruction   - Continue home tamsulosin 0.4 mg daily    Chronic anemia  - patient follows as outpatient with hepatology every 6 months  - transfuse hemoglobin < 7  - continue monitor twice weekly CBC     Seasonal allergies  - continue fluticasone nasal spray, cetirizine 10 mg daily    GERD  - continue Protonix 40 mg daily    Debility   - Continue with PT/OT for gait training and strengthening and restoration of ADL's   - Encourage mobility, OOB in chair, and early ambulation as appropriate  - Fall precautions   - Monitor for bowel and bladder dysfunction  - Monitor for and prevent skin breakdown and pressure ulcers  - Continue DVT prophylaxis with frequent ambulation      Anticipate disposition:  Home with home health    IP [facility] RISK OF UNPLANNED READMISSION Model: MODERATE     Follow-up needed during SNF stay-    Follow-up needed after discharge from SNF: PCP (1/13)    Future Appointments   Date Time " Provider Department Center   1/13/2025 10:40 AM [facility]   2/14/2025 10:00 AM [facility]MENT [facility]   2/17/2025 10:00 AM Johny Celis MD [facility]     I certify that SNF services are required to be given on an inpatient basis because Jd Dill III needs for skilled nursing care and/or skilled rehabilitation are required on a daily basis and such services can only practically be provided in a skilled nursing facility setting and are for an ongoing condition for which she received inpatient care in the hospital.     I spent  46 minutes reviewing patient records, examining, and counseling the patient/ patient's family with greater than 50% of the time spent in direct patient care and coordination.  Care coordination with staff      Malina Montgomery NP  Department of Hospital Medicine   [facility]     DOS: 1/7/2025       Patient note was created using MModal Dictation.  Any errors in syntax or even information may not have been identified and edited on initial review prior to signing this note.

## 2025-01-07 NOTE — PLAN OF CARE
"Patient with gradual progression of mobility toward goals as per POC. Continue with POC.    Problem: Physical Therapy  Goal: Physical Therapy Goal  Description: Goals to be met by: 25       Patient will increase functional independence with mobility by performin. Supine to sit with [place]  2. Sit to supine with [place]  3. Rolling to Left and Right with [place]  4. Sit to stand transfer with Modified [place] - in progress  5. Bed to chair transfer with Modified [place] using Rolling Walker/without AD - in progress  6. Gait  x 200 feet with Modified [place] using Rolling Walker - in progress  7. Gait x 50 feet (progress as tolerated) with SBA without an AD-met  Updated 2024 Gait x 200 feet (progress as tolerated) with (I) without an AD - in progress    8. Wheelchair propulsion x 200 feet with Modified [place] using bilateral upper extremities  9. Ascend/descend 12 stair with bilateral Handrails and Supervision using No Assistive Device - MET  Updated Goal: Ascend/descend 12 steps with BHR and Mod I using no AD    10. Ascend/Descend 4 inch curb step with Supervision using Rolling Walker - in progress    Rehab Services' DME recommendations    Walker Adult (5'1"-6"6")    Accessories N/A  Wheels Yes  Justification for walker: Mobility limitation cannot be sufficiently resolved by use of a cane/patient cannot safely ambulate with a cane, Patient's functional mobility deficit can be sufficiently resolved with the use of a Rolling Walker of Walker, Patient's mobility limitation significantly impairs their ability to participate in one of more activities of daily living, The use of a Rolling Walker or Walker will significantly improve the patient's ability to participate in MRADLS and the patient will use it on a regular basis    Outcome: Progressing   2025    "

## 2025-01-07 NOTE — PLAN OF CARE
Interdisciplinary team, Yoly Wilde , RN Charge Nurse, Susy Conde LPN, , Candi Martinez, PT, Rehab, Malgorzata Saldivar, Activities, and Joellen Kim, Dietician, spoke to patient and patient' cousin, Emory, via phone for care plan conference, weekly status update, and therapy progress update. Tentative discharge date set for 1/10/24 at 11:30 am.    Preferred Home Health: [facility]  Preferred Pharmacy: [facility]  Discharge Disposition: Home alone

## 2025-01-07 NOTE — TREATMENT PLAN
Family Training     Patient Name:  Jd Dill III   MRN:  [id number]  Admit Date: 12/26/2024       unable to schedule family training due to no family/caregiver availability.     1/7/2025

## 2025-01-07 NOTE — PT/OT/SLP PROGRESS
"Physical Therapy Treatment    Patient Name:  Jd Dill III   MRN:  [id number]  Admit Date: 12/26/2024  Admitting Diagnosis: Multifocal pneumonia  Recent Surgeries: N/A    General Precautions: Standard, fall  Orthopedic Precautions: N/A  Braces: N/A    Recommendations:     Discharge Recommendations: home health PT  Level of Assistance Recommended at Discharge: Intermittent assistance   Discharge Equipment Recommendations: walker, rolling  Barriers to discharge: Decreased caregiver support    Assessment:     Jd Dill III is a 79 y.o. male admitted with a medical diagnosis of Multifocal pneumonia. Patient agreeable to PT treatment this AM. Patient able to ambulate around unit and within therapy gym with Supervision and no AD. Patient without LOB while navigating obstacles and stepping over various height objects with SBA and no AD. Patient pleasant and motivated to engage in therapy. Patient will benefit from continued SNF rehabilitation services to address deficits as well as progress mobility towards PLOF and increased functional independence for safe transition to home environment at time of discharge.       Performance deficits affecting function: gait instability, impaired balance, impaired functional mobility.    Rehab Potential is good    Activity Tolerance: Good    Plan:     Patient to be seen 5 x/week to address the above listed problems via gait training, therapeutic activities, therapeutic exercises, neuromuscular re-education, wheelchair management/training    Plan of Care Expires: 01/26/25  Plan of Care Reviewed with: patient    Subjective     "Oh yes, I am ready."     Pain/Comfort:  Pain Rating 1: 0/10  Pain Rating Post-Intervention 1: 0/10    Patient's cultural, spiritual, Zoroastrian conflicts given the current situation:  no    Objective:     Communicated with nursing staff prior to session.  Patient found  seated on commode in room  with  (no active lines) upon PT entry to room.     Therapeutic " "Activities and Exercises:   PT providing patient with SBA/Supervision while patient managing pants in standing following bowel episode. Patient able to perform hand hygiene standing at sink with SBA/Supervision.     Standing BLE exercises x 15-20 reps including alternating hip flexion marches, heel raises, hip abduction and mini squats. Patient with BUE support on railings at base of steps.     Recumbent cross  - level 5 x 10 minutes for LE strengthening, ROM, and endurance. No rest break required.     Functional Mobility:  Transfers:     Sit to Stand:  supervision with no AD  Toilet Transfer: supervision with  rolling walker and grab bars  using  Step Transfer  Gait: Patient ambulated 158 feet from room to therapy gym with Supervision without an AD. Patient ambulated an additional 98 feet and 87 feet without AD with close SBA while navigating obstacles (see balance below). Patient ambulated 120 feet to/from curb step without an AD and with Supervision. Patient engaging in short distance gait to transition from one activity to another within gym throughout session. No LOB. Steady pace of gait. Seated rest breaks between trials.   Balance: Static standing without an AD with Supervision. Patient engaged in dynamic standing balance while navigating obstacles during ambulation with no AD and with close SBA. No LOB. Patient stepping over small and large bolster and weaving around cones. Patient lifted 3 lb weight from floor and carried in R hand at end of initial obstacle trial and throughout entire second trial.   Stairs:  Pt ascended/descended 4" curb step with No Assistive Device with no handrails with Supervision or Set-up Assistance and Stand-by Assistance. Patient completed x 2 consecutive trials. Patient ascended/descended 12 steps using BHR with reciprocal stepping pattern and Supervision.     AM-PAC 6 CLICK MOBILITY  20    Patient left up in chair with  OT present.    GOALS:   Multidisciplinary Problems  " "     Physical Therapy Goals          Problem: Physical Therapy    Goal Priority Disciplines Outcome Interventions   Physical Therapy Goal     PT, PT/OT Progressing    Description: Goals to be met by: 25       Patient will increase functional independence with mobility by performin. Supine to sit with [place]  2. Sit to supine with [place]  3. Rolling to Left and Right with [place]  4. Sit to stand transfer with Modified [place] - in progress  5. Bed to chair transfer with Modified [place] using Rolling Walker/without AD - in progress  6. Gait  x 200 feet with Modified [place] using Rolling Walker - in progress  7. Gait x 50 feet (progress as tolerated) with SBA without an AD-met  Updated 2024 Gait x 200 feet (progress as tolerated) with (I) without an AD - in progress    8. Wheelchair propulsion x 200 feet with Modified [place] using bilateral upper extremities  9. Ascend/descend 12 stair with bilateral Handrails and Supervision using No Assistive Device - MET  Updated Goal: Ascend/descend 12 steps with BHR and Mod I using no AD    10. Ascend/Descend 4 inch curb step with Supervision using Rolling Walker - in progress    Rehab Services' DME recommendations    Walker Adult (5'1"-6"6")    Accessories N/A  Wheels Yes  Justification for walker: Mobility limitation cannot be sufficiently resolved by use of a cane/patient cannot safely ambulate with a cane, Patient's functional mobility deficit can be sufficiently resolved with the use of a Rolling Walker of Walker, Patient's mobility limitation significantly impairs their ability to participate in one of more activities of daily living, The use of a Rolling Walker or Walker will significantly improve the patient's ability to participate in MRADLS and the patient will use it on a regular basis    Candi Martinez, PT 2025                               Time Tracking:     PT Received On: 25  PT Start Time: 0854  PT " Stop Time: 0934  PT Total Time (min): 40 min    Billable Minutes: Gait Training 17, Therapeutic Activity 8, and Therapeutic Exercise 15    Treatment Type: Treatment  PT/PTA: PT     Number of PTA visits since last PT visit: 0     01/07/2025

## 2025-01-08 PROCEDURE — 11000004 HC SNF PRIVATE

## 2025-01-08 PROCEDURE — 25000003 PHARM REV CODE 250: Performed by: HOSPITALIST

## 2025-01-08 PROCEDURE — 97530 THERAPEUTIC ACTIVITIES: CPT | Mod: CQ

## 2025-01-08 PROCEDURE — 25000003 PHARM REV CODE 250: Performed by: NURSE PRACTITIONER

## 2025-01-08 PROCEDURE — 97110 THERAPEUTIC EXERCISES: CPT

## 2025-01-08 PROCEDURE — 97116 GAIT TRAINING THERAPY: CPT | Mod: CQ

## 2025-01-08 PROCEDURE — 97110 THERAPEUTIC EXERCISES: CPT | Mod: CQ

## 2025-01-08 PROCEDURE — 97535 SELF CARE MNGMENT TRAINING: CPT

## 2025-01-08 RX ADMIN — METOPROLOL SUCCINATE 25 MG: 25 TABLET, EXTENDED RELEASE ORAL at 10:01

## 2025-01-08 RX ADMIN — SENNOSIDES AND DOCUSATE SODIUM 1 TABLET: 50; 8.6 TABLET ORAL at 10:01

## 2025-01-08 RX ADMIN — SODIUM CHLORIDE 1000 MG: 1 TABLET ORAL at 08:01

## 2025-01-08 RX ADMIN — SENNOSIDES AND DOCUSATE SODIUM 1 TABLET: 50; 8.6 TABLET ORAL at 08:01

## 2025-01-08 RX ADMIN — TAMSULOSIN HYDROCHLORIDE 0.8 MG: 0.4 CAPSULE ORAL at 10:01

## 2025-01-08 RX ADMIN — CETIRIZINE HYDROCHLORIDE 10 MG: 10 TABLET, FILM COATED ORAL at 10:01

## 2025-01-08 RX ADMIN — SODIUM CHLORIDE 1000 MG: 1 TABLET ORAL at 10:01

## 2025-01-08 RX ADMIN — FLUTICASONE PROPIONATE 50 MCG: 50 SPRAY, METERED NASAL at 10:01

## 2025-01-08 RX ADMIN — PANTOPRAZOLE SODIUM 40 MG: 40 TABLET, DELAYED RELEASE ORAL at 10:01

## 2025-01-08 NOTE — PT/OT/SLP PROGRESS
Physical Therapy Treatment    Patient Name:  Jd Dill III   MRN:  [id number]  Admit Date: 12/26/2024  Admitting Diagnosis: Multifocal pneumonia  Recent Surgeries: N/A    General Precautions: Standard, fall  Orthopedic Precautions: N/A  Braces: N/A    Recommendations:     Discharge Recommendations: home health PT  Level of Assistance Recommended at Discharge: Intermittent assistance   Discharge Equipment Recommendations: walker, rolling  Barriers to discharge: Decreased caregiver support    Assessment:     Jd Dill III is a 79 y.o. male admitted with a medical diagnosis of Multifocal pneumonia. Pt tolerated treatment well and without incident. Focus of session placed on completing functional mobility tasks for GG scoring. Pt with primary need for SPV throughout session however SBA maintained when ambulating over uneven surface and picking up objects without AD. Pt tolerating increased resistance on recumbent cross  without issue. Pt will continue to benefit from skilled PT services in order to further promote functional mobility, endurance, and BLE strengthening. Pt remains appropriate for PT treatment at this time.      Performance deficits affecting function: gait instability, impaired balance, impaired functional mobility.    Rehab Potential is good    Activity Tolerance: Good    Plan:     Patient to be seen 5 x/week to address the above listed problems via gait training, therapeutic activities, therapeutic exercises, neuromuscular re-education, wheelchair management/training    Plan of Care Expires: 01/26/25  Plan of Care Reviewed with: patient    Subjective     Pt agreeable to treatment.     Pain/Comfort:  Pain Rating 1: 0/10  Pain Rating Post-Intervention 1: 0/10    Patient's cultural, spiritual, Islam conflicts given the current situation:  no    Objective:     Communicated with nursing prior to session.  Patient found up in chair with  (no active lines) upon PT entry to room.     Therapeutic  "Activities and Exercises: Recumbent cross  x 10 minutes at min-mod resistance incorporated to promote BLE strengthening, tissue extensibility, endurance and functional mobility. Rest break x 2 taken (Level 6, arms 8, and seat 7).     Patient educated on role of therapy, goals of session, and benefits of out of bed mobility.   Instructed on use of call button and importance of calling nursing staff for assistance with mobility   Questions/concerns addressed within PTA scope of practice.  Pt verbalized understanding.    Functional Mobility:  Bed Mobility:     Rolling Left:  supervision  Rolling Right: supervision  Scooting: independence  Supine to Sit: independence  Sit to Supine: independence  Transfers:     Sit to Stand:  supervision progressing to mod I with no AD  Bed <> Chair: SPV progressing to modified independence with  no AD  using  Stand Pivot  Chair to Nustep: SPV no AD using stand pivot   Gait: 195 ft + 100 ft (10 ft uneven surface and 12 stairs incorporated) + 180 ft using no AD with SPV; SBA to ambulate over uneven surface. Rest breaks between trials, no LOB.  Balance: static/dynamic standing no AD to tie drawstring of pants with SPV  Stairs:  Pt ascended/descended 12 stair(s) and 4" curb step with No Assistive Device with bilateral handrails (on stairs) with Supervision or Set-up Assistance.   Wheelchair Propulsion:  Pt propelled Light weight wheelchair x 185 feet on Level tile with  Bilateral upper extremity with Modified Independent.     AM-PAC 6 CLICK MOBILITY  22    Patient left up in chair with call button in reach and all needs met .    GOALS:   Multidisciplinary Problems       Physical Therapy Goals          Problem: Physical Therapy    Goal Priority Disciplines Outcome Interventions   Physical Therapy Goal     PT, PT/OT Progressing    Description: Goals to be met by: 25       Patient will increase functional independence with mobility by performin. Supine to sit with " "[place]  2. Sit to supine with [place]  3. Rolling to Left and Right with [place]  4. Sit to stand transfer with Modified [place] - in progress  5. Bed to chair transfer with Modified [place] using Rolling Walker/without AD - in progress  6. Gait  x 200 feet with Modified [place] using Rolling Walker - in progress  7. Gait x 50 feet (progress as tolerated) with SBA without an AD-met  Updated 12/31/2024 Gait x 200 feet (progress as tolerated) with (I) without an AD - in progress    8. Wheelchair propulsion x 200 feet with Modified [place] using bilateral upper extremities  9. Ascend/descend 12 stair with bilateral Handrails and Supervision using No Assistive Device - MET  Updated Goal: Ascend/descend 12 steps with BHR and Mod I using no AD    10. Ascend/Descend 4 inch curb step with Supervision using Rolling Walker - in progress    Rehab Services' DME recommendations    Walker Adult (5'1"-6"6")    Accessories N/A  Wheels Yes  Justification for walker: Mobility limitation cannot be sufficiently resolved by use of a cane/patient cannot safely ambulate with a cane, Patient's functional mobility deficit can be sufficiently resolved with the use of a Rolling Walker of Walker, Patient's mobility limitation significantly impairs their ability to participate in one of more activities of daily living, The use of a Rolling Walker or Walker will significantly improve the patient's ability to participate in MRADLS and the patient will use it on a regular basis    Candi Martinez, PT 1/7/2025                               Time Tracking:     PT Received On: 01/08/25  PT Start Time: 1141  PT Stop Time: 1220  PT Total Time (min): 39 min    Billable Minutes: Gait Training 18, Therapeutic Activity 11, and Therapeutic Exercise 10    Treatment Type: Treatment  PT/PTA: PTA     Number of PTA visits since last PT visit: 1 01/08/2025  "

## 2025-01-08 NOTE — PT/OT/SLP PROGRESS
Occupational Therapy   Treatment    Name: Jd Dill III  MRN: [id number]  Admit Date: 12/26/2024  Admitting Diagnosis:  Multifocal pneumonia    General Precautions: Standard, fall   Orthopedic Precautions: N/A   Braces: N/A    Recommendations:     Discharge Recommendations:  home health OT  Level of Assistance Recommended at Discharge: Intermittent supervision  Discharge Equipment Recommendations: walker, rolling  Barriers to discharge:  Decreased caregiver support    Assessment:     Jd Dill III is a 79 y.o. male with a medical diagnosis of Multifocal pneumonia. Pt tolerated session well and without incident and shows excellent motivation and potential to improve, but the pt continues to require assistance to perform self-care tasks and mobility. Pt strengths include Functional cognition, Following multi-step tasks, and Attention to task and PLOF, Motivation, and Willingness to participate. Pt improved performance of all ADLs. However, pt would continue to benefit from cont'd OT services in the SNF setting to improve safety and independence /c functional tasks and ADLs upon discharge.   Performance deficits affecting function are weakness, impaired endurance, impaired self care skills, impaired functional mobility, gait instability, impaired balance.     Rehab Potential is good    Activity tolerance:  Good    Plan:     Patient to be seen 5 x/week to address the above listed problems via self-care/home management, therapeutic activities, therapeutic exercises    Plan of Care Expires: 01/26/25  Plan of Care Reviewed with: patient    Subjective     Communicated with: NANETTE prior to session.   Pain/Comfort:  Pain Rating 1: 0/10  Pain Rating Post-Intervention 1: 0/10    Patient's cultural, spiritual, Yazidi conflicts given the current situation:  no    Objective:     Patient found supine with  (no active lines) upon OT entry to room. Pt alert and agreeable to therapy.     Eating   Was the activity attempted? Yes    Was the activity done independently? Yes   Was adaptive equipment used? Yes   CARE Score - Eating 6   Oral Hygiene   Was the activity attempted? Yes   Was the activity done independently? Yes  (Mod I standing at sink)   Was adaptive equipment used? No   CARE Score - Oral Hygiene 6   Toileting Hygiene   Was the activity attempted? Yes   Was the activity done independently? Yes   Was adaptive equipment used? No   CARE Score - Toileting Hygiene 6   Shower/Bathe Self   Was the activity attempted? Yes   Was the activity done independently? No   Assistance Needed Supervision  (SBA standing in shower.  Pt educated on sitting in SC to clean B lower legs and feet and to dry for safety. GB used for standing balance.)   Was adaptive equipment used? Yes   CARE Score - Shower/Bathe Self 4   Upper Body Dressing   Was the activity attempted? Yes   Was the activity done independently? Yes   Was adaptive equipment used? No   CARE Score - Upper Body Dressing 6   Lower Body Dressing   Was the activity attempted? Yes   Was the activity done independently? No   Assistance Needed Supervision  (SPV to doff/don underwear and pants)   Was adaptive equipment used? No   CARE Score - Lower Body Dressing 4   Putting On/Taking Off Footwear   Was the activity attempted? Yes   Was the activity done independently? Yes   Was adaptive equipment used? No   CARE Score - Putting On/Taking Off Footwear 6   Personal Hygiene   Was the activity attempted? Yes   Was the activity done independently? Yes  (Mod I standing at sink)   Was adaptive equipment used? No   CARE Score - Personal Hygiene 6   Sit to Stand   Was the activity attempted? Yes   Was the activity done independently? No   Assistance Needed Supervision  (SPV /c no AD)   Was adaptive equipment used? No   CARE Score - Sit to Stand 4   Chair/Bed-to-Chair Transfer   Was the activity attempted? Yes   Was the activity done independently? No   Assistance Needed Supervision  (SPV /c no AD)   Was  adaptive equipment used? No   CARE Score - Chair/Bed-to-Chair Transfer 4   Toilet Transfer   Was the activity attempted? Yes   Was the activity done independently? No   Assistance Needed Supervision  (SPV /c no AD)   Was adaptive equipment used? No   CARE Score - Toilet Transfer 4   Tub/Shower Transfer   Was the activity attempted? Yes   Was the activity done independently? No   Assistance Needed Supervision  (SBA /c no AD)   Was adaptive equipment used? No   CARE Score - Tub/Shower Transfer 4     AMPAC 6 Click ADL: 23    OT Exercises:     ~Pt preformed 1x10 standing marches and kicks /c BLE. CGA for standing balance.    Treatment & Education:  Pt educated on POC, role of OT, and safety. Pt performed tasks to improve safety and independence in functional tasks and ADLs as mentioned above.       Patient left up in chair with call button in reach and all needs met    GOALS:   Multidisciplinary Problems       Occupational Therapy Goals          Problem: Occupational Therapy    Goal Priority Disciplines Outcome Interventions   Occupational Therapy Goal     OT, PT/OT Progressing    Description: Goals to be met by: 1/26/2025     Patient will increase functional independence with ADLs by performing:  UBD: with MI seated in w/c- Met  LBD: with CGA-SBA seated in w/c using AE as needed- Met  FWM: SBA seated in w/c using AE as needed- Met  Bathing: SPV seated on bath bench- Not met  Toileting: with SPV during hygiene mgmt- Met 1/2/2025  Pt will complete 10 mins on UE ergometer seated in w/c  Pt will complete 5-8 mins standing with RW/SBA during dynamic standing activity - Met                       Time Tracking:     OT Date of Treatment: 01/08/25  OT Start Time: 0857    OT Stop Time: 0935  OT Total Time (min): 38 min    Billable Minutes:Self Care/Home Management 30  Therapeutic Exercise 8    1/8/2025

## 2025-01-08 NOTE — PLAN OF CARE
Problem: Adult Inpatient Plan of Care  Goal: Plan of Care Review  Outcome: Progressing  Flowsheets (Taken 1/7/2025 1926)  Plan of Care Reviewed With: patient  Goal: Patient-Specific Goal (Individualized)  Outcome: Progressing  Goal: Absence of Hospital-Acquired Illness or Injury  Outcome: Progressing  Goal: Optimal Comfort and Wellbeing  Outcome: Progressing  Goal: Readiness for Transition of Care  Outcome: Progressing     Problem: Adult Inpatient Plan of Care  Goal: Patient-Specific Goal (Individualized)  Outcome: Progressing     Problem: Adult Inpatient Plan of Care  Goal: Absence of Hospital-Acquired Illness or Injury  Outcome: Progressing     Problem: Adult Inpatient Plan of Care  Goal: Optimal Comfort and Wellbeing  Outcome: Progressing     Problem: Adult Inpatient Plan of Care  Goal: Readiness for Transition of Care  Outcome: Progressing     Problem: Diabetes Comorbidity  Goal: Blood Glucose Level Within Targeted Range  Outcome: Progressing     Problem: Sepsis/Septic Shock  Goal: Optimal Coping  Outcome: Progressing  Goal: Absence of Bleeding  Outcome: Progressing  Goal: Blood Glucose Level Within Targeted Range  Outcome: Progressing  Goal: Absence of Infection Signs and Symptoms  Outcome: Progressing  Goal: Optimal Nutrition Intake  Outcome: Progressing     Problem: Sepsis/Septic Shock  Goal: Absence of Bleeding  Outcome: Progressing     Problem: Sepsis/Septic Shock  Goal: Blood Glucose Level Within Targeted Range  Outcome: Progressing     Problem: Sepsis/Septic Shock  Goal: Absence of Infection Signs and Symptoms  Outcome: Progressing     Problem: Sepsis/Septic Shock  Goal: Optimal Nutrition Intake  Outcome: Progressing     Problem: Pneumonia  Goal: Fluid Balance  Outcome: Progressing  Goal: Resolution of Infection Signs and Symptoms  Outcome: Progressing  Goal: Effective Oxygenation and Ventilation  Outcome: Progressing     Problem: Pneumonia  Goal: Resolution of Infection Signs and Symptoms  Outcome:  Progressing     Problem: Pneumonia  Goal: Effective Oxygenation and Ventilation  Outcome: Progressing     Problem: Wound  Goal: Optimal Coping  Outcome: Progressing  Goal: Optimal Functional Ability  Outcome: Progressing  Goal: Absence of Infection Signs and Symptoms  Outcome: Progressing  Goal: Improved Oral Intake  Outcome: Progressing  Goal: Optimal Pain Control and Function  Outcome: Progressing  Goal: Skin Health and Integrity  Outcome: Progressing  Goal: Optimal Wound Healing  Outcome: Progressing     Problem: Wound  Goal: Optimal Functional Ability  Outcome: Progressing     Problem: Wound  Goal: Absence of Infection Signs and Symptoms  Outcome: Progressing     Problem: Wound  Goal: Improved Oral Intake  Outcome: Progressing     Problem: Wound  Goal: Optimal Pain Control and Function  Outcome: Progressing     Problem: Wound  Goal: Skin Health and Integrity  Outcome: Progressing     Problem: Wound  Goal: Optimal Wound Healing  Outcome: Progressing     Problem: Skin Injury Risk Increased  Goal: Skin Health and Integrity  Outcome: Progressing

## 2025-01-08 NOTE — PLAN OF CARE
Problem: Occupational Therapy  Goal: Occupational Therapy Goal  Description: Goals to be met by: 1/26/2025     Patient will increase functional independence with ADLs by performing:  UBD: with MI seated in w/c- Met  LBD: with CGA-SBA seated in w/c using AE as needed- Met  FWM: SBA seated in w/c using AE as needed- Met  Bathing: SPV seated on bath bench- Not met  Toileting: with SPV during hygiene mgmt- Met 1/2/2025  Pt will complete 10 mins on UE ergometer seated in w/c  Pt will complete 5-8 mins standing with RW/SBA during dynamic standing activity - Met  Outcome: Progressing

## 2025-01-09 LAB
ALBUMIN SERPL BCP-MCNC: 2.9 G/DL (ref 3.5–5.2)
ALP SERPL-CCNC: 84 U/L (ref 40–150)
ALT SERPL W/O P-5'-P-CCNC: 20 U/L (ref 10–44)
ANION GAP SERPL CALC-SCNC: 7 MMOL/L (ref 8–16)
AST SERPL-CCNC: 17 U/L (ref 10–40)
BASOPHILS # BLD AUTO: 0.02 K/UL (ref 0–0.2)
BASOPHILS NFR BLD: 0.4 % (ref 0–1.9)
BILIRUB SERPL-MCNC: 0.3 MG/DL (ref 0.1–1)
BUN SERPL-MCNC: 16 MG/DL (ref 8–23)
CALCIUM SERPL-MCNC: 9.1 MG/DL (ref 8.7–10.5)
CHLORIDE SERPL-SCNC: 105 MMOL/L (ref 95–110)
CO2 SERPL-SCNC: 25 MMOL/L (ref 23–29)
CREAT SERPL-MCNC: 0.8 MG/DL (ref 0.5–1.4)
DIFFERENTIAL METHOD BLD: ABNORMAL
EOSINOPHIL # BLD AUTO: 0.3 K/UL (ref 0–0.5)
EOSINOPHIL NFR BLD: 6.3 % (ref 0–8)
ERYTHROCYTE [DISTWIDTH] IN BLOOD BY AUTOMATED COUNT: 15.1 % (ref 11.5–14.5)
EST. GFR  (NO RACE VARIABLE): >60 ML/MIN/1.73 M^2
GLUCOSE SERPL-MCNC: 100 MG/DL (ref 70–110)
HCT VFR BLD AUTO: 32.1 % (ref 40–54)
HGB BLD-MCNC: 10.5 G/DL (ref 14–18)
IMM GRANULOCYTES # BLD AUTO: 0.01 K/UL (ref 0–0.04)
IMM GRANULOCYTES NFR BLD AUTO: 0.2 % (ref 0–0.5)
LYMPHOCYTES # BLD AUTO: 1.3 K/UL (ref 1–4.8)
LYMPHOCYTES NFR BLD: 24.1 % (ref 18–48)
MAGNESIUM SERPL-MCNC: 2.1 MG/DL (ref 1.6–2.6)
MCH RBC QN AUTO: 31.7 PG (ref 27–31)
MCHC RBC AUTO-ENTMCNC: 32.7 G/DL (ref 32–36)
MCV RBC AUTO: 97 FL (ref 82–98)
MONOCYTES # BLD AUTO: 0.9 K/UL (ref 0.3–1)
MONOCYTES NFR BLD: 17.1 % (ref 4–15)
NEUTROPHILS # BLD AUTO: 2.8 K/UL (ref 1.8–7.7)
NEUTROPHILS NFR BLD: 51.9 % (ref 38–73)
NRBC BLD-RTO: 0 /100 WBC
PHOSPHATE SERPL-MCNC: 3.5 MG/DL (ref 2.7–4.5)
PLATELET # BLD AUTO: 182 K/UL (ref 150–450)
PMV BLD AUTO: 10.7 FL (ref 9.2–12.9)
POTASSIUM SERPL-SCNC: 4.5 MMOL/L (ref 3.5–5.1)
PROT SERPL-MCNC: 5.9 G/DL (ref 6–8.4)
RBC # BLD AUTO: 3.31 M/UL (ref 4.6–6.2)
SODIUM SERPL-SCNC: 137 MMOL/L (ref 136–145)
WBC # BLD AUTO: 5.44 K/UL (ref 3.9–12.7)

## 2025-01-09 PROCEDURE — 97116 GAIT TRAINING THERAPY: CPT

## 2025-01-09 PROCEDURE — 85025 COMPLETE CBC W/AUTO DIFF WBC: CPT | Performed by: HOSPITALIST

## 2025-01-09 PROCEDURE — 11000004 HC SNF PRIVATE

## 2025-01-09 PROCEDURE — 97110 THERAPEUTIC EXERCISES: CPT

## 2025-01-09 PROCEDURE — 83735 ASSAY OF MAGNESIUM: CPT | Performed by: HOSPITALIST

## 2025-01-09 PROCEDURE — 80053 COMPREHEN METABOLIC PANEL: CPT | Performed by: FAMILY MEDICINE

## 2025-01-09 PROCEDURE — 97530 THERAPEUTIC ACTIVITIES: CPT

## 2025-01-09 PROCEDURE — 25000003 PHARM REV CODE 250: Performed by: HOSPITALIST

## 2025-01-09 PROCEDURE — 36415 COLL VENOUS BLD VENIPUNCTURE: CPT | Performed by: HOSPITALIST

## 2025-01-09 PROCEDURE — 84100 ASSAY OF PHOSPHORUS: CPT | Performed by: HOSPITALIST

## 2025-01-09 PROCEDURE — 25000003 PHARM REV CODE 250: Performed by: NURSE PRACTITIONER

## 2025-01-09 RX ORDER — LOSARTAN POTASSIUM 25 MG/1
25 TABLET ORAL DAILY
Start: 2025-01-09 | End: 2025-01-13

## 2025-01-09 RX ORDER — AMOXICILLIN 250 MG
1 CAPSULE ORAL 2 TIMES DAILY PRN
COMMUNITY
Start: 2025-01-09

## 2025-01-09 RX ORDER — AMLODIPINE BESYLATE 5 MG/1
5 TABLET ORAL
Status: CANCELLED
Start: 2025-01-09

## 2025-01-09 RX ORDER — ACETAMINOPHEN 325 MG/1
650 TABLET ORAL EVERY 6 HOURS PRN
COMMUNITY
Start: 2025-01-09

## 2025-01-09 RX ADMIN — METOPROLOL SUCCINATE 25 MG: 25 TABLET, EXTENDED RELEASE ORAL at 08:01

## 2025-01-09 RX ADMIN — FLUTICASONE PROPIONATE 50 MCG: 50 SPRAY, METERED NASAL at 08:01

## 2025-01-09 RX ADMIN — SENNOSIDES AND DOCUSATE SODIUM 1 TABLET: 50; 8.6 TABLET ORAL at 08:01

## 2025-01-09 RX ADMIN — TAMSULOSIN HYDROCHLORIDE 0.8 MG: 0.4 CAPSULE ORAL at 08:01

## 2025-01-09 RX ADMIN — PANTOPRAZOLE SODIUM 40 MG: 40 TABLET, DELAYED RELEASE ORAL at 08:01

## 2025-01-09 RX ADMIN — SODIUM CHLORIDE 1000 MG: 1 TABLET ORAL at 08:01

## 2025-01-09 RX ADMIN — CETIRIZINE HYDROCHLORIDE 10 MG: 10 TABLET, FILM COATED ORAL at 08:01

## 2025-01-09 NOTE — PLAN OF CARE
"D/C scheduled on 1/10/25    Problem: Physical Therapy  Goal: Physical Therapy Goal  Description: Goals to be met by: 25       Patient will increase functional independence with mobility by performin. Supine to sit with [place] - MET  2. Sit to supine with [place] - MET  3. Rolling to Left and Right with [place] - not met  4. Sit to stand transfer with Modified [place] - not met  5. Bed to chair transfer with Modified [place] using Rolling Walker/without AD - not met  6. Gait  x 200 feet with Modified [place] using Rolling Walker - MET  7. Gait x 50 feet (progress as tolerated) with SBA without an AD-met  Updated 2024 Gait x 200 feet (progress as tolerated) with (I) without an AD - not met    8. Wheelchair propulsion x 200 feet with Modified [place] using bilateral upper extremities - not met  9. Ascend/descend 12 stair with bilateral Handrails and Supervision using No Assistive Device - MET  Updated Goal: Ascend/descend 12 steps with BHR and Mod I using no AD - not met    10. Ascend/Descend 4 inch curb step with Supervision using Rolling Walker - MET    Rehab Services' DME recommendations    Walker Adult (5'1"-6"6")    Accessories N/A  Wheels Yes  Justification for walker: Mobility limitation cannot be sufficiently resolved by use of a cane/patient cannot safely ambulate with a cane, Patient's functional mobility deficit can be sufficiently resolved with the use of a Rolling Walker of Walker, Patient's mobility limitation significantly impairs their ability to participate in one of more activities of daily living, The use of a Rolling Walker or Walker will significantly improve the patient's ability to participate in MRADLS and the patient will use it on a regular basis    Candi Martinez, PT 2025          Outcome: Adequate for Care Transition   2025    "

## 2025-01-09 NOTE — PROGRESS NOTES
[facility]                                  Skilled Nursing Facility                   Progress Note     Admit Date: 12/26/2024  SIRIA 1/10/2025  [id number] A  Principal Problem:  Multifocal pneumonia   HPI obtained from patient interview and chart review     Chief Complaint: Re-evaluation of medical treatment and therapy status, lab review, HTN    HPI:   Jd Dill III is a 79 y.o. male with PMH of BPH, GERD, hypertension transferred from clinic for tachycardia and generalized weakness.  Admitted to the hospital for sepsis due to multifocal pneumonia complicated by hyponatremia and liver dysfunction. Patient became debilitated during hospital stay due to illness.     Interval history:   All of today's labs reviewed and are listed below. 24 hr vital sign ranges reviewed and listed below.  BP starting to improve after having to hold his home amlodipine and losartan.  Likely okay to resume amlodipine upon discharge scheduled for tomorrow.  Will hold losartan for now, patient to follow-up with PCP for appropriateness to resume home blood pressure medication.  Tolerating room air.  Patient denies shortness of breath, abdominal discomfort, nausea, or vomiting.  Patient reports an adequate appetite.  Patient denies dysuria.  Patient reports having regular bowel movements.  Patient progessing with PT/OT- patient ambulated 195 ft + 100 ft (10 ft uneven surface and 12 stairs incorporated) + 180 ft using no AD with SPV; SBA to ambulate over uneven surface. Rest breaks between trials, no LOB.  Continuing to follow and treat all acute and chronic conditions.    Past Medical History: Patient has a past medical history of Allergy, AR (allergic rhinitis), Basal cell carcinoma of ear (2006), BPH (benign prostatic hyperplasia), DDD (degenerative disc disease), lumbar, Degenerative disc disease, Gastroesophageal reflux disease (3/11/2019), Hypertension,  Kidney stone, and Undescended testicle.    Past Surgical History: Patient has a past surgical history that includes Hemorrhoid surgery; Hernia repair; Right Orchiectomy; Pilonidal cyst drainage; Transurethral resection of prostate (2010); Excision basal cell carcinoma; and Esophagogastroduodenoscopy (N/A, 10/5/2022).    Social History: Patient reports that he quit smoking about 28 years ago. His smoking use included cigarettes. He started smoking about 38 years ago. He has a 5 pack-year smoking history. He has never been exposed to tobacco smoke. He has never used smokeless tobacco. He reports current alcohol use. He reports that he does not use drugs.    Family History: family history includes Allergies in his mother; Dementia in his mother; Diabetes in his father; Heart disease in his father; Hypertension in his father.    Allergies: Patient is allergic to ace inhibitors, chlorthalidone, feathers, and mite extract.    ROS  Constitutional: Negative for fever   Respiratory: Negative for cough, shortness of breath   Cardiovascular: Negative for chest pain, palpitations, and leg swelling.   Gastrointestinal: Negative for abdominal pain, constipation, diarrhea, nausea, vomiting.   Genitourinary: Negative for dysuria, frequency   Musculoskeletal:  + generalized weakness. Negative for back pain and myalgias.   Neurological: Negative for dizziness, headaches.   Psychiatric/Behavioral: Negative for depression. The patient is not nervous/anxious.      24 hour Vital Sign Range   Temp:  [97.6 °F (36.4 °C)-98.1 °F (36.7 °C)]   Pulse:  [76-92]   Resp:  [17-18]   BP: (121-133)/(58-65)   SpO2:  [96 %]     Current BMI: Body mass index is 23.17 kg/m².    PEx  Constitutional: Patient appears debilitated.  No distress noted  Cardiovascular: Normal rate, regular rhythm and normal heart sounds.    Pulmonary/Chest: Effort normal and breath sounds are clear  Abdominal: Soft. Bowel sounds are normal.   Musculoskeletal: Normal range of  "motion.   Neurological: Alert and oriented to person, place, and time.   Psychiatric: Normal mood and affect. Behavior is normal.   Skin: Skin is warm and dry.       No results for input(s): "POCTGLUCOSE" in the last 168 hours.       Assessment and Plan:    Multifocal pneumonia, Improving  - CXR with improvement in bibasilar opacities  - Completed 5 days of ceftriaxone and azithromycin.    - tolerating room air, denies cough    Essential hypertension   - home regimen with metoprolol XL 25 mg daily, losartan 100 mg daily and amlodipine 5 mg daily  - BP improved, continue to hold losartan 25 mg daily, continue to hold home amlodipine will resume this on discharge; Continue home metoprolol XL 25 mg daily with holding parameter    Hyponatremia  - asymptomatic  - continue monitor twice weekly BMPs  - resolved, continue Na Cl 1000 mg BID for now, will discontinue on discharge     Benign prostatic hyperplasia with urinary obstruction   - stable, Continue home tamsulosin 0.4 mg daily     Seasonal allergies  - continue fluticasone nasal spray, cetirizine 10 mg daily    GERD  - continue Protonix 40 mg daily    Debility   - Continue with PT/OT for gait training and strengthening and restoration of ADL's   - Encourage mobility, OOB in chair, and early ambulation as appropriate  - Fall precautions   - Monitor for bowel and bladder dysfunction  - Monitor for and prevent skin breakdown and pressure ulcers  - Continue DVT prophylaxis with frequent ambulation      Anticipate disposition:  Home with home health    IP [facility] RISK OF UNPLANNED READMISSION Model: MODERATE     Follow-up needed during SNF stay-    Follow-up needed after discharge from SNF: PCP     Future Appointments   Date Time Provider Department Center   1/13/2025 10:40 AM Jabier Hinkle MD [facility]   2/14/2025 10:00 AM APPOINTMENT [facility]   2/17/2025 10:00 AM Johny Celis MD [facility] HEM ONC [facility]       I certify that " SNF services are required to be given on an inpatient basis because Jd Dill III needs for skilled nursing care and/or skilled rehabilitation are required on a daily basis and such services can only practically be provided in a skilled nursing facility setting and are for an ongoing condition for which she received inpatient care in the hospital.       I spent 46 minutes reviewing patient records, examining, and counseling the patient/ patient's family with greater than 50% of the time spent in direct patient care and coordination.  Discharge coordination      Malina Montgomery NP  Department of Hospital Medicine   [facility]     DOS: 1/9/2025       Patient note was created using MModal Dictation.  Any errors in syntax or even information may not have been identified and edited on initial review prior to signing this note.

## 2025-01-09 NOTE — PLAN OF CARE
Problem: Adult Inpatient Plan of Care  Goal: Plan of Care Review  Outcome: Progressing  Flowsheets (Taken 1/8/2025 2046)  Plan of Care Reviewed With: patient  Goal: Patient-Specific Goal (Individualized)  Outcome: Progressing  Goal: Absence of Hospital-Acquired Illness or Injury  Outcome: Progressing  Goal: Optimal Comfort and Wellbeing  Outcome: Progressing  Goal: Readiness for Transition of Care  Outcome: Progressing     Problem: Diabetes Comorbidity  Goal: Blood Glucose Level Within Targeted Range  Outcome: Progressing     Problem: Sepsis/Septic Shock  Goal: Optimal Coping  Outcome: Progressing  Goal: Absence of Bleeding  Outcome: Progressing  Goal: Blood Glucose Level Within Targeted Range  Outcome: Progressing  Goal: Absence of Infection Signs and Symptoms  Outcome: Progressing  Goal: Optimal Nutrition Intake  Outcome: Progressing     Problem: Pneumonia  Goal: Fluid Balance  Outcome: Progressing  Goal: Resolution of Infection Signs and Symptoms  Outcome: Progressing  Goal: Effective Oxygenation and Ventilation  Outcome: Progressing     Problem: Wound  Goal: Optimal Coping  Outcome: Progressing  Goal: Optimal Functional Ability  Outcome: Progressing  Goal: Absence of Infection Signs and Symptoms  Outcome: Progressing  Goal: Improved Oral Intake  Outcome: Progressing  Goal: Optimal Pain Control and Function  Outcome: Progressing  Goal: Skin Health and Integrity  Outcome: Progressing  Goal: Optimal Wound Healing  Outcome: Progressing     Problem: Skin Injury Risk Increased  Goal: Skin Health and Integrity  Outcome: Progressing

## 2025-01-09 NOTE — PLAN OF CARE
[facility] - Skilled Nursing  Initial Discharge Assessment     SW met with patient in room for Discharge Planning Assessment.     Preferred Name: Jaiden  Primary Care Provider:    Jabier Hinkle MD     Admission Diagnosis:  Multifocal pneumonia   Admission Date: 12/26/2024  Expected Discharge Date:  1/10/2025    Discharge Barriers Identified:      Payor: Type: MEDICARE/MEDICARE PART A & B      Extended Emergency Contact Information:   Primary Emergency Contact: Emory Quiñonez  Mobile Phone: [phone]  Relation: relative  Preferred language: English   needed? No     Discharge Plan A: Home  Discharge Plan B:      Preferred Pharmacy:        Initial Discharge Assessment        Assessment Type Discharge Planning Assessment       Source of Information       Communicated SIRIA with patient/caregiver       Lives With alone      Do you expect to return to your current living situation?  yes      Do you have help at home or someone to help you manage your care at home?  no      Prior to hospitalization cognitive status: alert      Current cognitive status: alert      Equipment Currently Used at Home none      Readmission within 30 days?       Patient currently being followed by outpatient case management? no      Do you currently have service(s) that help you manage your care at home? no      Do you take prescription medications?  yes      Do you have prescription coverage?  yes      Do you have any problems affording any of your prescribed medications? no      Who is going to help you get home at discharge? friend      How do you get to doctors' appointments? self     Has lack of transportation kept you from medical appointments, meetings, work, or from getting things needed for daily living?  No        How often do you feel lonely or isolated from those around you? Never     How often do you need to have someone help you when you read instructions, pamphlets, or other written material from your doctor or  pharmacy?  Never      Are you on dialysis?       Do you take coumadin?        DME Needed Upon Discharge        Discharge Plan discussed with:        Discharge Barriers Identified

## 2025-01-09 NOTE — PLAN OF CARE
[facility] Skilled Nursing      HOME HEALTH ORDERS  FACE TO FACE ENCOUNTER    Patient Name: Jd Dill III  YOB: 1945    PCP: Jabier Hinkle MD   PCP Address[facility] [address] [facility] [place] [address]  PCP Phone Number: [phone]  PCP Fax: [phone]    Encounter Date: 12/24/24    Admit to Home Health    Diagnoses:  Active Hospital Problems    Diagnosis  POA    *Multifocal pneumonia [J18.9]  Yes    Debility [R53.81]  Yes    Hypophosphatemia [E83.39]  Yes    Hyponatremia [E87.1]  Yes    Diabetic polyneuropathy associated with type 2 diabetes mellitus [E11.42]  Yes    Gastroesophageal reflux disease [K21.9]  Yes    Benign prostatic hyperplasia with urinary obstruction [N40.1, N13.8]  Yes    Essential hypertension [I10]  Yes    AR (allergic rhinitis) [J30.9]  Yes      Resolved Hospital Problems   No resolved problems to display.       Follow Up Appointments:  Future Appointments   Date Time Provider Department Center   1/13/2025 10:40 AM Jabier Hinkle MD [facility]   2/14/2025 10:00 AM APPOINTMENT LAB, LU[facility]   2/17/2025 10:00 AM Johny Celis MD [facility] HEM ONC [facility]       Allergies:  Review of patient's allergies indicates:   Allergen Reactions    Ace inhibitors Other (See Comments)     Cough    Chlorthalidone      Hyponatremia    Feathers Other (See Comments)    Mite extract Other (See Comments)     Nose gets congested       Medications: Review discharge medications with patient and family and provide education.      I have seen and examined this patient within the last 30 days. My clinical findings that support the need for the home health skilled services and home bound status are the following:no   Weakness/numbness causing balance and gait disturbance due to Infection making it taxing to leave home.     Referrals/ Consults  Physical Therapy to evaluate and treat. Evaluate for home safety and equipment needs; Establish/upgrade home  exercise program. Perform / instruct on therapeutic exercises, gait training, transfer training, and Range of Motion.  Occupational Therapy to evaluate and treat. Evaluate home environment for safety and equipment needs. Perform/Instruct on transfers, ADL training, ROM, and therapeutic exercises.  Aide to provide assistance with personal care, ADLs, and vital signs.    Activities:   activity as tolerated    Nursing:   Agency to admit patient within 24 hours of hospital discharge unless specified on physician order or at patient request    SN to complete comprehensive assessment including routine vital signs. Instruct on disease process and s/s of complications to report to MD. Review/verify medication list sent home with the patient at time of discharge  and instruct patient/caregiver as needed. Frequency may be adjusted depending on start of care date.     Skilled nurse to perform up to 3 visits PRN for symptoms related to diagnosis    Notify MD if SBP > 160 or < 90; DBP > 90 or < 50; HR > 120 or < 50; Temp > 101; O2 < 88%    Ok to schedule additional visits based on staff availability and patient request on consecutive days within the home health episode.    Home Health Aide:  Nursing Three times weekly, Physical Therapy Three times weekly, Occupational Therapy Three times weekly, and Home Health Aide Three times weekly    Wound Care Orders  no    I certify that this patient is confined to his home and needs intermittent skilled nursing care, physical therapy, and occupational therapy.

## 2025-01-09 NOTE — PT/OT/SLP PROGRESS
"Physical Therapy Treatment/Discharge Note    Patient Name:  Jd Dill III   MRN:  [id number]  Admit Date: 12/26/2024  Admitting Diagnosis: Multifocal pneumonia  Recent Surgeries: N/A    General Precautions: Standard, fall  Orthopedic Precautions: N/A  Braces: N/A    Recommendations:     Discharge Recommendations: home health PT  Level of Assistance Recommended at Discharge: Intermittent assistance   Discharge Equipment Recommendations: walker, rolling  Barriers to discharge: Decreased caregiver support    Assessment:     Jd Dill III is a 79 y.o. male admitted with a medical diagnosis of Multifocal pneumonia. Patient agreeable to PT treatment this AM. Patient with steady progression of mobility over course of therapy. Patient pleasant and motivated to return home. Patient scheduled for D/C on 1/10/25 with recommendation for Home Health PT/OT to continue with progression of mobility toward PLOF and increased functional independence.     \F2  Performance deficits affecting function: impaired functional mobility, impaired self care skills, gait instability, impaired balance, decreased safety awareness.    Rehab Potential is good    Activity Tolerance: Good    Plan:     Patient to be seen 5 x/week to address the above listed problems via gait training, therapeutic activities, therapeutic exercises, neuromuscular re-education, wheelchair management/training    Plan of Care Expires: 01/26/25  Plan of Care Reviewed with: patient    Subjective     "I am excited to go home."     Pain/Comfort:  Pain Rating 1: 0/10  Pain Rating Post-Intervention 1: 0/10    Patient's cultural, spiritual, Yazidism conflicts given the current situation:  no    Objective:     Communicated with nursing staff prior to session.  Patient found up in chair with  (no active lines) upon PT entry to room.     Therapeutic Activities and Exercises:   Seated BLE exercises x 20 reps including ankle DF/PF, LAQs, hip flexion, hip abduction/adduction. " Performed to improve ROM and strengthening for safe and efficient engagement in functional mobility.     Functional Mobility:  Transfers:     Sit to Stand:  modified independence and supervision with no AD  Gait: Patient ambulated 300+ feet around unit/therapy gym with Supervision/Mod I without an AD. Patient ambulated an additional 200+ feet using RW with Mod I. No LOB. Steady gait speed.   Stairs:  Pt ascended/descended 12 stair(s) with No Assistive Device with bilateral handrails with Modified Independent and Supervision or Set-up Assistance. Patient ascended/descended 4 inch curb step x 8 consecutive trials with Supervision and no AD.     AM-PAC 6 CLICK MOBILITY  20    Patient left up in chair with call button in reach.    GOALS:   Multidisciplinary Problems       Physical Therapy Goals          Problem: Physical Therapy    Goal Priority Disciplines Outcome Interventions   Physical Therapy Goal     PT, PT/OT Adequate for Care Transition    Description: Goals to be met by: 25       Patient will increase functional independence with mobility by performin. Supine to sit with Martin - MET  2. Sit to supine with Martin - MET  3. Rolling to Left and Right with Martin - not met  4. Sit to stand transfer with Modified Martin - not met  5. Bed to chair transfer with Modified Martin using Rolling Walker/without AD - not met  6. Gait  x 200 feet with Modified Martin using Rolling Walker - MET  7. Gait x 50 feet (progress as tolerated) with SBA without an AD-met  Updated 2024 Gait x 200 feet (progress as tolerated) with (I) without an AD - not met    8. Wheelchair propulsion x 200 feet with Modified Martin using bilateral upper extremities - not met  9. Ascend/descend 12 stair with bilateral Handrails and Supervision using No Assistive Device - MET  Updated Goal: Ascend/descend 12 steps with BHR and Mod I using no AD - not met    10. Ascend/Descend 4 inch curb step  "with Supervision using Rolling Walker - MET    Rehab Services' DME recommendations    Walker Adult (5'1"-6"6")    Accessories N/A  Wheels Yes  Justification for walker: Mobility limitation cannot be sufficiently resolved by use of a cane/patient cannot safely ambulate with a cane, Patient's functional mobility deficit can be sufficiently resolved with the use of a Rolling Walker of Walker, Patient's mobility limitation significantly impairs their ability to participate in one of more activities of daily living, The use of a Rolling Walker or Walker will significantly improve the patient's ability to participate in MRADLS and the patient will use it on a regular basis    Candi Martinez, PT 1/7/2025                               Time Tracking:     PT Received On: 01/09/25  PT Start Time: 1125  PT Stop Time: 1152  PT Total Time (min): 27 min    Billable Minutes: Gait Training 17 and Therapeutic Exercise 10    Treatment Type: Treatment  PT/PTA: PT     Number of PTA visits since last PT visit: 0     01/09/2025  "

## 2025-01-09 NOTE — NURSING
Pt involved in plan of care and communicating needs throughout shift. Up in room and to bathroom independently; no c/o pain. Tolerating diet, voiding without difficulty. All VSS; Afebrile, AAOx4, no acute events so far this shift.  Pt remaining free from falls or injury throughout shift; bed locked and in lowest position; call light within reach.  Pt instructed to call for assistance as needed.  Q1H rounding done on pt.

## 2025-01-09 NOTE — PT/OT/SLP PROGRESS
Occupational Therapy   Treatment/Discharge Summary    Name: Jd Dill III  MRN: [id number]  Admit Date: 12/26/2024  Admitting Diagnosis:  Multifocal pneumonia    General Precautions: Standard, fall   Orthopedic Precautions: N/A   Braces: N/A    Recommendations:     Discharge Recommendations:  home health OT  Level of Assistance Recommended at Discharge: Intermittent supervision  Discharge Equipment Recommendations: walker, rolling  Barriers to discharge:  Decreased caregiver support    Assessment:     Jd Dill III is a 79 y.o. male with a medical diagnosis of Multifocal pneumonia. Pt tolerated session well and without incident and shows excellent motivation and potential to improve, but the pt continues to require assistance to perform self-care tasks and mobility. Pt strengths include Functional cognition, Following multi-step tasks, and Attention to task and PLOF, Motivation, and Willingness to participate. Pt appropriate for d/c from OT in SNF setting and transition to OT in next level of care. Performance deficits affecting function are weakness, impaired endurance, impaired self care skills, impaired functional mobility, gait instability, impaired balance.     Rehab Potential is excellent    Activity tolerance:  Excellent    Plan:   Pt to d/c from OT 1/9/2025 and from SNF 1/10/2025. Pt appropriate for next level of care.     Subjective     Communicated with: NSG prior to session .    Pain/Comfort:  Pain Rating 1: 0/10  Pain Rating Post-Intervention 1: 0/10    Patient's cultural, spiritual, Druze conflicts given the current situation:  no    Objective:     Patient found up in chair with  (no active lines) upon OT entry to room. Pt alert and agreeable to therapy.       Functional Mobility/Transfers:  Patient completed Sit <> Stand Transfer with supervision  with  no assistive device   Patient completed Bed <> Chair Transfer using Step Transfer technique with supervision with no assistive  "device  Functional Mobility: Pt propelled WC (I) ~170ft + ~170 ft      AMPAC 6 Click ADL: 23    OT Exercises:     ~Pt participated in 12 minute UBE activity seated in WC at maximal resistance to address endurance and strength of UE to improve performance of ADLs and other functional tasks.     ~Pt participated in standing "clothes folding" activity to improve standing tolerance, bimanual integration without UE support for standing, and preformance of IADLs. Pt tolerated standing for ~15 /c SPV and no AD for completion of task. No LOB.       Treatment & Education:  Pt educated on POC, role of OT, and safety. Pt performed tasks to improve safety and independence in functional tasks and ADLs as mentioned above.       Patient left up in chair with call button in reach and all needs met    GOALS:   Multidisciplinary Problems       Occupational Therapy Goals          Problem: Occupational Therapy    Goal Priority Disciplines Outcome Interventions   Occupational Therapy Goal     OT, PT/OT Adequate for Care Transition    Description: Goals to be met by: 1/26/2025     Patient will increase functional independence with ADLs by performing:  UBD: with MI seated in w/c- Met  LBD: with CGA-SBA seated in w/c using AE as needed- Met  FWM: SBA seated in w/c using AE as needed- Met  Bathing: SPV seated on bath bench- Not met  Toileting: with SPV during hygiene mgmt- Met 1/2/2025  Pt will complete 10 mins on UE ergometer seated in w/c- Met  Pt will complete 5-8 mins standing with RW/SBA during dynamic standing activity - Met                       Time Tracking:     OT Date of Treatment: 01/09/25  OT Start Time: 1045    OT Stop Time: 1124  OT Total Time (min): 39 min    Billable Minutes:Therapeutic Activity 15  Therapeutic Exercise 24    1/9/2025  "

## 2025-01-09 NOTE — PROGRESS NOTES
[facility]                                  Skilled Nursing Facility                   Progress Note     Admit Date: 12/26/2024  SIRIA 1/10/2025  [id number] A  Principal Problem:  Multifocal pneumonia   HPI obtained from patient interview and chart review     Chief Complaint: Re-evaluation of medical treatment and therapy status    HPI:   Jd Dill III is a 79 y.o. male with PMH of BPH, GERD, hypertension transferred from clinic for tachycardia and generalized weakness.  Admitted to the hospital for sepsis due to multifocal pneumonia complicated by hyponatremia and liver dysfunction. Patient became debilitated during hospital stay due to illness.     Interval history:  24 hr vital sign ranges reviewed and listed below.  Tolerating room air.  Patient denies shortness of breath, abdominal discomfort, nausea, or vomiting.  Patient reports an adequate appetite.  Patient denies dysuria.  Patient reports having regular bowel movements.  Patient progessing with PT/OT- patient ambulated 195 ft + 100 ft (10 ft uneven surface and 12 stairs incorporated) + 180 ft using no AD with SPV; SBA to ambulate over uneven surface. Rest breaks between trials, no LOB.  Continuing to follow and treat all acute and chronic conditions.    Past Medical History: Patient has a past medical history of Allergy, AR (allergic rhinitis), Basal cell carcinoma of ear (2006), BPH (benign prostatic hyperplasia), DDD (degenerative disc disease), lumbar, Degenerative disc disease, Gastroesophageal reflux disease (3/11/2019), Hypertension, Kidney stone, and Undescended testicle.    Past Surgical History: Patient has a past surgical history that includes Hemorrhoid surgery; Hernia repair; Right Orchiectomy; Pilonidal cyst drainage; Transurethral resection of prostate (2010); Excision basal cell carcinoma; and Esophagogastroduodenoscopy (N/A, 10/5/2022).    Social History:  "Patient reports that he quit smoking about 28 years ago. His smoking use included cigarettes. He started smoking about 38 years ago. He has a 5 pack-year smoking history. He has never been exposed to tobacco smoke. He has never used smokeless tobacco. He reports current alcohol use. He reports that he does not use drugs.    Family History: family history includes Allergies in his mother; Dementia in his mother; Diabetes in his father; Heart disease in his father; Hypertension in his father.    Allergies: Patient is allergic to ace inhibitors, chlorthalidone, feathers, and mite extract.    ROS  Constitutional: Negative for fever   Respiratory: Negative for cough, shortness of breath   Cardiovascular: Negative for chest pain, palpitations, and leg swelling.   Gastrointestinal: Negative for abdominal pain, constipation, diarrhea, nausea, vomiting.   Genitourinary: Negative for dysuria, frequency   Musculoskeletal:  + generalized weakness. Negative for back pain and myalgias.   Neurological: Negative for dizziness, headaches.   Psychiatric/Behavioral: Negative for depression. The patient is not nervous/anxious.      24 hour Vital Sign Range   Temp:  [97.6 °F (36.4 °C)-98.1 °F (36.7 °C)]   Pulse:  [76-92]   Resp:  [17-18]   BP: (121-133)/(58-65)   SpO2:  [96 %]     Current BMI: Body mass index is 23.17 kg/m².    PEx  Constitutional: Patient appears debilitated.  No distress noted  Cardiovascular: Normal rate, regular rhythm and normal heart sounds.    Pulmonary/Chest: Effort normal and breath sounds are clear  Abdominal: Soft. Bowel sounds are normal.   Musculoskeletal: Normal range of motion.   Neurological: Alert and oriented to person, place, and time.   Psychiatric: Normal mood and affect. Behavior is normal.   Skin: Skin is warm and dry.       No results for input(s): "POCTGLUCOSE" in the last 168 hours.       Assessment and Plan:    Multifocal pneumonia, Improving  - CXR with improvement in bibasilar opacities  - " Completed 5 days of ceftriaxone and azithromycin.    - tolerating room air, denies cough    Essential hypertension   - home regimen with metoprolol XL 25 mg daily, losartan 100 mg daily and amlodipine 5 mg daily  - BP improved, continue to hold losartan 25 mg daily, continue to hold home amlodipine; Continue home metoprolol XL 25 mg daily with holding parameter    Hyponatremia  - asymptomatic  - continue monitor twice weekly BMPs  - resolved, continue Na Cl 1000 mg BID for now     Benign prostatic hyperplasia with urinary obstruction   - Continue home tamsulosin 0.4 mg daily     Seasonal allergies  - continue fluticasone nasal spray, cetirizine 10 mg daily    GERD  - continue Protonix 40 mg daily    Debility   - Continue with PT/OT for gait training and strengthening and restoration of ADL's   - Encourage mobility, OOB in chair, and early ambulation as appropriate  - Fall precautions   - Monitor for bowel and bladder dysfunction  - Monitor for and prevent skin breakdown and pressure ulcers  - Continue DVT prophylaxis with frequent ambulation      Anticipate disposition:  Home with home health    IP [facility] RISK OF UNPLANNED READMISSION Model: MODERATE     Follow-up needed during SNF stay-    Follow-up needed after discharge from SNF: PCP     Future Appointments   Date Time Provider Department Center   1/13/2025 10:40 AM [facility]   2/14/2025 10:00 AM APPOINTMENT [facility]   2/17/2025 10:00 AM Johny Celis MD [facility] HEM ONC [facility]       I certify that SNF services are required to be given on an inpatient basis because Jd COLLAZO Ketanernst III needs for skilled nursing care and/or skilled rehabilitation are required on a daily basis and such services can only practically be provided in a skilled nursing facility setting and are for an ongoing condition for which she received inpatient care in the hospital.       I spent 45 minutes reviewing patient records,  examining, and counseling the patient/ patient's family with greater than 50% of the time spent in direct patient care and coordination.  Discharge coordination      Malina Montgomery NP  Department of Hospital Medicine   [facility]     DOS: 1/8/2025       Patient note was created using MModal Dictation.  Any errors in syntax or even information may not have been identified and edited on initial review prior to signing this note.

## 2025-01-09 NOTE — PLAN OF CARE
Problem: Occupational Therapy  Goal: Occupational Therapy Goal  Description: Goals to be met by: 1/26/2025     Patient will increase functional independence with ADLs by performing:  UBD: with MI seated in w/c- Met  LBD: with CGA-SBA seated in w/c using AE as needed- Met  FWM: SBA seated in w/c using AE as needed- Met  Bathing: SPV seated on bath bench- Not met  Toileting: with SPV during hygiene mgmt- Met 1/2/2025  Pt will complete 10 mins on UE ergometer seated in w/c- Met  Pt will complete 5-8 mins standing with RW/SBA during dynamic standing activity - Met  Outcome: Adequate for Care Transition     Pt to d/c from OT 1/9/2025 and from SNF 1/10/2025. Pt appropriate for next level of care.

## 2025-01-10 ENCOUNTER — TELEPHONE (OUTPATIENT)
Dept: ADMINISTRATIVE | Facility: CLINIC | Age: 80
End: 2025-01-10
Payer: MEDICARE

## 2025-01-10 VITALS
BODY MASS INDEX: 23.22 KG/M2 | DIASTOLIC BLOOD PRESSURE: 69 MMHG | SYSTOLIC BLOOD PRESSURE: 129 MMHG | TEMPERATURE: 98 F | HEIGHT: 67 IN | WEIGHT: 147.94 LBS | HEART RATE: 79 BPM | RESPIRATION RATE: 18 BRPM | OXYGEN SATURATION: 95 %

## 2025-01-10 DIAGNOSIS — J18.9 MULTIFOCAL PNEUMONIA: Primary | ICD-10-CM

## 2025-01-10 PROCEDURE — 25000003 PHARM REV CODE 250: Performed by: NURSE PRACTITIONER

## 2025-01-10 PROCEDURE — 25000003 PHARM REV CODE 250: Performed by: HOSPITALIST

## 2025-01-10 RX ADMIN — FLUTICASONE PROPIONATE 50 MCG: 50 SPRAY, METERED NASAL at 08:01

## 2025-01-10 RX ADMIN — SODIUM CHLORIDE 1000 MG: 1 TABLET ORAL at 08:01

## 2025-01-10 RX ADMIN — TAMSULOSIN HYDROCHLORIDE 0.8 MG: 0.4 CAPSULE ORAL at 08:01

## 2025-01-10 RX ADMIN — CETIRIZINE HYDROCHLORIDE 10 MG: 10 TABLET, FILM COATED ORAL at 08:01

## 2025-01-10 RX ADMIN — PANTOPRAZOLE SODIUM 40 MG: 40 TABLET, DELAYED RELEASE ORAL at 08:01

## 2025-01-10 RX ADMIN — METOPROLOL SUCCINATE 25 MG: 25 TABLET, EXTENDED RELEASE ORAL at 08:01

## 2025-01-10 NOTE — PLAN OF CARE
Patient is set to discharge on 1/10/2025 at 11:30am. Patient discharging to home with friend via private vehicle. Patient set up with [facility]. DME provided: No DME

## 2025-01-10 NOTE — PLAN OF CARE
Problem: Adult Inpatient Plan of Care  Goal: Plan of Care Review  Outcome: Progressing  Flowsheets (Taken 1/9/2025 2209)  Plan of Care Reviewed With: patient  Goal: Patient-Specific Goal (Individualized)  Outcome: Progressing  Goal: Absence of Hospital-Acquired Illness or Injury  Outcome: Progressing  Goal: Optimal Comfort and Wellbeing  Outcome: Progressing  Goal: Readiness for Transition of Care  Outcome: Progressing     Problem: Diabetes Comorbidity  Goal: Blood Glucose Level Within Targeted Range  Outcome: Progressing     Problem: Sepsis/Septic Shock  Goal: Optimal Coping  Outcome: Progressing  Goal: Absence of Bleeding  Outcome: Progressing  Goal: Blood Glucose Level Within Targeted Range  Outcome: Progressing  Goal: Absence of Infection Signs and Symptoms  Outcome: Progressing  Goal: Optimal Nutrition Intake  Outcome: Progressing     Problem: Pneumonia  Goal: Fluid Balance  Outcome: Progressing  Goal: Resolution of Infection Signs and Symptoms  Outcome: Progressing  Goal: Effective Oxygenation and Ventilation  Outcome: Progressing     Problem: Wound  Goal: Optimal Coping  Outcome: Progressing  Goal: Optimal Functional Ability  Outcome: Progressing  Goal: Absence of Infection Signs and Symptoms  Outcome: Progressing  Goal: Improved Oral Intake  Outcome: Progressing  Goal: Optimal Pain Control and Function  Outcome: Progressing  Goal: Skin Health and Integrity  Outcome: Progressing  Goal: Optimal Wound Healing  Outcome: Progressing     Problem: Skin Injury Risk Increased  Goal: Skin Health and Integrity  Outcome: Progressing

## 2025-01-10 NOTE — DISCHARGE SUMMARY
[facility] Medicine  Discharge Summary      Patient Name: Jd Dill III  MRN: [id number]  ABHAY: [id number]  Patient Class: - SNF  Admission Date: 12/26/2024  Hospital Length of Stay: 15 days  Discharge Date and Time:  01/10/2025 12:21 PM  Attending Physician: Alex Sifuentes MD   Discharging Provider: Malina Montgomery NP  Primary Care Provider: Jabier Hinkle MD    Primary Care Team: LT[facility] 8 MALINA MONTGOMERY     HPI:   Jd Dill III is a 79 y.o. male with PMH of BPH, GERD, hypertension transferred from clinic for tachycardia and generalized weakness. Admitted to the hospital for sepsis due to multifocal pneumonia complicated by hyponatremia and liver dysfunction. Patient became debilitated during hospital stay due to illness.       Hospital Course:   Patient progressed well with PT and OT- last PT note states that patient ambulated 300+ feet around unit/therapy gym with Supervision/Mod I without an AD. Patient ambulated an additional 200+ feet using RW with Mod I. No LOB. Steady gait speed. . Patient had no significant events during their stay at [facility].  Patient's discharge breathing comfortably on room air with adequate SpO2.  Home health was set up. DME was ordered if needed. Follow up appointment to be made by patient within one week. All prescriptions and discharge instructions were ordered to be given to the patient prior to discharge.     PEx  Constitutional: Patient appears debilitated.  No distress noted  Cardiovascular: Normal rate, regular rhythm and normal heart sounds.    Pulmonary/Chest: Effort normal and breath sounds are clear  Abdominal: Soft. Bowel sounds are normal.   Musculoskeletal: Normal range of motion.   Neurological: Alert and oriented to person, place, and time.   Psychiatric: Normal mood and affect. Behavior is normal.   Skin: Skin is warm and dry.     Goals of Care Treatment Preferences:  Code Status: Full Code         Consults:   Consults (From admission,  "onward)          Status Ordering Provider     Inpatient consult to Registered Dietitian/Nutritionist  Once        Provider:  (Not yet assigned)    Completed AUSTIN MURPHY notes have been filed under this hospital service.  Service: Hospital Medicine    Final Active Diagnoses:    Diagnosis Date Noted POA    PRINCIPAL PROBLEM:  Multifocal pneumonia [J18.9] 12/18/2024 Yes    Debility [R53.81] 12/19/2024 Yes    Hypophosphatemia [E83.39] 12/18/2024 Yes    Hyponatremia [E87.1] 12/17/2024 Yes    Diabetic polyneuropathy associated with type 2 diabetes mellitus [E11.42] 12/01/2023 Yes    Gastroesophageal reflux disease [K21.9] 03/11/2019 Yes    Benign prostatic hyperplasia with urinary obstruction [N40.1, N13.8] 05/12/2016 Yes    Essential hypertension [I10] 05/12/2016 Yes    AR (allergic rhinitis) [J30.9] 01/09/2013 Yes      Problems Resolved During this Admission:       Discharged Condition: stable    Disposition: Home-Health Care c    Follow Up:    Patient Instructions:      WALKER FOR HOME USE     Order Specific Question Answer Comments   Type of Walker: Adult (5'4"-6'6")    With wheels? Yes    Height: 5' 7" (1.702 m)    Weight: 67.1 kg (147 lb 14.9 oz)    Length of need (1-99 months): 99    Does patient have medical equipment at home? raised toilet    Please check all that apply: Patient's condition impairs ambulation.    Please check all that apply: Walker will be used for gait training.    Please check all that apply: Patient is unable to safely ambulate without equipment.      Other restrictions (specify):     No driving until:   Order Comments: Cleared by PCP     Notify your health care provider if you experience any of the following:  temperature >100.4     Notify your health care provider if you experience any of the following:  persistent nausea and vomiting or diarrhea     Notify your health care provider if you experience any of the following:  severe uncontrolled pain     Notify your " health care provider if you experience any of the following:  redness, tenderness, or signs of infection (pain, swelling, redness, odor or green/yellow discharge around incision site)     Notify your health care provider if you experience any of the following:  difficulty breathing or increased cough     Notify your health care provider if you experience any of the following:  severe persistent headache     Notify your health care provider if you experience any of the following:  worsening rash     Notify your health care provider if you experience any of the following:  persistent dizziness, light-headedness, or visual disturbances     Notify your health care provider if you experience any of the following:  increased confusion or weakness       Significant Diagnostic Studies: N/A    Pending Diagnostic Studies:       None           Medications:  Reconciled Home Medications:      Medication List        START taking these medications      acetaminophen 325 MG tablet  Commonly known as: TYLENOL  Take 2 tablets (650 mg total) by mouth every 6 (six) hours as needed for Pain.     senna-docusate 8.6-50 mg 8.6-50 mg per tablet  Commonly known as: PERICOLACE  Take 1 tablet by mouth 2 (two) times daily as needed for Constipation.            CHANGE how you take these medications      losartan 25 MG tablet  Commonly known as: COZAAR  Take 1 tablet (25 mg total) by mouth once daily. HOLD UNTIL FOLLOW-UP WITH PRIMARY CARE PROVIDER  What changed: additional instructions            CONTINUE taking these medications      amLODIPine 5 MG tablet  Commonly known as: NORVASC  TAKE 1 TABLET BY MOUTH EVERY DAY     azelastine 137 mcg (0.1 %) nasal spray  Commonly known as: ASTELIN  INSTILL 1-2 SPRAYS BY NASAL ROUTE 2 (TWO) TIMES DAILY AS NEEDED FOR RHINITIS.     blood sugar diagnostic Strp  To check BG 1 times daily, to use with insurance preferred meter     cetirizine 10 MG tablet  Commonly known as: ZYRTEC  Take 10 mg by mouth once  daily.     fluticasone propionate 50 mcg/actuation nasal spray  Commonly known as: FLONASE  USE 1 SPRAY (50 MCG TOTAL) IN EACH NOSTRIL ONCE DAILY     INV atorvastatin/placebo 40 MG tablet  Take one tablet by mouth each morning. For Investigational Use Only.     ketoconazole 2 % cream  Commonly known as: NIZORAL  APPLY TO AREA OF RED, DRY SKIN ON BOTH FEET/HEELS ONE TO TWO TIMES DAILY FOR 2 TO 3 WEEKS.     lancets Mis  To check BG 1 times daily, to use with insurance preferred meter     metoprolol succinate 25 MG 24 hr tablet  Commonly known as: TOPROL-XL  Take 1 tablet (25 mg total) by mouth once daily.     omeprazole 40 MG capsule  Commonly known as: PRILOSEC  TAKE 1 CAPSULE (40 MG TOTAL) BY MOUTH EVERY MORNING.     ONETOUCH VERIO FLEX METER Misc  Generic drug: blood-glucose meter  TO CHECK BLOOD GLUCOSE 1 TIME DAILY, TO USE WITH INSURANCE PREFERRED METER     tamsulosin 0.4 mg Cap  Commonly known as: FLOMAX  Take 0.8 mg by mouth once daily.              Indwelling Lines/Drains at time of discharge:   Lines/Drains/Airways       None                   Time spent on the discharge of patient: 38 minutes         Malina Montgomery NP  Department of [facility] Medicine  [facility]

## 2025-01-10 NOTE — NURSING
PT D/C from facility stable via wheelchair and transported home personal vehicle. Pt denied any pain or discomfort at this time. Skin remains intact. All personal belongings taken. Discharge paperwork was received, reviewed, and verbalized understanding. No concerns voiced upon departure.

## 2025-01-10 NOTE — PLAN OF CARE
Problem: Adult Inpatient Plan of Care  Goal: Plan of Care Review  Outcome: Adequate for Care Transition  Goal: Patient-Specific Goal (Individualized)  Outcome: Adequate for Care Transition  Goal: Absence of Hospital-Acquired Illness or Injury  Outcome: Adequate for Care Transition  Goal: Optimal Comfort and Wellbeing  Outcome: Adequate for Care Transition  Goal: Readiness for Transition of Care  Outcome: Adequate for Care Transition     Problem: Diabetes Comorbidity  Goal: Blood Glucose Level Within Targeted Range  Outcome: Adequate for Care Transition     Problem: Sepsis/Septic Shock  Goal: Optimal Coping  Outcome: Adequate for Care Transition  Goal: Absence of Bleeding  Outcome: Adequate for Care Transition  Goal: Blood Glucose Level Within Targeted Range  Outcome: Adequate for Care Transition  Goal: Absence of Infection Signs and Symptoms  Outcome: Adequate for Care Transition  Goal: Optimal Nutrition Intake  Outcome: Adequate for Care Transition     Problem: Pneumonia  Goal: Fluid Balance  Outcome: Adequate for Care Transition  Goal: Resolution of Infection Signs and Symptoms  Outcome: Adequate for Care Transition  Goal: Effective Oxygenation and Ventilation  Outcome: Adequate for Care Transition     Problem: Wound  Goal: Optimal Coping  Outcome: Adequate for Care Transition  Goal: Optimal Functional Ability  Outcome: Adequate for Care Transition  Goal: Absence of Infection Signs and Symptoms  Outcome: Adequate for Care Transition  Goal: Improved Oral Intake  Outcome: Adequate for Care Transition  Goal: Optimal Pain Control and Function  Outcome: Adequate for Care Transition  Goal: Skin Health and Integrity  Outcome: Adequate for Care Transition  Goal: Optimal Wound Healing  Outcome: Adequate for Care Transition     Problem: Skin Injury Risk Increased  Goal: Skin Health and Integrity  Outcome: Adequate for Care Transition

## 2025-01-13 ENCOUNTER — PATIENT MESSAGE (OUTPATIENT)
Dept: HOME HEALTH SERVICES | Facility: CLINIC | Age: 80
End: 2025-01-13
Payer: MEDICARE

## 2025-01-13 ENCOUNTER — PATIENT OUTREACH (OUTPATIENT)
Dept: ADMINISTRATIVE | Facility: CLINIC | Age: 80
End: 2025-01-13
Payer: MEDICARE

## 2025-01-13 ENCOUNTER — PATIENT MESSAGE (OUTPATIENT)
Dept: ADMINISTRATIVE | Facility: CLINIC | Age: 80
End: 2025-01-13
Payer: MEDICARE

## 2025-01-13 ENCOUNTER — OFFICE VISIT (OUTPATIENT)
Dept: INTERNAL MEDICINE | Facility: CLINIC | Age: 80
End: 2025-01-13
Payer: MEDICARE

## 2025-01-13 VITALS
WEIGHT: 150.81 LBS | DIASTOLIC BLOOD PRESSURE: 60 MMHG | OXYGEN SATURATION: 98 % | HEART RATE: 87 BPM | HEIGHT: 67 IN | SYSTOLIC BLOOD PRESSURE: 118 MMHG | BODY MASS INDEX: 23.67 KG/M2

## 2025-01-13 DIAGNOSIS — N40.1 BENIGN PROSTATIC HYPERPLASIA WITH URINARY OBSTRUCTION: ICD-10-CM

## 2025-01-13 DIAGNOSIS — Z00.00 ANNUAL PHYSICAL EXAM: Primary | ICD-10-CM

## 2025-01-13 DIAGNOSIS — R53.81 DEBILITY: ICD-10-CM

## 2025-01-13 DIAGNOSIS — I10 ESSENTIAL HYPERTENSION: ICD-10-CM

## 2025-01-13 DIAGNOSIS — D64.9 ANEMIA, UNSPECIFIED TYPE: ICD-10-CM

## 2025-01-13 DIAGNOSIS — E11.42 DIABETIC POLYNEUROPATHY ASSOCIATED WITH TYPE 2 DIABETES MELLITUS: ICD-10-CM

## 2025-01-13 DIAGNOSIS — J18.9 MULTIFOCAL PNEUMONIA: ICD-10-CM

## 2025-01-13 DIAGNOSIS — N13.8 BENIGN PROSTATIC HYPERPLASIA WITH URINARY OBSTRUCTION: ICD-10-CM

## 2025-01-13 DIAGNOSIS — K21.9 GASTROESOPHAGEAL REFLUX DISEASE, UNSPECIFIED WHETHER ESOPHAGITIS PRESENT: ICD-10-CM

## 2025-01-13 DIAGNOSIS — E11.9 TYPE 2 DIABETES MELLITUS WITHOUT COMPLICATION, WITHOUT LONG-TERM CURRENT USE OF INSULIN: ICD-10-CM

## 2025-01-13 PROCEDURE — 99214 OFFICE O/P EST MOD 30 MIN: CPT | Mod: PBBFAC | Performed by: INTERNAL MEDICINE

## 2025-01-13 PROCEDURE — 99999PBSHW PR PBB SHADOW TECHNICAL ONLY FILED TO HB: Mod: PBBFAC,,,

## 2025-01-13 PROCEDURE — 90677 PCV20 VACCINE IM: CPT | Mod: PBBFAC

## 2025-01-13 PROCEDURE — G2211 COMPLEX E/M VISIT ADD ON: HCPCS | Mod: S$PBB,,, | Performed by: INTERNAL MEDICINE

## 2025-01-13 PROCEDURE — G0009 ADMIN PNEUMOCOCCAL VACCINE: HCPCS | Mod: PBBFAC

## 2025-01-13 PROCEDURE — 99999 PR PBB SHADOW E&M-EST. PATIENT-LVL IV: CPT | Mod: PBBFAC,,, | Performed by: INTERNAL MEDICINE

## 2025-01-13 PROCEDURE — 99214 OFFICE O/P EST MOD 30 MIN: CPT | Mod: S$PBB,,, | Performed by: INTERNAL MEDICINE

## 2025-01-13 RX ADMIN — PNEUMOCOCCAL 20-VALENT CONJUGATE VACCINE 0.5 ML
2.2; 2.2; 2.2; 2.2; 2.2; 2.2; 2.2; 2.2; 2.2; 2.2; 2.2; 2.2; 2.2; 2.2; 2.2; 2.2; 4.4; 2.2; 2.2; 2.2 INJECTION, SUSPENSION INTRAMUSCULAR at 11:01

## 2025-01-13 NOTE — PROGRESS NOTES
C3 nurse attempted to contact Jd Dill III for a TCC post hospital discharge follow up call. No answer. No voicemail available. The patient has a scheduled HOSFU appointment with Jabier Hinkle On 01/13/25 @ 8604.

## 2025-01-13 NOTE — PROGRESS NOTES
"INTERNAL MEDICINE RESIDENT CLINIC  CLINIC NOTE    Patient Name: Jd Dill III  YOB: 1945    PRESENTING HISTORY       History of Present Illness:  Mr. Jd Dill III "Jaiden" is a 79 y.o. male with HTN, DM 2 w/ polyneuropathy, BPH, GERD, debility, anemia who presents to the clinic for an annual exam.   Hospitalized 12/17/24 for sepsis 2/2 pneumonia then transferred to SNF for PT due to debility. Hospital course c/b hyponatremia.   Last CBC and CMP 1/09 hb 10.5 MCV 97 Na wnl. Albumin 2.9.    Recently enrolled in home health. Notes that his balance has not been as good as it use to be. States he doesn't feel as strong as he use to. At SNF determined not needed DME. Lives in a one story home no stairs. Lives at home by himself. Feels good to do chores around the house. Had a fall 2 months ago.     No SOB , ROSARIO after discharge. Notes GERD, BPH symptoms controlled. BP at home 120/80. No lightheadedness when standing.  . In clinic 118/60.     On amlodipine, toprol, losartan since participating in study regarding hypertension.     Health Maintenance         Date Due Completion Date    Hemoglobin A1c 06/30/2025 12/31/2024    Diabetes Urine Screening 07/09/2025 7/9/2024    Diabetic Eye Exam 09/26/2025 9/26/2024    Lipid Panel 12/30/2025 12/30/2024    Colonoscopy 03/29/2028 3/29/2023    Override on 4/19/2018: Done (dr [facility])    Override on 1/2/2013: Done    TETANUS VACCINE 03/19/2030 3/19/2020           ROS    PAST HISTORY:     Past Medical History:   Diagnosis Date    Allergy     AR (allergic rhinitis)     Basal cell carcinoma of ear 2006    Right Ear    BPH (benign prostatic hyperplasia)     DDD (degenerative disc disease), lumbar     Degenerative disc disease     Gastroesophageal reflux disease 3/11/2019    Hypertension     Kidney stone     Undescended testicle     Right Orchiectomy       Past Surgical History:   Procedure Laterality Date    BASAL CELL CARCINOMA EXCISION      " ESOPHAGOGASTRODUODENOSCOPY N/A 10/5/2022    Procedure: EGD (ESOPHAGOGASTRODUODENOSCOPY);  Surgeon: Franco Campoverde MD;  [facility];  Service: Endoscopy;  Laterality: N/A;    HEMORRHOID SURGERY      HERNIA REPAIR      PILONIDAL CYST DRAINAGE      Right Orchiectomy      TRANSURETHRAL RESECTION OF PROSTATE         Family History   Problem Relation Name Age of Onset    Heart disease Father  68 y/o     Hypertension Father  68 y/o     Diabetes Father  68 y/o     Dementia Mother      Allergies Mother         Social History     Socioeconomic History    Marital status: Single   Tobacco Use    Smoking status: Former     Current packs/day: 0.00     Average packs/day: 0.5 packs/day for 10.0 years (5.0 ttl pk-yrs)     Types: Cigarettes     Start date: 1987     Quit date: 1997     Years since quittin.0     Passive exposure: Never    Smokeless tobacco: Never   Substance and Sexual Activity    Alcohol use: Yes     Comment: Rare    Drug use: Never   Social History Narrative    Retired, 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 frequ exercise at wellness [facility] (). No kids. 1 cousin in contact.     Social Drivers of Health     Financial Resource Strain: Low Risk  (2024)    Overall Financial Resource Strain (CARDIA)     Difficulty of Paying Living Expenses: Not very hard   Food Insecurity: No Food Insecurity (2024)    Hunger Vital Sign     Worried About Running Out of Food in the Last Year: Never true     Ran Out of Food in the Last Year: Never true   Transportation Needs: No Transportation Needs (2024)    TRANSPORTATION NEEDS     Transportation : No   Physical Activity: Inactive (2024)    Exercise Vital Sign     Days of Exercise per Week: 0 days     Minutes of Exercise per Session: 0 min   Stress: No Stress Concern Present (2024)    [place] citizen [place] [employer] - Occupational Stress Questionnaire     Feeling of Stress : Not at all   Housing Stability:  Low Risk  (12/18/2024)    Housing Stability Vital Sign     Unable to Pay for Housing in the Last Year: No     Homeless in the Last Year: No       MEDICATIONS & ALLERGIES:     Current Outpatient Medications on File Prior to Visit   Medication Sig    acetaminophen (TYLENOL) 325 MG tablet Take 2 tablets (650 mg total) by mouth every 6 (six) hours as needed for Pain.    amLODIPine (NORVASC) 5 MG tablet TAKE 1 TABLET BY MOUTH EVERY DAY    azelastine (ASTELIN) 137 mcg (0.1 %) nasal spray INSTILL 1-2 SPRAYS BY NASAL ROUTE 2 (TWO) TIMES DAILY AS NEEDED FOR RHINITIS.    blood sugar diagnostic Strp To check BG 1 times daily, to use with insurance preferred meter    cetirizine (ZYRTEC) 10 MG tablet Take 10 mg by mouth once daily.    fluticasone propionate (FLONASE) 50 mcg/actuation nasal spray USE 1 SPRAY (50 MCG TOTAL) IN EACH NOSTRIL ONCE DAILY    INV atorvastatin tablet 40 mg/placebo Take one tablet by mouth each morning. For Investigational Use Only.    ketoconazole (NIZORAL) 2 % cream APPLY TO AREA OF RED, DRY SKIN ON BOTH FEET/HEELS ONE TO TWO TIMES DAILY FOR 2 TO 3 WEEKS.    lancets Misc To check BG 1 times daily, to use with insurance preferred meter    losartan (COZAAR) 25 MG tablet Take 1 tablet (25 mg total) by mouth once daily. HOLD UNTIL FOLLOW-UP WITH PRIMARY CARE PROVIDER    metoprolol succinate (TOPROL-XL) 25 MG 24 hr tablet Take 1 tablet (25 mg total) by mouth once daily.    omeprazole (PRILOSEC) 40 MG capsule TAKE 1 CAPSULE (40 MG TOTAL) BY MOUTH EVERY MORNING.    ONETOUCH VERIO FLEX METER Misc TO CHECK BLOOD GLUCOSE 1 TIME DAILY, TO USE WITH INSURANCE PREFERRED METER    senna-docusate 8.6-50 mg (PERICOLACE) 8.6-50 mg per tablet Take 1 tablet by mouth 2 (two) times daily as needed for Constipation.    tamsulosin (FLOMAX) 0.4 mg Cp24 Take 0.8 mg by mouth once daily.      No current facility-administered medications on file prior to visit.       Review of patient's allergies indicates:   Allergen Reactions     "Ace inhibitors Other (See Comments)     Cough    Chlorthalidone      Hyponatremia    Feathers Other (See Comments)    Mite extract Other (See Comments)     Nose gets congested       OBJECTIVE:   Vital Signs:  Vitals:    01/13/25 1034   BP: 118/60   Pulse: 87   SpO2: 98%   Weight: 68.4 kg (150 lb 12.7 oz)   Height: 5' 7" (1.702 m)       No results found for this or any previous visit (from the past 24 hours).      Physical Exam  Constitutional:       Appearance: Normal appearance.   Eyes:      General: No scleral icterus.  Cardiovascular:      Rate and Rhythm: Normal rate and regular rhythm.      Pulses: Normal pulses.      Heart sounds: Normal heart sounds.   Pulmonary:      Effort: Pulmonary effort is normal. No respiratory distress.      Breath sounds: Rhonchi present.   Abdominal:      General: Abdomen is flat.      Palpations: Abdomen is soft.   Musculoskeletal:      Right lower leg: No edema.      Left lower leg: No edema.      Comments: Able to get up out of chair and step onto examination table with reduced speed.   Skin:     General: Skin is warm and dry.   Neurological:      Mental Status: He is alert. Mental status is at baseline.         Laboratory  Lab Results   Component Value Date    WBC 5.44 01/09/2025    HGB 10.5 (L) 01/09/2025    HCT 32.1 (L) 01/09/2025    MCV 97 01/09/2025     01/09/2025     @[id number](GLU,NA,K,Cl,CO2,BUN,Creatinine,Calcium,MG)@  No results found for: "INR", "PROTIME"  Lab Results   Component Value Date    HGBA1C 6.1 (H) 12/31/2024     No results for input(s): "POCTGLUCOSE" in the last 72 hours.    Diagnostic Results:  CT scan and blood work reviewed    ASSESSMENT & PLAN:     Jd Gordillo" was seen today for annual exam.    Diagnoses and all orders for this visit:    Annual physical exam  -     pneumoc 20-genoveva conj-dip cr(PF) (PREVNAR-20 (PF)) injection Syrg 0.5 mL    Type 2 diabetes mellitus without complication, without long-term current use of insulin  Blood sugars stable " upon review of daily blood sugar record. No episodes of hypoglycemia. Appetite improving after hospitalization. Not taking anti glycemic medications.     Essential hypertension  BP in clinic 118/60. Has been off home losartan 100 mg since hospitalization    - DC losartan    - continue amlodipine, toprol. Will re-evaluate need for toprol at next clinic visit to reduce polypharmacy.     Anemia, unspecified type  Hb 10.7 on last lab likely a result of hospitalization had mild anemia prior to hospitalization. MCV wnl. Appetite improving will re-evaluate hb likely will improve with improving nutrition.     Gastroesophageal reflux disease, unspecified whether esophagitis present  Controlled   - continue omeprazole    Benign prostatic hyperplasia with urinary obstruction  Controlled, urinating without hesitancy.   - continue tamsulosin     Multifocal pneumonia  Faint rhonchi appreciated on exam. No dyspnea on exertion.     Debility  Ongoing but improving weakness 2/2 recent hospitalization. Will be seeing home health today. Able to perform daily activity of living. Able to get out of chair and onto exam table. Mild subjective balance disturbance likely 2/2 muscle weakness.    RTC in 6 months  Discussed with Dr. Hinkle  - staff attestation to follow    Franko Schilling MD  Internal Medicine PGY-1    I have reviewed and concur with the resident's history, physical, assessment, and plan.  I have personally interviewed and examined the patient at bedside.  See below addendum for my evaluation and additional findings.      Visit today included increased complexity associated with the care of the episodic problem  addressed and managing the longitudinal care of the patient due to the serious and/or complex managed problem(s) .    Follow up in about 6 months (around 7/13/2025).    Future Appointments   Date Time Provider Department Center   1/22/2025  8:00 AM Alexandra Chávez NP [facility]   2/14/2025 10:00 AM APPOINTMENT  [facility]   2/17/2025 10:00 AM Johny Celis MD [facility] HEM ONC [facility]   7/14/2025 10:20 AM Jabier Hinkle MD [facility]

## 2025-01-14 ENCOUNTER — TELEPHONE (OUTPATIENT)
Dept: HOME HEALTH SERVICES | Facility: CLINIC | Age: 80
End: 2025-01-14
Payer: MEDICARE

## 2025-01-14 NOTE — PROGRESS NOTES
C3 nurse spoke with Jd Dill III for a TCC post hospital discharge follow up call. The patient has a scheduled HOSFU appointment with Jabier Hinkle On 01/13/25 @ 1040.

## 2025-01-21 ENCOUNTER — TELEPHONE (OUTPATIENT)
Dept: HOME HEALTH SERVICES | Facility: CLINIC | Age: 80
End: 2025-01-21
Payer: MEDICARE

## 2025-01-23 ENCOUNTER — OUTPATIENT CASE MANAGEMENT (OUTPATIENT)
Dept: ADMINISTRATIVE | Facility: OTHER | Age: 80
End: 2025-01-23
Payer: MEDICARE

## 2025-01-29 ENCOUNTER — TELEPHONE (OUTPATIENT)
Dept: INTERNAL MEDICINE | Facility: CLINIC | Age: 80
End: 2025-01-29
Payer: MEDICARE

## 2025-01-29 NOTE — TELEPHONE ENCOUNTER
Hi, please call the patient back.  I think the pain in his feet is from neuropathy from diabetes.  Please ask how he would describe the pain.  Neuropathy typically feels like a burning or tingling sensation.    I can refer him to a neurologist or I can on my own offer him a medicine if this is indeed from neuropathy.    Let me know if patient has any questions.  Thank you, Jabier Hinkle

## 2025-01-29 NOTE — TELEPHONE ENCOUNTER
----- Message from Med Assistant Daniel sent at 1/29/2025  9:24 AM CST -----  Pt stated he has an increase problem painful on his feet.  ----- Message -----  From: Kellen Coats  Sent: 1/29/2025   9:21 AM CST  To: Arin Eugene Staff      Patient Returning Call        Who Called:pt  Does the patient know what this is regarding?:asking for a referral to see a neurologist  doctor  Would the patient rather a call back or a response via [employer]? call  Best Call Back Number:[phone]  Additional Information: call back

## 2025-01-29 NOTE — TELEPHONE ENCOUNTER
Called patient to follow up on foot pain. No answer.unable to leave a message.No voicemail set up .

## 2025-01-30 NOTE — TELEPHONE ENCOUNTER
I think the pain in his feet is from neuropathy from diabetes.  Please ask how he would describe the pain.  Neuropathy typically feels like a burning or tingling sensation.     I can refer him to a neurologist or I can on my own offer him a medicine if this is indeed from neuropathy.     Let me know if patient has any questions.  Thank you, Jabier Hinkle      Message sent via patient portal.

## 2025-02-04 ENCOUNTER — TELEPHONE (OUTPATIENT)
Dept: INTERNAL MEDICINE | Facility: CLINIC | Age: 80
End: 2025-02-04
Payer: MEDICARE

## 2025-02-04 DIAGNOSIS — E11.42 DIABETIC POLYNEUROPATHY ASSOCIATED WITH TYPE 2 DIABETES MELLITUS: Primary | ICD-10-CM

## 2025-02-04 NOTE — TELEPHONE ENCOUNTER
Hi, please contact the patient to assist in scheduling    Orders Placed This Encounter    Ambulatory referral/consult to Neurology       Thank you, Jabier Hinkle

## 2025-02-04 NOTE — TELEPHONE ENCOUNTER
----- Message from Edy Daniel sent at 1/30/2025  9:52 AM CST -----    ----- Message -----  From: Dion Ortega  Sent: 1/30/2025   9:45 AM CST  To: Arin Eugene Staff    Type:  Patient Requesting Referral    Who Called:  Jd  Does the patient already have the specialty appointment scheduled?: no  If yes, what is the date of that appointment?: n/a  Referral to What Specialty:  neurology   Reason for Referral:  neuropathy in feet & legs   Does the patient want the referral with a specific physician?: no  [facility]  Patient Requesting a Response?: yes  Would the patient rather a call back or a response via [employer]? Call back   Best Call Back Number:  [phone]  Additional Information:

## 2025-02-14 ENCOUNTER — TELEPHONE (OUTPATIENT)
Dept: HEMATOLOGY/ONCOLOGY | Facility: CLINIC | Age: 80
End: 2025-02-14
Payer: MEDICARE

## 2025-02-14 ENCOUNTER — LAB VISIT (OUTPATIENT)
Dept: LAB | Facility: HOSPITAL | Age: 80
End: 2025-02-14
Attending: INTERNAL MEDICINE
Payer: MEDICARE

## 2025-02-14 DIAGNOSIS — D64.9 ANEMIA, UNSPECIFIED TYPE: ICD-10-CM

## 2025-02-14 DIAGNOSIS — D69.6 THROMBOCYTOPENIA: ICD-10-CM

## 2025-02-14 LAB
BASOPHILS # BLD AUTO: 0.04 K/UL (ref 0–0.2)
BASOPHILS NFR BLD: 0.6 % (ref 0–1.9)
DIFFERENTIAL METHOD BLD: ABNORMAL
EOSINOPHIL # BLD AUTO: 0.6 K/UL (ref 0–0.5)
EOSINOPHIL NFR BLD: 7.7 % (ref 0–8)
ERYTHROCYTE [DISTWIDTH] IN BLOOD BY AUTOMATED COUNT: 13.5 % (ref 11.5–14.5)
HCT VFR BLD AUTO: 41.4 % (ref 40–54)
HGB BLD-MCNC: 14.3 G/DL (ref 14–18)
IMM GRANULOCYTES # BLD AUTO: 0.02 K/UL (ref 0–0.04)
IMM GRANULOCYTES NFR BLD AUTO: 0.3 % (ref 0–0.5)
LYMPHOCYTES # BLD AUTO: 2.5 K/UL (ref 1–4.8)
LYMPHOCYTES NFR BLD: 34.9 % (ref 18–48)
MCH RBC QN AUTO: 31.4 PG (ref 27–31)
MCHC RBC AUTO-ENTMCNC: 34.5 G/DL (ref 32–36)
MCV RBC AUTO: 91 FL (ref 82–98)
MONOCYTES # BLD AUTO: 0.6 K/UL (ref 0.3–1)
MONOCYTES NFR BLD: 8.8 % (ref 4–15)
NEUTROPHILS # BLD AUTO: 3.4 K/UL (ref 1.8–7.7)
NEUTROPHILS NFR BLD: 47.7 % (ref 38–73)
NRBC BLD-RTO: 0 /100 WBC
PLATELET # BLD AUTO: 184 K/UL (ref 150–450)
PMV BLD AUTO: 8.8 FL (ref 9.2–12.9)
RBC # BLD AUTO: 4.55 M/UL (ref 4.6–6.2)
WBC # BLD AUTO: 7.17 K/UL (ref 3.9–12.7)

## 2025-02-14 PROCEDURE — 85025 COMPLETE CBC W/AUTO DIFF WBC: CPT | Performed by: INTERNAL MEDICINE

## 2025-02-14 PROCEDURE — 36415 COLL VENOUS BLD VENIPUNCTURE: CPT | Performed by: INTERNAL MEDICINE

## 2025-02-17 ENCOUNTER — OFFICE VISIT (OUTPATIENT)
Dept: HEMATOLOGY/ONCOLOGY | Facility: CLINIC | Age: 80
End: 2025-02-17
Payer: MEDICARE

## 2025-02-17 VITALS
DIASTOLIC BLOOD PRESSURE: 61 MMHG | RESPIRATION RATE: 15 BRPM | WEIGHT: 153.69 LBS | HEART RATE: 76 BPM | BODY MASS INDEX: 24.12 KG/M2 | HEIGHT: 67 IN | OXYGEN SATURATION: 98 % | SYSTOLIC BLOOD PRESSURE: 138 MMHG

## 2025-02-17 DIAGNOSIS — Z86.2 HISTORY OF ANEMIA: Primary | ICD-10-CM

## 2025-02-17 DIAGNOSIS — Z86.2 HISTORY OF THROMBOCYTOPENIA: ICD-10-CM

## 2025-02-17 PROCEDURE — 99213 OFFICE O/P EST LOW 20 MIN: CPT | Mod: PBBFAC,PO | Performed by: INTERNAL MEDICINE

## 2025-02-17 NOTE — PROGRESS NOTES
"PATIENT: Jd Dill III  MRN: [id number]  DATE: 2/17/2025      Subjective:     Chief complaint:  Chief Complaint   Patient presents with    Anemia    Follow-up       HEME History:  Mr. Dill is a 79 y.o. male who presents for evaluation and management of cytopenias.  80yo man referred for anemia and thrombocytopenia. He denies bleeding though he does note easy bruising which he attributes to aging. Explicitly denies hematuria and melena/hematochezia. Denies SOB/CP.   He had a colonoscopy in 2023 at .   He reports that energy levels seem to be declining with age, noting that he gets tired more easily than he used to.   Denies any history of GI surgeries. Denies diarrhea. Denies history of liver disease. Denies unintentional weight loss and loss of appetite. Though he does report some intentional weight loss related to dietary changes for diabetes.  He had a CBC last month showing hemoglobin 12.6 with MCV 96. Platelets 129.   He denies that he is on any multivitamins.         Interval History: Mr. Dill returns for follow up.   He has been using colace for constipation. He says "it seems to be working," but stops working if he stops taking it. He was hospitalized around [place] for pneumonia and was having some fatigue for a while after that but state now he is "feeling fine," and denies any SOB. He states his energy level is "okay," but reports energy and stamina still not back to where they were prior to hospitalization.       Review of Systems   A comprehensive review of systems was performed; pertinent positives and negatives are noted in the HPI.        Objective:      Vitals:   Vitals:    02/17/25 1006   BP: 138/61   BP Location: Left arm   Patient Position: Sitting   Pulse: 76   Resp: 15   SpO2: 98%   Weight: 69.7 kg (153 lb 10.6 oz)   Height: 5' 7" (1.702 m)     BMI: Body mass index is 24.07 kg/m².      Physical Exam:   Physical Exam  Vitals and nursing note reviewed.   Constitutional:       " General: He is not in acute distress.     Appearance: Normal appearance. He is normal weight. He is not toxic-appearing.   HENT:      Head: Normocephalic and atraumatic.   Eyes:      General: No scleral icterus.     Conjunctiva/sclera: Conjunctivae normal.   Cardiovascular:      Rate and Rhythm: Normal rate.   Pulmonary:      Effort: Pulmonary effort is normal. No respiratory distress.   Abdominal:      General: There is no distension.   Musculoskeletal:         General: No deformity.      Cervical back: Neck supple.   Skin:     Coloration: Skin is not jaundiced.      Findings: No erythema.   Neurological:      General: No focal deficit present.      Mental Status: He is alert and oriented to person, place, and time. Mental status is at baseline.   Psychiatric:         Mood and Affect: Mood normal.         Behavior: Behavior normal.         Thought Content: Thought content normal.           Laboratory Data:  WBC   Date Value Ref Range Status   02/14/2025 7.17 3.90 - 12.70 K/uL Final     Hemoglobin   Date Value Ref Range Status   02/14/2025 14.3 14.0 - 18.0 g/dL Final     Hematocrit   Date Value Ref Range Status   02/14/2025 41.4 40.0 - 54.0 % Final     Platelets   Date Value Ref Range Status   02/14/2025 184 150 - 450 K/uL Final     Gran # (ANC)   Date Value Ref Range Status   02/14/2025 3.4 1.8 - 7.7 K/uL Final     Gran %   Date Value Ref Range Status   02/14/2025 47.7 38.0 - 73.0 % Final     Results    Collected Updated Procedure    02/14/2025 1007 02/14/2025 1013 CBC auto differential [[id number]]    (Abnormal)   Blood    Component Value Units   WBC 7.17 K/uL   RBC 4.55 Low  M/uL   Hemoglobin 14.3 g/dL   Hematocrit 41.4 %   MCV 91 fL   MCH 31.4 High  pg   MCHC 34.5 g/dL   RDW 13.5 %   Platelets 184 K/uL   MPV 8.8 Low  fL   Immature Granulocytes 0.3 %   Gran # (ANC) 3.4 K/uL   Immature Grans (Abs) 0.02  K/uL   Lymph # 2.5 K/uL   Mono # 0.6 K/uL   Eos # 0.6 High  K/uL   Baso # 0.04 K/uL   nRBC 0 /100 WBC   Gran %  47.7 %   Lymph % 34.9 %   Mono % 8.8 %   Eosinophil % 7.7 %   Basophil % 0.6 %   Differential Method Automated           01/09/2025 0355 01/09/2025 0605 CBC auto differential [[id number]]   (Abnormal)   Blood    Component Value Units   WBC 5.44 K/uL   RBC 3.31 Low  M/uL   Hemoglobin 10.5 Low  g/dL   Hematocrit 32.1 Low  %   MCV 97 fL   MCH 31.7 High  pg   MCHC 32.7 g/dL   RDW 15.1 High  %   Platelets 182 K/uL   MPV 10.7 fL   Immature Granulocytes 0.2 %   Gran # (ANC) 2.8 K/uL   Immature Grans (Abs) 0.01  K/uL   Lymph # 1.3 K/uL   Mono # 0.9 K/uL   Eos # 0.3 K/uL   Baso # 0.02 K/uL   nRBC 0 /100 WBC   Gran % 51.9 %   Lymph % 24.1 %   Mono % 17.1 High  %   Eosinophil % 6.3 %   Basophil % 0.4 %   Differential Method Automated           01/06/2025 0445 01/06/2025 0608 CBC auto differential [[id number]]   (Abnormal)   Blood    Component Value Units   WBC 5.03 K/uL   RBC 3.08 Low  M/uL   Hemoglobin 9.8 Low  g/dL   Hematocrit 29.2 Low  %   MCV 95 fL   MCH 31.8 High  pg   MCHC 33.6 g/dL   RDW 14.9 High  %   Platelets 179 K/uL   MPV 10.0 fL   Immature Granulocytes 0.2 %   Gran # (ANC) 2.4 K/uL   Immature Grans (Abs) 0.01  K/uL   Lymph # 1.3 K/uL   Mono # 0.9 K/uL   Eos # 0.4 K/uL   Baso # 0.02 K/uL   nRBC 0 /100 WBC   Gran % 48.3 %   Lymph % 25.6 %   Mono % 17.9 High  %   Eosinophil % 7.6 %   Basophil % 0.4 %   Differential Method Automated           08/19/2024 1100 08/22/2024 0756 STFR [[id number]]   Blood    Component Value Units   Sol. Transferrin Receptor 2.0  mg/L          08/19/2024 1100 08/19/2024 1358 Iron and TIBC [[id number]]    Blood    Component Value Units   Iron 120 ug/dL   Transferrin 250 mg/dL   TIBC 370 ug/dL   Saturated Iron 32 %          08/19/2024 1100 08/19/2024 1233 FERRITIN [[id number]]    Blood    Component Value Units   Ferritin 265 ng/mL          08/19/2024 1100 08/19/2024 1121 CBC auto differential [[id number]]    (Abnormal)   Blood    Component Value Units   WBC 6.47 K/uL   RBC 4.27 Low  M/uL    Hemoglobin 13.6 Low  g/dL   Hematocrit 39.8 Low  %   MCV 93 fL   MCH 31.9 High  pg   MCHC 34.2 g/dL   RDW 13.4 %   Platelets 151 K/uL   MPV 9.8 fL   Immature Granulocytes 0.3 %   Gran # (ANC) 3.9 K/uL   Immature Grans (Abs) 0.02  K/uL   Lymph # 1.4 K/uL   Mono # 1.1 High  K/uL   Eos # 0.1 K/uL   Baso # 0.01 K/uL   nRBC 0 /100 WBC   Gran % 60.5 %   Lymph % 21.3 %   Mono % 16.8 High  %   Eosinophil % 0.9 %   Basophil % 0.2 %   Differential Method Automated           08/19/2024 1100 08/19/2024 1453 FOLATE [[id number]]    Blood    Component Value Units   Folate 13.9 ng/mL          08/19/2024 1100 08/19/2024 1453 VITAMIN B12 [[id number]]    Blood    Component Value Units   Vitamin B-12 652 pg/mL                Chemistry        Component Value Date/Time     01/09/2025 0355    K 4.5 01/09/2025 0355     01/09/2025 0355    CO2 25 01/09/2025 0355    BUN 16 01/09/2025 0355    CREATININE 0.8 01/09/2025 0355     01/09/2025 0355        Component Value Date/Time    CALCIUM 9.1 01/09/2025 0355    ALKPHOS 84 01/09/2025 0355    AST 17 01/09/2025 0355    ALT 20 01/09/2025 0355    BILITOT 0.3 01/09/2025 0355    ESTGFRAFRICA >60.0 12/20/2021 0707    EGFRNONAA >60.0 12/20/2021 0707              Assessment/Plan:     1. History of anemia    2. History of thrombocytopenia      Anemia resolved based on most recent labs. Given historical trends, I would think it appropriate to repeat labs in 9 months to ensure stability.     Med and Orders:  Orders Placed This Encounter    CBC auto differential       Follow Up:  Follow up in about 9 months (around 11/17/2025).      Above care plan was discussed with patient and all questions were addressed to their expressed satisfaction.       Johny Celis MD, FACP  Hematology & Medical Oncology  [facility]

## 2025-02-24 DIAGNOSIS — Z00.00 ENCOUNTER FOR MEDICARE ANNUAL WELLNESS EXAM: ICD-10-CM

## 2025-02-25 DIAGNOSIS — I10 ESSENTIAL HYPERTENSION: ICD-10-CM

## 2025-02-25 RX ORDER — METOPROLOL SUCCINATE 25 MG/1
25 TABLET, EXTENDED RELEASE ORAL
Qty: 90 TABLET | Refills: 3 | Status: SHIPPED | OUTPATIENT
Start: 2025-02-25

## 2025-02-25 RX ORDER — AMLODIPINE BESYLATE 5 MG/1
5 TABLET ORAL
Qty: 90 TABLET | Refills: 3 | Status: SHIPPED | OUTPATIENT
Start: 2025-02-25

## 2025-02-25 NOTE — TELEPHONE ENCOUNTER
Refill Decision Note   Jd Fuentes  is requesting a refill authorization.  Brief Assessment and Rationale for Refill:  Approve     Medication Therapy Plan:        Pharmacist review requested: Yes   Comments:     Note composed:10:46 AM 02/25/2025

## 2025-02-25 NOTE — TELEPHONE ENCOUNTER
Refill Routing Note   Medication(s) are not appropriate for processing by [facility] for the following reason(s):        Drug-disease interaction    [facility] action(s):  Defer      Medication Therapy Plan: Drug-Disease: amLODIPine and Liver dysfunction    Pharmacist review requested: Yes     Appointments  past 12m or future 3m with PCP    Date Provider   Last Visit   1/13/2025 Jabier Hinkle MD   Next Visit   7/14/2025 Jabier Hinkle MD   ED visits in past 90 days: 0        Note composed:10:40 AM 02/25/2025

## 2025-02-25 NOTE — TELEPHONE ENCOUNTER
No care due was identified.  [facility] Embedded Care Due Messages. Reference number: [id number].   2/25/2025 10:23:00 AM CST

## 2025-02-25 NOTE — TELEPHONE ENCOUNTER
No care due was identified.  [facility] Embedded Care Due Messages. Reference number: [id number].   2/25/2025 9:36:14 AM CST

## 2025-02-25 NOTE — TELEPHONE ENCOUNTER
Refill Decision Note   Jd Dill  is requesting a refill authorization.  Brief Assessment and Rationale for Refill:  Approve     Medication Therapy Plan:        Comments:     Note composed:10:18 AM 02/25/2025

## 2025-02-25 NOTE — TELEPHONE ENCOUNTER
----- Message from Carmen sent at 2/25/2025  9:38 AM CST -----  [facility]   Requesting an RX refill or new RX.Is this a refill or new RX: Refill RX name and strength (copy/paste from chart):  Metoprolol Is this a 30 day or 90 day RX: Pharmacy name and phone # (copy/paste from chart):  [facility] [address] The doctors have asked that we provide their patients with the following 2 reminders -- prescription refills can take up to 72 hours, and a friendly reminder that in the future you can use your [facility] account to request refills:

## 2025-02-27 NOTE — PLAN OF CARE
Problem: Occupational Therapy  Goal: Occupational Therapy Goal  Description: Goals to be met by: 1/26/2025     Patient will increase functional independence with ADLs by performing:  UBD: with MI seated in w/c- Ongoing  LBD: with CGA-SBA seated in w/c using AE as needed- Ongoing  FWM: SBA seated in w/c using AE as needed- Ongoing  Bathing: SPV seated on bath bench  Toileting: with SPV during hygiene mgmt- Met 1/2/2025  Pt will complete 10 mins on UE ergometer seated in w/c  Pt will complete 5-8 mins standing with RW/SBA during dynamic standing activity  Outcome: Progressing      [facility]      Central Scheduling for appointments: [phone]    Nurse Questions: Vannesa Souza RN Care Coordinator:  [phone]    After hours urgent matters that cannot wait until the next business day:  [phone].  Ask for the on-call pediatric doctor for the specialty you are calling for be paged.      Prescription Renewals:  Please call your pharmacy first.  Your pharmacy must fax requests to [phone].  Please allow 2-3 days for prescriptions to be authorized.    If your physician has ordered a CT or MRI, you may schedule this test by calling [facility] in [place] at [phone].    **If your child is having a sedated procedure, they will need a history and physical done at their Primary Care Provider within 30 days of the procedure.  If your child was seen by the ordering provider in our office within 30 days of the procedure, their visit summary will work for the H&P unless they inform you otherwise.  If you have any questions, please call the RN Care Coordinator.**    **If your child is going to be admitted to [facility] for testing or a procedure, they will need a PCR COVID test within 4 days of admission.  A [facility] [place] scheduling team should be contacting you to schedule.  If you do not hear from them, you can call [phone] to schedule**    Instructions from Dr. Kennedy:     Call the [facility] clinic in [place] to schedule your video EEG; the number for [facility] scheduling is [phone].   Return to clinic in 1 year or sooner as needed

## 2025-03-05 ENCOUNTER — EXTERNAL HOME HEALTH (OUTPATIENT)
Dept: HOME HEALTH SERVICES | Facility: HOSPITAL | Age: 80
End: 2025-03-05
Payer: MEDICARE

## 2025-03-28 ENCOUNTER — OFFICE VISIT (OUTPATIENT)
Dept: NEUROLOGY | Facility: CLINIC | Age: 80
End: 2025-03-28
Payer: MEDICARE

## 2025-03-28 VITALS
WEIGHT: 152 LBS | SYSTOLIC BLOOD PRESSURE: 132 MMHG | DIASTOLIC BLOOD PRESSURE: 74 MMHG | BODY MASS INDEX: 23.81 KG/M2 | HEART RATE: 78 BPM

## 2025-03-28 DIAGNOSIS — E11.42 DIABETIC POLYNEUROPATHY ASSOCIATED WITH TYPE 2 DIABETES MELLITUS: ICD-10-CM

## 2025-03-28 PROCEDURE — 99999 PR PBB SHADOW E&M-EST. PATIENT-LVL III: CPT | Mod: PBBFAC,,, | Performed by: STUDENT IN AN ORGANIZED HEALTH CARE EDUCATION/TRAINING PROGRAM

## 2025-03-28 PROCEDURE — 99213 OFFICE O/P EST LOW 20 MIN: CPT | Mod: PBBFAC,PN | Performed by: STUDENT IN AN ORGANIZED HEALTH CARE EDUCATION/TRAINING PROGRAM

## 2025-03-28 NOTE — PROGRESS NOTES
GENERAL NEUROLOGY VISIT   Date: 3/28/25  Patient Name: Jd Dill III   MRN: [id number]   PCP: Jabier Hinkle  Referring Provider: Self, Aaareferral    History:    Patient is a 80 y.o.  male who was referred for peripheral neuropathy.   PMH of T2DM , diabetic neuropathy, multifocal pneumonia, essential hypertension, BPH, history of kidney stone, hypophosphatemia, for disease.     Patient presented with 1 year of bilateral numbness/tingling.  Reported mild symptoms does not bothering him much interfere with his gait due to sensation.  Last A1c 6.1, reported diabetes treated by diet, symptom has been stable for the past year, never been on nerve pain medication.  Medication list reviewed.       Past Medical History:   Diagnosis Date    Allergy     AR (allergic rhinitis)     Basal cell carcinoma of ear     Right Ear    BPH (benign prostatic hyperplasia)     DDD (degenerative disc disease), lumbar     Degenerative disc disease     Gastroesophageal reflux disease 3/11/2019    Hypertension     Kidney stone     Undescended testicle     Right Orchiectomy       Past Surgical History:   Procedure Laterality Date    BASAL CELL CARCINOMA EXCISION      ESOPHAGOGASTRODUODENOSCOPY N/A 10/5/2022    Procedure: EGD (ESOPHAGOGASTRODUODENOSCOPY);  Surgeon: Franco Campoverde MD;  Location: [facility];  Service: Endoscopy;  Laterality: N/A;    HEMORRHOID SURGERY      HERNIA REPAIR      PILONIDAL CYST DRAINAGE      Right Orchiectomy      TRANSURETHRAL RESECTION OF PROSTATE         Social History[1]    Review of patient's allergies indicates:   Allergen Reactions    Ace inhibitors Other (See Comments)     Cough    Chlorthalidone      Hyponatremia    Feathers Other (See Comments)    Mite extract Other (See Comments)     Nose gets congested       Medications Ordered Prior to Encounter[2]     Family history:  Family History   Problem Relation Name Age of Onset    Heart disease Father  70 y/o     Hypertension Father  70 y/o      Diabetes Father  70 y/o     Dementia Mother      Allergies Mother         Review Of Systems     Constitutional Negative for fevers, chills, weigh loss   HEENT Negative for hearing loss, dysphagia, sore throat, diplopia   Respiratory Negative for shortness of breath, cough    Cardiovascular Negative for chest pain, palpitations    Gastrointestinal Negative for constipation, diarrhea, early satiety    Skin Negative for rashes    Musculoskeletal Negative for joint pains, myalgias.   Neurological See Above    Psychological Negative for sleep disturbances.    Heme/Lymph Negative for easy bruising, easy bleeding    Endocrine Negative for polyuria, polydypsia     Physical Exam:     Physical Examination    Vitals: There were no vitals taken for this visit.      NEURO    Neurological Exam  Mental Status:  Alert and oriented to person, place and time, recent and remote memory intact, normal attention span and fund of knowledge; Speech is spontaneous and fluent     Cranial Nerve exam:  II: Visual fields full to confrontation; Pupils equal round and reactive about 3mm  III, IV, VI: Extraoccular movements intact with no nystagmus  V: Sensation in V1, V2, V3 intact to light-touch bilaterally,  VII:  No facial weakness,  VIII: Hearing grossly intact  IX,X: palate elevation symmetric   XI: SCM & Trapezius normal,  XII: tongue midline, normal morphology, tongue movement normal     Motor Exam: No involuntary movement. Normal tone and bulk in all 4 extremities  Strength: 5/5 throughout   Reflexes: 2+ throughout    Sensory Exam: Intact touch, pain and vibration in all 4 limbs  , subjective numbness feeling not reflected on exam  Cerebellar Sign: Normal Finger-to-nose, bilaterally.  Gait:  Normal steady physiologic gait with normal arm swinging on both side    Interval/Previous Work-up:   Reviewed      Assessment and Plan:   Jd Dill III is a 80 y.o. male   -symptoms of mild peripheral neuropathy in both feet inpatient with  history of diabetes stable for 1 year.  Neurology exam unremarkable.      Problem List Items Addressed This Visit          Neuro    Diabetic polyneuropathy associated with type 2 diabetes mellitus     -- Symptom is mild and stable, gabapentin offered but patient refused, he does not feel his symptoms significant to the point that he needs medications.   -- There are no need for further neuropathy workup giving mild symptom and stable for 1 year  -- Discussed that we can re-evaluate if symptoms are worsen to discuss further tests or treatment options        RTC as needed    Time spent on this encounter: 40 minutes. This includes face to face time and non-face to face time preparing to see the patient (eg, review of tests), obtaining and/or reviewing separately obtained history, documenting clinical information in the electronic or other health record, independently interpreting results and communicating results to the patient/family/caregiver, or care coordinator.       A dictation device was used to produce this document. Use of such devices sometimes results in grammatical errors or replacement of words that sound similarly.     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 frequ exercise at wellness [facility] (). No kids. 1 cousin in contact.     Social Drivers of Health     Financial Resource Strain: Low Risk  (2024)    Overall Financial Resource  Strain (CARDIA)     Difficulty of Paying Living Expenses: Not very hard   Food Insecurity: No Food Insecurity (12/18/2024)    Hunger Vital Sign     Worried About Running Out of Food in the Last Year: Never true     Ran Out of Food in the Last Year: Never true   Transportation Needs: No Transportation Needs (12/18/2024)    TRANSPORTATION NEEDS     Transportation : No   Physical Activity: Inactive (12/18/2024)    Exercise Vital Sign     Days of Exercise per Week: 0 days     Minutes of Exercise per Session: 0 min   Stress: No Stress Concern Present (12/18/2024)    [employer] - Occupational Stress Questionnaire     Feeling of Stress : Not at all   Housing Stability: Unknown (12/18/2024)    Housing Stability Vital Sign     Unable to Pay for Housing in the Last Year: No     Homeless in the Last Year: No   [2]   Current Outpatient Medications on File Prior to Visit   Medication Sig Dispense Refill    amLODIPine (NORVASC) 5 MG tablet TAKE 1 TABLET BY MOUTH EVERY DAY 90 tablet 3    azelastine (ASTELIN) 137 mcg (0.1 %) nasal spray INSTILL 1-2 SPRAYS BY NASAL ROUTE 2 (TWO) TIMES DAILY AS NEEDED FOR RHINITIS. 30 mL 11    blood sugar diagnostic Strp To check BG 1 times daily, to use with insurance preferred meter 100 each 11    cetirizine (ZYRTEC) 10 MG tablet Take 10 mg by mouth once daily.      INV atorvastatin tablet 40 mg/placebo Take one tablet by mouth each morning. For Investigational Use Only. 90 tablet 3    lancets Misc To check BG 1 times daily, to use with insurance preferred meter 100 each 11    metoprolol succinate (TOPROL-XL) 25 MG 24 hr tablet TAKE 1 TABLET BY MOUTH EVERY DAY 90 tablet 3    omeprazole (PRILOSEC) 40 MG capsule TAKE 1 CAPSULE (40 MG TOTAL) BY MOUTH EVERY MORNING. 90 capsule 3    senna-docusate 8.6-50 mg (PERICOLACE) 8.6-50 mg per tablet Take 1 tablet by mouth 2 (two) times daily as needed for Constipation.      tamsulosin (FLOMAX) 0.4 mg Cp24 Take 0.8 mg by mouth once  daily.        No current facility-administered medications on file prior to visit.

## 2025-05-05 ENCOUNTER — TELEPHONE (OUTPATIENT)
Dept: INTERNAL MEDICINE | Facility: CLINIC | Age: 80
End: 2025-05-05
Payer: MEDICARE

## 2025-05-05 DIAGNOSIS — E11.9 TYPE 2 DIABETES MELLITUS WITHOUT COMPLICATION, WITHOUT LONG-TERM CURRENT USE OF INSULIN: ICD-10-CM

## 2025-05-05 NOTE — TELEPHONE ENCOUNTER
----- Message from Med Assistant Daniel sent at 5/5/2025  9:42 AM CDT -----  Contact: pharm @ [phone]    ----- Message -----  From: Nestor Duckworth  Sent: 5/5/2025   9:39 AM CDT  To: Arin Eugene Staff    Requesting an RX refill or new RX. Is this a refill or new RX: new RX name and strength (copy/paste from chart):  blood sugar diagnostic Strp Is this a 30 day or 90 day RX: Pharmacy name and phone # (copy/paste from chart):  [id number] [address] [facility] [place] [facility] [address] [facility] [facility] [id number]: [phone] Fax: [phone] doctors have asked that we provide their patients with the following 2 reminders -- prescription refills can take up to 72 hours, and a friendly reminder that in the future you can use your [facility] account to request refills: yes

## 2025-05-07 ENCOUNTER — EXTERNAL HOME HEALTH (OUTPATIENT)
Dept: HOME HEALTH SERVICES | Facility: HOSPITAL | Age: 80
End: 2025-05-07
Payer: MEDICARE

## 2025-05-20 ENCOUNTER — HOSPITAL ENCOUNTER (OUTPATIENT)
Dept: RADIOLOGY | Facility: HOSPITAL | Age: 80
Discharge: HOME | End: 2025-05-20
Attending: ALLERGY & IMMUNOLOGY
Payer: MEDICARE

## 2025-05-20 ENCOUNTER — OFFICE VISIT (OUTPATIENT)
Dept: ALLERGY | Facility: CLINIC | Age: 80
End: 2025-05-20
Payer: MEDICARE

## 2025-05-20 VITALS — WEIGHT: 155.63 LBS | BODY MASS INDEX: 24.43 KG/M2 | HEIGHT: 67 IN

## 2025-05-20 DIAGNOSIS — J18.9 PNEUMONIA DUE TO INFECTIOUS ORGANISM, UNSPECIFIED LATERALITY, UNSPECIFIED PART OF LUNG: Primary | ICD-10-CM

## 2025-05-20 DIAGNOSIS — J30.89 ALLERGIC RHINITIS DUE TO DUST MITE: ICD-10-CM

## 2025-05-20 DIAGNOSIS — J18.9 PNEUMONIA DUE TO INFECTIOUS ORGANISM, UNSPECIFIED LATERALITY, UNSPECIFIED PART OF LUNG: ICD-10-CM

## 2025-05-20 DIAGNOSIS — K21.9 GASTROESOPHAGEAL REFLUX DISEASE, UNSPECIFIED WHETHER ESOPHAGITIS PRESENT: ICD-10-CM

## 2025-05-20 DIAGNOSIS — K21.9 LARYNGOPHARYNGEAL REFLUX: ICD-10-CM

## 2025-05-20 DIAGNOSIS — J31.0 CHRONIC RHINITIS: ICD-10-CM

## 2025-05-20 DIAGNOSIS — H10.13 ALLERGIC CONJUNCTIVITIS, BILATERAL: ICD-10-CM

## 2025-05-20 PROCEDURE — 99215 OFFICE O/P EST HI 40 MIN: CPT | Mod: S$PBB,,, | Performed by: ALLERGY & IMMUNOLOGY

## 2025-05-20 PROCEDURE — 99213 OFFICE O/P EST LOW 20 MIN: CPT | Mod: PBBFAC,25 | Performed by: ALLERGY & IMMUNOLOGY

## 2025-05-20 PROCEDURE — 71046 X-RAY EXAM CHEST 2 VIEWS: CPT | Mod: TC,FY

## 2025-05-20 PROCEDURE — 71046 X-RAY EXAM CHEST 2 VIEWS: CPT | Mod: 26,,, | Performed by: RADIOLOGY

## 2025-05-20 PROCEDURE — 99999 PR PBB SHADOW E&M-EST. PATIENT-LVL III: CPT | Mod: PBBFAC,,, | Performed by: ALLERGY & IMMUNOLOGY

## 2025-05-20 RX ORDER — MOMETASONE FUROATE 1 MG/ML
LOTION TOPICAL
COMMUNITY
Start: 2025-04-01

## 2025-05-20 RX ORDER — FLUTICASONE PROPIONATE 50 MCG
2 SPRAY, SUSPENSION (ML) NASAL DAILY
Qty: 48 ML | Refills: 3 | Status: SHIPPED | OUTPATIENT
Start: 2025-05-20

## 2025-05-20 RX ORDER — AZELASTINE 1 MG/ML
2 SPRAY, METERED NASAL 2 TIMES DAILY
Qty: 90 ML | Refills: 3 | Status: SHIPPED | OUTPATIENT
Start: 2025-05-20

## 2025-05-20 NOTE — PROGRESS NOTES
"Jd Dill (Fred) returns to clinic today for allergic rhinitis and a recent hospitalization for pneumonia.    He was last seen March 7, 2024.  He is here alone.    He reports ongoing nasal symptoms that follow a daily pattern. After waking, he has sneezing which triggers clear mucus production, leading to frequent nose blowing for about 1-2 hours each morning. He uses toilet paper to blow his nose, which he describes as messy. The rest of the day is symptom-free after the morning episode.     He initially developed an upper respiratory infection in mid December and was evaluated in urgent care on December 13, 2024.  He was treated symptomatically but continued to feel bad and returned to urgent care on December 17, 2024.    He continued to have a cough but had developed shortness of breath, wheezing, and tachycardia.    He was transferred to the emergency department and admitted.  He was diagnosed with multifocal pneumonia complicated by hyponatremia and liver dysfunction. He was started on ceftriaxone and azithromycin.  He was also treated with IV fluids and his liver dysfunction and hyponatremia resolved.    He was then transferred to the SNF on 12/26/2024 as he lives alone.  He was discharged on January 10, 2025 and has done well since then.    He stopped taking Flonase some months ago but recently found the medication and plans to restart it.  He continues to use Azelastine, administering 2 sprays twice daily at night, but is unsure of its effectiveness as he does not have symptoms at night.     He also takes omeprazole for reflux, currently 1 pill daily, though he thought he was previously taking 2.    Physical Exam    General: Well-developed. Well-nourished. No acute distress.  Head: No sinus tenderness.  Eyes: No bulbar or palpebral conjunctival injection.  Ears: EACs clear. TMs clear. No pre-auricular nodes.  Nose: Nasal mucosa pink. No apparent septal deviation. Turbinates without significant edema. No " visible polyps or masses.  Teeth/Gums: No bleeding noted.  Oropharynx: No exudates.  Neck: Supple. No thyromegaly. No cervical lymphadenopathy.  Respiratory/Chest: Effort good. Auscultation clear bilaterally.  Skin: Good turgor. No urticaria or angioedema.  Neuro/Psych: Oriented x 3.    Laboratory 08/28/2018:  IgE level:  167.  ImmunoCAP:  Class II:  DM f.  Class I:  DM pt.      Portable chest x-ray 12/17/2024:  Scattered low-grade patchy airspace opacities and a suspected nodular lesion as above. Recommend further evaluation with CT chest.     Chest CT 12/17/2024:  Findings of multifocal pneumonia with reactive mediastinal lymph nodes. Follow-up to resolution is suggested.    Chest x-ray portable 12/29/2024:  Improvement in bibasilar opacities.     Assessment:  1. Allergic rhinitis, not controlled.  2. Allergic conjunctivitis, controlled.  3. Mild GERD, controlled.  4. LPR, controlled.  5. Nasal septal deviation.   6. Hypertension on amlodipine, losartan, and metoprolol.  7. History of nephrolithiasis.  8. BPH.  9. Multifocal pneumonia with hospitalization December 17, 2024.    Recommendations:  1. Continue house dust mite avoidance procedures.  2. Continue azelastine 1 to 2 sprays each nostril b.i.d. p.r.n. rhinitis.  He will increase this to twice a day and follow his symptoms.  3. Restart futicasone two sprays each nostril daily.   4. Zyrtec daily as needed.  5. Discussed sinus rinses in the future.  6. Lab work as ordered.   7. Chest x-ray today.  8. Return to clinic in 1 to 2 weeks or sooner if needed.    Patient education January 13, 2021:  Discussed long-term safety profile of PPIs.    Patient education September 4, 2018:  Allergic mechanisms and treatment options were reviewed in detail.  House dust mite avoidance was reviewed.   LPR and web site were reviewed.    This includes face to face time and non-face to face time preparing to see the patient (eg, review of tests), obtaining and/or reviewing  separately obtained history, documenting clinical information in the electronic or other health record, independently interpreting results and communicating results to the patient/family/caregiver, or care coordinator.  45 minutes.              Assessment & Plan    PLAN SUMMARY:  1. Order chest XR to confirm pneumonia resolution  2. Order lab work including allergy testing  3. Change Azelastine: 2 sprays in each nostril in the morning  4. Continue omeprazole: 1 tablet daily  5. Consider adding Flonase if symptoms persist  6. Suggest nasal rinse for symptom management if needed    PLAN NOTE:  1. Evaluated ongoing nasal symptoms and current medication regimen.  2. Considered adjusting timing of Azelastine administration to address morning symptoms.  3. Recommend stepwise approach: change Azelastine timing, potentially add Flonase, then consider nasal rinse if symptoms persist.  4. Discussed the potential benefits of using a nasal rinse for symptom management.  5. Changed Azelastine: Take 2 sprays in each nostril first thing in the morning instead of at night.  6. Assessed post-pneumonia recovery and explained that pneumonia can develop as a complication of a cold.  7. Ordered chest XR to ensure complete resolution of pneumonia.  8. Continued omeprazole: 1 tablet daily.  9. Ordered lab work including allergy testing to investigate potential underlying causes of pneumonia and check allergy status.         This note was generated with the assistance of ambient listening technology. Verbal consent was obtained by the patient and accompanying visitor(s) for the recording of patient appointment to facilitate this note. I attest to having reviewed and edited the generated note for accuracy, though some syntax or spelling errors may persist. Please contact the author of this note for any clarification.

## 2025-06-04 ENCOUNTER — OFFICE VISIT (OUTPATIENT)
Dept: ALLERGY | Facility: CLINIC | Age: 80
End: 2025-06-04
Payer: MEDICARE

## 2025-06-04 VITALS — WEIGHT: 156.06 LBS | BODY MASS INDEX: 24.49 KG/M2 | HEIGHT: 67 IN

## 2025-06-04 DIAGNOSIS — I10 ESSENTIAL HYPERTENSION: ICD-10-CM

## 2025-06-04 DIAGNOSIS — H10.13 ALLERGIC CONJUNCTIVITIS, BILATERAL: ICD-10-CM

## 2025-06-04 DIAGNOSIS — K21.9 LARYNGOPHARYNGEAL REFLUX: ICD-10-CM

## 2025-06-04 DIAGNOSIS — J30.9 ALLERGIC RHINITIS, UNSPECIFIED SEASONALITY, UNSPECIFIED TRIGGER: Primary | ICD-10-CM

## 2025-06-04 DIAGNOSIS — K21.9 GASTROESOPHAGEAL REFLUX DISEASE, UNSPECIFIED WHETHER ESOPHAGITIS PRESENT: ICD-10-CM

## 2025-06-04 PROCEDURE — 99214 OFFICE O/P EST MOD 30 MIN: CPT | Mod: S$PBB,,, | Performed by: ALLERGY & IMMUNOLOGY

## 2025-06-04 PROCEDURE — 99213 OFFICE O/P EST LOW 20 MIN: CPT | Mod: PBBFAC | Performed by: ALLERGY & IMMUNOLOGY

## 2025-06-04 PROCEDURE — 99999 PR PBB SHADOW E&M-EST. PATIENT-LVL III: CPT | Mod: PBBFAC,,, | Performed by: ALLERGY & IMMUNOLOGY

## 2025-07-14 ENCOUNTER — LAB VISIT (OUTPATIENT)
Dept: LAB | Facility: HOSPITAL | Age: 80
End: 2025-07-14
Attending: INTERNAL MEDICINE
Payer: MEDICARE

## 2025-07-14 ENCOUNTER — OFFICE VISIT (OUTPATIENT)
Dept: INTERNAL MEDICINE | Facility: CLINIC | Age: 80
End: 2025-07-14
Payer: MEDICARE

## 2025-07-14 VITALS
WEIGHT: 156.75 LBS | DIASTOLIC BLOOD PRESSURE: 60 MMHG | HEART RATE: 70 BPM | OXYGEN SATURATION: 98 % | BODY MASS INDEX: 24.6 KG/M2 | SYSTOLIC BLOOD PRESSURE: 102 MMHG | HEIGHT: 67 IN

## 2025-07-14 DIAGNOSIS — I10 ESSENTIAL HYPERTENSION: ICD-10-CM

## 2025-07-14 DIAGNOSIS — E11.9 TYPE 2 DIABETES MELLITUS WITHOUT COMPLICATION, WITHOUT LONG-TERM CURRENT USE OF INSULIN: Primary | ICD-10-CM

## 2025-07-14 DIAGNOSIS — E11.42 DIABETIC POLYNEUROPATHY ASSOCIATED WITH TYPE 2 DIABETES MELLITUS: ICD-10-CM

## 2025-07-14 DIAGNOSIS — E11.9 TYPE 2 DIABETES MELLITUS WITHOUT COMPLICATION, WITHOUT LONG-TERM CURRENT USE OF INSULIN: ICD-10-CM

## 2025-07-14 LAB
ALBUMIN/CREAT UR: NORMAL
CREAT UR-MCNC: 52 MG/DL (ref 23–375)
EAG (OHS): 120 MG/DL (ref 68–131)
HBA1C MFR BLD: 5.8 % (ref 4–5.6)
MICROALBUMIN UR-MCNC: <5 UG/ML (ref ?–5000)

## 2025-07-14 PROCEDURE — 99999 PR PBB SHADOW E&M-EST. PATIENT-LVL IV: CPT | Mod: PBBFAC,,, | Performed by: INTERNAL MEDICINE

## 2025-07-14 PROCEDURE — 82570 ASSAY OF URINE CREATININE: CPT | Performed by: INTERNAL MEDICINE

## 2025-07-14 PROCEDURE — 99214 OFFICE O/P EST MOD 30 MIN: CPT | Mod: S$PBB,,, | Performed by: INTERNAL MEDICINE

## 2025-07-14 PROCEDURE — 36415 COLL VENOUS BLD VENIPUNCTURE: CPT

## 2025-07-14 PROCEDURE — 99214 OFFICE O/P EST MOD 30 MIN: CPT | Mod: PBBFAC | Performed by: INTERNAL MEDICINE

## 2025-07-14 PROCEDURE — 83036 HEMOGLOBIN GLYCOSYLATED A1C: CPT

## 2025-07-14 PROCEDURE — G2211 COMPLEX E/M VISIT ADD ON: HCPCS | Mod: ,,, | Performed by: INTERNAL MEDICINE

## 2025-07-14 NOTE — PROGRESS NOTES
Subjective     Patient ID: Jd Dill III is a 80 y.o. male.    Chief Complaint: Follow-up (6 months)             History of Present Illness    CHIEF COMPLAINT:  Jd presents today for follow up    NEUROPATHY:  He reports onset of neuropathy symptoms approximately one week after initiating vitamin B6 supplementation recommended by a retired registered nurse friend. Symptoms initially manifested as altered sensation in toes. He notes partial improvement in sensation; however, balance issues persist and continue to be a concern with ongoing challenges maintaining balance despite some sensory improvement.    RECENT MEDICAL HISTORY:  He reports recent history of pneumonia, now fully recovered. He expresses relief at having overcome the illness and currently feels well.    WEIGHT MANAGEMENT:  His current weight is 156 lbs, up from 150 lbs a year ago. He experienced significant weight loss during hospital stay and subsequently regained weight after discharge by increasing food intake.    EXERCISE:  [place] walks at the mall five days per week for exercise, maintaining a consistent walking routine in a climate-controlled environment. He is not currently engaging in exercise at the wellness center.    CURRENT MEDICATIONS:  He takes Colace for bowel movements and Amlodipine 5 mg for blood pressure. He acknowledges understanding that Amlodipine may cause leg swelling.    REVIEW OF SYSTEMS:  He denies shortness of breath. He reports bowel movements are managed with Colace. He reports minimal urinary symptoms, describing them as minimal.      ROS:  Constitutional: negative activity change, negative unexpected weight change  HENT: negative trouble swallowing  Eyes: negative discharge, negative visual disturbance  Respiratory: negative chest tightness, negative wheezing, negative shortness of breath  Cardiovascular: negative chest pain, negative palpitations, +lower extremity swelling  Gastrointestinal: negative blood in stool,  negative constipation, negative diarrhea, negative vomiting, +change in bowel habits  Endocrine: negative polydipsia, negative polyuria  Genitourinary: negative difficulty urinating, negative dysuria, negative hematuria  Musculoskeletal: negative arthralgias, negative joint swelling, negative neck pain  Neurological: negative weakness, negative headaches, +decreased sensation in extremities, +balance issues  Psychiatric/Behavioral: negative confusion, negative dysphoric mood       Vit B6 has helped with neuropathy        HPI  Review of Systems     Objective     Physical Exam  Vitals reviewed.   Constitutional:       General: He is not in acute distress.     Appearance: Normal appearance. He is well-developed. He is not ill-appearing, toxic-appearing or diaphoretic.   HENT:      Head: Normocephalic and atraumatic.   Eyes:      General: No scleral icterus.  Neck:      Thyroid: No thyromegaly.   Cardiovascular:      Heart sounds: No murmur heard.     No friction rub. No gallop.   Pulmonary:      Effort: Pulmonary effort is normal. No respiratory distress.      Breath sounds: No wheezing or rales.   Abdominal:      General: Bowel sounds are normal. There is no distension.      Palpations: Abdomen is soft. There is no mass.      Tenderness: There is no abdominal tenderness. There is no guarding or rebound.   Musculoskeletal:         General: Normal range of motion.      Cervical back: Normal range of motion.      Comments: Has leg leg discrepancy     Lymphadenopathy:      Cervical: No cervical adenopathy.   Skin:     Findings: No lesion.   Neurological:      Mental Status: He is alert and oriented to person, place, and time.      Comments: + Romberg testing   Psychiatric:         Mood and Affect: Mood normal.         Behavior: Behavior normal.         Thought Content: Thought content normal.            Assessment and Plan     1. Type 2 diabetes mellitus without complication, without long-term current use of insulin  -      Hemoglobin A1C; Future; Expected date: 07/14/2025  -     Microalbumin/Creatinine Ratio, Urine    2. Essential hypertension    3. Diabetic polyneuropathy associated with type 2 diabetes mellitus        Assessment & Plan    POLYNEUROPATHY:  - Jd's neuropathy symptoms have improved after starting vitamin B6 supplementation, as recommended by a retired R.E.N.  - Jd can now feel their toes.  - Will continue vitamin B6 for ongoing management.    UNSTEADINESS ON FEET:  - Balance issues persist despite the improvement in neuropathy symptoms.  - No change noted in patient's stability.    TYPE 2 DIABETES MELLITUS:  - Ordered A1C test today for diabetes monitoring (no fasting required) and annual urine test for comprehensive diabetes management.    HYPERTENSION:  - Blood pressure remains well-controlled and within normal limits even after discontinuing one antihypertensive medication.  - Jd has tolerated this medication reduction without adverse effects.    LOCALIZED EDEMA:  - Mild leg swelling observed during exam, potentially related to amlodipine therapy.  - Will continue amlodipine as benefits outweigh side effects.    CONSTIPATION:  - Jd continues to use Colace which effectively manages bowel movements.  - Will continue current regimen.    HISTORY OF PNEUMONIA:  - Jd has fully recovered from pneumonia and is currently doing well.    GENERAL HEALTH AND FOLLOW-UP:  - Jd reports walking at the mall 5 days per week, which is encouraged to continue.  - Weight slightly increased from a year ago.  - Recent lab results from May allergy doctor visit were normal for kidney, liver, and blood count tests.  - Follow up in 6 months with NP Hillary Capone, then in 1 year unless issues arise sooner.  - Jd advised to contact office before appointments to inquire about any specific lab work needed.                    Health Maintenance         Date Due Completion Date    Hemoglobin A1c 06/30/2025 12/31/2024    Diabetes  Urine Screening 07/09/2025 7/9/2024    Diabetic Eye Exam 09/26/2025 9/26/2024    Influenza Vaccine (1) 09/01/2025 9/30/2024    Lipid Panel 12/30/2025 12/30/2024    Colonoscopy 03/29/2028 3/29/2023    Override on 4/19/2018: Done (dr ilan green endo)    Override on 1/2/2013: Done    TETANUS VACCINE 03/19/2030 3/19/2020          Follow up in about 1 year (around 7/14/2026)    Ms Hillary Perry in 6 months, Arin in 12 months.    Visit today included increased complexity associated with the care of the episodic problem  addressed and managing the longitudinal care of the patient due to the serious and/or complex managed problem(s) .    Future Appointments   Date Time Provider Department Center   7/29/2025  9:30 AM TUSHAR Aldana III, MD [facility] [place]   11/14/2025  9:30 AM [facility] [place]   11/17/2025 10:30 AM Johny Celis MD [facility]   1/14/2026 10:30 AM Hillary Perry FNP [facility] [place] [facility]   7/16/2026  9:40 AM Jabier Hinkle MD [facility] [place] [facility]         This note was generated with the assistance of ambient listening technology. Verbal consent was obtained by the patient and accompanying visitor(s) for the recording of patient appointment to facilitate this note. I attest to having reviewed and edited the generated note for accuracy, though some syntax or spelling errors may persist. Please contact the author of this note for any clarification.

## 2025-07-29 ENCOUNTER — OFFICE VISIT (OUTPATIENT)
Dept: ALLERGY | Facility: CLINIC | Age: 80
End: 2025-07-29
Payer: MEDICARE

## 2025-07-29 VITALS
BODY MASS INDEX: 24.54 KG/M2 | SYSTOLIC BLOOD PRESSURE: 124 MMHG | HEART RATE: 65 BPM | DIASTOLIC BLOOD PRESSURE: 58 MMHG | OXYGEN SATURATION: 96 % | HEIGHT: 67 IN

## 2025-07-29 DIAGNOSIS — H10.13 ALLERGIC CONJUNCTIVITIS, BILATERAL: ICD-10-CM

## 2025-07-29 DIAGNOSIS — J30.9 ALLERGIC RHINITIS, UNSPECIFIED SEASONALITY, UNSPECIFIED TRIGGER: Primary | ICD-10-CM

## 2025-07-29 DIAGNOSIS — K21.9 LARYNGOPHARYNGEAL REFLUX: ICD-10-CM

## 2025-07-29 DIAGNOSIS — I10 ESSENTIAL HYPERTENSION: ICD-10-CM

## 2025-07-29 PROCEDURE — 99999 PR PBB SHADOW E&M-EST. PATIENT-LVL II: CPT | Mod: PBBFAC,,, | Performed by: ALLERGY & IMMUNOLOGY

## 2025-07-29 PROCEDURE — 99212 OFFICE O/P EST SF 10 MIN: CPT | Mod: PBBFAC | Performed by: ALLERGY & IMMUNOLOGY

## 2025-07-29 PROCEDURE — 99214 OFFICE O/P EST MOD 30 MIN: CPT | Mod: S$PBB,,, | Performed by: ALLERGY & IMMUNOLOGY

## 2025-07-29 NOTE — PROGRESS NOTES
"Jd Dill (Fred) returns to clinic today for continued evaluation of allergic rhinitis and a recent hospitalization for pneumonia.    He is here alone. He was last seen 2025.      His symptoms are well-controlled with his current medication regimen. He uses two nasal sprays: Flonase (2 sprays each nostril daily) and Azelastine. He adjusted his Azelastine dosage to 1 spray each nostril in the morning and 2 sprays each nostril at night due to drowsiness, which has been effective in controlling symptoms without daytime drowsiness.     He also takes Cetirizine (Zyrtec) 10mg tablet daily. With this combination of medications, symptoms are mostly controlled.     He did try new sinus rinse but did not think it was any different than just blowing his nose.    He is having numbness in his feet, for which he has been taking vitamin B6 on the recommendation of a friend. He reports some improvement in sensation in his toes.    Jd denies having pneumonia or any other infections.    Physical examination:  General: Well-developed. Well-nourished. No acute distress.  Skin: No urticaria or angioedema.  Neuro/Psych: Oriented x 3.    Laboratory 2018:  IgE level:  167.  ImmunoCAP:  Class II:  DM f.  Class I:  DM pt.      Portable chest x-ray 2024:  Scattered low-grade patchy airspace opacities and a suspected nodular lesion as above. Recommend further evaluation with CT chest.     Chest CT 2024:  Findings of multifocal pneumonia with reactive mediastinal lymph nodes. Follow-up to resolution is suggested.    Chest x-ray portable 2024:  Improvement in bibasilar opacities.     Laboratory 2025:   IgE level:  97.  ImmunoCAP:   Class II:  Dust mites.   Class 0/ I: Cedar (0.22), ragweed (0.19).  IgA:  239.   Ig.   IgM:  76.  Pneumococcal titers: Protective.  CBC: Normal.   CMP:  Sodium 134.    Chest x-ray 2025:  Cardiac size is normal. Interstitial markings of the lungs are increased but " the lungs have improved since our study in December and I see no evidence of active pneumonia at this time.     Assessment:  1. Allergic rhinitis, now controlled.  2. Allergic conjunctivitis, controlled.  3. Mild GERD, controlled.  4. LPR, controlled.  5. Nasal septal deviation.   6. Hypertension on amlodipine, losartan, and metoprolol.  7. History of nephrolithiasis.  8. BPH.  9. Multifocal pneumonia with hospitalization December 17, 2024, now resolved.    Recommendations:  1. Continue house dust mite avoidance procedures.  2. Continue azelastine 1 to 2 sprays each nostril b.i.d. p.r.n. rhinitis.   3. Continue futicasone two sprays each nostril daily.   4. Zyrtec daily as needed.  5. Return to clinic in 6 months or sooner if needed.    Patient education January 13, 2021:  Discussed long-term safety profile of PPIs.    Patient education September 4, 2018:  Allergic mechanisms and treatment options were reviewed in detail.  House dust mite avoidance was reviewed.   LPR and web site were reviewed.        This note was generated with the assistance of ambient listening technology. Verbal consent was obtained by the patient and accompanying visitor(s) for the recording of patient appointment to facilitate this note. I attest to having reviewed and edited the generated note for accuracy, though some syntax or spelling errors may persist. Please contact the author of this note for any clarification.